# Patient Record
Sex: FEMALE | Race: WHITE | NOT HISPANIC OR LATINO | Employment: OTHER | URBAN - METROPOLITAN AREA
[De-identification: names, ages, dates, MRNs, and addresses within clinical notes are randomized per-mention and may not be internally consistent; named-entity substitution may affect disease eponyms.]

---

## 2017-03-20 ENCOUNTER — HOSPITAL ENCOUNTER (OUTPATIENT)
Dept: MAMMOGRAPHY | Facility: CLINIC | Age: 82
Discharge: HOME | End: 2017-03-20
Attending: INTERNAL MEDICINE | Admitting: INTERNAL MEDICINE
Payer: MEDICARE

## 2017-03-20 DIAGNOSIS — Z12.31 VISIT FOR SCREENING MAMMOGRAM: ICD-10-CM

## 2017-03-20 PROCEDURE — 77063 BREAST TOMOSYNTHESIS BI: CPT

## 2017-03-20 PROCEDURE — G0202 SCR MAMMO BI INCL CAD: HCPCS

## 2017-05-04 DIAGNOSIS — E78.00 HYPERCHOLESTEROLEMIA: ICD-10-CM

## 2017-05-04 DIAGNOSIS — I10 ESSENTIAL HYPERTENSION, BENIGN: ICD-10-CM

## 2017-05-04 RX ORDER — METOPROLOL SUCCINATE 25 MG/1
12.5 TABLET, EXTENDED RELEASE ORAL DAILY
Qty: 45 TABLET | Refills: 0 | Status: SHIPPED | OUTPATIENT
Start: 2017-05-04 | End: 2017-05-31

## 2017-05-04 RX ORDER — SIMVASTATIN 10 MG
10 TABLET ORAL AT BEDTIME
Qty: 90 TABLET | Refills: 0 | Status: SHIPPED | OUTPATIENT
Start: 2017-05-04 | End: 2017-05-31

## 2017-05-04 NOTE — TELEPHONE ENCOUNTER
Metoprolol      Last Written Prescription Date: 11/08/16  Last Fill Quantity: 45, # refills: 1    Last Office Visit with [facility] or  Health prescribing provider:  12/13/16  OB/GYN   Future Office Visit:    Next 5 appointments (look out 90 days)     May 31, 2017  8:00 AM CDT   PHYSICAL with Magalis Woodward MD   [facility]    [address]  [facility] [address]   [phone]                    BP Readings from Last 3 Encounters:   12/13/16 136/70   11/21/16 130/68   07/05/16 120/75     Simvastatin     Last Written Prescription Date: 12/31/16  Last Fill Quantity: 90, # refills: 0  Last Office Visit with [facility] or  Health prescribing provider: 12/13/16 OB/GYN  Next 5 appointments (look out 90 days)     May 31, 2017  8:00 AM CDT   PHYSICAL with Magalis Woodward MD   [facility]    [address]  [facility] [address]   [phone]                   Lab Results   Component Value Date    CHOL 188 05/27/2016     Lab Results   Component Value Date    HDL 79 05/27/2016     Lab Results   Component Value Date    LDL 87 05/27/2016     Lab Results   Component Value Date    TRIG 110 05/27/2016     Lab Results   Component Value Date    CHOLHDLRATIO 2.2 05/26/2015       Labs showing if normal/abnormal  Lab Results   Component Value Date    CHOL 188 05/27/2016    TRIG 110 05/27/2016    HDL 79 05/27/2016    LDL 87 05/27/2016    VLDL 15 05/26/2015    CHOLHDLRATIO 2.2 05/26/2015

## 2017-05-31 ENCOUNTER — OFFICE VISIT (OUTPATIENT)
Dept: INTERNAL MEDICINE | Facility: CLINIC | Age: 82
End: 2017-05-31
Payer: MEDICARE

## 2017-05-31 VITALS
DIASTOLIC BLOOD PRESSURE: 84 MMHG | OXYGEN SATURATION: 94 % | HEART RATE: 94 BPM | SYSTOLIC BLOOD PRESSURE: 138 MMHG | TEMPERATURE: 98.3 F | WEIGHT: 167.6 LBS | BODY MASS INDEX: 27.92 KG/M2 | HEIGHT: 65 IN

## 2017-05-31 DIAGNOSIS — N64.4 NIPPLE TENDERNESS: ICD-10-CM

## 2017-05-31 DIAGNOSIS — I10 BENIGN HYPERTENSION: ICD-10-CM

## 2017-05-31 DIAGNOSIS — N18.30 CKD (CHRONIC KIDNEY DISEASE) STAGE 3, GFR 30-59 ML/MIN (H): ICD-10-CM

## 2017-05-31 DIAGNOSIS — E78.00 HYPERCHOLESTEROLEMIA: ICD-10-CM

## 2017-05-31 DIAGNOSIS — Z00.00 ENCOUNTER FOR ROUTINE ADULT HEALTH EXAMINATION WITHOUT ABNORMAL FINDINGS: Primary | ICD-10-CM

## 2017-05-31 DIAGNOSIS — E03.9 HYPOTHYROIDISM, UNSPECIFIED TYPE: ICD-10-CM

## 2017-05-31 DIAGNOSIS — E03.8 OTHER SPECIFIED HYPOTHYROIDISM: ICD-10-CM

## 2017-05-31 DIAGNOSIS — I10 ESSENTIAL HYPERTENSION, BENIGN: ICD-10-CM

## 2017-05-31 DIAGNOSIS — Z78.0 MENOPAUSE: ICD-10-CM

## 2017-05-31 LAB
ALBUMIN SERPL-MCNC: 3.7 G/DL (ref 3.4–5)
ALP SERPL-CCNC: 63 U/L (ref 40–150)
ALT SERPL W P-5'-P-CCNC: 23 U/L (ref 0–50)
ANION GAP SERPL CALCULATED.3IONS-SCNC: 10 MMOL/L (ref 3–14)
AST SERPL W P-5'-P-CCNC: 21 U/L (ref 0–45)
BASOPHILS # BLD AUTO: 0 10E9/L (ref 0–0.2)
BASOPHILS NFR BLD AUTO: 0.4 %
BILIRUB SERPL-MCNC: 0.8 MG/DL (ref 0.2–1.3)
BUN SERPL-MCNC: 23 MG/DL (ref 7–30)
CALCIUM SERPL-MCNC: 9 MG/DL (ref 8.5–10.1)
CHLORIDE SERPL-SCNC: 102 MMOL/L (ref 94–109)
CHOLEST SERPL-MCNC: 201 MG/DL
CO2 SERPL-SCNC: 28 MMOL/L (ref 20–32)
CREAT SERPL-MCNC: 0.87 MG/DL (ref 0.52–1.04)
DIFFERENTIAL METHOD BLD: NORMAL
EOSINOPHIL # BLD AUTO: 0.3 10E9/L (ref 0–0.7)
EOSINOPHIL NFR BLD AUTO: 3.9 %
ERYTHROCYTE [DISTWIDTH] IN BLOOD BY AUTOMATED COUNT: 13.9 % (ref 10–15)
GFR SERPL CREATININE-BSD FRML MDRD: 63 ML/MIN/1.7M2
GLUCOSE SERPL-MCNC: 90 MG/DL (ref 70–99)
HCT VFR BLD AUTO: 43.8 % (ref 35–47)
HDLC SERPL-MCNC: 84 MG/DL
HGB BLD-MCNC: 14.5 G/DL (ref 11.7–15.7)
LDLC SERPL CALC-MCNC: 101 MG/DL
LYMPHOCYTES # BLD AUTO: 1.7 10E9/L (ref 0.8–5.3)
LYMPHOCYTES NFR BLD AUTO: 23.8 %
MCH RBC QN AUTO: 30.5 PG (ref 26.5–33)
MCHC RBC AUTO-ENTMCNC: 33.1 G/DL (ref 31.5–36.5)
MCV RBC AUTO: 92 FL (ref 78–100)
MONOCYTES # BLD AUTO: 0.8 10E9/L (ref 0–1.3)
MONOCYTES NFR BLD AUTO: 10.6 %
NEUTROPHILS # BLD AUTO: 4.4 10E9/L (ref 1.6–8.3)
NEUTROPHILS NFR BLD AUTO: 61.3 %
NONHDLC SERPL-MCNC: 117 MG/DL
PLATELET # BLD AUTO: 277 10E9/L (ref 150–450)
POTASSIUM SERPL-SCNC: 4 MMOL/L (ref 3.4–5.3)
PROT SERPL-MCNC: 7.9 G/DL (ref 6.8–8.8)
RBC # BLD AUTO: 4.75 10E12/L (ref 3.8–5.2)
SODIUM SERPL-SCNC: 140 MMOL/L (ref 133–144)
T4 FREE SERPL-MCNC: 1.46 NG/DL (ref 0.76–1.46)
TRIGL SERPL-MCNC: 81 MG/DL
TSH SERPL DL<=0.005 MIU/L-ACNC: 4.16 MU/L (ref 0.4–4)
WBC # BLD AUTO: 7.2 10E9/L (ref 4–11)

## 2017-05-31 PROCEDURE — 82043 UR ALBUMIN QUANTITATIVE: CPT | Performed by: INTERNAL MEDICINE

## 2017-05-31 PROCEDURE — G0439 PPPS, SUBSEQ VISIT: HCPCS | Performed by: INTERNAL MEDICINE

## 2017-05-31 PROCEDURE — 80061 LIPID PANEL: CPT | Performed by: INTERNAL MEDICINE

## 2017-05-31 PROCEDURE — 85025 COMPLETE CBC W/AUTO DIFF WBC: CPT | Performed by: INTERNAL MEDICINE

## 2017-05-31 PROCEDURE — 80053 COMPREHEN METABOLIC PANEL: CPT | Performed by: INTERNAL MEDICINE

## 2017-05-31 PROCEDURE — 84443 ASSAY THYROID STIM HORMONE: CPT | Performed by: INTERNAL MEDICINE

## 2017-05-31 PROCEDURE — 84439 ASSAY OF FREE THYROXINE: CPT | Performed by: INTERNAL MEDICINE

## 2017-05-31 PROCEDURE — 36415 COLL VENOUS BLD VENIPUNCTURE: CPT | Performed by: INTERNAL MEDICINE

## 2017-05-31 RX ORDER — SIMVASTATIN 10 MG
10 TABLET ORAL AT BEDTIME
Qty: 90 TABLET | Refills: 3 | Status: SHIPPED | OUTPATIENT
Start: 2017-05-31 | End: 2018-06-04

## 2017-05-31 RX ORDER — METOPROLOL SUCCINATE 25 MG/1
12.5 TABLET, EXTENDED RELEASE ORAL DAILY
Qty: 45 TABLET | Refills: 3 | Status: SHIPPED | OUTPATIENT
Start: 2017-05-31 | End: 2018-06-04

## 2017-05-31 RX ORDER — LISINOPRIL 10 MG/1
10 TABLET ORAL DAILY
Qty: 90 TABLET | Refills: 1 | Status: SHIPPED | OUTPATIENT
Start: 2017-05-31 | End: 2017-12-20

## 2017-05-31 RX ORDER — LEVOTHYROXINE SODIUM 88 UG/1
88 TABLET ORAL DAILY
Qty: 90 TABLET | Refills: 1 | Status: SHIPPED | OUTPATIENT
Start: 2017-05-31 | End: 2017-12-20

## 2017-05-31 NOTE — MR AVS SNAPSHOT
After Visit Summary   5/31/2017    Bushra Harmon    MRN: [id number]           Patient Information     Date Of Birth          1935        Visit Information        Provider Department      5/31/2017 8:00 AM Magalis Woodward MD [facility]        Today's Diagnoses     Encounter for routine adult health examination without abnormal findings    -  1    Benign hypertension        Other specified hypothyroidism        Menopause        CKD (chronic kidney disease) stage 3, GFR 30-59 ml/min        Nipple tenderness           Follow-ups after your visit        Additional Services     GENERAL SURG ADULT REFERRAL       Your provider has referred you to: [facility]: [facility] [place] [phone]   [email or web address]    Please be aware that coverage of these services is subject to the terms and limitations of your health insurance plan.  Call member services at your health plan with any benefit or coverage questions.      Please bring the following with you to your appointment:    (1) Any X-Rays, CTs or MRIs which have been performed.  Contact the facility where they were done to arrange for  prior to your scheduled appointment.   (2) List of current medications   (3) This referral request   (4) Any documents/labs given to you for this referral                  Future tests that were ordered for you today     Open Future Orders        Priority Expected Expires Ordered    Albumin Random Urine Quantitative Routine 5/31/2017 3/2/2018 5/31/2017    DX Hip/Pelvis/Spine Routine  5/31/2018 5/31/2017            Who to contact     If you have questions or need follow up information about today's clinic visit or your schedule please contact [facility] directly at [phone].  Normal or non-critical lab and imaging results will be communicated to you by MyChart, letter or phone within 4 business days after the clinic has  "received the results. If you do not hear from us within 7 days, please contact the clinic through Latio or phone. If you have a critical or abnormal lab result, we will notify you by phone as soon as possible.  Submit refill requests through Latio or call your pharmacy and they will forward the refill request to us. Please allow 3 business days for your refill to be completed.          Additional Information About Your Visit        [employer] Information     Latio gives you secure access to your electronic health record. If you see a primary care provider, you can also send messages to your care team and make appointments. If you have questions, please call your primary care clinic.  If you do not have a primary care provider, please call [phone] and they will assist you.        Care EveryWhere ID     This is your Care EveryWhere ID. This could be used by other organizations to access your [place] medical records  [id number]        Your Vitals Were     Pulse Temperature Height Last Period Pulse Oximetry BMI (Body Mass Index)    94 98.3  F (36.8  C) (Oral) 5' 4.5\" (1.638 m) 02/26/2003 94% 28.32 kg/m2       Blood Pressure from Last 3 Encounters:   05/31/17 138/84   12/13/16 136/70   11/21/16 130/68    Weight from Last 3 Encounters:   05/31/17 167 lb 9.6 oz (76 kg)   12/13/16 169 lb (76.7 kg)   11/21/16 169 lb (76.7 kg)              We Performed the Following     CBC with platelets differential     Comprehensive metabolic panel     GENERAL SURG ADULT REFERRAL     Lipid panel reflex to direct LDL     TSH with free T4 reflex          Today's Medication Changes          These changes are accurate as of: 5/31/17  8:54 AM.  If you have any questions, ask your nurse or doctor.               These medicines have changed or have updated prescriptions.        Dose/Directions    Acidophilus Tabs   This may have changed:  See the new instructions.   Used for:  Routine general medical examination at a [facility] " facility        120mg TAB EVERY OTHER DAY   Quantity:  90 Tab   Refills:  3       lisinopril 10 MG tablet   Commonly known as:  PRINIVIL/ZESTRIL   This may have changed:  Another medication with the same name was removed. Continue taking this medication, and follow the directions you see here.   Used for:  Benign hypertension   Changed by:  Magalis Woodward MD        Dose:  10 mg   Take 1 tablet (10 mg) by mouth daily   Quantity:  90 tablet   Refills:  0                Primary Care Provider Office Phone # Fax #    Magalis Woodward [phone]       [facility] [address] [facility] [address]        Thank you!     Thank you for choosing [facility]  for your care. Our goal is always to provide you with excellent care. Hearing back from our patients is one way we can continue to improve our services. Please take a few minutes to complete the written survey that you may receive in the mail after your visit with us. Thank you!             Your Updated Medication List - Protect others around you: Learn how to safely use, store and throw away your medicines at www.disposemymeds.org.          This list is accurate as of: 5/31/17  8:54 AM.  Always use your most recent med list.                   Brand Name Dispense Instructions for use    Acidophilus Tabs     90 Tab    120mg TAB EVERY OTHER DAY       acyclovir 200 MG capsule    ZOVIRAX    25 capsule    Take 1 capsule (200 mg) by mouth 5 times daily       aspirin 81 MG tablet     100    ONE DAILY       biotin 2.5 mg/mL Susp      Take 5,000 mg by mouth daily       calcium citrate-vitamin D 315-200 MG-UNIT Tabs per tablet    CITRACAL    90 Tab    2 TABLETS DAILY       Glucosamine HCl 1000 MG Tabs     90 Tab    2 TABLETS DAILY       levothyroxine 88 MCG tablet    SYNTHROID/LEVOTHROID    90 tablet    Take 1 tablet (88 mcg) by mouth daily       lisinopril 10 MG tablet    PRINIVIL/ZESTRIL    90 tablet    Take 1 tablet (10  mg) by mouth daily       Lutein 6 MG Tabs      Every other day       metoprolol 25 MG 24 hr tablet    TOPROL-XL    45 tablet    Take 0.5 tablets (12.5 mg) by mouth daily       MULTIVITAMIN TABS   OR      1 daily       * simvastatin 10 MG tablet    ZOCOR    90 tablet    Take 1 tablet (10 mg) by mouth At Bedtime       * simvastatin 10 MG tablet    ZOCOR    90 tablet    Take 1 tablet (10 mg) by mouth At Bedtime       SOY CARE MENOPAUSE PO      Every other day       * Notice:  This list has 2 medication(s) that are the same as other medications prescribed for you. Read the directions carefully, and ask your doctor or other care provider to review them with you.

## 2017-05-31 NOTE — PROGRESS NOTES
SUBJECTIVE:                                                            Bushra Harmon is a 81 year old female who presents for Preventive Visit.      Are you in the first 12 months of your Medicare coverage?  No    Physical   Annual:     Getting at least 3 servings of Calcium per day::  Yes    Bi-annual eye exam::  Yes    Dental care twice a year::  Yes    Sleep apnea or symptoms of sleep apnea::  None    Diet::  Regular (no restrictions)    Frequency of exercise::  2-3 days/week    Duration of exercise::  15-30 minutes    Medication side effects::  None    Additional concerns today::  YES      COGNITIVE SCREEN  1) Repeat 3 items (Banana, Sunrise, Chair)    2) Clock draw: NORMAL  3) 3 item recall: Recalls 3 objects  Results: 3 items recalled: COGNITIVE IMPAIRMENT LESS LIKELY    Mini-CogTM Copyright S Roya. Licensed by the author for use in [employer]; reprinted with permission (roberto@[facility]). All rights reserved.      May 21st right nipple tenderness.    Reviewed and updated as needed this visit by clinical staff  Tobacco  Allergies  Meds  Med Hx  Surg Hx  Fam Hx  Soc Hx        Reviewed and updated as needed this visit by Provider        Social History   Substance Use Topics     Smoking status: Former Smoker     Packs/day: 0.50     Years: 45.00     Quit date: 5/1/2001     Smokeless tobacco: Never Used     Alcohol use Yes      Comment: 1 drink per day       The patient does not drink >3 drinks per day nor >7 drinks per week.        PROBLEMS TO ADD ON...    Today's PHQ-2 Score:   PHQ-2 ( 1999 [employer]) 5/28/2017   Q1: Little interest or pleasure in doing things Not at all   Q2: Feeling down, depressed or hopeless Not at all   PHQ-2 Score 0       Do you feel safe in your environment - Yes    Do you have a Health Care Directive?: Yes: Advance Directive has been received and scanned.    Current providers sharing in care for this patient include:   Patient Care Team:  Magalis Woodward MD as PCP -  "General (Internal Medicine)      Hearing impairment: No    Ability to successfully perform activities of daily living: Yes, no assistance needed     Fall risk:  Fallen 2 or more times in the past year?: No  Any fall with injury in the past year?: No    Home safety:  none identified      The following health maintenance items are reviewed in Epic and correct as of today:  Health Maintenance   Topic Date Due     ADVANCE DIRECTIVE PLANNING Q5 YRS  05/18/2016     TSH Q1 YEAR  05/27/2017     MEDICARE ANNUAL WELLNESS VISIT  05/27/2017     FALL RISK ASSESSMENT  05/27/2017     INFLUENZA VACCINE (SYSTEM ASSIGNED)  09/01/2017     TETANUS IMMUNIZATION (SYSTEM ASSIGNED)  05/10/2020     LIPID SCREEN Q5 YR FEMALE (SYSTEM ASSIGNED)  05/27/2021     COLONOSCOPY Q10 YR  11/03/2025     DEXA SCAN SCREENING (SYSTEM ASSIGNED)  Completed     PNEUMOCOCCAL  Completed              ROS:  Constitutional, HEENT, cardiovascular, pulmonary, GI, , musculoskeletal, neuro, skin, endocrine and psych systems are negative, except as otherwise noted.  Right nipple tenderness since mid May    Problem list, Medication list, Allergies, and Medical/Social/Surgical histories reviewed in [facility] and updated as appropriate.  OBJECTIVE:                                                            /84 (BP Location: Left arm, Patient Position: Chair, Cuff Size: Adult Regular)  Pulse 94  Temp 98.3  F (36.8  C) (Oral)  Ht 5' 4.5\" (1.638 m)  Wt 167 lb 9.6 oz (76 kg)  LMP 02/26/2003  SpO2 94%  BMI 28.32 kg/m2 Estimated body mass index is 28.32 kg/(m^2) as calculated from the following:    Height as of this encounter: 5' 4.5\" (1.638 m).    Weight as of this encounter: 167 lb 9.6 oz (76 kg).  EXAM:   GENERAL APPEARANCE: healthy, alert and no distress  EYES: Eyes grossly normal to inspection, PERRL and conjunctivae and sclerae normal  HENT: ear canals and TM's normal, nose and mouth without ulcers or lesions, oropharynx clear and oral mucous membranes " moist  NECK: no adenopathy, no asymmetry, masses, or scars and thyroid normal to palpation  RESP: lungs clear to auscultation - no rales, rhonchi or wheezes  BREAST: normal without masses, tenderness or nipple discharge and no palpable axillary masses or adenopathy; right nipple with mild tenderness  CV: regular rate and rhythm, normal S1 S2, no S3 or S4, no murmur, click or rub, no peripheral edema and peripheral pulses strong  ABDOMEN: soft, nontender, no hepatosplenomegaly, no masses and bowel sounds normal  MS: no musculoskeletal defects are noted and gait is age appropriate without ataxia  SKIN: no suspicious lesions or rashes  NEURO: Normal strength and tone, sensory exam grossly normal, mentation intact and speech normal  PSYCH: mentation appears normal and affect normal/bright    ASSESSMENT / PLAN:                                                                ICD-10-CM    1. Encounter for routine adult health examination without abnormal findings Z00.00 Lipid panel reflex to direct LDL     Comprehensive metabolic panel     TSH with free T4 reflex     CBC with platelets differential     Albumin Random Urine Quantitative     Albumin Random Urine Quantitative   2. Benign hypertension I10 Albumin Random Urine Quantitative     lisinopril (PRINIVIL/ZESTRIL) 10 MG tablet     Albumin Random Urine Quantitative   3. Other specified hypothyroidism E03.8    4. Menopause Z78.0 DX Hip/Pelvis/Spine   5. CKD (chronic kidney disease) stage 3, GFR 30-59 ml/min N18.3 Albumin Random Urine Quantitative     Albumin Random Urine Quantitative   6. Nipple tenderness N64.4 GENERAL SURG ADULT REFERRAL   7. BENIGN HYPERTENSION I10 metoprolol (TOPROL-XL) 25 MG 24 hr tablet   8. Hypercholesterolemia E78.00 simvastatin (ZOCOR) 10 MG tablet   9. Hypothyroidism, unspecified type E03.9 levothyroxine (SYNTHROID/LEVOTHROID) 88 MCG tablet       End of Life Planning:  Patient currently has an advanced directive: Yes.  Practitioner is supportive  "of decision.    COUNSELING:  Reviewed preventive health counseling, as reflected in patient instructions        Estimated body mass index is 28.32 kg/(m^2) as calculated from the following:    Height as of this encounter: 5' 4.5\" (1.638 m).    Weight as of this encounter: 167 lb 9.6 oz (76 kg).     reports that she quit smoking about 16 years ago. She has a 22.50 pack-year smoking history. She has never used smokeless tobacco.      Appropriate preventive services were discussed with this patient, including applicable screening as appropriate for cardiovascular disease, diabetes, osteopenia/osteoporosis, and glaucoma.  As appropriate for age/gender, discussed screening for colorectal cancer, prostate cancer, breast cancer, and cervical cancer. Checklist reviewing preventive services available has been given to the patient.    Reviewed patients plan of care and provided an AVS. The Basic Care Plan (routine screening as documented in Health Maintenance) for Bushra meets the Care Plan requirement. This Care Plan has been established and reviewed with the Patient.    Counseling Resources:  ATP IV Guidelines  Pooled Cohorts Equation Calculator  Breast Cancer Risk Calculator  FRAX Risk Assessment  ICSI Preventive Guidelines  Dietary Guidelines for Americans, 2010  [place]'s MyPlate  ASA Prophylaxis  Lung CA Screening    Magalis Woodward MD  [facility]  Answers for HPI/ROS submitted by the patient on 5/28/2017   PHQ-2 Score: 0    "

## 2017-05-31 NOTE — NURSING NOTE
"Chief Complaint   Patient presents with     Medicare Visit     Fasting       Initial /84 (BP Location: Left arm, Patient Position: Chair, Cuff Size: Adult Regular)  Pulse 94  Temp 98.3  F (36.8  C) (Oral)  Ht 5' 4.5\" (1.638 m)  Wt 167 lb 9.6 oz (76 kg)  LMP 02/26/2003  SpO2 94%  BMI 28.32 kg/m2 Estimated body mass index is 28.32 kg/(m^2) as calculated from the following:    Height as of this encounter: 5' 4.5\" (1.638 m).    Weight as of this encounter: 167 lb 9.6 oz (76 kg).  Medication Reconciliation: complete     Maria Teresa Casey CMA      "

## 2017-06-01 LAB
CREAT UR-MCNC: 73 MG/DL
MICROALBUMIN UR-MCNC: 94 MG/L
MICROALBUMIN/CREAT UR: 128.28 MG/G CR (ref 0–25)

## 2017-06-01 ASSESSMENT — PATIENT HEALTH QUESTIONNAIRE - PHQ9: SUM OF ALL RESPONSES TO PHQ QUESTIONS 1-9: 0

## 2017-06-06 ENCOUNTER — TELEPHONE (OUTPATIENT)
Dept: INTERNAL MEDICINE | Facility: CLINIC | Age: 82
End: 2017-06-06

## 2017-06-06 NOTE — TELEPHONE ENCOUNTER
Pt calls with question regarding Protein in urine. Pt states the lab note from Dr. Woodward said it was stable, but concerned about how high the level is. Reviewed result note from Dr. Woodward and previous results. Informed pt the result is consistent with results from 2015. Pt thinks level was around 80 about 6 months ago when she saw her Nephrologist. Recommended pt contact Nephrologist with results to see if they have any concerns about this result. Pt agrees with plan.

## 2017-06-08 ENCOUNTER — OFFICE VISIT (OUTPATIENT)
Dept: SURGERY | Facility: CLINIC | Age: 82
End: 2017-06-08
Payer: MEDICARE

## 2017-06-08 ENCOUNTER — TELEPHONE (OUTPATIENT)
Dept: SURGERY | Facility: CLINIC | Age: 82
End: 2017-06-08

## 2017-06-08 VITALS
SYSTOLIC BLOOD PRESSURE: 132 MMHG | HEART RATE: 84 BPM | WEIGHT: 165 LBS | HEIGHT: 65 IN | OXYGEN SATURATION: 94 % | BODY MASS INDEX: 27.49 KG/M2 | DIASTOLIC BLOOD PRESSURE: 68 MMHG

## 2017-06-08 DIAGNOSIS — N64.4 NIPPLE PAIN: Primary | ICD-10-CM

## 2017-06-08 DIAGNOSIS — N64.4 MASTODYNIA: Primary | ICD-10-CM

## 2017-06-08 PROCEDURE — 99204 OFFICE O/P NEW MOD 45 MIN: CPT | Performed by: SURGERY

## 2017-06-08 NOTE — PATIENT INSTRUCTIONS
ULTRASOUND right breast    Date: 6/12/2017  Time: 2:00 pm   Location: [facility]  Breast [address] [facility]   [place] [address]  [phone]    * No lotion, perfume, or deodorants please.     Please check in at 1:45 pm

## 2017-06-08 NOTE — LETTER
" 2017    RE:  Bushra Harmon-: 39    Assessment:    Bushra Harmon is seen in consultation for right nipple pain, at the request of Magalis Woodward MD.     Bushra is a 81 year old female with a right nipple pain of unclear etiology.      PLAN:  We discussed the nature of mastodynia and recommendations for symptomatic improvement.  We discussed options for close observation with self breast exams and yearly mammography/US vs breast MRI. She would like to proceed with right diagnostic mammography and retroareolar US.  If these are reassuring she will continue her yearly mammography with tomosynthesis and self breast exams.  She will return to see me if she notes any changes in her self breast exams.     HPI:  Bushra Harmon is a 81 year old female who noted a right nipple pain 1 month ago.  The right nipple is particularly sensitive to rubbing of overlying clothing.  Wearing a bra minimizes her symptoms.  She describes the pain as aching. She denies trauma, or new physical activity/exercise routine.  Denies increased pain with activity.  She has consulted with a dermatologist and her PCP regarding this.         Skin rashes, dimpling or nipple changes:  none  Nipple discharge:  none  Performs self breast exams:  Yes  Previous breast biopsies:  Yes - L core bx ~10ya, B9  Previous cyst aspiration:  No  Previous other breast surgery:  No     Hormonal history:  postmenopausal, >10yrs HRT in the past.      Family history of breast cancer: Yes - mother 84yo  Family history of ovarian cancer: No       Has a past medical history of CKD (chronic kidney disease); Essential hypertension, benign; and unspecified hypothyroidism.     ROS:  The 10 point review of systems is negative other than noted in the HPI and above.     PE:  Vitals: /68 (BP Location: Right arm, Cuff Size: Adult Regular)  Pulse 84  Ht 5' 4.5\" (1.638 m)  Wt 165 lb (74.8 kg)  LMP 2003  SpO2 94%  Breastfeeding? No  BMI 27.88 " kg/m2  General appearance: well-nourished, sitting comfortably, no apparent distress  Neck: Supple, without masses or lymphadenopathy   Respirations:  Unlabored  Extremities: warm, without edema  Neurologic: alert, speech is clear, moves all extremities with good strength  Psychiatric: Mood and affect are appropriate  Skin: Without lesions, rashes, or jaundice  Breast:                         Symmetrical with no skin or nipple changes.                         Contour is normal .                        Tenderness right nipple areolar complex.                        Parenchyma is heterogeneously dense.                        Masses- none.                        Incisional scar- none     Lymph:                           No supraclavicular/infraclavicular adenopathy.                         Axillary adenopathy: none        This note was created using voice recognition software. Undetected word substitutions or other errors may have occurred.        Idania Sanchez MD

## 2017-06-08 NOTE — PROGRESS NOTES
Breast Patients    BREAST PATIENTS (ALL)    1-Do you have any of the following symptoms? Breast Pain  2-In which breast are you having the symptoms? right  3-Do you use hormones?  No  4-Have you had a Mammogram? Yes  Where: [facility]  Date: 3/20/2017  5-Have you ever had a breast cyst drained? No  6-Have you ever had a breast biopsy? Yes  Side: LEFT  Date: 10 YRS AGO  7-Have you ever had a Breast Cancer? No   8-Is there a history of Breast Cancer in your family? Yes   Relationship to you:    Mother  9-Have you ever had Ovarian Cancer? No  10-Is there a history of Ovarian Cancer in your family? No  11-Summarize your caffeine intake (i.e. coffee, tea, chocolate, soda etc.): 2 CUPS DAILY    BREAST PATIENTS (FEMALE)    12-What age did your periods begin? 13-14  13-Date your last menstrual period began? ?  14-Number of full-term pregnancies: 3  15-Your age when your first child was born? 26  16-Did you nurse your children? Yes  17-Are you pregnant now? No  18-Have you begun menopause? Yes  Age Menopause began:  HORMONE THERAPY  19-Have you had either ovary removed?No  20-Do you have breast implants? No       HPI      ROS (Review of Systems):      Positive for thyroid problem.   Cardiovascular: Positive for hypertension.          Physical Exam      Assessment:    Bushra Harmon is seen in consultation for right nipple pain, at the request of Magalis Woodward MD.    Bushra is a 81 year old female with a right nipple pain of unclear etiology.     PLAN:  We discussed the nature of mastodynia and recommendations for symptomatic improvement.  We discussed options for close observation with self breast exams and yearly mammography/US vs breast MRI. She would like to proceed with right diagnostic mammography and retroareolar US.  If these are reassuring she will continue her yearly mammography with tomosynthesis and self breast exams.  She will return to see me if she notes  any changes in her self breast exams.    HPI:  Bushra Harmon is a 81 year old female who noted a right nipple pain 1 month ago.  The right nipple is particularly sensitive to rubbing of overlying clothing.  Wearing a bra minimizes her symptoms.  She describes the pain as aching. She denies trauma, or new physical activity/exercise routine.  Denies increased pain with activity.  She has consulted with a dermatologist and her PCP regarding this.        Skin rashes, dimpling or nipple changes:  none  Nipple discharge:  none  Performs self breast exams:  Yes  Previous breast biopsies:  Yes - L core bx ~10ya, B9  Previous cyst aspiration:  No  Previous other breast surgery:  No    Hormonal history:  postmenopausal, >10yrs HRT in the past.     Family history of breast cancer: Yes - mother 84yo  Family history of ovarian cancer: No    Imaging:  Recent Results (from the past 744 hour(s))   MA Diagnostic Right w/Rafael    Narrative    Examination: Right breast digital diagnostic mammography and digital  breast tomosynthesis with computer aided detection, and focussed  ultrasound of the right breast, 6/14/2017.    Comparison: 3/20/2017, 3/17/2016, 3/16/2015, 3/13/2014 and 3/8/2012.    History: Intermittent hypersensitivity and tenderness in the right  nipple.    BREAST DENSITY: Scattered fibroglandular densities    Findings: No significant mammographic change.    Focused ultrasound of the retroareolar right breast was performed by  radiologist and technologist. No concerning findings identified.      Impression    IMPRESSION: BI-RADS CATEGORY: 1 -  NEGATIVE.    RECOMMENDED FOLLOW-UP: Annual Mammography.    Clinical follow-up. Given the lack of imaging findings, further  management of nipple pain should be based on clinical grounds.    The patient was given the results of the examination.    MOSES GAMBINO   US Breast Right Limited 1-3 Quadrants    Narrative    Examination: Right breast digital diagnostic mammography and  digital  breast tomosynthesis with computer aided detection, and focussed  ultrasound of the right breast, 2017.    Comparison: 3/20/2017, 3/17/2016, 3/16/2015, 3/13/2014 and 3/8/2012.    History: Intermittent hypersensitivity and tenderness in the right  nipple.    BREAST DENSITY: Scattered fibroglandular densities    Findings: No significant mammographic change.    Focused ultrasound of the retroareolar right breast was performed by  radiologist and technologist. No concerning findings identified.      Impression    IMPRESSION: BI-RADS CATEGORY: 1 -  NEGATIVE.    RECOMMENDED FOLLOW-UP: Annual Mammography.    Clinical follow-up. Given the lack of imaging findings, further  management of nipple pain should be based on clinical grounds.    The patient was given the results of the examination.    MOSES GAMBINO            Past Medical History:   has a past medical history of CKD (chronic kidney disease); Essential hypertension, benign; and Unspecified hypothyroidism.    Past Surgical History:  Past Surgical History:   Procedure Laterality Date     ABDOMEN SURGERY       C DEXA, BONE DENSITY, AXIAL SKEL  2003    Normal BMD     C NONSPECIFIC PROCEDURE      Exploratory Laparotomy constricting band     C NONSPECIFIC PROCEDURE       x3     C REPLANTATION THUMB DISTAL,COMPLETE  2009    left thumb trauma; Dr. Jose surgery     COLONOSCOPY N/A 11/3/2015    Procedure: COLONOSCOPY;  Surgeon: Joanna Acevedo MD;  Location:  GI     HC COLONOSCOPY THRU STOMA, DIAGNOSTIC  10/22/2005    Diverticulosis- Dr. Shea     HC FLEX SIGMOIDOSCOPY W/WO SHERIF SPEC BY BRUSH/WASH  1998    normal     HC REMOVE TONSILS/ADENOIDS,<11 Y/O       HC REMV CATARACT EXTRACAP,INSERT LENS,COMP  2005    Left eye     SURGICAL HISTORY OF -   2004    macular hole repair- Left eye        Social History:  Social History     Social History     Marital status:      Spouse name: N/A     Number of children: 3     Years of  "education: N/A     Occupational History     office       Retired     Social History Main Topics     Smoking status: Former Smoker     Packs/day: 0.50     Years: 45.00     Quit date: 5/1/2001     Smokeless tobacco: Never Used     Alcohol use Yes      Comment: 1 drink per day     Drug use: No     Sexual activity: No     Other Topics Concern     Caffeine Concern Yes     1 cup per day     Exercise Yes     Active; exercise 4 times per week     Seat Belt Yes     Self-Exams No     Social History Narrative        ROS:  The 10 point review of systems is negative other than noted in the HPI and above.    PE:  Vitals: /68 (BP Location: Right arm, Cuff Size: Adult Regular)  Pulse 84  Ht 5' 4.5\" (1.638 m)  Wt 165 lb (74.8 kg)  LMP 02/26/2003  SpO2 94%  Breastfeeding? No  BMI 27.88 kg/m2  General appearance: well-nourished, sitting comfortably, no apparent distress  Neck: Supple, without masses or lymphadenopathy   Respirations:  Unlabored  Extremities: warm, without edema  Neurologic: alert, speech is clear, moves all extremities with good strength  Psychiatric: Mood and affect are appropriate  Skin: Without lesions, rashes, or jaundice  Breast:    Symmetrical with no skin or nipple changes.    Contour is normal .   Tenderness right nipple areolar complex.   Parenchyma is heterogeneously dense.   Masses- none.   Incisional scar- none    Lymph:      No supraclavicular/infraclavicular adenopathy.    Axillary adenopathy: none      This note was created using voice recognition software. Undetected word substitutions or other errors may have occurred.     Time spent with the patient with greater that 50% of the time in discussion was 15 minutes.     Idania Sanchez MD    Please route or send letter to:  Primary Care Provider (PCP) and Referring Provider    "

## 2017-06-08 NOTE — MR AVS SNAPSHOT
After Visit Summary   6/8/2017    Bushra Harmon    MRN: [id number]           Patient Information     Date Of Birth          1935        Visit Information        Provider Department      6/8/2017 9:45 AM Idania Sanchez MD [facility] General [facility]      Care Instructions    ULTRASOUND right breast    Date: 6/12/2017  Time: 2:00 pm   Location: [facility]  Breast Center  [address]   [place] [address]  [phone]    * No lotion, perfume, or deodorants please.     Please check in at 1:45 pm             Follow-ups after your visit        Your next 10 appointments already scheduled     Jun 12, 2017  2:00 PM CDT   MA DIAGNOSTIC DIGITAL RIGHT with [id number]   [facility] ([facility])    [address], [address]  [facility] [id number] [phone]           Do not use any powder, lotion or deodorant under your arms or on your breast. If you do, we will ask you to remove it before your exam.  Wear comfortable, two-piece clothing.  If you have any allergies, tell your care team.  Bring any previous mammograms from other facilities or have them mailed to the breast center.            Jun 12, 2017  2:30 PM CDT   US BREAST RIGHT LIMITED 1-3 QUAD with [email or web address]   [facility] ([facility])    [address]  [facility] [id number] [phone]           Please bring a list of your medicines (including vitamins, minerals and over-the-counter drugs). Also, tell your doctor about any allergies you may have. Wear comfortable clothes and leave your valuables at home.  You do not need to do anything special to prepare for your exam.  Please call the Imaging Department at your exam site with any questions.            Jun 29, 2017 11:00 AM CDT   DX HIP/PELVIS/SPINE with RIDX1   [facility] ([facility] [place]  "[place])    [address]  [address]  [facility] [place] [address]              Please do not take any of the following 24 hours prior to the day of your exam: vitamins, calcium tablets, antacids.              Future tests that were ordered for you today     Open Future Orders        Priority Expected Expires Ordered    MA Diagnostic Digital Right Routine 6/8/2017 6/8/2018 6/8/2017    US Breast Right Limited 1-3 Quadrants Routine 6/8/2017 6/8/2018 6/8/2017            Who to contact     If you have questions or need follow up information about today's clinic visit or your schedule please contact [facility] directly at [phone].  Normal or non-critical lab and imaging results will be communicated to you by [employer], letter or phone within 4 business days after the clinic has received the results. If you do not hear from us within 7 days, please contact the clinic through [employer] or phone. If you have a critical or abnormal lab result, we will notify you by phone as soon as possible.  Submit refill requests through [employer] or call your pharmacy and they will forward the refill request to us. Please allow 3 business days for your refill to be completed.          Additional Information About Your Visit        [employer] Information     [employer] gives you secure access to your electronic health record. If you see a primary care provider, you can also send messages to your care team and make appointments. If you have questions, please call your primary care clinic.  If you do not have a primary care provider, please call [phone] and they will assist you.        Care EveryWhere ID     This is your Care EveryWhere ID. This could be used by other organizations to access your [facility] medical records  [id number]        Your Vitals Were     Pulse Height Last Period Pulse Oximetry Breastfeeding? BMI (Body Mass Index)    84 5' 4.5\" (1.638 m) 02/26/2003 94% No 27.88 kg/m2       Blood " Pressure from Last 3 Encounters:   06/08/17 132/68   05/31/17 138/84   12/13/16 136/70    Weight from Last 3 Encounters:   06/08/17 165 lb (74.8 kg)   05/31/17 167 lb 9.6 oz (76 kg)   12/13/16 169 lb (76.7 kg)              Today, you had the following     No orders found for display         Today's Medication Changes          These changes are accurate as of: 6/8/17 10:27 AM.  If you have any questions, ask your nurse or doctor.               These medicines have changed or have updated prescriptions.        Dose/Directions    Acidophilus Tabs   This may have changed:  See the new instructions.   Used for:  Routine general medical examination at a health care facility        120mg TAB EVERY OTHER DAY   Quantity:  90 Tab   Refills:  3                Primary Care Provider Office Phone # Fax #    Magalis Woodward [phone]       [facility] [address] [place] [address]        Thank you!     Thank you for choosing [facility]  for your care. Our goal is always to provide you with excellent care. Hearing back from our patients is one way we can continue to improve our services. Please take a few minutes to complete the written survey that you may receive in the mail after your visit with us. Thank you!             Your Updated Medication List - Protect others around you: Learn how to safely use, store and throw away your medicines at www.disposemymeds.org.          This list is accurate as of: 6/8/17 10:27 AM.  Always use your most recent med list.                   Brand Name Dispense Instructions for use    Acidophilus Tabs     90 Tab    120mg TAB EVERY OTHER DAY       acyclovir 200 MG capsule    ZOVIRAX    25 capsule    Take 1 capsule (200 mg) by mouth 5 times daily       aspirin 81 MG tablet     100    ONE DAILY       biotin 2.5 mg/mL Susp      Take 5,000 mg by mouth daily       calcium citrate-vitamin D 315-200 MG-UNIT Tabs per tablet    CITRACAL    90  Tab    2 TABLETS DAILY       Glucosamine HCl 1000 MG Tabs     90 Tab    2 TABLETS DAILY       levothyroxine 88 MCG tablet    SYNTHROID/LEVOTHROID    90 tablet    Take 1 tablet (88 mcg) by mouth daily       lisinopril 10 MG tablet    PRINIVIL/ZESTRIL    90 tablet    Take 1 tablet (10 mg) by mouth daily       Lutein 6 MG Tabs      Every other day       metoprolol 25 MG 24 hr tablet    TOPROL-XL    45 tablet    Take 0.5 tablets (12.5 mg) by mouth daily       MULTIVITAMIN TABS   OR      1 daily       * simvastatin 10 MG tablet    ZOCOR    90 tablet    Take 1 tablet (10 mg) by mouth At Bedtime       * simvastatin 10 MG tablet    ZOCOR    90 tablet    Take 1 tablet (10 mg) by mouth At Bedtime       SOY CARE MENOPAUSE PO      Every other day       * Notice:  This list has 2 medication(s) that are the same as other medications prescribed for you. Read the directions carefully, and ask your doctor or other care provider to review them with you.

## 2017-06-14 ENCOUNTER — HOSPITAL ENCOUNTER (OUTPATIENT)
Dept: ULTRASOUND IMAGING | Facility: CLINIC | Age: 82
End: 2017-06-14
Attending: SURGERY
Payer: MEDICARE

## 2017-06-14 ENCOUNTER — HOSPITAL ENCOUNTER (OUTPATIENT)
Dept: MAMMOGRAPHY | Facility: CLINIC | Age: 82
Discharge: HOME | End: 2017-06-14
Attending: SURGERY | Admitting: SURGERY
Payer: MEDICARE

## 2017-06-14 DIAGNOSIS — N64.4 NIPPLE PAIN: ICD-10-CM

## 2017-06-14 PROCEDURE — 76642 ULTRASOUND BREAST LIMITED: CPT | Mod: RT

## 2017-06-14 PROCEDURE — G0279 TOMOSYNTHESIS, MAMMO: HCPCS

## 2017-06-29 ENCOUNTER — RADIANT APPOINTMENT (OUTPATIENT)
Dept: BONE DENSITY | Facility: CLINIC | Age: 82
End: 2017-06-29
Attending: INTERNAL MEDICINE
Payer: MEDICARE

## 2017-06-29 ENCOUNTER — RADIANT APPOINTMENT (OUTPATIENT)
Dept: BONE DENSITY | Facility: CLINIC | Age: 82
End: 2017-06-29
Payer: MEDICARE

## 2017-06-29 DIAGNOSIS — Z78.0 MENOPAUSE: ICD-10-CM

## 2017-06-29 PROCEDURE — 77081 DXA BONE DENSITY APPENDICULR: CPT | Performed by: INTERNAL MEDICINE

## 2017-06-29 PROCEDURE — 77080 DXA BONE DENSITY AXIAL: CPT | Mod: XU | Performed by: INTERNAL MEDICINE

## 2017-12-18 ENCOUNTER — TRANSFERRED RECORDS (OUTPATIENT)
Dept: HEALTH INFORMATION MANAGEMENT | Facility: CLINIC | Age: 82
End: 2017-12-18

## 2017-12-20 DIAGNOSIS — I10 BENIGN HYPERTENSION: ICD-10-CM

## 2017-12-20 DIAGNOSIS — E03.9 HYPOTHYROIDISM, UNSPECIFIED TYPE: ICD-10-CM

## 2017-12-21 RX ORDER — LEVOTHYROXINE SODIUM 88 UG/1
TABLET ORAL
Qty: 90 TABLET | Refills: 1 | Status: SHIPPED | OUTPATIENT
Start: 2017-12-21 | End: 2018-06-04

## 2017-12-21 RX ORDER — LISINOPRIL 10 MG/1
TABLET ORAL
Qty: 90 TABLET | Refills: 1 | Status: SHIPPED | OUTPATIENT
Start: 2017-12-21 | End: 2018-06-04

## 2017-12-21 NOTE — TELEPHONE ENCOUNTER
TSH   Date Value Ref Range Status   05/31/2017 4.16 (H) 0.40 - 4.00 mU/L Final     Creatinine   Date Value Ref Range Status   05/31/2017 0.87 0.52 - 1.04 mg/dL Final     Potassium   Date Value Ref Range Status   05/31/2017 4.0 3.4 - 5.3 mmol/L Final       Prescriptions approved per [facility] Refill Protocol.

## 2018-01-12 DIAGNOSIS — N18.30 CHRONIC KIDNEY DISEASE, STAGE III (MODERATE) (H): Primary | ICD-10-CM

## 2018-02-22 ENCOUNTER — TELEPHONE (OUTPATIENT)
Dept: INTERNAL MEDICINE | Facility: CLINIC | Age: 83
End: 2018-02-22

## 2018-02-22 DIAGNOSIS — R19.7 DIARRHEA, UNSPECIFIED TYPE: Primary | ICD-10-CM

## 2018-02-22 NOTE — TELEPHONE ENCOUNTER
Pt states she ate at a [employer] party 8 days ago in [place], and started getting diarrhea shortly after this. There was shrimp cocktail. Ate Liver holliday there.   Now has a lot of abdomen gurgling, and has intermittent, very loose stools. Not watery. Not every day. But can be several times a day, when she does get it.     No appetite at times.     Trying to drink plenty of fluids.     She is asking if can have stool sample.     She had Cdiff before and has sample cup from years ago. Not sure if they  but she is asking if can use it to have a stool sample.  Advised that may need different testing.     Please advise.     Tried Imodium. She has appt on Monday when she hurt her shoulder, but she states this is starting to feel better, so may cancel it.

## 2018-02-22 NOTE — TELEPHONE ENCOUNTER
Please advise not likely food poisoning 8 days after eating seafood.  Without recent antibiotic use, C diff would be from contamination from another person with C diff.  Will order stool cultures and C diff,  new collection kit from lab.  Ivet Witt CNP

## 2018-02-23 PROCEDURE — 87177 OVA AND PARASITES SMEARS: CPT | Performed by: NURSE PRACTITIONER

## 2018-02-23 PROCEDURE — 87209 SMEAR COMPLEX STAIN: CPT | Performed by: NURSE PRACTITIONER

## 2018-02-23 PROCEDURE — 87329 GIARDIA AG IA: CPT | Performed by: NURSE PRACTITIONER

## 2018-02-23 PROCEDURE — 87338 HPYLORI STOOL AG IA: CPT | Performed by: NURSE PRACTITIONER

## 2018-02-24 DIAGNOSIS — R19.7 DIARRHEA, UNSPECIFIED TYPE: ICD-10-CM

## 2018-02-24 LAB
C DIFF TOX B STL QL: ABNORMAL
SPECIMEN SOURCE: ABNORMAL

## 2018-02-26 LAB
G LAMBLIA AG STL QL IA: NORMAL
H PYLORI AG STL QL IA: NORMAL
O+P STL MICRO: NORMAL
O+P STL MICRO: NORMAL
SPECIMEN SOURCE: NORMAL

## 2018-02-27 ENCOUNTER — OFFICE VISIT (OUTPATIENT)
Dept: INTERNAL MEDICINE | Facility: CLINIC | Age: 83
End: 2018-02-27
Payer: MEDICARE

## 2018-02-27 VITALS
TEMPERATURE: 97.6 F | DIASTOLIC BLOOD PRESSURE: 70 MMHG | OXYGEN SATURATION: 96 % | HEIGHT: 65 IN | WEIGHT: 168 LBS | SYSTOLIC BLOOD PRESSURE: 110 MMHG | HEART RATE: 98 BPM | BODY MASS INDEX: 27.99 KG/M2

## 2018-02-27 DIAGNOSIS — E03.8 OTHER SPECIFIED HYPOTHYROIDISM: ICD-10-CM

## 2018-02-27 DIAGNOSIS — R19.7 DIARRHEA, UNSPECIFIED TYPE: Primary | ICD-10-CM

## 2018-02-27 DIAGNOSIS — I10 HYPERTENSION GOAL BP (BLOOD PRESSURE) < 140/90: ICD-10-CM

## 2018-02-27 LAB
BASOPHILS # BLD AUTO: 0 10E9/L (ref 0–0.2)
BASOPHILS NFR BLD AUTO: 0.2 %
DIFFERENTIAL METHOD BLD: NORMAL
EOSINOPHIL # BLD AUTO: 0.2 10E9/L (ref 0–0.7)
EOSINOPHIL NFR BLD AUTO: 2.2 %
ERYTHROCYTE [DISTWIDTH] IN BLOOD BY AUTOMATED COUNT: 14 % (ref 10–15)
HCT VFR BLD AUTO: 41.5 % (ref 35–47)
HGB BLD-MCNC: 13.6 G/DL (ref 11.7–15.7)
LYMPHOCYTES # BLD AUTO: 1.8 10E9/L (ref 0.8–5.3)
LYMPHOCYTES NFR BLD AUTO: 22.6 %
MCH RBC QN AUTO: 30 PG (ref 26.5–33)
MCHC RBC AUTO-ENTMCNC: 32.8 G/DL (ref 31.5–36.5)
MCV RBC AUTO: 91 FL (ref 78–100)
MONOCYTES # BLD AUTO: 0.7 10E9/L (ref 0–1.3)
MONOCYTES NFR BLD AUTO: 9 %
NEUTROPHILS # BLD AUTO: 5.4 10E9/L (ref 1.6–8.3)
NEUTROPHILS NFR BLD AUTO: 66 %
PLATELET # BLD AUTO: 296 10E9/L (ref 150–450)
RBC # BLD AUTO: 4.54 10E12/L (ref 3.8–5.2)
WBC # BLD AUTO: 8.1 10E9/L (ref 4–11)

## 2018-02-27 PROCEDURE — 36415 COLL VENOUS BLD VENIPUNCTURE: CPT | Performed by: INTERNAL MEDICINE

## 2018-02-27 PROCEDURE — 99214 OFFICE O/P EST MOD 30 MIN: CPT | Performed by: INTERNAL MEDICINE

## 2018-02-27 PROCEDURE — 80048 BASIC METABOLIC PNL TOTAL CA: CPT | Performed by: INTERNAL MEDICINE

## 2018-02-27 PROCEDURE — 85025 COMPLETE CBC W/AUTO DIFF WBC: CPT | Performed by: INTERNAL MEDICINE

## 2018-02-27 NOTE — NURSING NOTE
"Chief Complaint   Patient presents with     Diarrhea      started 2/15/18 was tested last week for stool tests, cdiff was inconclusive as stool was formed. Last 3 days having water stools       Initial /70 (BP Location: Left arm, Cuff Size: Adult Large)  Pulse 98  Temp 97.6  F (36.4  C) (Oral)  Ht 5' 4.5\" (1.638 m)  Wt 168 lb (76.2 kg)  LMP 02/26/2003  SpO2 96%  Breastfeeding? No  BMI 28.39 kg/m2 Estimated body mass index is 28.39 kg/(m^2) as calculated from the following:    Height as of this encounter: 5' 4.5\" (1.638 m).    Weight as of this encounter: 168 lb (76.2 kg).  Medication Reconciliation: complete   Corin Ulloa CMA      "

## 2018-02-27 NOTE — MR AVS SNAPSHOT
"              After Visit Summary   2/27/2018    Bushra Harmon    MRN: [id number]           Patient Information     Date Of Birth          1935        Visit Information        Provider Department      2/27/2018 2:20 PM Magalis Woodward MD [facility]        Today's Diagnoses     Diarrhea, unspecified type    -  1    Other specified hypothyroidism        Hypertension goal BP (blood pressure) < 140/90           Follow-ups after your visit        Additional Services     GASTROENTEROLOGY ADULT REF CONSULT ONLY       Preferred Location: [facility] [phone]      Please be aware that coverage of these services is subject to the terms and limitations of your health insurance plan.  Call member services at your health plan with any benefit or coverage questions.  Any procedures must be performed at a [facility] facility OR coordinated by your clinic's referral office.    Please bring the following with you to your appointment:    (1) Any X-Rays, CTs or MRIs which have been performed.  Contact the facility where they were done to arrange for  prior to your scheduled appointment.    (2) List of current medications   (3) This referral request   (4) Any documents/labs given to you for this referral                  Your next 10 appointments already scheduled     Mar 21, 2018 11:00 AM CDT   (Arrive by 10:45 AM)   MA SCREENING BILATERAL W/ JOSE with [id number]   [facility] ([facility])    [address], [address]  [facility] [address] [phone]           Three-dimensional (3D) mammograms are available at [facility] locations in [place], [place], [place], [place], [facility], [place], [place], and [place]. -Health locations include [place] [facility] in [place]. Benefits of 3D mammograms include: - Improved rate of cancer detection - Decreases your chance of having to go back for more tests, which means fewer: - \"False-positive\" " results (This means that there is an abnormal area but it isn't cancer.) - Invasive testing procedures, such as a biopsy or surgery - Can provide clearer images of the breast if you have dense breast tissue. 3D mammography is an optional exam that anyone can have with a 2D mammogram. It doesn't replace or take the place of a 2D mammogram. 2D mammograms remain an effective screening test for all women.  Not all insurance companies cover the cost of a 3D mammogram. Check with your insurance.            Jun 04, 2018  8:00 AM CDT   PHYSICAL with Magalis Woodward MD   [facility]    [address]  [facility] [id number] [phone]              Who to contact     If you have questions or need follow up information about today's clinic visit or your schedule please contact [facility] directly at [phone].  Normal or non-critical lab and imaging results will be communicated to you by Contourhart, letter or phone within 4 business days after the clinic has received the results. If you do not hear from us within 7 days, please contact the clinic through Contourhart or phone. If you have a critical or abnormal lab result, we will notify you by phone as soon as possible.  Submit refill requests through PLDT or call your pharmacy and they will forward the refill request to us. Please allow 3 business days for your refill to be completed.          Additional Information About Your Visit        PLDT Information     PLDT gives you secure access to your electronic health record. If you see a primary care provider, you can also send messages to your care team and make appointments. If you have questions, please call your primary care clinic.  If you do not have a primary care provider, please call [phone] and they will assist you.        Care EveryWhere ID     This is your Care EveryWhere ID. This could be used by other organizations to access  "your [place] medical records  [id number]        Your Vitals Were     Pulse Temperature Height Last Period Pulse Oximetry Breastfeeding?    98 97.6  F (36.4  C) (Oral) 5' 4.5\" (1.638 m) 02/26/2003 96% No    BMI (Body Mass Index)                   28.39 kg/m2            Blood Pressure from Last 3 Encounters:   02/27/18 110/70   06/08/17 132/68   05/31/17 138/84    Weight from Last 3 Encounters:   02/27/18 168 lb (76.2 kg)   06/08/17 165 lb (74.8 kg)   05/31/17 167 lb 9.6 oz (76 kg)              We Performed the Following     Basic metabolic panel     CBC with platelets differential     GASTROENTEROLOGY ADULT REF CONSULT ONLY          Today's Medication Changes          These changes are accurate as of 2/27/18  3:53 PM.  If you have any questions, ask your nurse or doctor.               These medicines have changed or have updated prescriptions.        Dose/Directions    Acidophilus Tabs   This may have changed:  See the new instructions.   Used for:  Routine general medical examination at a health care facility        120mg TAB EVERY OTHER DAY   Quantity:  90 Tab   Refills:  3       acyclovir 200 MG capsule   Commonly known as:  ZOVIRAX   This may have changed:    - when to take this  - reasons to take this   Used for:  Herpes simplex virus (HSV) infection        Dose:  200 mg   Take 1 capsule (200 mg) by mouth 5 times daily   Quantity:  25 capsule   Refills:  3       simvastatin 10 MG tablet   Commonly known as:  ZOCOR   This may have changed:  Another medication with the same name was removed. Continue taking this medication, and follow the directions you see here.   Used for:  Hypercholesterolemia   Changed by:  Magalis Woodward MD        Dose:  10 mg   Take 1 tablet (10 mg) by mouth At Bedtime   Quantity:  90 tablet   Refills:  3                Primary Care Provider Office Phone # Fax #    Magalis Woodward [phone]       [facility] [address] [facility] [address]        Equal Access to " Services     [facility]: Hadii aad ku hadgilseandres Sanaalison, waaxda luqadaha, qaybta kaalmada juancarlos, shaun prather . So [facility] [phone].    ATENCIÓN: Si habla bridger, tiene a conrad disposición servicios gratuitos de asistencia lingüística. Llame al [phone].    We comply with applicable federal civil rights laws and [place] laws. We do not discriminate on the basis of race, color, national origin, age, disability, sex, sexual orientation, or gender identity.            Thank you!     Thank you for choosing [facility]  for your care. Our goal is always to provide you with excellent care. Hearing back from our patients is one way we can continue to improve our services. Please take a few minutes to complete the written survey that you may receive in the mail after your visit with us. Thank you!             Your Updated Medication List - Protect others around you: Learn how to safely use, store and throw away your medicines at www.disposemymeds.org.          This list is accurate as of 2/27/18  3:53 PM.  Always use your most recent med list.                   Brand Name Dispense Instructions for use Diagnosis    Acidophilus Tabs     90 Tab    120mg TAB EVERY OTHER DAY    Routine general medical examination at a health care facility       acyclovir 200 MG capsule    ZOVIRAX    25 capsule    Take 1 capsule (200 mg) by mouth 5 times daily    Herpes simplex virus (HSV) infection       aspirin 81 MG tablet     100    ONE DAILY    Essential hypertension, benign, Routine general medical examination at a health care facility       biotin 2.5 mg/mL Susp      Take 5,000 mg by mouth daily        calcium citrate-vitamin D 315-200 MG-UNIT Tabs per tablet    CITRACAL    90 Tab    2 TABLETS DAILY    Routine general medical examination at a health care facility       Glucosamine HCl 1000 MG Tabs     90 Tab    2 TABLETS DAILY    Routine general medical examination at a health care  facility       levothyroxine 88 MCG tablet    SYNTHROID/LEVOTHROID    90 tablet    TAKE 1 TABLET BY MOUTH EVERY DAY    Hypothyroidism, unspecified type       lisinopril 10 MG tablet    PRINIVIL/ZESTRIL    90 tablet    TAKE 1 TABLET BY MOUTH EVERY DAY    Benign hypertension       Lutein 6 MG Tabs           metoprolol succinate 25 MG 24 hr tablet    TOPROL-XL    45 tablet    Take 0.5 tablets (12.5 mg) by mouth daily    Essential hypertension, benign       MULTIVITAMIN TABS   OR      1 daily        simvastatin 10 MG tablet    ZOCOR    90 tablet    Take 1 tablet (10 mg) by mouth At Bedtime    Hypercholesterolemia       SOY CARE MENOPAUSE PO      Every other day

## 2018-02-27 NOTE — PROGRESS NOTES
"  SUBJECTIVE:   Bushra Harmon is a 82 year old female who presents to clinic today for the following health issues:    Diarrhea.  The patient had been in [facility] since Feb 6. She reports that she had a party Feb 14th. She then had diarrhea.  She tried the BRAT diet.  She returned from [place] on Feb 21st.  She took imodium. On the 23rd she had a formed stool, so C diff was inconclusive.   The patient reports that she has diarrhea in the morning now, and then the symptoms resolve.  The bowel movements are okay in the afternoon and evening.   She has not had a fever or chills.  Denies any abdominal pain.   Her diet and medications have not changed recently.   She has not been in the hospital or with antibiotics.   Denies any stress.     She had colon cancer screening in 2015. She had diverticulosis.     The Ova and Parasites were negative.  The C diff was inconclusive secondary to formed stool.     Of note, she had changed stool for the past few months prior to this.        Hypertension Follow-up      Outpatient blood pressures are being checked at home.  Results are normal.    Low Salt Diet: low salt    Hypothyroidism Follow-up      Since last visit, patient describes the following symptoms: weight gain of 3 lbs since 1 1/2 years ago      Amount of exercise or physical activity: some activity    Problems taking medications regularly: No    Medication side effects: none    Diet: regular (no restrictions)              Reviewed and updated as needed this visit by clinical staff  Allergies  Meds       Reviewed and updated as needed this visit by Provider         ROS:  CONSTITUTIONAL: NEGATIVE for fever, chills, change in weight  RESP: NEGATIVE for significant cough or SOB  CV: NEGATIVE for chest pain, palpitations or peripheral edema  POS diarrhea    OBJECTIVE:     /70 (BP Location: Left arm, Cuff Size: Adult Large)  Pulse 98  Temp 97.6  F (36.4  C) (Oral)  Ht 5' 4.5\" (1.638 m)  Wt 168 lb (76.2 kg)  LMP " 02/26/2003  SpO2 96%  Breastfeeding? No  BMI 28.39 kg/m2  Body mass index is 28.39 kg/(m^2).  GENERAL: healthy, alert and no distress  RESP: lungs clear to auscultation - no rales, rhonchi or wheezes  CV: regular rate and rhythm, normal S1 S2, no S3 or S4, no murmur, click or rub,  GI: no abdominal pain upon palpation; positive overactive bowel sounds appreciated; no masses or hepatosplenomegaly appreciated    ASSESSMENT/PLAN:       (R19.7) Diarrhea, unspecified type  (primary encounter diagnosis)  Comment: uncertain etiology- ova and parasites negative, unlikely c diff given formed stool  Plan: GASTROENTEROLOGY ADULT REF CONSULT ONLY, Basic         metabolic panel, CBC with platelets         differential          (E03.8) Other specified hypothyroidism  Comment: borderline in April  Plan: assess for thyroid when due in April    (I10) Hypertension goal BP (blood pressure) < 140/90  Comment: at goal  Plan: continue on current regimen        Magalis Woodward MD  [facility]

## 2018-02-28 LAB
ANION GAP SERPL CALCULATED.3IONS-SCNC: 7 MMOL/L (ref 3–14)
BUN SERPL-MCNC: 24 MG/DL (ref 7–30)
CALCIUM SERPL-MCNC: 9.4 MG/DL (ref 8.5–10.1)
CHLORIDE SERPL-SCNC: 101 MMOL/L (ref 94–109)
CO2 SERPL-SCNC: 27 MMOL/L (ref 20–32)
CREAT SERPL-MCNC: 0.95 MG/DL (ref 0.52–1.04)
GFR SERPL CREATININE-BSD FRML MDRD: 57 ML/MIN/1.7M2
GLUCOSE SERPL-MCNC: 108 MG/DL (ref 70–99)
POTASSIUM SERPL-SCNC: 3.8 MMOL/L (ref 3.4–5.3)
SODIUM SERPL-SCNC: 135 MMOL/L (ref 133–144)

## 2018-03-15 DIAGNOSIS — B00.9 HERPES SIMPLEX VIRUS (HSV) INFECTION: ICD-10-CM

## 2018-03-19 RX ORDER — ACYCLOVIR 200 MG/1
CAPSULE ORAL
Qty: 25 CAPSULE | Refills: 0 | Status: SHIPPED | OUTPATIENT
Start: 2018-03-19 | End: 2019-11-07

## 2018-03-19 NOTE — TELEPHONE ENCOUNTER
"Requested Prescriptions   Pending Prescriptions Disp Refills     acyclovir (ZOVIRAX) 200 MG capsule [Pharmacy Med Name: ACYCLOVIR 200MG CAPSULES] 25 capsule 0     Sig: TAKE ONE CAPSULE BY MOUTH FIVE TIMES DAILY    Antivirals for Herpes Protocol Passed    3/15/2018 10:22 AM       Passed - Patient is age 12 or older       Passed - Recent (12 mo) or future (30 days) visit within the authorizing provider's specialty    Patient had office visit in the last 12 months or has a visit in the next 30 days with authorizing provider or within the authorizing provider's specialty.  See \"Patient Info\" tab in inbasket, or \"Choose Columns\" in Meds & Orders section of the refill encounter.           Passed - Normal serum creatinine on file in past 12 months    Recent Labs   Lab Test  02/27/18   1452   CR  0.95           Prescription approved per [facility] Protocol.      "

## 2018-03-21 ENCOUNTER — HOSPITAL ENCOUNTER (OUTPATIENT)
Dept: MAMMOGRAPHY | Facility: CLINIC | Age: 83
Discharge: HOME | End: 2018-03-21
Attending: INTERNAL MEDICINE | Admitting: INTERNAL MEDICINE
Payer: MEDICARE

## 2018-03-21 DIAGNOSIS — Z12.31 VISIT FOR SCREENING MAMMOGRAM: ICD-10-CM

## 2018-03-21 PROCEDURE — 77063 BREAST TOMOSYNTHESIS BI: CPT

## 2018-06-04 ENCOUNTER — OFFICE VISIT (OUTPATIENT)
Dept: INTERNAL MEDICINE | Facility: CLINIC | Age: 83
End: 2018-06-04
Payer: MEDICARE

## 2018-06-04 VITALS
SYSTOLIC BLOOD PRESSURE: 120 MMHG | TEMPERATURE: 98.4 F | WEIGHT: 163 LBS | OXYGEN SATURATION: 93 % | DIASTOLIC BLOOD PRESSURE: 70 MMHG | HEART RATE: 84 BPM | BODY MASS INDEX: 27.16 KG/M2 | HEIGHT: 65 IN

## 2018-06-04 DIAGNOSIS — I10 HYPERTENSION GOAL BP (BLOOD PRESSURE) < 140/90: ICD-10-CM

## 2018-06-04 DIAGNOSIS — E78.00 HYPERCHOLESTEROLEMIA: ICD-10-CM

## 2018-06-04 DIAGNOSIS — E03.9 HYPOTHYROIDISM, UNSPECIFIED TYPE: ICD-10-CM

## 2018-06-04 DIAGNOSIS — E78.5 HYPERLIPIDEMIA LDL GOAL <100: ICD-10-CM

## 2018-06-04 DIAGNOSIS — E03.8 OTHER SPECIFIED HYPOTHYROIDISM: ICD-10-CM

## 2018-06-04 DIAGNOSIS — R79.89 LOW VITAMIN D LEVEL: ICD-10-CM

## 2018-06-04 DIAGNOSIS — I10 BENIGN HYPERTENSION: ICD-10-CM

## 2018-06-04 DIAGNOSIS — I10 ESSENTIAL HYPERTENSION, BENIGN: ICD-10-CM

## 2018-06-04 DIAGNOSIS — Z00.00 ENCOUNTER FOR ROUTINE ADULT HEALTH EXAMINATION WITHOUT ABNORMAL FINDINGS: Primary | ICD-10-CM

## 2018-06-04 LAB
ALBUMIN SERPL-MCNC: 3.6 G/DL (ref 3.4–5)
ALP SERPL-CCNC: 62 U/L (ref 40–150)
ALT SERPL W P-5'-P-CCNC: 27 U/L (ref 0–50)
ANION GAP SERPL CALCULATED.3IONS-SCNC: 10 MMOL/L (ref 3–14)
AST SERPL W P-5'-P-CCNC: 21 U/L (ref 0–45)
BASOPHILS # BLD AUTO: 0 10E9/L (ref 0–0.2)
BASOPHILS NFR BLD AUTO: 0.3 %
BILIRUB SERPL-MCNC: 0.9 MG/DL (ref 0.2–1.3)
BUN SERPL-MCNC: 24 MG/DL (ref 7–30)
CALCIUM SERPL-MCNC: 9.3 MG/DL (ref 8.5–10.1)
CHLORIDE SERPL-SCNC: 102 MMOL/L (ref 94–109)
CHOLEST SERPL-MCNC: 185 MG/DL
CO2 SERPL-SCNC: 27 MMOL/L (ref 20–32)
CREAT SERPL-MCNC: 0.86 MG/DL (ref 0.52–1.04)
CREAT UR-MCNC: 69 MG/DL
DIFFERENTIAL METHOD BLD: NORMAL
EOSINOPHIL # BLD AUTO: 0.2 10E9/L (ref 0–0.7)
EOSINOPHIL NFR BLD AUTO: 2.3 %
ERYTHROCYTE [DISTWIDTH] IN BLOOD BY AUTOMATED COUNT: 14.1 % (ref 10–15)
GFR SERPL CREATININE-BSD FRML MDRD: 63 ML/MIN/1.7M2
GLUCOSE SERPL-MCNC: 92 MG/DL (ref 70–99)
HCT VFR BLD AUTO: 42.2 % (ref 35–47)
HDLC SERPL-MCNC: 76 MG/DL
HGB BLD-MCNC: 13.9 G/DL (ref 11.7–15.7)
LDLC SERPL CALC-MCNC: 90 MG/DL
LYMPHOCYTES # BLD AUTO: 1.8 10E9/L (ref 0.8–5.3)
LYMPHOCYTES NFR BLD AUTO: 28.1 %
MCH RBC QN AUTO: 30 PG (ref 26.5–33)
MCHC RBC AUTO-ENTMCNC: 32.9 G/DL (ref 31.5–36.5)
MCV RBC AUTO: 91 FL (ref 78–100)
MICROALBUMIN UR-MCNC: 53 MG/L
MICROALBUMIN/CREAT UR: 76.3 MG/G CR (ref 0–25)
MONOCYTES # BLD AUTO: 0.7 10E9/L (ref 0–1.3)
MONOCYTES NFR BLD AUTO: 10.7 %
NEUTROPHILS # BLD AUTO: 3.8 10E9/L (ref 1.6–8.3)
NEUTROPHILS NFR BLD AUTO: 58.6 %
NONHDLC SERPL-MCNC: 109 MG/DL
PLATELET # BLD AUTO: 272 10E9/L (ref 150–450)
POTASSIUM SERPL-SCNC: 4 MMOL/L (ref 3.4–5.3)
PROT SERPL-MCNC: 7.5 G/DL (ref 6.8–8.8)
RBC # BLD AUTO: 4.64 10E12/L (ref 3.8–5.2)
SODIUM SERPL-SCNC: 139 MMOL/L (ref 133–144)
T4 FREE SERPL-MCNC: 1.47 NG/DL (ref 0.76–1.46)
TRIGL SERPL-MCNC: 93 MG/DL
TSH SERPL DL<=0.005 MIU/L-ACNC: 4.23 MU/L (ref 0.4–4)
WBC # BLD AUTO: 6.5 10E9/L (ref 4–11)

## 2018-06-04 PROCEDURE — G0439 PPPS, SUBSEQ VISIT: HCPCS | Performed by: INTERNAL MEDICINE

## 2018-06-04 PROCEDURE — 82043 UR ALBUMIN QUANTITATIVE: CPT | Performed by: INTERNAL MEDICINE

## 2018-06-04 PROCEDURE — 80053 COMPREHEN METABOLIC PANEL: CPT | Performed by: INTERNAL MEDICINE

## 2018-06-04 PROCEDURE — 84443 ASSAY THYROID STIM HORMONE: CPT | Performed by: INTERNAL MEDICINE

## 2018-06-04 PROCEDURE — 82306 VITAMIN D 25 HYDROXY: CPT | Performed by: INTERNAL MEDICINE

## 2018-06-04 PROCEDURE — 36415 COLL VENOUS BLD VENIPUNCTURE: CPT | Performed by: INTERNAL MEDICINE

## 2018-06-04 PROCEDURE — 80061 LIPID PANEL: CPT | Performed by: INTERNAL MEDICINE

## 2018-06-04 PROCEDURE — 85025 COMPLETE CBC W/AUTO DIFF WBC: CPT | Performed by: INTERNAL MEDICINE

## 2018-06-04 PROCEDURE — 84439 ASSAY OF FREE THYROXINE: CPT | Performed by: INTERNAL MEDICINE

## 2018-06-04 RX ORDER — LEVOTHYROXINE SODIUM 88 UG/1
88 TABLET ORAL DAILY
Qty: 90 TABLET | Refills: 4 | Status: SHIPPED | OUTPATIENT
Start: 2018-06-04 | End: 2019-06-05

## 2018-06-04 RX ORDER — LISINOPRIL 10 MG/1
10 TABLET ORAL DAILY
Qty: 90 TABLET | Refills: 4 | Status: SHIPPED | OUTPATIENT
Start: 2018-06-04 | End: 2019-06-05

## 2018-06-04 RX ORDER — SIMVASTATIN 10 MG
10 TABLET ORAL AT BEDTIME
Qty: 90 TABLET | Refills: 4 | Status: SHIPPED | OUTPATIENT
Start: 2018-06-04 | End: 2019-06-05

## 2018-06-04 RX ORDER — METOPROLOL SUCCINATE 25 MG/1
12.5 TABLET, EXTENDED RELEASE ORAL DAILY
Qty: 45 TABLET | Refills: 4 | Status: SHIPPED | OUTPATIENT
Start: 2018-06-04 | End: 2019-06-05

## 2018-06-04 ASSESSMENT — ACTIVITIES OF DAILY LIVING (ADL)
CURRENT_FUNCTION: NO ASSISTANCE NEEDED
I_NEED_ASSISTANCE_FOR_THE_FOLLOWING_DAILY_ACTIVITIES:: NO ASSISTANCE IS NEEDED

## 2018-06-04 NOTE — MR AVS SNAPSHOT
After Visit Summary   6/4/2018    Bushra Harmon    MRN: [id number]           Patient Information     Date Of Birth          1935        Visit Information        Provider Department      6/4/2018 8:00 AM Magalis Woodward MD [facility]        Today's Diagnoses     Encounter for routine adult health examination without abnormal findings    -  1    Hypertension goal BP (blood pressure) < 140/90        Hyperlipidemia LDL goal <100        Low vitamin D level        Other specified hypothyroidism           Follow-ups after your visit        Who to contact     If you have questions or need follow up information about today's clinic visit or your schedule please contact [facility] directly at [phone].  Normal or non-critical lab and imaging results will be communicated to you by MyChart, letter or phone within 4 business days after the clinic has received the results. If you do not hear from us within 7 days, please contact the clinic through MyChart or phone. If you have a critical or abnormal lab result, we will notify you by phone as soon as possible.  Submit refill requests through Glam .fr [place] or call your pharmacy and they will forward the refill request to us. Please allow 3 business days for your refill to be completed.          Additional Information About Your Visit        MyChart Information     Glam [employer] [place] gives you secure access to your electronic health record. If you see a primary care provider, you can also send messages to your care team and make appointments. If you have questions, please call your primary care clinic.  If you do not have a primary care provider, please call [phone] and they will assist you.        Care EveryWhere ID     This is your Care EveryWhere ID. This could be used by other organizations to access your [place] medical records  [id number]        Your Vitals Were     Pulse Temperature Height Last Period Pulse Oximetry  "Breastfeeding?    84 98.4  F (36.9  C) (Oral) 5' 4.5\" (1.638 m) 02/26/2003 93% No    BMI (Body Mass Index)                   27.55 kg/m2            Blood Pressure from Last 3 Encounters:   06/04/18 120/70   02/27/18 110/70   06/08/17 132/68    Weight from Last 3 Encounters:   06/04/18 163 lb (73.9 kg)   02/27/18 168 lb (76.2 kg)   06/08/17 165 lb (74.8 kg)              We Performed the Following     CBC with platelets differential     Comprehensive metabolic panel     Lipid panel reflex to direct LDL Fasting     TSH with free T4 reflex     Vitamin D Deficiency          Today's Medication Changes          These changes are accurate as of 6/4/18  8:41 AM.  If you have any questions, ask your nurse or doctor.               These medicines have changed or have updated prescriptions.        Dose/Directions    LACTOBACILLUS PO   This may have changed:  Another medication with the same name was removed. Continue taking this medication, and follow the directions you see here.   Changed by:  Magalis Woodward MD        Dose:  120 mg   Take 120 mg by mouth daily   Refills:  0                Primary Care Provider Office Phone # Fax #    Magalis Woodward [phone]       [address] [place] [address]        Equal Access to Services     [facility]: Hadarturo juárez hadasho Soomaali, waaxda luqadaha, qaybta kaalmada adeegyada, waxay yoko sheikh. So [facility] [phone].    ATENCIÓN: Si habla español, tiene a conrad disposición servicios gratuitos de asistencia lingüística. Llame al [phone].    We comply with applicable federal civil rights laws and [place] laws. We do not discriminate on the basis of race, color, national origin, age, disability, sex, sexual orientation, or gender identity.            Thank you!     Thank you for choosing [facility]  for your care. Our goal is always to provide you with excellent care. Hearing back from our patients is one " way we can continue to improve our services. Please take a few minutes to complete the written survey that you may receive in the mail after your visit with us. Thank you!             Your Updated Medication List - Protect others around you: Learn how to safely use, store and throw away your medicines at www.disposemymeds.org.          This list is accurate as of 6/4/18  8:41 AM.  Always use your most recent med list.                   Brand Name Dispense Instructions for use Diagnosis    acyclovir 200 MG capsule    ZOVIRAX    25 capsule    TAKE ONE CAPSULE BY MOUTH FIVE TIMES DAILY    Herpes simplex virus (HSV) infection       aspirin 81 MG tablet     100    ONE DAILY    Essential hypertension, benign, Routine general medical examination at a health care facility       biotin 2.5 mg/mL Susp      Take 5,000 mg by mouth daily        calcium citrate-vitamin D 315-200 MG-UNIT Tabs per tablet    CITRACAL    90 Tab    2 TABLETS DAILY    Routine general medical examination at a health care facility       Glucosamine HCl 1000 MG Tabs     90 Tab    2 TABLETS DAILY    Routine general medical examination at a health care facility       LACTOBACILLUS PO      Take 120 mg by mouth daily        levothyroxine 88 MCG tablet    SYNTHROID/LEVOTHROID    90 tablet    TAKE 1 TABLET BY MOUTH EVERY DAY    Hypothyroidism, unspecified type       lisinopril 10 MG tablet    PRINIVIL/ZESTRIL    90 tablet    TAKE 1 TABLET BY MOUTH EVERY DAY    Benign hypertension       Lutein 6 MG Tabs      Take 20 mg by mouth daily        metoprolol succinate 25 MG 24 hr tablet    TOPROL-XL    45 tablet    Take 0.5 tablets (12.5 mg) by mouth daily    Essential hypertension, benign       MULTIVITAMIN TABS   OR      1 daily        simvastatin 10 MG tablet    ZOCOR    90 tablet    Take 1 tablet (10 mg) by mouth At Bedtime    Hypercholesterolemia

## 2018-06-04 NOTE — PROGRESS NOTES
SUBJECTIVE:   Bushra Harmon is a 82 year old female who presents for Preventive Visit.    Are you in the first 12 months of your Medicare coverage?  No    Physical   Annual:     Getting at least 3 servings of Calcium per day::  Yes    Bi-annual eye exam::  Yes    Dental care twice a year::  Yes    Sleep apnea or symptoms of sleep apnea::  None    Taking medications regularly::  Yes    Medication side effects::  None    Additional concerns today::  No    Ability to successfully perform activities of daily living: no assistance needed  Home Safety:  No safety concerns identified  Hearing Impairment: no hearing concerns      COGNITIVE SCREEN  1) Repeat 3 items (Banana, Sunrise, Chair)    2) Clock draw: NORMAL  3) 3 item recall: Recalls 3 objects  Results: 3 items recalled: COGNITIVE IMPAIRMENT LESS LIKELY    Mini-CogTM Copyright AKI Pryor. Licensed by the author for use in [facility] [employer]; reprinted with permission (roberto@[email or web address]). All rights reserved.        Reviewed and updated as needed this visit by clinical staff  Tobacco  Allergies  Meds  Med Hx  Surg Hx  Fam Hx  Soc Hx        Reviewed and updated as needed this visit by Provider  Allergies  Meds        Social History   Substance Use Topics     Smoking status: Former Smoker     Packs/day: 0.50     Years: 45.00     Quit date: 5/1/2001     Smokeless tobacco: Never Used     Alcohol use Yes      Comment: 1 drink per day       Alcohol Use 6/4/2018   If you drink alcohol do you typically have greater than 3 drinks per day OR greater than 7 drinks per week? No       Today's PHQ-2 Score:   PHQ-2 ( 1999 [employer]) 6/4/2018   Q1: Little interest or pleasure in doing things 0   Q2: Feeling down, depressed or hopeless 0   PHQ-2 Score 0   Q1: Little interest or pleasure in doing things Not at all   Q2: Feeling down, depressed or hopeless Not at all   PHQ-2 Score 0       Do you feel safe in your environment - Yes    Do you have a Health Care Directive?: Yes:  "Advance Directive has been received and scanned.    Current providers sharing in care for this patient include:   Patient Care Team:  Magalis Woodward MD as PCP - General (Internal Medicine)    The following health maintenance items are reviewed in Epic and correct as of today:  Health Maintenance   Topic Date Due     ADVANCE DIRECTIVE PLANNING Q5 YRS  05/18/2016     TSH Q1 YEAR  05/31/2018     MEDICARE ANNUAL WELLNESS VISIT  05/31/2018     FALL RISK ASSESSMENT  05/31/2018     INFLUENZA VACCINE (Season Ended) 09/01/2018     TETANUS IMMUNIZATION (SYSTEM ASSIGNED)  05/10/2020     COLONOSCOPY Q10 YR  11/03/2025     DEXA SCAN SCREENING (SYSTEM ASSIGNED)  Completed     PNEUMOCOCCAL  Completed       Review of Systems  Constitutional, HEENT, cardiovascular, pulmonary, GI, , musculoskeletal, neuro, skin, endocrine and psych systems are negative, except as otherwise noted.    OBJECTIVE:   /70 (BP Location: Left arm, Cuff Size: Adult Regular)  Pulse 84  Temp 98.4  F (36.9  C) (Oral)  Ht 5' 4.5\" (1.638 m)  Wt 163 lb (73.9 kg)  LMP 02/26/2003  SpO2 93%  Breastfeeding? No  BMI 27.55 kg/m2 Estimated body mass index is 27.55 kg/(m^2) as calculated from the following:    Height as of this encounter: 5' 4.5\" (1.638 m).    Weight as of this encounter: 163 lb (73.9 kg).  Physical Exam  GENERAL APPEARANCE: healthy, alert and no distress  EYES: Eyes grossly normal to inspection, PERRL and conjunctivae and sclerae normal  HENT: ear canals and TM's normal, nose and mouth without ulcers or lesions, oropharynx clear and oral mucous membranes moist  NECK: no adenopathy, no asymmetry, masses, or scars and thyroid normal to palpation  RESP: lungs clear to auscultation - no rales, rhonchi or wheezes  BREAST: normal without masses, tenderness or nipple discharge and no palpable axillary masses or adenopathy  CV: regular rate and rhythm, normal S1 S2, no S3 or S4, no murmur, click or rub, no peripheral edema and peripheral " pulses strong  ABDOMEN: soft, nontender, no hepatosplenomegaly, no masses and bowel sounds normal  MS: no musculoskeletal defects are noted and gait is age appropriate without ataxia  SKIN: no suspicious lesions or rashes  NEURO: Normal strength and tone, sensory exam grossly normal, mentation intact and speech normal  PSYCH: mentation appears normal and affect normal/bright    ASSESSMENT / PLAN:       (Z00.00) Encounter for routine adult health examination without abnormal findings  (primary encounter diagnosis)  Comment: fasting  Plan: Comprehensive metabolic panel, CBC with         platelets differential, TSH with free T4         reflex, Lipid panel reflex to direct LDL         Fasting, Vitamin D Deficiency, Albumin Random         Urine Quantitative with Creat Ratio            (I10) Hypertension goal BP (blood pressure) < 140/90  Comment: at goal  Plan: CBC with platelets differential, Albumin Random        Urine Quantitative with Creat Ratio            (E78.5) Hyperlipidemia LDL goal <100  Comment: assess  Plan: Comprehensive metabolic panel, Lipid panel         reflex to direct LDL Fasting            (E55.9) Low vitamin D level  Comment: assess  Plan: Vitamin D Deficiency            (E03.8) Other specified hypothyroidism  Comment:   Plan: TSH with free T4 reflex            (E03.9) Hypothyroidism, unspecified type  Comment:   Plan: levothyroxine (SYNTHROID/LEVOTHROID) 88 MCG         tablet           (I10) Benign hypertension  Comment: at goal  Plan: lisinopril (PRINIVIL/ZESTRIL) 10 MG tablet            (I10) BENIGN HYPERTENSION  Comment: at goal  Plan: metoprolol succinate (TOPROL-XL) 25 MG 24 hr         tablet           (E78.00) Hypercholesterolemia  Comment:   Plan: simvastatin (ZOCOR) 10 MG tablet              End of Life Planning:  Patient currently has an advanced directive: Yes.  Practitioner is supportive of decision.    COUNSELING:  Reviewed preventive health counseling, as reflected in patient  "instructions        Estimated body mass index is 27.55 kg/(m^2) as calculated from the following:    Height as of this encounter: 5' 4.5\" (1.638 m).    Weight as of this encounter: 163 lb (73.9 kg).     reports that she quit smoking about 17 years ago. She has a 22.50 pack-year smoking history. She has never used smokeless tobacco.      Appropriate preventive services were discussed with this patient, including applicable screening as appropriate for cardiovascular disease, diabetes, osteopenia/osteoporosis, and glaucoma.  As appropriate for age/gender, discussed screening for colorectal cancer, prostate cancer, breast cancer, and cervical cancer. Checklist reviewing preventive services available has been given to the patient.    Reviewed patients plan of care and provided an AVS. The Basic Care Plan (routine screening as documented in Health Maintenance) for Bushra meets the Care Plan requirement. This Care Plan has been established and reviewed with the Patient.    Counseling Resources:  ATP IV Guidelines  Pooled Cohorts Equation Calculator  Breast Cancer Risk Calculator  FRAX Risk Assessment  ICSI Preventive Guidelines  Dietary Guidelines for Americans, 2010  USDA's MyPlate  ASA Prophylaxis  Lung CA Screening    Magalis Woodward MD  [facility]  Answers for HPI/ROS submitted by the patient on 6/4/2018   PHQ-2 Score: 0    "

## 2018-06-05 LAB — DEPRECATED CALCIDIOL+CALCIFEROL SERPL-MC: 53 UG/L (ref 20–75)

## 2018-12-19 ENCOUNTER — TRANSFERRED RECORDS (OUTPATIENT)
Dept: HEALTH INFORMATION MANAGEMENT | Facility: CLINIC | Age: 83
End: 2018-12-19

## 2019-03-22 ENCOUNTER — HOSPITAL ENCOUNTER (OUTPATIENT)
Dept: MAMMOGRAPHY | Facility: CLINIC | Age: 84
Discharge: HOME | End: 2019-03-22
Attending: INTERNAL MEDICINE | Admitting: INTERNAL MEDICINE
Payer: MEDICARE

## 2019-03-22 DIAGNOSIS — Z12.31 VISIT FOR SCREENING MAMMOGRAM: ICD-10-CM

## 2019-03-22 PROCEDURE — 77063 BREAST TOMOSYNTHESIS BI: CPT

## 2019-06-05 ENCOUNTER — OFFICE VISIT (OUTPATIENT)
Dept: INTERNAL MEDICINE | Facility: CLINIC | Age: 84
End: 2019-06-05
Payer: MEDICARE

## 2019-06-05 VITALS
TEMPERATURE: 97.9 F | HEART RATE: 86 BPM | DIASTOLIC BLOOD PRESSURE: 79 MMHG | WEIGHT: 165.4 LBS | SYSTOLIC BLOOD PRESSURE: 160 MMHG | BODY MASS INDEX: 26.58 KG/M2 | HEIGHT: 66 IN | RESPIRATION RATE: 18 BRPM | OXYGEN SATURATION: 94 %

## 2019-06-05 DIAGNOSIS — E78.00 HYPERCHOLESTEROLEMIA: ICD-10-CM

## 2019-06-05 DIAGNOSIS — I10 ESSENTIAL HYPERTENSION, BENIGN: ICD-10-CM

## 2019-06-05 DIAGNOSIS — Z00.00 ROUTINE GENERAL MEDICAL EXAMINATION AT A HEALTH CARE FACILITY: Primary | ICD-10-CM

## 2019-06-05 DIAGNOSIS — E03.9 HYPOTHYROIDISM, UNSPECIFIED TYPE: ICD-10-CM

## 2019-06-05 DIAGNOSIS — Z78.0 MENOPAUSE: ICD-10-CM

## 2019-06-05 LAB
BASOPHILS # BLD AUTO: 0 10E9/L (ref 0–0.2)
BASOPHILS NFR BLD AUTO: 0.1 %
CREAT UR-MCNC: 80 MG/DL
DIFFERENTIAL METHOD BLD: NORMAL
EOSINOPHIL # BLD AUTO: 0.2 10E9/L (ref 0–0.7)
EOSINOPHIL NFR BLD AUTO: 2.3 %
ERYTHROCYTE [DISTWIDTH] IN BLOOD BY AUTOMATED COUNT: 14 % (ref 10–15)
HCT VFR BLD AUTO: 46.5 % (ref 35–47)
HGB BLD-MCNC: 15.2 G/DL (ref 11.7–15.7)
LYMPHOCYTES # BLD AUTO: 1.8 10E9/L (ref 0.8–5.3)
LYMPHOCYTES NFR BLD AUTO: 24.6 %
MCH RBC QN AUTO: 29.9 PG (ref 26.5–33)
MCHC RBC AUTO-ENTMCNC: 32.7 G/DL (ref 31.5–36.5)
MCV RBC AUTO: 92 FL (ref 78–100)
MICROALBUMIN UR-MCNC: 108 MG/L
MICROALBUMIN/CREAT UR: 134.33 MG/G CR (ref 0–25)
MONOCYTES # BLD AUTO: 0.9 10E9/L (ref 0–1.3)
MONOCYTES NFR BLD AUTO: 11.7 %
NEUTROPHILS # BLD AUTO: 4.5 10E9/L (ref 1.6–8.3)
NEUTROPHILS NFR BLD AUTO: 61.3 %
PLATELET # BLD AUTO: 296 10E9/L (ref 150–450)
RBC # BLD AUTO: 5.08 10E12/L (ref 3.8–5.2)
WBC # BLD AUTO: 7.4 10E9/L (ref 4–11)

## 2019-06-05 PROCEDURE — G0439 PPPS, SUBSEQ VISIT: HCPCS | Performed by: INTERNAL MEDICINE

## 2019-06-05 PROCEDURE — 82043 UR ALBUMIN QUANTITATIVE: CPT | Performed by: INTERNAL MEDICINE

## 2019-06-05 PROCEDURE — 85025 COMPLETE CBC W/AUTO DIFF WBC: CPT | Performed by: INTERNAL MEDICINE

## 2019-06-05 PROCEDURE — 36415 COLL VENOUS BLD VENIPUNCTURE: CPT | Performed by: INTERNAL MEDICINE

## 2019-06-05 PROCEDURE — 80053 COMPREHEN METABOLIC PANEL: CPT | Performed by: INTERNAL MEDICINE

## 2019-06-05 PROCEDURE — 84443 ASSAY THYROID STIM HORMONE: CPT | Performed by: INTERNAL MEDICINE

## 2019-06-05 PROCEDURE — 80061 LIPID PANEL: CPT | Performed by: INTERNAL MEDICINE

## 2019-06-05 PROCEDURE — 84439 ASSAY OF FREE THYROXINE: CPT | Performed by: INTERNAL MEDICINE

## 2019-06-05 RX ORDER — LEVOTHYROXINE SODIUM 88 UG/1
88 TABLET ORAL DAILY
Qty: 90 TABLET | Refills: 4 | Status: SHIPPED | OUTPATIENT
Start: 2019-06-05 | End: 2019-09-25

## 2019-06-05 RX ORDER — SIMVASTATIN 10 MG
10 TABLET ORAL AT BEDTIME
Qty: 90 TABLET | Refills: 4 | Status: SHIPPED | OUTPATIENT
Start: 2019-06-05 | End: 2020-07-24

## 2019-06-05 RX ORDER — LISINOPRIL 20 MG/1
20 TABLET ORAL DAILY
Qty: 90 TABLET | Refills: 4 | Status: SHIPPED | OUTPATIENT
Start: 2019-06-05 | End: 2020-09-07

## 2019-06-05 RX ORDER — METOPROLOL SUCCINATE 25 MG/1
12.5 TABLET, EXTENDED RELEASE ORAL DAILY
Qty: 45 TABLET | Refills: 4 | Status: SHIPPED | OUTPATIENT
Start: 2019-06-05 | End: 2019-12-12

## 2019-06-05 ASSESSMENT — ENCOUNTER SYMPTOMS
DYSURIA: 0
DIARRHEA: 0
ABDOMINAL PAIN: 0
NAUSEA: 0
BREAST MASS: 0
PALPITATIONS: 0
FEVER: 0
JOINT SWELLING: 0
EYE PAIN: 0
WEAKNESS: 0
ARTHRALGIAS: 0
DIZZINESS: 0
CHILLS: 0
SORE THROAT: 0
MYALGIAS: 0
HEMATURIA: 0
HEARTBURN: 0
HEADACHES: 0
SHORTNESS OF BREATH: 0
CONSTIPATION: 0
COUGH: 0
NERVOUS/ANXIOUS: 0
FREQUENCY: 0
PARESTHESIAS: 0
HEMATOCHEZIA: 0

## 2019-06-05 ASSESSMENT — ACTIVITIES OF DAILY LIVING (ADL): CURRENT_FUNCTION: NO ASSISTANCE NEEDED

## 2019-06-05 ASSESSMENT — MIFFLIN-ST. JEOR: SCORE: 1224.06

## 2019-06-05 NOTE — PROGRESS NOTES
"SUBJECTIVE:   Bushra Harmon is a 83 year old female who presents for Preventive Visit.      Are you in the first 12 months of your Medicare coverage?  No    Healthy Habits:     In general, how would you rate your overall health?  Good    Frequency of exercise:  2-3 days/week    Duration of exercise:  Less than 15 minutes    Do you usually eat at least 4 servings of fruit and vegetables a day, include whole grains    & fiber and avoid regularly eating high fat or \"junk\" foods?  Yes    Taking medications regularly:  Yes    Medication side effects:  None    Ability to successfully perform activities of daily living:  No assistance needed    Home Safety:  No safety concerns identified    Hearing Impairment:  No hearing concerns    In the past 6 months, have you been bothered by leaking of urine?  No    In general, how would you rate your overall mental or emotional health?  Good      PHQ-2 Total Score: 0    Additional concerns today:  No    Do you feel safe in your environment? Yes    Do you have a Health Care Directive? Yes: Patient states has Advance Directive and will bring in a copy to clinic.      Fall risk  Fall Risk Assessment not completed.    Cognitive Screening   1) Repeat 3 items (Leader, Season, Table)    2) Clock draw: NORMAL  3) 3 item recall: Recalls 3 objects  Results: 3 items recalled: COGNITIVE IMPAIRMENT LESS LIKELY    Mini-CogTM Copyright S Roya. Licensed by the author for use in [facility]; reprinted with permission ([email or web address]@[email or web address]). All rights reserved.      Do you have sleep apnea, excessive snoring or daytime drowsiness?: no    Reviewed and updated as needed this visit by clinical staff  Tobacco  Allergies  Meds  Med Hx  Surg Hx  Fam Hx  Soc Hx        Reviewed and updated as needed this visit by Provider  Meds        Social History     Tobacco Use     Smoking status: Former Smoker     Packs/day: 0.50     Years: 45.00     Pack years: 22.50     Last attempt to quit: 5/1/2001    "  Years since quittin.1     Smokeless tobacco: Never Used   Substance Use Topics     Alcohol use: Yes     Comment: 1 drink per day     If you drink alcohol do you typically have >3 drinks per day or >7 drinks per week? No    Alcohol Use 2019   Prescreen: >3 drinks/day or >7 drinks/week? No   Prescreen: >3 drinks/day or >7 drinks/week? -       Current providers sharing in care for this patient include:   Patient Care Team:  Magalis Woodward MD as PCP - General (Internal Medicine)  Magalis Woodward MD as Assigned PCP    The following health maintenance items are reviewed in Epic and correct as of today:  Health Maintenance   Topic Date Due     MEDICARE ANNUAL WELLNESS VISIT  2019     DEXA  2019     ADVANCED DIRECTIVE PLANNING  2019 (Originally 2016)     DTAP/TDAP/TD IMMUNIZATION (3 - Td) 05/10/2020     PHQ-2  Completed     INFLUENZA VACCINE  Completed     PNEUMOCOCCAL IMMUNIZATION 65+ LOW/MEDIUM RISK  Completed     ZOSTER IMMUNIZATION  Completed     IPV IMMUNIZATION  Aged Out     MENINGITIS IMMUNIZATION  Aged Out       Review of Systems   Constitutional: Negative for chills and fever.   HENT: Negative for congestion, ear pain, hearing loss and sore throat.    Eyes: Negative for pain and visual disturbance.   Respiratory: Negative for cough and shortness of breath.    Cardiovascular: Negative for chest pain, palpitations and peripheral edema.   Gastrointestinal: Negative for abdominal pain, constipation, diarrhea, heartburn, hematochezia and nausea.   Breasts:  Negative for tenderness, breast mass and discharge.   Genitourinary: Negative for dysuria, frequency, genital sores, hematuria, pelvic pain, urgency, vaginal bleeding and vaginal discharge.   Musculoskeletal: Negative for arthralgias, joint swelling and myalgias.   Skin: Negative for rash.   Neurological: Negative for dizziness, weakness, headaches and paresthesias.   Psychiatric/Behavioral: Negative for mood changes. The  "patient is not nervous/anxious.          OBJECTIVE:   /79 (BP Location: Left arm, Patient Position: Sitting, Cuff Size: Adult Regular)   Pulse 86   Temp 97.9  F (36.6  C) (Oral)   Resp 18   Ht 1.664 m (5' 5.5\")   Wt 75 kg (165 lb 6.4 oz)   LMP 02/26/2003   SpO2 94%   BMI 27.11 kg/m   Estimated body mass index is 27.11 kg/m  as calculated from the following:    Height as of this encounter: 1.664 m (5' 5.5\").    Weight as of this encounter: 75 kg (165 lb 6.4 oz).  Physical Exam  GENERAL APPEARANCE: healthy, alert and no distress  EYES: Eyes grossly normal to inspection, PERRL and conjunctivae and sclerae normal  HENT: ear canals and TM's normal, nose and mouth without ulcers or lesions, oropharynx clear and oral mucous membranes moist  NECK: no adenopathy, no asymmetry, masses, or scars and thyroid normal to palpation  RESP: lungs clear to auscultation - no rales, rhonchi or wheezes  BREAST: normal without masses, tenderness or nipple discharge and no palpable axillary masses or adenopathy  CV: regular rate and rhythm, normal S1 S2, no S3 or S4, no murmur, click or rub, no peripheral edema and peripheral pulses strong  ABDOMEN: soft, nontender, no hepatosplenomegaly, no masses and bowel sounds normal  MS: no musculoskeletal defects are noted and gait is age appropriate without ataxia  SKIN: no suspicious lesions or rashes  NEURO: Normal strength and tone, sensory exam grossly normal, mentation intact and speech normal  PSYCH: mentation appears normal and affect normal/bright    Diagnostic Test Results:  Labs reviewed in Epic    ASSESSMENT / PLAN:       ICD-10-CM    1. Routine general medical examination at a health care facility Z00.00    2. Hypothyroidism, unspecified type E03.9 levothyroxine (SYNTHROID/LEVOTHROID) 88 MCG tablet     TSH with free T4 reflex   3. BENIGN HYPERTENSION I10 lisinopril (PRINIVIL/ZESTRIL) 20 MG tablet     metoprolol succinate ER (TOPROL-XL) 25 MG 24 hr tablet     Comprehensive " "metabolic panel     CBC with platelets differential     Albumin Random Urine Quantitative with Creat Ratio   4. Hypercholesterolemia E78.00 simvastatin (ZOCOR) 10 MG tablet     Comprehensive metabolic panel     Lipid panel reflex to direct LDL Fasting   5. Menopause Z78.0 DX Hip/Pelvis/Spine       End of Life Planning:  Patient currently has an advanced directive: Yes.  Practitioner is supportive of decision.    COUNSELING:  Reviewed preventive health counseling, as reflected in patient instructions       Regular exercise       Healthy diet/nutrition    Estimated body mass index is 27.11 kg/m  as calculated from the following:    Height as of this encounter: 1.664 m (5' 5.5\").    Weight as of this encounter: 75 kg (165 lb 6.4 oz).         reports that she quit smoking about 18 years ago. She has a 22.50 pack-year smoking history. She has never used smokeless tobacco.      Appropriate preventive services were discussed with this patient, including applicable screening as appropriate for cardiovascular disease, diabetes, osteopenia/osteoporosis, and glaucoma.  As appropriate for age/gender, discussed screening for colorectal cancer, prostate cancer, breast cancer, and cervical cancer. Checklist reviewing preventive services available has been given to the patient.    Reviewed patients plan of care and provided an AVS. The Basic Care Plan (routine screening as documented in Health Maintenance) for Bushra meets the Care Plan requirement. This Care Plan has been established and reviewed with the Patient.    Counseling Resources:  ATP IV Guidelines  Pooled Cohorts Equation Calculator  Breast Cancer Risk Calculator  FRAX Risk Assessment  ICSI Preventive Guidelines  Dietary Guidelines for Americans, 2010  USDA's MyPlate  ASA Prophylaxis  Lung CA Screening    Magalis Woodward MD  [facility]    Identified Health Risks:  "

## 2019-06-05 NOTE — NURSING NOTE
"BP (!) 0/0 (BP Location: Left arm, Patient Position: Sitting, Cuff Size: Adult Regular)   Pulse 86   Temp 97.9  F (36.6  C) (Oral)   Resp 18   Ht 1.664 m (5' 5.5\")   Wt 75 kg (165 lb 6.4 oz)   LMP 02/26/2003   SpO2 94%   BMI 27.11 kg/m    Patient here for a physical and is fasting.  "

## 2019-06-05 NOTE — PATIENT INSTRUCTIONS
Nurse only  Want bp <130/80    Patient Education   Personalized Prevention Plan  You are due for the preventive services outlined below.  Your care team is available to assist you in scheduling these services.  If you have already completed any of these items, please share that information with your care team to update in your medical record.  Health Maintenance Due   Topic Date Due     Annual Wellness Visit  06/04/2019     Osteoporosis Screening  06/29/2019

## 2019-06-06 LAB
ALBUMIN SERPL-MCNC: 3.7 G/DL (ref 3.4–5)
ALP SERPL-CCNC: 68 U/L (ref 40–150)
ALT SERPL W P-5'-P-CCNC: 24 U/L (ref 0–50)
ANION GAP SERPL CALCULATED.3IONS-SCNC: 8 MMOL/L (ref 3–14)
AST SERPL W P-5'-P-CCNC: 22 U/L (ref 0–45)
BILIRUB SERPL-MCNC: 0.8 MG/DL (ref 0.2–1.3)
BUN SERPL-MCNC: 24 MG/DL (ref 7–30)
CALCIUM SERPL-MCNC: 9.1 MG/DL (ref 8.5–10.1)
CHLORIDE SERPL-SCNC: 103 MMOL/L (ref 94–109)
CHOLEST SERPL-MCNC: 206 MG/DL
CO2 SERPL-SCNC: 26 MMOL/L (ref 20–32)
CREAT SERPL-MCNC: 0.99 MG/DL (ref 0.52–1.04)
GFR SERPL CREATININE-BSD FRML MDRD: 53 ML/MIN/{1.73_M2}
GLUCOSE SERPL-MCNC: 83 MG/DL (ref 70–99)
HDLC SERPL-MCNC: 82 MG/DL
LDLC SERPL CALC-MCNC: 103 MG/DL
NONHDLC SERPL-MCNC: 124 MG/DL
POTASSIUM SERPL-SCNC: 4.2 MMOL/L (ref 3.4–5.3)
PROT SERPL-MCNC: 7.7 G/DL (ref 6.8–8.8)
SODIUM SERPL-SCNC: 137 MMOL/L (ref 133–144)
T4 FREE SERPL-MCNC: 1.68 NG/DL (ref 0.76–1.46)
TRIGL SERPL-MCNC: 105 MG/DL
TSH SERPL DL<=0.005 MIU/L-ACNC: 4.1 MU/L (ref 0.4–4)

## 2019-06-09 DIAGNOSIS — R79.89 ABNORMAL TSH: Primary | ICD-10-CM

## 2019-06-18 ENCOUNTER — DOCUMENTATION ONLY (OUTPATIENT)
Dept: OTHER | Facility: CLINIC | Age: 84
End: 2019-06-18

## 2019-07-08 ENCOUNTER — OFFICE VISIT (OUTPATIENT)
Dept: NURSING | Facility: CLINIC | Age: 84
End: 2019-07-08
Payer: MEDICARE

## 2019-07-08 VITALS — SYSTOLIC BLOOD PRESSURE: 128 MMHG | DIASTOLIC BLOOD PRESSURE: 70 MMHG

## 2019-07-08 DIAGNOSIS — H61.21 IMPACTED CERUMEN OF RIGHT EAR: Primary | ICD-10-CM

## 2019-07-08 NOTE — PROGRESS NOTES
Patient identified using two patient identifiers.  Ear exam showing wax occlusion completed by RN.  H202/H20 was placed in the right ear(s) via irrigation tool: elephant ear.

## 2019-07-25 ENCOUNTER — ANCILLARY PROCEDURE (OUTPATIENT)
Dept: BONE DENSITY | Facility: CLINIC | Age: 84
End: 2019-07-25
Payer: MEDICARE

## 2019-07-25 ENCOUNTER — ANCILLARY PROCEDURE (OUTPATIENT)
Dept: BONE DENSITY | Facility: CLINIC | Age: 84
End: 2019-07-25
Attending: INTERNAL MEDICINE
Payer: MEDICARE

## 2019-07-25 DIAGNOSIS — Z78.0 MENOPAUSE: ICD-10-CM

## 2019-07-25 DIAGNOSIS — Z78.0 ASYMPTOMATIC MENOPAUSAL STATE: ICD-10-CM

## 2019-07-25 PROCEDURE — 77081 DXA BONE DENSITY APPENDICULR: CPT | Mod: 59 | Performed by: INTERNAL MEDICINE

## 2019-07-25 PROCEDURE — 77085 DXA BONE DENSITY AXL VRT FX: CPT | Performed by: INTERNAL MEDICINE

## 2019-08-08 ENCOUNTER — OFFICE VISIT (OUTPATIENT)
Dept: ENDOCRINOLOGY | Facility: CLINIC | Age: 84
End: 2019-08-08
Payer: MEDICARE

## 2019-08-08 VITALS
HEIGHT: 66 IN | DIASTOLIC BLOOD PRESSURE: 72 MMHG | WEIGHT: 167.8 LBS | BODY MASS INDEX: 26.97 KG/M2 | HEART RATE: 80 BPM | TEMPERATURE: 97.7 F | OXYGEN SATURATION: 95 % | RESPIRATION RATE: 18 BRPM | SYSTOLIC BLOOD PRESSURE: 147 MMHG

## 2019-08-08 DIAGNOSIS — E03.8 OTHER SPECIFIED HYPOTHYROIDISM: Primary | ICD-10-CM

## 2019-08-08 PROCEDURE — 99204 OFFICE O/P NEW MOD 45 MIN: CPT | Performed by: INTERNAL MEDICINE

## 2019-08-08 ASSESSMENT — MIFFLIN-ST. JEOR: SCORE: 1234.95

## 2019-08-08 NOTE — LETTER
2019         RE: Bushra Harmon  [address]  [place] [address]        Dear Colleague,    Thank you for referring your patient, Bushra Harmon, to the [facility]. Please see a copy of my visit note below.    ENDOCRINOLOGY CLINIC NOTE:    Name: Bushra Harmon  Seen in consultation with Magalis Woodward for Hypothyroidism.  HPI:  Bushra Harmon is a 83 year old female who presents for the evaluation of :hypothyroidism.   has a past medical history of CKD (chronic kidney disease), Essential hypertension, benign, and Unspecified hypothyroidism.    #1 Hypothyroidism.  Initially diagnosed at age 42.and since then she is on thyroid hormone replacement.  Currently taking levothyroxine 88 mcg/day. On this dose for many years.   Taking generic.  Reports compliance.    Feeling OK. No major s/s.  Taking biotin. X on it 10 years.    + frontal hair loss.    Palpitations:  No  Changes to hair or skin: + frontal hair loss X 2 yearss  Diarrhea/Constipation:No  Changes in menses: s/p menopause. Had been on HRT for a while. Stopped at age 67.  Dysphagia or Shortness of breath:No  Muscle aches or pain:Yes: on and off leg pain.  Tremors:No  Changes in weight: mostly stable  Heat or cold intolerance: no  History of Lithium or Amiodarone use:No  Head or neck surgery/radiation:No  Family History of Thyroid Problems: + hypothyroidism in mother and sister  PMH/PSH:  Past Medical History:   Diagnosis Date     CKD (chronic kidney disease)      Essential hypertension, benign      Unspecified hypothyroidism      Past Surgical History:   Procedure Laterality Date     ABDOMEN SURGERY       C DEXA, BONE DENSITY, AXIAL SKEL  2003    Normal BMD     C NONSPECIFIC PROCEDURE      Exploratory Laparotomy constricting band     C NONSPECIFIC PROCEDURE       x3     C REPLANTATION THUMB DISTAL,COMPLETE  2009    left thumb trauma; Dr. Jose surgery     COLONOSCOPY N/A 11/3/2015    Procedure: COLONOSCOPY;   Surgeon: Joanna Acevedo MD;  Location: [facility]     HC COLONOSCOPY THRU STOMA, DIAGNOSTIC  10/22/2005    Diverticulosis- Dr. Shea     HC FLEX SIGMOIDOSCOPY W/WO SHERIF SPEC BY BRUSH/WASH  1998    normal     HC REMOVE TONSILS/ADENOIDS,<13 Y/O       HC REMV CATARACT EXTRACAP,INSERT LENS,COMP  2005    Left eye     SURGICAL HISTORY OF -   2004    macular hole repair- Left eye     Family Hx:  Family History   Problem Relation Age of Onset     Hypertension Mother           at 100.     Breast Cancer Mother      Thyroid Disease Mother      Diabetes Sister      Hypertension Sister      Neurologic Disorder Daughter         MS     Hypertension Son        Social Hx:  Social History     Socioeconomic History     Marital status:      Spouse name: Not on file     Number of children: 3     Years of education: Not on file     Highest education level: Not on file   Occupational History     Occupation: office     Employer: RETIRED   Social Needs     Financial resource strain: Not on file     Food insecurity:     Worry: Not on file     Inability: Not on file     Transportation needs:     Medical: Not on file     Non-medical: Not on file   Tobacco Use     Smoking status: Former Smoker     Packs/day: 0.50     Years: 45.00     Pack years: 22.50     Last attempt to quit: 2001     Years since quittin.2     Smokeless tobacco: Never Used   Substance and Sexual Activity     Alcohol use: Yes     Comment: 1 drink per day     Drug use: No     Sexual activity: Never   Lifestyle     Physical activity:     Days per week: Not on file     Minutes per session: Not on file     Stress: Not on file   Relationships     Social connections:     Talks on phone: Not on file     Gets together: Not on file     Attends Ep[employer]op[employer] service: Not on file     Active member of club or organization: Not on file     Attends meetings of clubs or organizations: Not on file     Relationship status: Not on file     Intimate partner  "violence:     Fear of current or ex partner: Not on file     Emotionally abused: Not on file     Physically abused: Not on file     Forced sexual activity: Not on file   Other Topics Concern      Service Not Asked     Blood Transfusions Not Asked     Caffeine Concern Yes     Comment: 1 cup per day     Occupational Exposure Not Asked     Hobby Hazards Not Asked     Sleep Concern Not Asked     Stress Concern Not Asked     Weight Concern Not Asked     Special Diet Not Asked     Back Care Not Asked     Exercise Yes     Comment: Active; exercise 4 times per week     Bike Helmet Not Asked     Seat Belt Yes     Self-Exams No     Parent/sibling w/ CABG, MI or angioplasty before 65F 55M? Not Asked   Social History Narrative     Not on file          MEDICATIONS:  has a current medication list which includes the following prescription(s): acyclovir, aspirin, biotin, calcium citrate-vitamin d, glucosamine hcl, lactobacillus, levothyroxine, lisinopril, lutein, metoprolol succinate er, multiple vitamin, and simvastatin.    ROS   ROS: 10 point ROS neg other than the symptoms noted above in the HPI.    Physical Exam   VS: BP (!) 147/72 (BP Location: Left arm, Patient Position: Chair, Cuff Size: Adult Regular)   Pulse 80   Temp 97.7  F (36.5  C) (Oral)   Resp 18   Ht 1.664 m (5' 5.5\")   Wt 76.1 kg (167 lb 12.8 oz)   LMP 02/26/2003   SpO2 95%   BMI 27.50 kg/m     GENERAL: AXOX3, NAD, well dressed, answering questions appropriately, appears stated age.  HEENT: No exopthalmous, no proptosis, EOMI, no lig lag, no retraction  NECK: Thyroid normal in size, non tender, no nodules were palpated.  CV: RRR, no rubs, gallops, no murmurs  LUNGS: CTAB, no wheezes, rales, or ronchi  ABDOMEN:+BS  EXTREMITIES: no edema, +pulses, no rashes, no lesions  NEUROLOGY: CN grossly intact,  + DTR upper and lower extremity, no tremors  MSK: grossly intact  SKIN: no rashes, no lesions    LABS:  TFTs:  ENDO THYROID LABS-[facility] Latest Ref Rng & " Units 6/5/2019 6/4/2018   TSH 0.40 - 4.00 mU/L 4.10 (H) 4.23 (H)   T4 FREE 0.76 - 1.46 ng/dL 1.68 (H) 1.47 (H)     ENDO THYROID LABS-[facility] Latest Ref Rng & Units 5/31/2017 5/27/2016   TSH 0.40 - 4.00 mU/L 4.16 (H) 2.87   T4 FREE 0.76 - 1.46 ng/dL 1.46        All pertinent notes, labs, and images personally reviewed by me.     A/P  Ms.Nancy YUNG Harmon is a 83 year old here for the evaluation of hypothyroidism:    #1 Hypothyroidism. Differential includes: autoimmune disease (Hashimoto's thyroiditis), treatment for hyperthyroidism, radiation therapy, thyroid surgery, medications, congenital disease, pituitary disorder, pregnancy, and iodine deficiency.  Persons with Hashimoto's thyroiditis have serum antibodies reacting with TG, TPO, and against an unidentified protein present in colloid.   Currently taking generic levothyroxine 88 mcg/day and reports compliance  Clinically looks euthyroid  She is on biotin for last 10 years  Labs done in 2018 in 2019 showing high TSH and high free T4 as noted above.  Plan:  Discussed diagnosis, pathophysiology, management and treatment options of condition with patient.  Recommend to stop biotin for 4 weeks and labs after that.   Will also screen for Hashimoto at that time.  Follow-up after that  Continue levothyroxine at current dose at this time      Discussed s/s of hypothyroidism and hyperthyroidism to watch for.  The patient indicates understanding of these issues and agrees with the plan.    Plan: T4 free, Thyroid peroxidase antibody, TSH,         Thyroxine Free by [facility] Dialysis        Standard treatment for hypothyroidism involves daily use of the synthetic thyroid hormone levothyroxine (Levothroid, Synthroid, others).  The dosage of thyroxine should normally be that required to bring the serum TSH level to the low normal range, such as 0.3 - 1 uU/ml. This is typically achieved with 1 ug L-T4/lb body weight/day, ranges from 75 - 125 ug/day in women, and 125 - 200 ug/day in  men. Once thyroxine treatment is initiated, it is required indefinitely in most patients.     Symptoms should improve one to two weeks after starting treatment. Treatment with levothyroxine is usually lifelong.  Doseage may need to be adjusted based on body weight, medications, or pregnancy.  To determine the right dosage of levothyroxine initially we will repeat TSH and free T4 after two months.     Excessive amounts of the hormone can cause side effects, such as: Increased appetite, insomnia, heart palpitations, and shakiness.  Patients with CAD will be started on a lower dose.  Levothyroxine causes virtually no side effects when used in the appropriate dose.    In patients with childbearing age precaution should be taking regarding hypothyroidism. Hypothyroid woman are more likely to experience infertility, and they have an increase. Balance and portion, anemia, and gestational hypertension, placental abruption and postpartum hemorrhage.    Certain medications, supplements and foods can affect the body s ability to absorb levothyroxine. Medications include: Iron supplements, Cholestyramine and Calcium supplements.       Follow-up:  4-6 weeks.    Belinda Marcos MD  Endocrinology  [facility]/[place]  CC: Magalis Woodward    More than 50% of face to face time spent with Ms. Harmon on counseling / coordinating her care.        All questions were answered.  The patient indicates understanding of the above issues and agrees with the plan set forth.     Addendum to above note and clinic visit:    Labs reviewed.    See result note/telephone encounter.            Again, thank you for allowing me to participate in the care of your patient.        Sincerely,        Belinda Marcos MD

## 2019-08-08 NOTE — NURSING NOTE
ENDOCRINOLOGY INTAKE FORM    Patient Name:  Bushra Harmon  :  1935    Is patient Diabetic?   No  Does patient have non-diabetic or other endocrine issues?  Yes: hypothyroidism, hyperlipidemia    Vitals: LMP 2003   BMI= There is no height or weight on file to calculate BMI.    Smoking and Alcohol use:  Social History     Tobacco Use     Smoking status: Former Smoker     Packs/day: 0.50     Years: 45.00     Pack years: 22.50     Last attempt to quit: 2001     Years since quittin.2     Smokeless tobacco: Never Used   Substance Use Topics     Alcohol use: Yes     Comment: 1 drink per day     Drug use: No       Irasema Henry CMA  [facility]  [place]

## 2019-08-08 NOTE — PATIENT INSTRUCTIONS
[facility]   Dr Marcos, Endocrinology Department      [facility]   [address]  [place] [address]  Appointment [phone]  Fax: [phone]  [place]: Monday and Tuesday         [facility]   [address]  [place] [address]  Appointment [phone]  Fax: [phone]  [place]: Wednesday and Thursday            Stop biotin X 4 weeks  Labs after that.  Please make a lab appointment for blood work and follow up clinic appointment in 1 week after that to discuss results.  Continue levothyroxine at current dose.      Take Levothyroxine on an empty stomach. Take it with a full glass of water at least 30 minutes to 1 hour before eating breakfast.   This medicine should be taken at least 4 hours before or 4 hours after these medicines: antacids (Maalox , Mylanta , Tums ), calcium supplements, cholestyramine (Prevalite , Questran ), colestipol (Colestid ), iron supplements, orlistat (Heraclio , Xenical ), simethicone (Gas-X , Mylicon ), and sucralfate (Carafate ).   Swallow the capsule whole. Do not cut or crush it.

## 2019-08-08 NOTE — PROGRESS NOTES
ENDOCRINOLOGY CLINIC NOTE:    Name: Bushra Harmon  Seen in consultation with Magalis Woodward for Hypothyroidism.  HPI:  Bushra Harmon is a 83 year old female who presents for the evaluation of :hypothyroidism.   has a past medical history of CKD (chronic kidney disease), Essential hypertension, benign, and Unspecified hypothyroidism.    #1 Hypothyroidism.  Initially diagnosed at age 42.and since then she is on thyroid hormone replacement.  Currently taking levothyroxine 88 mcg/day. On this dose for many years.   Taking generic.  Reports compliance.    Feeling OK. No major s/s.  Taking biotin. X on it 10 years.    + frontal hair loss.    Palpitations:  No  Changes to hair or skin: + frontal hair loss X 2 yearss  Diarrhea/Constipation:No  Changes in menses: s/p menopause. Had been on HRT for a while. Stopped at age 67.  Dysphagia or Shortness of breath:No  Muscle aches or pain:Yes: on and off leg pain.  Tremors:No  Changes in weight: mostly stable  Heat or cold intolerance: no  History of Lithium or Amiodarone use:No  Head or neck surgery/radiation:No  Family History of Thyroid Problems: + hypothyroidism in mother and sister  PMH/PSH:  Past Medical History:   Diagnosis Date     CKD (chronic kidney disease)      Essential hypertension, benign      Unspecified hypothyroidism      Past Surgical History:   Procedure Laterality Date     ABDOMEN SURGERY       C DEXA, BONE DENSITY, AXIAL SKEL  2003    Normal BMD     C NONSPECIFIC PROCEDURE      Exploratory Laparotomy constricting band     C NONSPECIFIC PROCEDURE       x3     C REPLANTATION THUMB DISTAL,COMPLETE  2009    left thumb trauma; Dr. Jose surgery     COLONOSCOPY N/A 11/3/2015    Procedure: COLONOSCOPY;  Surgeon: Joanna Acevedo MD;  Location:  GI     HC COLONOSCOPY THRU STOMA, DIAGNOSTIC  10/22/2005    Diverticulosis- Dr. Shea     HC FLEX SIGMOIDOSCOPY W/WO SHERIF SPEC BY BRUSH/WASH  1998    normal     HC REMOVE  TONSILS/ADENOIDS,<11 Y/O       HC REMV CATARACT EXTRACAP,INSERT LENS,COMP  2005    Left eye     SURGICAL HISTORY OF -   2004    macular hole repair- Left eye     Family Hx:  Family History   Problem Relation Age of Onset     Hypertension Mother           at 100.     Breast Cancer Mother      Thyroid Disease Mother      Diabetes Sister      Hypertension Sister      Neurologic Disorder Daughter         MS     Hypertension Son        Social Hx:  Social History     Socioeconomic History     Marital status:      Spouse name: Not on file     Number of children: 3     Years of education: Not on file     Highest education level: Not on file   Occupational History     Occupation: office     Employer: RETIRED   Social Needs     Financial resource strain: Not on file     Food insecurity:     Worry: Not on file     Inability: Not on file     Transportation needs:     Medical: Not on file     Non-medical: Not on file   Tobacco Use     Smoking status: Former Smoker     Packs/day: 0.50     Years: 45.00     Pack years: 22.50     Last attempt to quit: 2001     Years since quittin.2     Smokeless tobacco: Never Used   Substance and Sexual Activity     Alcohol use: Yes     Comment: 1 drink per day     Drug use: No     Sexual activity: Never   Lifestyle     Physical activity:     Days per week: Not on file     Minutes per session: Not on file     Stress: Not on file   Relationships     Social connections:     Talks on phone: Not on file     Gets together: Not on file     Attends Yarsanism service: Not on file     Active member of club or organization: Not on file     Attends meetings of clubs or organizations: Not on file     Relationship status: Not on file     Intimate partner violence:     Fear of current or ex partner: Not on file     Emotionally abused: Not on file     Physically abused: Not on file     Forced sexual activity: Not on file   Other Topics Concern      Service Not Asked      "Blood Transfusions Not Asked     Caffeine Concern Yes     Comment: 1 cup per day     Occupational Exposure Not Asked     Hobby Hazards Not Asked     Sleep Concern Not Asked     Stress Concern Not Asked     Weight Concern Not Asked     Special Diet Not Asked     Back Care Not Asked     Exercise Yes     Comment: Active; exercise 4 times per week     Bike Helmet Not Asked     Seat Belt Yes     Self-Exams No     Parent/sibling w/ CABG, MI or angioplasty before 65F 55M? Not Asked   Social History Narrative     Not on file          MEDICATIONS:  has a current medication list which includes the following prescription(s): acyclovir, aspirin, biotin, calcium citrate-vitamin d, glucosamine hcl, lactobacillus, levothyroxine, lisinopril, lutein, metoprolol succinate er, multiple vitamin, and simvastatin.    ROS   ROS: 10 point ROS neg other than the symptoms noted above in the HPI.    Physical Exam   VS: BP (!) 147/72 (BP Location: Left arm, Patient Position: Chair, Cuff Size: Adult Regular)   Pulse 80   Temp 97.7  F (36.5  C) (Oral)   Resp 18   Ht 1.664 m (5' 5.5\")   Wt 76.1 kg (167 lb 12.8 oz)   LMP 02/26/2003   SpO2 95%   BMI 27.50 kg/m    GENERAL: AXOX3, NAD, well dressed, answering questions appropriately, appears stated age.  HEENT: No exopthalmous, no proptosis, EOMI, no lig lag, no retraction  NECK: Thyroid normal in size, non tender, no nodules were palpated.  CV: RRR, no rubs, gallops, no murmurs  LUNGS: CTAB, no wheezes, rales, or ronchi  ABDOMEN:+BS  EXTREMITIES: no edema, +pulses, no rashes, no lesions  NEUROLOGY: CN grossly intact,  + DTR upper and lower extremity, no tremors  MSK: grossly intact  SKIN: no rashes, no lesions    LABS:  TFTs:  ENDO THYROID LABS-[facility] Latest Ref Rng & Units 6/5/2019 6/4/2018   TSH 0.40 - 4.00 mU/L 4.10 (H) 4.23 (H)   T4 FREE 0.76 - 1.46 ng/dL 1.68 (H) 1.47 (H)     ENDO THYROID LABS-[facility] Latest Ref Rng & Units 5/31/2017 5/27/2016   TSH 0.40 - 4.00 mU/L 4.16 (H) 2.87   T4 FREE " 0.76 - 1.46 ng/dL 1.46        All pertinent notes, labs, and images personally reviewed by me.     A/P  Ms.Nancy YUNG Harmon is a 83 year old here for the evaluation of hypothyroidism:    #1 Hypothyroidism. Differential includes: autoimmune disease (Hashimoto's thyroiditis), treatment for hyperthyroidism, radiation therapy, thyroid surgery, medications, congenital disease, pituitary disorder, pregnancy, and iodine deficiency.  Persons with Hashimoto's thyroiditis have serum antibodies reacting with TG, TPO, and against an unidentified protein present in colloid.   Currently taking generic levothyroxine 88 mcg/day and reports compliance  Clinically looks euthyroid  She is on biotin for last 10 years  Labs done in 2018 in 2019 showing high TSH and high free T4 as noted above.  Plan:  Discussed diagnosis, pathophysiology, management and treatment options of condition with patient.  Recommend to stop biotin for 4 weeks and labs after that.   Will also screen for Hashimoto at that time.  Follow-up after that  Continue levothyroxine at current dose at this time      Discussed s/s of hypothyroidism and hyperthyroidism to watch for.  The patient indicates understanding of these issues and agrees with the plan.    Plan: T4 free, Thyroid peroxidase antibody, TSH,         Thyroxine Free by Equilibrium Dialysis        Standard treatment for hypothyroidism involves daily use of the synthetic thyroid hormone levothyroxine (Levothroid, Synthroid, others).  The dosage of thyroxine should normally be that required to bring the serum TSH level to the low normal range, such as 0.3 - 1 uU/ml. This is typically achieved with 1 ug L-T4/lb body weight/day, ranges from 75 - 125 ug/day in women, and 125 - 200 ug/day in men. Once thyroxine treatment is initiated, it is required indefinitely in most patients.     Symptoms should improve one to two weeks after starting treatment. Treatment with levothyroxine is usually lifelong.  Doseage may need  to be adjusted based on body weight, medications, or pregnancy.  To determine the right dosage of levothyroxine initially we will repeat TSH and free T4 after two months.     Excessive amounts of the hormone can cause side effects, such as: Increased appetite, insomnia, heart palpitations, and shakiness.  Patients with CAD will be started on a lower dose.  Levothyroxine causes virtually no side effects when used in the appropriate dose.    In patients with childbearing age precaution should be taking regarding hypothyroidism. Hypothyroid woman are more likely to experience infertility, and they have an increase. Balance and portion, anemia, and gestational hypertension, placental abruption and postpartum hemorrhage.    Certain medications, supplements and foods can affect the body s ability to absorb levothyroxine. Medications include: Iron supplements, Cholestyramine and Calcium supplements.       Follow-up:  4-6 weeks.    Belinda Marcos MD  Endocrinology  [facility]/[place]  CC: Magalis Woodward    More than 50% of face to face time spent with Ms. Harmon on counseling / coordinating her care.        All questions were answered.  The patient indicates understanding of the above issues and agrees with the plan set forth.     Addendum to above note and clinic visit:    Labs reviewed.    See result note/telephone encounter.

## 2019-09-23 DIAGNOSIS — E03.8 OTHER SPECIFIED HYPOTHYROIDISM: ICD-10-CM

## 2019-09-23 PROCEDURE — 84439 ASSAY OF FREE THYROXINE: CPT | Performed by: INTERNAL MEDICINE

## 2019-09-23 PROCEDURE — 84443 ASSAY THYROID STIM HORMONE: CPT | Performed by: INTERNAL MEDICINE

## 2019-09-23 PROCEDURE — 84439 ASSAY OF FREE THYROXINE: CPT | Mod: 90 | Performed by: INTERNAL MEDICINE

## 2019-09-23 PROCEDURE — 99000 SPECIMEN HANDLING OFFICE-LAB: CPT | Performed by: INTERNAL MEDICINE

## 2019-09-23 PROCEDURE — 36415 COLL VENOUS BLD VENIPUNCTURE: CPT | Performed by: INTERNAL MEDICINE

## 2019-09-23 PROCEDURE — 86376 MICROSOMAL ANTIBODY EACH: CPT | Performed by: INTERNAL MEDICINE

## 2019-09-24 LAB
T4 FREE SERPL-MCNC: 1.27 NG/DL (ref 0.76–1.46)
THYROPEROXIDASE AB SERPL-ACNC: 63 IU/ML
TSH SERPL DL<=0.005 MIU/L-ACNC: 7.36 MU/L (ref 0.4–4)

## 2019-09-25 ENCOUNTER — TELEPHONE (OUTPATIENT)
Dept: ENDOCRINOLOGY | Facility: CLINIC | Age: 84
End: 2019-09-25

## 2019-09-25 DIAGNOSIS — E03.8 OTHER SPECIFIED HYPOTHYROIDISM: Primary | ICD-10-CM

## 2019-09-25 RX ORDER — LEVOTHYROXINE SODIUM 100 UG/1
100 TABLET ORAL DAILY
Qty: 90 TABLET | Refills: 0 | Status: SHIPPED | OUTPATIENT
Start: 2019-09-25 | End: 2019-11-12

## 2019-09-25 NOTE — TELEPHONE ENCOUNTER
ENDO THYROID LABS-[facility] Latest Ref Rng & Units 9/23/2019 6/5/2019   TSH 0.40 - 4.00 mU/L 7.36 (H) 4.10 (H)   T4 FREE 0.76 - 1.46 ng/dL 1.27 1.68 (H)   THYR PEROXIDASE RENETTA <35 IU/mL 63 (H)      TPO antibodies are positive. This means that you you have Hashimots thyroiditis which is associated with underactive thyroid.      Based on labs recommend to increase levothyroxine to 100 mcg/day ( from 88)  F/u as scheduled on Nov. Get labs few days prior to that.  Rx sent.    Please inform patient.

## 2019-09-26 LAB — T4 FREE SERPL DIALY-MCNC: 2.8 NG/DL (ref 1.1–2.4)

## 2019-09-27 NOTE — TELEPHONE ENCOUNTER
Patient called back and said she is kind of scared because a few of her labs were so far out of range and she is wanting to discuss this further. She didn't recognize the phone number earlier but will  the call if she can get another call back.

## 2019-09-30 NOTE — TELEPHONE ENCOUNTER
Component      Latest Ref Rng & Units 9/23/2019   Free T4 Eq Dialysis      1.1 - 2.4 ng/dL 2.8 (H)   TSH      0.40 - 4.00 mU/L 7.36 (H)   Thyroid Peroxidase Antibody      <35 IU/mL 63 (H)   T4 Free      0.76 - 1.46 ng/dL 1.27     See previoous encounter about dose change 9/25/19.  Called Bushra again.  No response.  Left VM.  F/u as scheduled.

## 2019-10-01 ENCOUNTER — HEALTH MAINTENANCE LETTER (OUTPATIENT)
Age: 84
End: 2019-10-01

## 2019-10-31 DIAGNOSIS — E03.8 OTHER SPECIFIED HYPOTHYROIDISM: ICD-10-CM

## 2019-10-31 PROCEDURE — 84439 ASSAY OF FREE THYROXINE: CPT | Performed by: INTERNAL MEDICINE

## 2019-10-31 PROCEDURE — 36415 COLL VENOUS BLD VENIPUNCTURE: CPT | Performed by: INTERNAL MEDICINE

## 2019-10-31 PROCEDURE — 84443 ASSAY THYROID STIM HORMONE: CPT | Performed by: INTERNAL MEDICINE

## 2019-11-01 LAB
T4 FREE SERPL-MCNC: 1.57 NG/DL (ref 0.76–1.46)
TSH SERPL DL<=0.005 MIU/L-ACNC: 1.56 MU/L (ref 0.4–4)

## 2019-11-07 ENCOUNTER — OFFICE VISIT (OUTPATIENT)
Dept: ENDOCRINOLOGY | Facility: CLINIC | Age: 84
End: 2019-11-07
Payer: MEDICARE

## 2019-11-07 VITALS
RESPIRATION RATE: 16 BRPM | SYSTOLIC BLOOD PRESSURE: 138 MMHG | TEMPERATURE: 98.2 F | WEIGHT: 168.1 LBS | DIASTOLIC BLOOD PRESSURE: 76 MMHG | HEART RATE: 80 BPM | HEIGHT: 66 IN | OXYGEN SATURATION: 94 % | BODY MASS INDEX: 27.02 KG/M2

## 2019-11-07 DIAGNOSIS — E03.8 OTHER SPECIFIED HYPOTHYROIDISM: Primary | ICD-10-CM

## 2019-11-07 PROCEDURE — 36415 COLL VENOUS BLD VENIPUNCTURE: CPT | Performed by: INTERNAL MEDICINE

## 2019-11-07 PROCEDURE — 84439 ASSAY OF FREE THYROXINE: CPT | Mod: 90 | Performed by: INTERNAL MEDICINE

## 2019-11-07 PROCEDURE — 99214 OFFICE O/P EST MOD 30 MIN: CPT | Performed by: INTERNAL MEDICINE

## 2019-11-07 PROCEDURE — 99000 SPECIMEN HANDLING OFFICE-LAB: CPT | Performed by: INTERNAL MEDICINE

## 2019-11-07 ASSESSMENT — MIFFLIN-ST. JEOR: SCORE: 1236.31

## 2019-11-07 NOTE — PROGRESS NOTES
ENDOCRINOLOGY CLINIC NOTE:    Name: Bushra Harmon  Seen in consultation with Magalis Woodward for Hypothyroidism.  HPI:  Bushra Harmon is a 83 year old female who presents for the evaluation of :hypothyroidism.   has a past medical history of CKD (chronic kidney disease), Essential hypertension, benign, and Unspecified hypothyroidism.    #1. Hypothyroidism.  Initially diagnosed at age 42.and since then she is on thyroid hormone replacement.  Currently taking levothyroxine 100 mcg/day. On this dose X 2019. Prior to that was taking 88 mcg/day. Dose increased based on labs at that time.  Taking generic.  Reports compliance.    Feeling OK. No major s/s.  No longer taking biotin X Aug 2019.  Labs 10/2019 showing high FT4. FT4DL not done.    + frontal hair loss.    Palpitations:  No  Changes to hair or skin: + frontal hair loss X 2 years  Diarrhea/Constipation:No  Changes in menses: s/p menopause. Had been on HRT for a while. Stopped at age 67.  Dysphagia or Shortness of breath:No  Tremors:No  Changes in weight: mostly stable  Wt Readings from Last 2 Encounters:   19 76.2 kg (168 lb 1.6 oz)   19 76.1 kg (167 lb 12.8 oz)     Heat or cold intolerance: no  History of Lithium or Amiodarone use:No  Head or neck surgery/radiation:No  Family History of Thyroid Problems: + hypothyroidism in mother and sister  PMH/PSH:  Past Medical History:   Diagnosis Date     CKD (chronic kidney disease)      Essential hypertension, benign      Unspecified hypothyroidism      Past Surgical History:   Procedure Laterality Date     ABDOMEN SURGERY       C DEXA, BONE DENSITY, AXIAL SKEL  2003    Normal BMD     C NONSPECIFIC PROCEDURE      Exploratory Laparotomy constricting band     C NONSPECIFIC PROCEDURE       x3     C REPLANTATION THUMB DISTAL,COMPLETE  2009    left thumb trauma; Dr. Jose surgery     COLONOSCOPY N/A 11/3/2015    Procedure: COLONOSCOPY;  Surgeon: Joanna Acevedo MD;  Location: [facility]      HC COLONOSCOPY THRU STOMA, DIAGNOSTIC  10/22/2005    Diverticulosis- Dr. Shea     HC FLEX SIGMOIDOSCOPY W/WO SHERIF SPEC BY BRUSH/WASH  1998    normal     HC REMOVE TONSILS/ADENOIDS,<11 Y/O       HC REMV CATARACT EXTRACAP,INSERT LENS,COMP  2005    Left eye     SURGICAL HISTORY OF -   2004    macular hole repair- Left eye     Family Hx:  Family History   Problem Relation Age of Onset     Hypertension Mother           at 100.     Breast Cancer Mother      Thyroid Disease Mother      Diabetes Sister      Hypertension Sister      Neurologic Disorder Daughter         MS     Hypertension Son        Social Hx:  Social History     Socioeconomic History     Marital status:      Spouse name: Not on file     Number of children: 3     Years of education: Not on file     Highest education level: Not on file   Occupational History     Occupation: office     Employer: RETIRED   Social Needs     Financial resource strain: Not on file     Food insecurity:     Worry: Not on file     Inability: Not on file     Transportation needs:     Medical: Not on file     Non-medical: Not on file   Tobacco Use     Smoking status: Former Smoker     Packs/day: 0.50     Years: 45.00     Pack years: 22.50     Last attempt to quit: 2001     Years since quittin.5     Smokeless tobacco: Never Used   Substance and Sexual Activity     Alcohol use: Yes     Comment: 1 drink per day     Drug use: No     Sexual activity: Never   Lifestyle     Physical activity:     Days per week: Not on file     Minutes per session: Not on file     Stress: Not on file   Relationships     Social connections:     Talks on phone: Not on file     Gets together: Not on file     Attends Protestant service: Not on file     Active member of club or organization: Not on file     Attends meetings of clubs or organizations: Not on file     Relationship status: Not on file     Intimate partner violence:     Fear of current or ex partner: Not on  "file     Emotionally abused: Not on file     Physically abused: Not on file     Forced sexual activity: Not on file   Other Topics Concern      Service Not Asked     Blood Transfusions Not Asked     Caffeine Concern Yes     Comment: 1 cup per day     Occupational Exposure Not Asked     Hobby Hazards Not Asked     Sleep Concern Not Asked     Stress Concern Not Asked     Weight Concern Not Asked     Special Diet Not Asked     Back Care Not Asked     Exercise Yes     Comment: Active; exercise 4 times per week     Bike Helmet Not Asked     Seat Belt Yes     Self-Exams No     Parent/sibling w/ CABG, MI or angioplasty before 65F 55M? Not Asked   Social History Narrative     Not on file          MEDICATIONS:  has a current medication list which includes the following prescription(s): aspirin, biotin, calcium citrate-vitamin d, glucosamine hcl, lactobacillus, levothyroxine, lisinopril, lutein, metoprolol succinate er, multiple vitamin, and simvastatin.    ROS   ROS: 10 point ROS neg other than the symptoms noted above in the HPI.    Physical Exam   VS: /76   Pulse 80   Temp 98.2  F (36.8  C) (Oral)   Resp 16   Ht 1.664 m (5' 5.5\")   Wt 76.2 kg (168 lb 1.6 oz)   LMP 02/26/2003   SpO2 94%   Breastfeeding? No   BMI 27.55 kg/m    GENERAL: AXOX3, NAD, well dressed, answering questions appropriately, appears stated age.  HEENT: No exopthalmous, no proptosis, EOMI, no lig lag, no retraction  NECK: Thyroid normal in size, non tender, no nodules were palpated.  CV: RRR, no rubs, gallops, no murmurs  LUNGS: CTAB, no wheezes, rales, or ronchi  ABDOMEN:+BS  EXTREMITIES: no edema, +pulses, no rashes, no lesions  NEUROLOGY: CN grossly intact,  + DTR upper and lower extremity, no tremors  MSK: grossly intact  SKIN: no rashes, no lesions    LABS:  TFTs:  ENDO THYROID LABS-[facility] Latest Ref Rng & Units 10/31/2019   TSH 0.40 - 4.00 mU/L 1.56   T4 FREE 0.76 - 1.46 ng/dL 1.57 (H)     Component      Latest Ref Rng & Units " 9/23/2019 10/31/2019   Free T4 Eq Dialysis      1.1 - 2.4 ng/dL 2.8 (H)    TSH      0.40 - 4.00 mU/L  1.56   T4 Free      0.76 - 1.46 ng/dL  1.57 (H)     ENDO THYROID LABS-[facility] Latest Ref Rng & Units 9/23/2019   TSH 0.40 - 4.00 mU/L 7.36 (H)   T4 FREE 0.76 - 1.46 ng/dL 1.27   THYR PEROXIDASE RENETTA <35 IU/mL 63 (H)     ENDO THYROID LABS-[facility] Latest Ref Rng & Units 6/5/2019 6/4/2018   TSH 0.40 - 4.00 mU/L 4.10 (H) 4.23 (H)   T4 FREE 0.76 - 1.46 ng/dL 1.68 (H) 1.47 (H)     ENDO THYROID LABS-[facility] Latest Ref Rng & Units 5/31/2017 5/27/2016   TSH 0.40 - 4.00 mU/L 4.16 (H) 2.87   T4 FREE 0.76 - 1.46 ng/dL 1.46        All pertinent notes, labs, and images personally reviewed by me.     A/P  Ms.Nancy YUNG Harmon is a 83 year old here for the evaluation of hypothyroidism:    #1 Hypothyroidism(+TPO):   Currently taking generic levothyroxine 100 mcg/day and reports compliance. On this dose X 9/2019  Clinically looks euthyroid  Plan:  Discussed diagnosis, pathophysiology, management and treatment options of condition with patient.  Labs today (FT4DL)  Dose change based on that.  (May need to consider dose close to 650 mcg/week based on labs)  Continue to hold off biotin ( she did not notice difference since stopping it)    Discussed s/s of hypothyroidism and hyperthyroidism to watch for.  The patient indicates understanding of these issues and agrees with the plan.    Bushra to follow up with Primary Care provider regarding elevated blood pressure.    Standard treatment for hypothyroidism involves daily use of the synthetic thyroid hormone levothyroxine (Levothroid, Synthroid, others).  The dosage of thyroxine should normally be that required to bring the serum TSH level to the low normal range, such as 0.3 - 1 uU/ml. This is typically achieved with 1 ug L-T4/lb body weight/day, ranges from 75 - 125 ug/day in women, and 125 - 200 ug/day in men. Once thyroxine treatment is initiated, it is required indefinitely in most patients.      Symptoms should improve one to two weeks after starting treatment. Treatment with levothyroxine is usually lifelong.  Doseage may need to be adjusted based on body weight, medications, or pregnancy.  To determine the right dosage of levothyroxine initially we will repeat TSH and free T4 after two months.     Excessive amounts of the hormone can cause side effects, such as: Increased appetite, insomnia, heart palpitations, and shakiness.  Patients with CAD will be started on a lower dose.  Levothyroxine causes virtually no side effects when used in the appropriate dose.    In patients with childbearing age precaution should be taking regarding hypothyroidism. Hypothyroid woman are more likely to experience infertility, and they have an increase. Balance and portion, anemia, and gestational hypertension, placental abruption and postpartum hemorrhage.    Certain medications, supplements and foods can affect the body s ability to absorb levothyroxine. Medications include: Iron supplements, Cholestyramine and Calcium supplements.       Follow-up:  Based on labs.    Belinda Marcos MD  Endocrinology  [facility]  CC: Magalis Woodward    More than 50% of face to face time spent with Ms. Harmon on counseling / coordinating her care.        All questions were answered.  The patient indicates understanding of the above issues and agrees with the plan set forth.     Addendum to above note and clinic visit:    Labs reviewed.    See result note/telephone encounter.

## 2019-11-07 NOTE — NURSING NOTE
ENDOCRINOLOGY INTAKE FORM    Patient Name:  Bushra Harmon  :  1935    Is patient Diabetic?   No  Does patient have non-diabetic or other endocrine issues?  Yes: hypothyroidism, hyperlipidemia    Vitals: LMP 2003   BMI= There is no height or weight on file to calculate BMI.    Smoking and Alcohol use:  Social History     Tobacco Use     Smoking status: Former Smoker     Packs/day: 0.50     Years: 45.00     Pack years: 22.50     Last attempt to quit: 2001     Years since quittin.5     Smokeless tobacco: Never Used   Substance Use Topics     Alcohol use: Yes     Comment: 1 drink per day     Drug use: No     Irasema Henry CMA  [facility]  [facility]

## 2019-11-07 NOTE — LETTER
11/7/2019         RE: Bushra Harmon  [address]  [place] [address]        Dear Colleague,    Thank you for referring your patient, Bushra Harmon, to the [facility]. Please see a copy of my visit note below.    ENDOCRINOLOGY CLINIC NOTE:    Name: Bushra Harmon  Seen in consultation with Magalis Woodward for Hypothyroidism.  HPI:  Bushra Harmon is a 83 year old female who presents for the evaluation of :hypothyroidism.   has a past medical history of CKD (chronic kidney disease), Essential hypertension, benign, and Unspecified hypothyroidism.    #1. Hypothyroidism.  Initially diagnosed at age 42.and since then she is on thyroid hormone replacement.  Currently taking levothyroxine 100 mcg/day. On this dose X 9/2019. Prior to that was taking 88 mcg/day. Dose increased based on labs at that time.  Taking generic.  Reports compliance.    Feeling OK. No major s/s.  No longer taking biotin X Aug 2019.  Labs 10/2019 showing high FT4. FT4DL not done.    + frontal hair loss.    Palpitations:  No  Changes to hair or skin: + frontal hair loss X 2 years  Diarrhea/Constipation:No  Changes in menses: s/p menopause. Had been on HRT for a while. Stopped at age 67.  Dysphagia or Shortness of breath:No  Tremors:No  Changes in weight: mostly stable  Wt Readings from Last 2 Encounters:   11/07/19 76.2 kg (168 lb 1.6 oz)   08/08/19 76.1 kg (167 lb 12.8 oz)     Heat or cold intolerance: no  History of Lithium or Amiodarone use:No  Head or neck surgery/radiation:No  Family History of Thyroid Problems: + hypothyroidism in mother and sister  PMH/PSH:  Past Medical History:   Diagnosis Date     CKD (chronic kidney disease)      Essential hypertension, benign      Unspecified hypothyroidism      Past Surgical History:   Procedure Laterality Date     ABDOMEN SURGERY       C DEXA, BONE DENSITY, AXIAL SKEL  6/16/2003    Normal BMD     C NONSPECIFIC PROCEDURE  1980's    Exploratory Laparotomy constricting band      C NONSPECIFIC PROCEDURE       x3     C REPLANTATION THUMB DISTAL,COMPLETE  2009    left thumb trauma; Dr. Jose surgery     COLONOSCOPY N/A 11/3/2015    Procedure: COLONOSCOPY;  Surgeon: Joanna Acevedo MD;  Location:  GI     HC COLONOSCOPY THRU STOMA, DIAGNOSTIC  10/22/2005    Diverticulosis- Dr. Shea     HC FLEX SIGMOIDOSCOPY W/WO SHERIF SPEC BY BRUSH/WASH  1998    normal     HC REMOVE TONSILS/ADENOIDS,<11 Y/O       HC REMV CATARACT EXTRACAP,INSERT LENS,COMP  2005    Left eye     SURGICAL HISTORY OF -   2004    macular hole repair- Left eye     Family Hx:  Family History   Problem Relation Age of Onset     Hypertension Mother           at 100.     Breast Cancer Mother      Thyroid Disease Mother      Diabetes Sister      Hypertension Sister      Neurologic Disorder Daughter         MS     Hypertension Son        Social Hx:  Social History     Socioeconomic History     Marital status:      Spouse name: Not on file     Number of children: 3     Years of education: Not on file     Highest education level: Not on file   Occupational History     Occupation: office     Employer: RETIRED   Social Needs     Financial resource strain: Not on file     Food insecurity:     Worry: Not on file     Inability: Not on file     Transportation needs:     Medical: Not on file     Non-medical: Not on file   Tobacco Use     Smoking status: Former Smoker     Packs/day: 0.50     Years: 45.00     Pack years: 22.50     Last attempt to quit: 2001     Years since quittin.5     Smokeless tobacco: Never Used   Substance and Sexual Activity     Alcohol use: Yes     Comment: 1 drink per day     Drug use: No     Sexual activity: Never   Lifestyle     Physical activity:     Days per week: Not on file     Minutes per session: Not on file     Stress: Not on file   Relationships     Social connections:     Talks on phone: Not on file     Gets together: Not on file     Attends Jain service:  "Not on file     Active member of club or organization: Not on file     Attends meetings of clubs or organizations: Not on file     Relationship status: Not on file     Intimate partner violence:     Fear of current or ex partner: Not on file     Emotionally abused: Not on file     Physically abused: Not on file     Forced sexual activity: Not on file   Other Topics Concern      Service Not Asked     Blood Transfusions Not Asked     Caffeine Concern Yes     Comment: 1 cup per day     Occupational Exposure Not Asked     Hobby Hazards Not Asked     Sleep Concern Not Asked     Stress Concern Not Asked     Weight Concern Not Asked     Special Diet Not Asked     Back Care Not Asked     Exercise Yes     Comment: Active; exercise 4 times per week     Bike Helmet Not Asked     Seat Belt Yes     Self-Exams No     Parent/sibling w/ CABG, MI or angioplasty before 65F 55M? Not Asked   Social History Narrative     Not on file          MEDICATIONS:  has a current medication list which includes the following prescription(s): aspirin, biotin, calcium citrate-vitamin d, glucosamine hcl, lactobacillus, levothyroxine, lisinopril, lutein, metoprolol succinate er, multiple vitamin, and simvastatin.    ROS   ROS: 10 point ROS neg other than the symptoms noted above in the HPI.    Physical Exam   VS: /76   Pulse 80   Temp 98.2  F (36.8  C) (Oral)   Resp 16   Ht 1.664 m (5' 5.5\")   Wt 76.2 kg (168 lb 1.6 oz)   LMP 02/26/2003   SpO2 94%   Breastfeeding? No   BMI 27.55 kg/m     GENERAL: AXOX3, NAD, well dressed, answering questions appropriately, appears stated age.  HEENT: No exopthalmous, no proptosis, EOMI, no lig lag, no retraction  NECK: Thyroid normal in size, non tender, no nodules were palpated.  CV: RRR, no rubs, gallops, no murmurs  LUNGS: CTAB, no wheezes, rales, or ronchi  ABDOMEN:+BS  EXTREMITIES: no edema, +pulses, no rashes, no lesions  NEUROLOGY: CN grossly intact,  + DTR upper and lower extremity, no " tremors  MSK: grossly intact  SKIN: no rashes, no lesions    LABS:  TFTs:  ENDO THYROID LABS-[facility] Latest Ref Rng & Units 10/31/2019   TSH 0.40 - 4.00 mU/L 1.56   T4 FREE 0.76 - 1.46 ng/dL 1.57 (H)     Component      Latest Ref Rng & Units 9/23/2019 10/31/2019   Free T4 Eq Dialysis      1.1 - 2.4 ng/dL 2.8 (H)    TSH      0.40 - 4.00 mU/L  1.56   T4 Free      0.76 - 1.46 ng/dL  1.57 (H)     ENDO THYROID LABS-[facility] Latest Ref Rng & Units 9/23/2019   TSH 0.40 - 4.00 mU/L 7.36 (H)   T4 FREE 0.76 - 1.46 ng/dL 1.27   THYR PEROXIDASE RENETTA <35 IU/mL 63 (H)     ENDO THYROID LABS-[facility] Latest Ref Rng & Units 6/5/2019 6/4/2018   TSH 0.40 - 4.00 mU/L 4.10 (H) 4.23 (H)   T4 FREE 0.76 - 1.46 ng/dL 1.68 (H) 1.47 (H)     ENDO THYROID LABS-[facility] Latest Ref Rng & Units 5/31/2017 5/27/2016   TSH 0.40 - 4.00 mU/L 4.16 (H) 2.87   T4 FREE 0.76 - 1.46 ng/dL 1.46        All pertinent notes, labs, and images personally reviewed by me.     A/P  Ms.Nancy YUNG Harmon is a 83 year old here for the evaluation of hypothyroidism:    #1 Hypothyroidism(+TPO):   Currently taking generic levothyroxine 100 mcg/day and reports compliance. On this dose X 9/2019  Clinically looks euthyroid  Plan:  Discussed diagnosis, pathophysiology, management and treatment options of condition with patient.  Labs today (FT4DL)  Dose change based on that.  (May need to consider dose close to 650 mcg/week based on labs)  Continue to hold off biotin ( she did not notice difference since stopping it)    Discussed s/s of hypothyroidism and hyperthyroidism to watch for.  The patient indicates understanding of these issues and agrees with the plan.    Bushra to follow up with Primary Care provider regarding elevated blood pressure.    Standard treatment for hypothyroidism involves daily use of the synthetic thyroid hormone levothyroxine (Levothroid, Synthroid, others).  The dosage of thyroxine should normally be that required to bring the serum TSH level to the low normal range, such as  0.3 - 1 uU/ml. This is typically achieved with 1 ug L-T4/lb body weight/day, ranges from 75 - 125 ug/day in women, and 125 - 200 ug/day in men. Once thyroxine treatment is initiated, it is required indefinitely in most patients.     Symptoms should improve one to two weeks after starting treatment. Treatment with levothyroxine is usually lifelong.  Doseage may need to be adjusted based on body weight, medications, or pregnancy.  To determine the right dosage of levothyroxine initially we will repeat TSH and free T4 after two months.     Excessive amounts of the hormone can cause side effects, such as: Increased appetite, insomnia, heart palpitations, and shakiness.  Patients with CAD will be started on a lower dose.  Levothyroxine causes virtually no side effects when used in the appropriate dose.    In patients with childbearing age precaution should be taking regarding hypothyroidism. Hypothyroid woman are more likely to experience infertility, and they have an increase. Balance and portion, anemia, and gestational hypertension, placental abruption and postpartum hemorrhage.    Certain medications, supplements and foods can affect the body s ability to absorb levothyroxine. Medications include: Iron supplements, Cholestyramine and Calcium supplements.       Follow-up:  Based on labs.    Belinda Marcos MD  Endocrinology  [facility]  CC: Magalis Woodward    More than 50% of face to face time spent with Ms. Harmon on counseling / coordinating her care.        All questions were answered.  The patient indicates understanding of the above issues and agrees with the plan set forth.     Addendum to above note and clinic visit:    Labs reviewed.    See result note/telephone encounter.            Again, thank you for allowing me to participate in the care of your patient.        Sincerely,        Belinda Marcos MD

## 2019-11-07 NOTE — PATIENT INSTRUCTIONS
[facility]   Dr Marcos, Endocrinology Department      [facility]   [address]  [place] [address]  Appointment [phone]  Fax: [phone]  [place]: Monday and Tuesday         [facility]   [address]  [place] [address]  Appointment [phone]  Fax: [phone]  [place]: Wednesday and Thursday            Please check the cost coverage and copay with insurance before recommended tests, services and medications (especially if new medications are prescribed).     If ordered, please get blood work done 1 week prior to your next appointment so they will be available to Dr. Marcos at your visit.    Labs today.  Dose change based on that.  (May need to consider dose close to 650 mcg/week based on labs)      Take Levothyroxine on an empty stomach. Take it with a full glass of water at least 30 minutes to 1 hour before eating breakfast.   This medicine should be taken at least 4 hours before or 4 hours after these medicines: antacids (Maalox , Mylanta , Tums ), calcium supplements, cholestyramine (Prevalite , Questran ), colestipol (Colestid ), iron supplements, orlistat (Heraclio , Xenical ), simethicone (Gas-X , Mylicon ), and sucralfate (Carafate ).   Swallow the capsule whole. Do not cut or crush it.

## 2019-11-11 LAB — T4 FREE SERPL DIALY-MCNC: 3 NG/DL (ref 1.1–2.4)

## 2019-11-12 ENCOUNTER — TELEPHONE (OUTPATIENT)
Dept: ENDOCRINOLOGY | Facility: CLINIC | Age: 84
End: 2019-11-12

## 2019-11-12 DIAGNOSIS — E03.8 OTHER SPECIFIED HYPOTHYROIDISM: ICD-10-CM

## 2019-11-12 RX ORDER — LEVOTHYROXINE SODIUM 100 UG/1
100 TABLET ORAL DAILY
Qty: 90 TABLET | Refills: 0 | Status: SHIPPED | OUTPATIENT
Start: 2019-11-12 | End: 2020-02-05

## 2019-11-12 NOTE — TELEPHONE ENCOUNTER
ENDO THYROID LABS-[facility] Latest Ref Rng & Units 10/31/2019   TSH 0.40 - 4.00 mU/L 1.56   T4 FREE 0.76 - 1.46 ng/dL 1.57 (H)     Component      Latest Ref Rng & Units 11/7/2019   Free T4 Eq Dialysis      1.1 - 2.4 ng/dL 3.0 (H)     Currently taking levothyroxine 100 mcg/day. On this dose X 9/2019. Prior to that was taking 88 mcg/day.  Based on labs recommend to decrease dose of levothyroxine slightly.  Take 100 mcg X 6 days a week and 50 mcg X 1 day a week (total dose 650 mcg/week)  Labs in 2 months.  Please make a lab appointment for blood work and follow up clinic appointment in 1 week after that to discuss results.    Rx sent.    Please inform patient.

## 2019-12-12 ENCOUNTER — TELEPHONE (OUTPATIENT)
Dept: INTERNAL MEDICINE | Facility: CLINIC | Age: 84
End: 2019-12-12

## 2019-12-12 DIAGNOSIS — I10 ESSENTIAL HYPERTENSION, BENIGN: ICD-10-CM

## 2019-12-12 RX ORDER — METOPROLOL SUCCINATE 25 MG/1
25 TABLET, EXTENDED RELEASE ORAL DAILY
Qty: 90 TABLET | Refills: 4 | Status: SHIPPED | OUTPATIENT
Start: 2019-12-12 | End: 2020-12-31

## 2019-12-12 NOTE — TELEPHONE ENCOUNTER
Patient was seen by her nephrologist Dr Blair on 12/10/19 and he would like her to take a full pill of the metoprolol. The patient was unclear if we need to send a new rx for this or if the kidney doctor should do it.

## 2020-01-24 DIAGNOSIS — E03.8 OTHER SPECIFIED HYPOTHYROIDISM: ICD-10-CM

## 2020-01-24 LAB
T4 FREE SERPL-MCNC: 1.56 NG/DL (ref 0.76–1.46)
TSH SERPL DL<=0.005 MIU/L-ACNC: 2.04 MU/L (ref 0.4–4)

## 2020-01-24 PROCEDURE — 36415 COLL VENOUS BLD VENIPUNCTURE: CPT | Performed by: INTERNAL MEDICINE

## 2020-01-24 PROCEDURE — 84439 ASSAY OF FREE THYROXINE: CPT | Performed by: INTERNAL MEDICINE

## 2020-01-24 PROCEDURE — 84443 ASSAY THYROID STIM HORMONE: CPT | Performed by: INTERNAL MEDICINE

## 2020-01-24 PROCEDURE — 99000 SPECIMEN HANDLING OFFICE-LAB: CPT | Performed by: INTERNAL MEDICINE

## 2020-01-27 LAB — T4 FREE SERPL DIALY-MCNC: 3.3 NG/DL (ref 1.1–2.4)

## 2020-02-05 ENCOUNTER — OFFICE VISIT (OUTPATIENT)
Dept: ENDOCRINOLOGY | Facility: CLINIC | Age: 85
End: 2020-02-05
Payer: MEDICARE

## 2020-02-05 VITALS
HEART RATE: 110 BPM | HEIGHT: 66 IN | DIASTOLIC BLOOD PRESSURE: 54 MMHG | OXYGEN SATURATION: 94 % | BODY MASS INDEX: 26.52 KG/M2 | WEIGHT: 165 LBS | SYSTOLIC BLOOD PRESSURE: 118 MMHG

## 2020-02-05 DIAGNOSIS — E03.8 OTHER SPECIFIED HYPOTHYROIDISM: ICD-10-CM

## 2020-02-05 PROCEDURE — 99214 OFFICE O/P EST MOD 30 MIN: CPT | Performed by: INTERNAL MEDICINE

## 2020-02-05 RX ORDER — LEVOTHYROXINE SODIUM 100 UG/1
TABLET ORAL
Qty: 90 TABLET | Refills: 0 | OUTPATIENT
Start: 2020-02-05

## 2020-02-05 RX ORDER — LEVOTHYROXINE SODIUM 88 UG/1
88 TABLET ORAL DAILY
Qty: 90 TABLET | Refills: 0 | Status: SHIPPED | OUTPATIENT
Start: 2020-02-05 | End: 2020-04-27

## 2020-02-05 ASSESSMENT — MIFFLIN-ST. JEOR: SCORE: 1217.25

## 2020-02-05 NOTE — PROGRESS NOTES
ENDOCRINOLOGY CLINIC NOTE:    Name: Bushra Harmon  Seen in consultation with Magalis Woodward for Hypothyroidism.  HPI:  Bushra Harmon is a 83 year old female who presents for the evaluation of :hypothyroidism.   has a past medical history of CKD (chronic kidney disease), Essential hypertension, benign, and Unspecified hypothyroidism.    #1. Hypothyroidism.  Initially diagnosed at age 42.and since then she is on thyroid hormone replacement.  Currently taking 100 mcg X 6 days a week and 50 mcg X 1 day a week (total dose 650 mcg/week). On this dose since 2019. Dose was decreased at that time.  Taking generic.  Reports compliance.    Feeling OK. No major s/s.  No longer taking biotin X Aug 2019.    + frontal hair loss.    Palpitations:  No  Changes to hair or skin: + frontal hair loss X 2 years  Diarrhea/Constipation:No  Changes in menses: s/p menopause. Had been on HRT for a while. Stopped at age 67.  Dysphagia or Shortness of breath:No  Tremors:No  Changes in weight: mostly stable  Wt Readings from Last 2 Encounters:   20 74.8 kg (165 lb)   19 76.2 kg (168 lb 1.6 oz)     Heat or cold intolerance: no  History of Lithium or Amiodarone use:No  Head or neck surgery/radiation:No  Family History of Thyroid Problems: + hypothyroidism in mother and sister  PMH/PSH:  Past Medical History:   Diagnosis Date     CKD (chronic kidney disease)      Essential hypertension, benign      Unspecified hypothyroidism      Past Surgical History:   Procedure Laterality Date     ABDOMEN SURGERY       C DEXA, BONE DENSITY, AXIAL SKEL  2003    Normal BMD     C NONSPECIFIC PROCEDURE      Exploratory Laparotomy constricting band     C NONSPECIFIC PROCEDURE       x3     C REPLANTATION THUMB DISTAL,COMPLETE  2009    left thumb trauma; Dr. Jose surgery     COLONOSCOPY N/A 11/3/2015    Procedure: COLONOSCOPY;  Surgeon: Joanna Acevedo MD;  Location:  GI     HC COLONOSCOPY THRU STOMA, DIAGNOSTIC   10/22/2005    Diverticulosis- Dr. Shea     HC FLEX SIGMOIDOSCOPY W/WO SHERIF SPEC BY BRUSH/WASH  1998    normal     HC REMOVE TONSILS/ADENOIDS,<13 Y/O       HC REMV CATARACT EXTRACAP,INSERT LENS, COMPLEX, W/O ECP  2005    Left eye     SURGICAL HISTORY OF -   2004    macular hole repair- Left eye     Family Hx:  Family History   Problem Relation Age of Onset     Hypertension Mother           at 100.     Breast Cancer Mother      Thyroid Disease Mother      Diabetes Sister      Hypertension Sister      Neurologic Disorder Daughter         MS     Hypertension Son        Social Hx:  Social History     Socioeconomic History     Marital status:      Spouse name: Not on file     Number of children: 3     Years of education: Not on file     Highest education level: Not on file   Occupational History     Occupation: office     Employer: RETIRED   Social Needs     Financial resource strain: Not on file     Food insecurity:     Worry: Not on file     Inability: Not on file     Transportation needs:     Medical: Not on file     Non-medical: Not on file   Tobacco Use     Smoking status: Former Smoker     Packs/day: 0.50     Years: 45.00     Pack years: 22.50     Last attempt to quit: 2001     Years since quittin.7     Smokeless tobacco: Never Used   Substance and Sexual Activity     Alcohol use: Yes     Comment: 1 drink per day     Drug use: No     Sexual activity: Never   Lifestyle     Physical activity:     Days per week: Not on file     Minutes per session: Not on file     Stress: Not on file   Relationships     Social connections:     Talks on phone: Not on file     Gets together: Not on file     Attends Sabianist service: Not on file     Active member of club or organization: Not on file     Attends meetings of clubs or organizations: Not on file     Relationship status: Not on file     Intimate partner violence:     Fear of current or ex partner: Not on file     Emotionally abused: Not  "on file     Physically abused: Not on file     Forced sexual activity: Not on file   Other Topics Concern      Service Not Asked     Blood Transfusions Not Asked     Caffeine Concern Yes     Comment: 1 cup per day     Occupational Exposure Not Asked     Hobby Hazards Not Asked     Sleep Concern Not Asked     Stress Concern Not Asked     Weight Concern Not Asked     Special Diet Not Asked     Back Care Not Asked     Exercise Yes     Comment: Active; exercise 4 times per week     Bike Helmet Not Asked     Seat Belt Yes     Self-Exams No     Parent/sibling w/ CABG, MI or angioplasty before 65F 55M? Not Asked   Social History Narrative     Not on file          MEDICATIONS:  has a current medication list which includes the following prescription(s): calcium citrate-vitamin d, glucosamine hcl, lactobacillus, levothyroxine, lisinopril, lutein, metoprolol succinate er, multiple vitamin, simvastatin, aspirin, and biotin.    ROS   ROS: 10 point ROS neg other than the symptoms noted above in the HPI.    Physical Exam   VS: Pulse 110   Ht 1.664 m (5' 5.5\")   Wt 74.8 kg (165 lb)   LMP 02/26/2003   SpO2 94%   BMI 27.04 kg/m    GENERAL: AXOX3, NAD, well dressed, answering questions appropriately, appears stated age.  HEENT: No exopthalmous, no proptosis, EOMI, no lig lag, no retraction  NECK: Thyroid normal in size, non tender, no nodules were palpated.  NEUROLOGY: CN grossly intact, no tremors  PSYCH: normal affect and mood      LABS:  TFTs:  Component      Latest Ref Rng & Units 1/24/2020   T4 Free      0.76 - 1.46 ng/dL 1.56 (H)   TSH      0.40 - 4.00 mU/L 2.04   Free T4 Eq Dialysis      1.1 - 2.4 ng/dL 3.3 (H)     ENDO THYROID LABS-[facility] Latest Ref Rng & Units 10/31/2019   TSH 0.40 - 4.00 mU/L 1.56   T4 FREE 0.76 - 1.46 ng/dL 1.57 (H)     Component      Latest Ref Rng & Units 9/23/2019 10/31/2019   Free T4 Eq Dialysis      1.1 - 2.4 ng/dL 2.8 (H)    TSH      0.40 - 4.00 mU/L  1.56   T4 Free      0.76 - 1.46 ng/dL  " 1.57 (H)     ENDO THYROID LABS-[facility] Latest Ref Rng & Units 9/23/2019   TSH 0.40 - 4.00 mU/L 7.36 (H)   T4 FREE 0.76 - 1.46 ng/dL 1.27   THYR PEROXIDASE RENETTA <35 IU/mL 63 (H)     ENDO THYROID LABS-[facility] Latest Ref Rng & Units 6/5/2019 6/4/2018   TSH 0.40 - 4.00 mU/L 4.10 (H) 4.23 (H)   T4 FREE 0.76 - 1.46 ng/dL 1.68 (H) 1.47 (H)     ENDO THYROID LABS-[facility] Latest Ref Rng & Units 5/31/2017 5/27/2016   TSH 0.40 - 4.00 mU/L 4.16 (H) 2.87   T4 FREE 0.76 - 1.46 ng/dL 1.46        All pertinent notes, labs, and images personally reviewed by me.     A/P  Ms.Nancy YUNG Harmon is a 83 year old here for the evaluation of hypothyroidism:    #1 Hypothyroidism(+TPO):   Currently taking 100 mcg X 6 days a week and 50 mcg X 1 day a week (total dose 650 mcg/week). On this dose since 11/2019. Dose was decreased at that time.  Taking generic.  Clinically looks euthyorid.  Plan:  Discussed diagnosis, pathophysiology, management and treatment options of condition with patient.  Decrease dose of levothyroxine.  Take 88 mcg/day (2/5/2020).  Labs in 2 months.  Please make a lab appointment for blood work and follow up clinic appointment in 1 week after that to discuss results.  Continue to hold off biotin.    Discussed s/s of hypothyroidism and hyperthyroidism to watch for.  The patient indicates understanding of these issues and agrees with the plan.    Bushra to follow up with Primary Care provider regarding elevated blood pressure.    Standard treatment for hypothyroidism involves daily use of the synthetic thyroid hormone levothyroxine (Levothroid, Synthroid, others).  The dosage of thyroxine should normally be that required to bring the serum TSH level to the low normal range, such as 0.3 - 1 uU/ml. This is typically achieved with 1 ug L-T4/lb body weight/day, ranges from 75 - 125 ug/day in women, and 125 - 200 ug/day in men. Once thyroxine treatment is initiated, it is required indefinitely in most patients.     Symptoms should improve one to  two weeks after starting treatment. Treatment with levothyroxine is usually lifelong.  Doseage may need to be adjusted based on body weight, medications, or pregnancy.  To determine the right dosage of levothyroxine initially we will repeat TSH and free T4 after two months.     Excessive amounts of the hormone can cause side effects, such as: Increased appetite, insomnia, heart palpitations, and shakiness.  Patients with CAD will be started on a lower dose.  Levothyroxine causes virtually no side effects when used in the appropriate dose.    In patients with childbearing age precaution should be taking regarding hypothyroidism. Hypothyroid woman are more likely to experience infertility, and they have an increase. Balance and portion, anemia, and gestational hypertension, placental abruption and postpartum hemorrhage.    Certain medications, supplements and foods can affect the body s ability to absorb levothyroxine. Medications include: Iron supplements, Cholestyramine and Calcium supplements.       Follow-up:  2 months    Belinda Marcos MD  Endocrinology  [facility]  CC: Magalis Woodward    All questions were answered.  The patient indicates understanding of the above issues and agrees with the plan set forth.     Addendum to above note and clinic visit:    Labs reviewed.    See result note/telephone encounter.

## 2020-02-05 NOTE — LETTER
2020         RE: Bushra Harmon  [address]  [place] [address]        Dear Colleague,    Thank you for referring your patient, Bushra Harmon, to the [facility]. Please see a copy of my visit note below.    ENDOCRINOLOGY CLINIC NOTE:    Name: Bushra Harmon  Seen in consultation with Magalis Woodward for Hypothyroidism.  HPI:  Bushra Harmon is a 83 year old female who presents for the evaluation of :hypothyroidism.   has a past medical history of CKD (chronic kidney disease), Essential hypertension, benign, and Unspecified hypothyroidism.    #1. Hypothyroidism.  Initially diagnosed at age 42.and since then she is on thyroid hormone replacement.  Currently taking 100 mcg X 6 days a week and 50 mcg X 1 day a week (total dose 650 mcg/week). On this dose since 2019. Dose was decreased at that time.  Taking generic.  Reports compliance.    Feeling OK. No major s/s.  No longer taking biotin X Aug 2019.    + frontal hair loss.    Palpitations:  No  Changes to hair or skin: + frontal hair loss X 2 years  Diarrhea/Constipation:No  Changes in menses: s/p menopause. Had been on HRT for a while. Stopped at age 67.  Dysphagia or Shortness of breath:No  Tremors:No  Changes in weight: mostly stable  Wt Readings from Last 2 Encounters:   20 74.8 kg (165 lb)   19 76.2 kg (168 lb 1.6 oz)     Heat or cold intolerance: no  History of Lithium or Amiodarone use:No  Head or neck surgery/radiation:No  Family History of Thyroid Problems: + hypothyroidism in mother and sister  PMH/PSH:  Past Medical History:   Diagnosis Date     CKD (chronic kidney disease)      Essential hypertension, benign      Unspecified hypothyroidism      Past Surgical History:   Procedure Laterality Date     ABDOMEN SURGERY       C DEXA, BONE DENSITY, AXIAL SKEL  2003    Normal BMD     C NONSPECIFIC PROCEDURE      Exploratory Laparotomy constricting band     C NONSPECIFIC PROCEDURE       x3     C  REPLANTATION THUMB DISTAL,COMPLETE  2009    left thumb trauma; Dr. Jose surgery     COLONOSCOPY N/A 11/3/2015    Procedure: COLONOSCOPY;  Surgeon: Joanna Acevedo MD;  Location:  GI     HC COLONOSCOPY THRU STOMA, DIAGNOSTIC  10/22/2005    Diverticulosis- Dr. Shea     HC FLEX SIGMOIDOSCOPY W/WO SHERIF SPEC BY BRUSH/WASH  1998    normal     HC REMOVE TONSILS/ADENOIDS,<13 Y/O       HC REMV CATARACT EXTRACAP,INSERT LENS, COMPLEX, W/O ECP  2005    Left eye     SURGICAL HISTORY OF -   2004    macular hole repair- Left eye     Family Hx:  Family History   Problem Relation Age of Onset     Hypertension Mother           at 100.     Breast Cancer Mother      Thyroid Disease Mother      Diabetes Sister      Hypertension Sister      Neurologic Disorder Daughter         MS     Hypertension Son        Social Hx:  Social History     Socioeconomic History     Marital status:      Spouse name: Not on file     Number of children: 3     Years of education: Not on file     Highest education level: Not on file   Occupational History     Occupation: office     Employer: RETIRED   Social Needs     Financial resource strain: Not on file     Food insecurity:     Worry: Not on file     Inability: Not on file     Transportation needs:     Medical: Not on file     Non-medical: Not on file   Tobacco Use     Smoking status: Former Smoker     Packs/day: 0.50     Years: 45.00     Pack years: 22.50     Last attempt to quit: 2001     Years since quittin.7     Smokeless tobacco: Never Used   Substance and Sexual Activity     Alcohol use: Yes     Comment: 1 drink per day     Drug use: No     Sexual activity: Never   Lifestyle     Physical activity:     Days per week: Not on file     Minutes per session: Not on file     Stress: Not on file   Relationships     Social connections:     Talks on phone: Not on file     Gets together: Not on file     Attends Buddhism service: Not on file     Active member  "of club or organization: Not on file     Attends meetings of clubs or organizations: Not on file     Relationship status: Not on file     Intimate partner violence:     Fear of current or ex partner: Not on file     Emotionally abused: Not on file     Physically abused: Not on file     Forced sexual activity: Not on file   Other Topics Concern      Service Not Asked     Blood Transfusions Not Asked     Caffeine Concern Yes     Comment: 1 cup per day     Occupational Exposure Not Asked     Hobby Hazards Not Asked     Sleep Concern Not Asked     Stress Concern Not Asked     Weight Concern Not Asked     Special Diet Not Asked     Back Care Not Asked     Exercise Yes     Comment: Active; exercise 4 times per week     Bike Helmet Not Asked     Seat Belt Yes     Self-Exams No     Parent/sibling w/ CABG, MI or angioplasty before 65F 55M? Not Asked   Social History Narrative     Not on file          MEDICATIONS:  has a current medication list which includes the following prescription(s): calcium citrate-vitamin d, glucosamine hcl, lactobacillus, levothyroxine, lisinopril, lutein, metoprolol succinate er, multiple vitamin, simvastatin, aspirin, and biotin.    ROS   ROS: 10 point ROS neg other than the symptoms noted above in the HPI.    Physical Exam   VS: Pulse 110   Ht 1.664 m (5' 5.5\")   Wt 74.8 kg (165 lb)   LMP 02/26/2003   SpO2 94%   BMI 27.04 kg/m     GENERAL: AXOX3, NAD, well dressed, answering questions appropriately, appears stated age.  HEENT: No exopthalmous, no proptosis, EOMI, no lig lag, no retraction  NECK: Thyroid normal in size, non tender, no nodules were palpated.  NEUROLOGY: CN grossly intact, no tremors  PSYCH: normal affect and mood      LABS:  TFTs:  Component      Latest Ref Rng & Units 1/24/2020   T4 Free      0.76 - 1.46 ng/dL 1.56 (H)   TSH      0.40 - 4.00 mU/L 2.04   Free T4 Eq Dialysis      1.1 - 2.4 ng/dL 3.3 (H)     ENDO THYROID LABS[facility] Latest Ref Rng & Units 10/31/2019   TSH " 0.40 - 4.00 mU/L 1.56   T4 FREE 0.76 - 1.46 ng/dL 1.57 (H)     Component      Latest Ref Rng & Units 9/23/2019 10/31/2019   Free T4 Eq Dialysis      1.1 - 2.4 ng/dL 2.8 (H)    TSH      0.40 - 4.00 mU/L  1.56   T4 Free      0.76 - 1.46 ng/dL  1.57 (H)     ENDO THYROID LABS-[facility] Latest Ref Rng & Units 9/23/2019   TSH 0.40 - 4.00 mU/L 7.36 (H)   T4 FREE 0.76 - 1.46 ng/dL 1.27   THYR PEROXIDASE RENETTA <35 IU/mL 63 (H)     ENDO THYROID LABS-[facility] Latest Ref Rng & Units 6/5/2019 6/4/2018   TSH 0.40 - 4.00 mU/L 4.10 (H) 4.23 (H)   T4 FREE 0.76 - 1.46 ng/dL 1.68 (H) 1.47 (H)     ENDO THYROID LABS-[facility] Latest Ref Rng & Units 5/31/2017 5/27/2016   TSH 0.40 - 4.00 mU/L 4.16 (H) 2.87   T4 FREE 0.76 - 1.46 ng/dL 1.46        All pertinent notes, labs, and images personally reviewed by me.     A/P  Ms.Nancy YUNG Harmon is a 83 year old here for the evaluation of hypothyroidism:    #1 Hypothyroidism(+TPO):   Currently taking 100 mcg X 6 days a week and 50 mcg X 1 day a week (total dose 650 mcg/week). On this dose since 11/2019. Dose was decreased at that time.  Taking generic.  Clinically looks euthyorid.  Plan:  Discussed diagnosis, pathophysiology, management and treatment options of condition with patient.  Decrease dose of levothyroxine.  Take 88 mcg/day (2/5/2020).  Labs in 2 months.  Please make a lab appointment for blood work and follow up clinic appointment in 1 week after that to discuss results.  Continue to hold off biotin.    Discussed s/s of hypothyroidism and hyperthyroidism to watch for.  The patient indicates understanding of these issues and agrees with the plan.    Bushra to follow up with Primary Care provider regarding elevated blood pressure.    Standard treatment for hypothyroidism involves daily use of the synthetic thyroid hormone levothyroxine (Levothroid, Synthroid, others).  The dosage of thyroxine should normally be that required to bring the serum TSH level to the low normal range, such as 0.3 - 1 uU/ml. This is  typically achieved with 1 ug L-T4/lb body weight/day, ranges from 75 - 125 ug/day in women, and 125 - 200 ug/day in men. Once thyroxine treatment is initiated, it is required indefinitely in most patients.     Symptoms should improve one to two weeks after starting treatment. Treatment with levothyroxine is usually lifelong.  Doseage may need to be adjusted based on body weight, medications, or pregnancy.  To determine the right dosage of levothyroxine initially we will repeat TSH and free T4 after two months.     Excessive amounts of the hormone can cause side effects, such as: Increased appetite, insomnia, heart palpitations, and shakiness.  Patients with CAD will be started on a lower dose.  Levothyroxine causes virtually no side effects when used in the appropriate dose.    In patients with childbearing age precaution should be taking regarding hypothyroidism. Hypothyroid woman are more likely to experience infertility, and they have an increase. Balance and portion, anemia, and gestational hypertension, placental abruption and postpartum hemorrhage.    Certain medications, supplements and foods can affect the body s ability to absorb levothyroxine. Medications include: Iron supplements, Cholestyramine and Calcium supplements.       Follow-up:  2 months    Belinda Marcos MD  Endocrinology  [facility]  CC: Magalis Woodward    All questions were answered.  The patient indicates understanding of the above issues and agrees with the plan set forth.     Addendum to above note and clinic visit:    Labs reviewed.    See result note/telephone encounter.            Again, thank you for allowing me to participate in the care of your patient.        Sincerely,        Belinda Marcos MD

## 2020-02-05 NOTE — NURSING NOTE
"ENDOCRINOLOGY INTAKE FORM    Patient Name:  Bushra Harmon  :  1935    Is patient Diabetic?   No  Does patient have non-diabetic or other endocrine issues?  Yes: hypothyroid    Vitals: Pulse 110   Ht 1.664 m (5' 5.5\")   Wt 74.8 kg (165 lb)   LMP 2003   SpO2 94%   BMI 27.04 kg/m    BMI= Body mass index is 27.04 kg/m .    Flu vaccine:  Yes: 10/2019  Pneumonia vaccine:  Yes: 2015 pneumo 13 2011 (Pneumococcal 23 valent)    Smoking and Alcohol use:  Social History     Tobacco Use     Smoking status: Former Smoker     Packs/day: 0.50     Years: 45.00     Pack years: 22.50     Last attempt to quit: 2001     Years since quittin.7     Smokeless tobacco: Never Used   Substance Use Topics     Alcohol use: Yes     Comment: 1 drink per day     Drug use: No       Maryellen MCLAUGHLIN CMA"

## 2020-02-05 NOTE — PATIENT INSTRUCTIONS
[facility]   Dr Marcos, Endocrinology Department      [facility]   [address]  [place] [address]  Appointment [phone]  Fax: [phone]  [place]: Monday and Tuesday         [facility]   [address]  [place] [address]  Appointment [phone]  Fax: [phone]  [place]: Wednesday and Thursday          Decrease dose of levothyroxine.  Take 88 mcg/day.  Labs in 2 months.  Please make a lab appointment for blood work and follow up clinic appointment in 1 week after that to discuss results.    Take Levothyroxine on an empty stomach. Take it with a full glass of water at least 30 minutes to 1 hour before eating breakfast.   This medicine should be taken at least 4 hours before or 4 hours after these medicines: antacids (Maalox , Mylanta , Tums ), calcium supplements, cholestyramine (Prevalite , Questran ), colestipol (Colestid ), iron supplements, orlistat (Heraclio , Xenical ), simethicone (Gas-X , Mylicon ), and sucralfate (Carafate ).   Swallow the capsule whole. Do not cut or crush it.

## 2020-02-14 ENCOUNTER — OFFICE VISIT (OUTPATIENT)
Dept: PODIATRY | Facility: CLINIC | Age: 85
End: 2020-02-14
Payer: MEDICARE

## 2020-02-14 VITALS
DIASTOLIC BLOOD PRESSURE: 70 MMHG | WEIGHT: 165 LBS | BODY MASS INDEX: 26.52 KG/M2 | HEIGHT: 66 IN | SYSTOLIC BLOOD PRESSURE: 124 MMHG

## 2020-02-14 DIAGNOSIS — M79.671 FOOT PAIN, BILATERAL: Primary | ICD-10-CM

## 2020-02-14 DIAGNOSIS — M77.41 METATARSALGIA, RIGHT FOOT: ICD-10-CM

## 2020-02-14 DIAGNOSIS — L84 CALLUS: ICD-10-CM

## 2020-02-14 DIAGNOSIS — M79.672 FOOT PAIN, BILATERAL: Primary | ICD-10-CM

## 2020-02-14 DIAGNOSIS — M21.6X1 PLANTAR FAT PAD ATROPHY OF RIGHT FOOT: ICD-10-CM

## 2020-02-14 PROCEDURE — 99203 OFFICE O/P NEW LOW 30 MIN: CPT | Performed by: PODIATRIST

## 2020-02-14 ASSESSMENT — MIFFLIN-ST. JEOR: SCORE: 1217.25

## 2020-02-14 NOTE — LETTER
2020         RE: Bushra Harmon  [address] [address]        Dear Colleague,    Thank you for referring your patient, Bushra Harmon, to the [facility]. Please see a copy of my visit note below.    PATIENT HISTORY:   Bushra Harmon is a 84 year old female who presents to clinic for painful calluses to both feet and pain under the right second toe on the ball of the foot.  Calluses she has had for years the pain on the bottom of the right toe has been a few months.  Notes that it is a deep ache.  Pain is 6 out of 10.  Worse with activity.  Denies specific injury.  Has been using callus patches on the area but has not really helped.  Is wondering what is causing the pain and what can be done to get rid of the calluses.    Review of Systems:  Patient denies fever, chills, rash, wound, stiffness, numbness, weakness, heart burn, blood in stool, chest pain with activity, calf pain when walking, shortness of breath with activity, chronic cough, easy bleeding/bruising, swelling of ankles, excessive thirst, fatigue, depression, anxiety.  Patient admits to limping at times.     PAST MEDICAL HISTORY:   Past Medical History:   Diagnosis Date     CKD (chronic kidney disease)      Essential hypertension, benign      Unspecified hypothyroidism         PAST SURGICAL HISTORY:   Past Surgical History:   Procedure Laterality Date     ABDOMEN SURGERY       C DEXA, BONE DENSITY, AXIAL SKEL  2003    Normal BMD     C NONSPECIFIC PROCEDURE      Exploratory Laparotomy constricting band     C NONSPECIFIC PROCEDURE       x3     C REPLANTATION THUMB DISTAL,COMPLETE  2009    left thumb trauma; Dr. Jose surgery     COLONOSCOPY N/A 11/3/2015    Procedure: COLONOSCOPY;  Surgeon: Joanna Acevedo MD;  Location:  GI     HC COLONOSCOPY THRU STOMA, DIAGNOSTIC  10/22/2005    Diverticulosis- Dr. Shea     HC FLEX SIGMOIDOSCOPY W/WO SHERIF SPEC BY BRUSH/WASH  1998    normal     HC  REMOVE TONSILS/ADENOIDS,<11 Y/O       HC REMV CATARACT EXTRACAP,INSERT LENS, COMPLEX, W/O ECP  2005    Left eye     SURGICAL HISTORY OF -   2004    macular hole repair- Left eye        MEDICATIONS:   Current Outpatient Medications:      aspirin (ASA) 81 MG tablet, 2 times weekly, Disp: , Rfl:      biotin 2.5 mg/mL, Take 5,000 mg by mouth daily, Disp: , Rfl:      CALCIUM CITRATE-VITAMIN D 315-200 MG-UNIT PO TABS, 2 TABLETS DAILY, Disp: 90 Tab, Rfl: 3     GLUCOSAMINE HCL 1000 MG PO TABS, 2 TABLETS DAILY, Disp: 90 Tab, Rfl: 3     LACTOBACILLUS PO, Take 120 mg by mouth daily, Disp: , Rfl:      levothyroxine (SYNTHROID/LEVOTHROID) 88 MCG tablet, Take 1 tablet (88 mcg) by mouth daily, Disp: 90 tablet, Rfl: 0     lisinopril (PRINIVIL/ZESTRIL) 20 MG tablet, Take 1 tablet (20 mg) by mouth daily, Disp: 90 tablet, Rfl: 4     Lutein 6 MG TABS, Take 20 mg by mouth daily , Disp: , Rfl:      metoprolol succinate ER (TOPROL-XL) 25 MG 24 hr tablet, Take 1 tablet (25 mg) by mouth daily, Disp: 90 tablet, Rfl: 4     MULTIVITAMIN TABS   OR, 1 daily, Disp: , Rfl:      simvastatin (ZOCOR) 10 MG tablet, Take 1 tablet (10 mg) by mouth At Bedtime, Disp: 90 tablet, Rfl: 4     ALLERGIES:    Allergies   Allergen Reactions     No Known Drug Allergies         SOCIAL HISTORY:   Social History     Socioeconomic History     Marital status:      Spouse name: Not on file     Number of children: 3     Years of education: Not on file     Highest education level: Not on file   Occupational History     Occupation: office     Employer: RETIRED   Social Needs     Financial resource strain: Not on file     Food insecurity:     Worry: Not on file     Inability: Not on file     Transportation needs:     Medical: Not on file     Non-medical: Not on file   Tobacco Use     Smoking status: Former Smoker     Packs/day: 0.50     Years: 45.00     Pack years: 22.50     Last attempt to quit: 2001     Years since quittin.8     Smokeless tobacco:  "Never Used   Substance and Sexual Activity     Alcohol use: Yes     Comment: 1 drink per day     Drug use: No     Sexual activity: Never   Lifestyle     Physical activity:     Days per week: Not on file     Minutes per session: Not on file     Stress: Not on file   Relationships     Social connections:     Talks on phone: Not on file     Gets together: Not on file     Attends Nondenominational service: Not on file     Active member of club or organization: Not on file     Attends meetings of clubs or organizations: Not on file     Relationship status: Not on file     Intimate partner violence:     Fear of current or ex partner: Not on file     Emotionally abused: Not on file     Physically abused: Not on file     Forced sexual activity: Not on file   Other Topics Concern      Service Not Asked     Blood Transfusions Not Asked     Caffeine Concern Yes     Comment: 1 cup per day     Occupational Exposure Not Asked     Hobby Hazards Not Asked     Sleep Concern Not Asked     Stress Concern Not Asked     Weight Concern Not Asked     Special Diet Not Asked     Back Care Not Asked     Exercise Yes     Comment: Active; exercise 4 times per week     Bike Helmet Not Asked     Seat Belt Yes     Self-Exams No     Parent/sibling w/ CABG, MI or angioplasty before 65F 55M? Not Asked   Social History Narrative     Not on file        FAMILY HISTORY:   Family History   Problem Relation Age of Onset     Hypertension Mother           at 100.     Breast Cancer Mother      Thyroid Disease Mother      Diabetes Sister      Hypertension Sister      Neurologic Disorder Daughter         MS     Hypertension Son         EXAM:Vitals: /70   Ht 1.664 m (5' 5.5\")   Wt 74.8 kg (165 lb)   LMP 2003   BMI 27.04 kg/m     BMI= Body mass index is 27.04 kg/m .    General appearance: Patient is alert and fully cooperative with history & exam.  No sign of distress is noted during the visit.     Psychiatric: Affect is pleasant & " appropriate.  Patient appears motivated to improve health.     Respiratory: Breathing is regular & unlabored while sitting.     HEENT: Hearing is intact to spoken word.  Speech is clear.  No gross evidence of visual impairment that would impact ambulation.     Dermatologic: Localized hyperkeratotic lesions to the plantar fifth metatarsal heads bilaterally and left third metatarsal head.  No open lesions or signs of acute infection noted.     Vascular: DP & PT pulses are intact & regular bilaterally.  No significant edema but varicosities noted.  CFT and skin temperature is normal to both lower extremities.     Neurologic: Lower extremity sensation is intact to light touch.  No evidence of weakness or contracture in the lower extremities.  No evidence of neuropathy.     Musculoskeletal: Patient is ambulatory without assistive device or brace.  Semi-flexible contractures of toes 2 through 5 bilateral.  Pain on palpation of the right plantar second metatarsal head distally.  Pain with plantarflexion of the right second toe.       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 Discussed metatarsalgia.  Discussed treatments such as orthotics, padding, physical therapy, injections, immobilization and further imaging such as MRI.    Recommend inserts and not going barefoot along with topical pain cream.    Discussed causes of keratomas.  They are due to areas of increase friction.  Hammertoes can create these as they put more pressure to the metatarsal head.  Discussed treatments such as using foot file, pumice stone, metatarsal pads, orthotics, and not walking barefoot.     We discussed the cost structure of callus care if they were to come back and have it treated in the clinic if insurance does not cover it and explained that they would be billed. They were also provided information on places to get the callus  treatment.    Recommend a pumice stone and also inserts.  Patient will call with further questions or concerns.       Neva Cabello DPM, Podiatry/Foot and Ankle Surgery        Recommended to Bushra Harmon to follow up with Primary Care provider regarding elevated blood pressure.        Again, thank you for allowing me to participate in the care of your patient.        Sincerely,        Neva Cabello DPM, Podiatry/Foot and Ankle Surgery

## 2020-02-14 NOTE — PATIENT INSTRUCTIONS
Thank you for choosing [facility] Podiatry / Foot & Ankle Surgery!    DR. ABURTO'S CLINIC SCHEDULE  MONDAY AM - [place] TUESDAY - [place]   [address] [address] [place] [address] [place] [address]   [phone] / [phone] / [phone]       WEDNESDAY - [place] FRIDAY AM - [facility]   [address]   [place] [address] [place] [address]   [phone] / [phone]       FRIDAY PM - [place] SCHEDULE SURGERY: [phone]   [address] BILLING QUESTIONS: [phone]   [place] [address] AFTER HOURS: [phone]   [phone] / [phone] APPOINTMENTS: [phone]     Consumer Price Line (CPL) [phone]     FOLLOW UP:  As needed      CAPSULITIS / METATARSALGIA  All joints in the body are surrounded by a capsule, or a covering of soft tissue and ligaments. The capsule holds bones together and secretes joint fluid to help lubricate the joint. If a joint capsule is exposed to excessive force, it can develop microscopic tears and become inflamed. This commonly occurs in the foot due to mild variation in anatomy. Hammertoes, bunions, irregular bone length, joint immobility, etc. can all lead to excessive force on the joint. Capsule injury can also occur due to repetitive stress from exercise, insufficient support from shoes, excessive bare foot walking and excessive weight.      Conservative treatments include ice, rest from the aggravating activity, weight loss, orthotic inserts, improving shoes and shoe modifications. You can purchase an over the counter felt metatarsal pad to place in your shoes at [facility] or on [employer]. Appropriate shoes will protect the inflamed tissue improving the chances of healing. Avoidance of standing or walking barefoot, including around the house, is necessary to allow healing. Casts are sometimes used for more aggressive protection.  NSAIDs such as Advil are  also used to help with pain and decreasing inflammation. If pain continues over a period of weeks with continuous rest and icing, Corticosteroid injections can be a treatment option to try and help decrease inflammation.    Surgery is often necessary to correct the underlying structural problem. Surgery might include shortening an excessively long bone, repairing bunion or hammertoe, lengthening a tight Achilles  tendon, etc. These are same day surgeries that might be pursued if more conservative measures fail to provide relief.      The inflamed joint capsule has the potential to completely tear. This will allow the toe to drift off the ground, curving toward the other toes. The involved toe may under or overlap the adjacent toes as drift continues. The pain may improve after the joint tears or this new position will be permanent. Surgery can address the toe alignment. Your goal of treating capsulitis is to avoid this scenario.        ** You can find felt metatarsal pads to place in your shoes at our [facility], [place], or on [employer] **        CALLUS / CORNS / IPKs  When there is excessive friction or pressure on the skin, the body responds by making the skin thicker to protect the deeper structures from becoming exposed. While this works well to protect the deeper structures, the thickened skin can increase pressure and pain.    CALLUS: Flat, diffuse thickening are simple calluses and they are usually caused by friction. Often these are the result of rubbing on a shoe or going barefoot.    CORNS: Calluses with a central core between the toes are called corns. These result from prominent joints on adjacent toes rubbing together. Theses are a symptom of bone malalignment and will always recur unless the underlying bones are addressed surgically.    IPKs: Calluses with a central core on the ball of the foot are usually IPKs (intractable plantar keratosis). These are caused by excessive  pressure from the metatarsals, the bones that make up the ball of the foot. Often one of these bones is too long or too prominent.  Again, these will always recur unless the underlying bone issue is addressed. There is no cure for these. They will either go away by themselves, recur, or more could develop.    ROUTINE MAINTENANCE  1. File them down with a pumice stone or callus file a couple times a week.   2. An electric callus removing device. Amope Pedi Perfect Electronic Pedicure Foot File and Callus Remover can be a good option.   3. Lotion can be applied to soften the callus. A urea based cream such as Kersal or Vanicream or thicker cream with shea butter are good options.  4. Toe spacers or toe covers can be used for corns, gel pads can be used for other lesions on the bottom of the foot.   If there is a surgical pathology noted, such as a prominent bone, often this needs to be addressed surgically to minimize recurrence. However, sometimes the lesion simply migrates to another spot after surgery, so it is not a guaranteed cure.     There was also discussion of the cost structure of callus care if they were to come back and have it treated in the clinic. Explained that if insurance does not cover it, they would be billed. This charge could range from $100 - $160.  They were also provided information on places to get the callus treatment.                TOE SPACERS              [email or web address]   1-800-PEDIFIX        BODY WEIGHT AND YOUR FEET  The following information is included in the after visit summary for all patients. Body weight can be a sensitive issue to discuss in clinic, but we think the following information is very important. Although we focus on the feet and ankles, we do support the overall health of our patients.     Many things can cause foot and ankle problems. Foot structure, activity level, foot mechanics and injuries are common causes of pain. One very important issue that often goes  unmentioned, is body weight. Extra weight can cause increased stress on muscles, ligaments, bones and tendons. Sometimes just a few extra pounds is all it takes to put one over her/his threshold. Without reducing that stress, it can be difficult to alleviate pain. As Foot & Ankle specialists, our job is addressing the lower extremity problem and possible causes. Regarding extra body weight, we encourage patients to discuss diet and weight management plans with their primary care doctors. It is this team approach that gives you the best opportunity for pain relief and getting you back on your feet.      [facility] has a Comprehensive Weight Management Program. This program includes counseling, education, non-surgical and surgical approaches to weight loss. If you are interested in learning more either talk to you primary care provider or call [phone].

## 2020-02-14 NOTE — PROGRESS NOTES
PATIENT HISTORY:   Bushra Harmon is a 84 year old female who presents to clinic for painful calluses to both feet and pain under the right second toe on the ball of the foot.  Calluses she has had for years the pain on the bottom of the right toe has been a few months.  Notes that it is a deep ache.  Pain is 6 out of 10.  Worse with activity.  Denies specific injury.  Has been using callus patches on the area but has not really helped.  Is wondering what is causing the pain and what can be done to get rid of the calluses.    Review of Systems:  Patient denies fever, chills, rash, wound, stiffness, numbness, weakness, heart burn, blood in stool, chest pain with activity, calf pain when walking, shortness of breath with activity, chronic cough, easy bleeding/bruising, swelling of ankles, excessive thirst, fatigue, depression, anxiety.  Patient admits to limping at times.     PAST MEDICAL HISTORY:   Past Medical History:   Diagnosis Date     CKD (chronic kidney disease)      Essential hypertension, benign      Unspecified hypothyroidism         PAST SURGICAL HISTORY:   Past Surgical History:   Procedure Laterality Date     ABDOMEN SURGERY       C DEXA, BONE DENSITY, AXIAL SKEL  2003    Normal BMD     C NONSPECIFIC PROCEDURE      Exploratory Laparotomy constricting band     C NONSPECIFIC PROCEDURE       x3     C REPLANTATION THUMB DISTAL,COMPLETE  2009    left thumb trauma; Dr. Jose surgery     COLONOSCOPY N/A 11/3/2015    Procedure: COLONOSCOPY;  Surgeon: Joanna Acevedo MD;  Location:  GI     HC COLONOSCOPY THRU STOMA, DIAGNOSTIC  10/22/2005    Diverticulosis- Dr. Shea     HC FLEX SIGMOIDOSCOPY W/WO SHERIF SPEC BY BRUSH/WASH  1998    normal     HC REMOVE TONSILS/ADENOIDS,<13 Y/O       HC REMV CATARACT EXTRACAP,INSERT LENS, COMPLEX, W/O ECP  2005    Left eye     SURGICAL HISTORY OF -   2004    macular hole repair- Left eye        MEDICATIONS:   Current Outpatient Medications:       aspirin (ASA) 81 MG tablet, 2 times weekly, Disp: , Rfl:      biotin 2.5 mg/mL, Take 5,000 mg by mouth daily, Disp: , Rfl:      CALCIUM CITRATE-VITAMIN D 315-200 MG-UNIT PO TABS, 2 TABLETS DAILY, Disp: 90 Tab, Rfl: 3     GLUCOSAMINE HCL 1000 MG PO TABS, 2 TABLETS DAILY, Disp: 90 Tab, Rfl: 3     LACTOBACILLUS PO, Take 120 mg by mouth daily, Disp: , Rfl:      levothyroxine (SYNTHROID/LEVOTHROID) 88 MCG tablet, Take 1 tablet (88 mcg) by mouth daily, Disp: 90 tablet, Rfl: 0     lisinopril (PRINIVIL/ZESTRIL) 20 MG tablet, Take 1 tablet (20 mg) by mouth daily, Disp: 90 tablet, Rfl: 4     Lutein 6 MG TABS, Take 20 mg by mouth daily , Disp: , Rfl:      metoprolol succinate ER (TOPROL-XL) 25 MG 24 hr tablet, Take 1 tablet (25 mg) by mouth daily, Disp: 90 tablet, Rfl: 4     MULTIVITAMIN TABS   OR, 1 daily, Disp: , Rfl:      simvastatin (ZOCOR) 10 MG tablet, Take 1 tablet (10 mg) by mouth At Bedtime, Disp: 90 tablet, Rfl: 4     ALLERGIES:    Allergies   Allergen Reactions     No Known Drug Allergies         SOCIAL HISTORY:   Social History     Socioeconomic History     Marital status:      Spouse name: Not on file     Number of children: 3     Years of education: Not on file     Highest education level: Not on file   Occupational History     Occupation: office     Employer: RETIRED   Social Needs     Financial resource strain: Not on file     Food insecurity:     Worry: Not on file     Inability: Not on file     Transportation needs:     Medical: Not on file     Non-medical: Not on file   Tobacco Use     Smoking status: Former Smoker     Packs/day: 0.50     Years: 45.00     Pack years: 22.50     Last attempt to quit: 2001     Years since quittin.8     Smokeless tobacco: Never Used   Substance and Sexual Activity     Alcohol use: Yes     Comment: 1 drink per day     Drug use: No     Sexual activity: Never   Lifestyle     Physical activity:     Days per week: Not on file     Minutes per session: Not on file      "Stress: Not on file   Relationships     Social connections:     Talks on phone: Not on file     Gets together: Not on file     Attends Congregational service: Not on file     Active member of club or organization: Not on file     Attends meetings of clubs or organizations: Not on file     Relationship status: Not on file     Intimate partner violence:     Fear of current or ex partner: Not on file     Emotionally abused: Not on file     Physically abused: Not on file     Forced sexual activity: Not on file   Other Topics Concern      Service Not Asked     Blood Transfusions Not Asked     Caffeine Concern Yes     Comment: 1 cup per day     Occupational Exposure Not Asked     Hobby Hazards Not Asked     Sleep Concern Not Asked     Stress Concern Not Asked     Weight Concern Not Asked     Special Diet Not Asked     Back Care Not Asked     Exercise Yes     Comment: Active; exercise 4 times per week     Bike Helmet Not Asked     Seat Belt Yes     Self-Exams No     Parent/sibling w/ CABG, MI or angioplasty before 65F 55M? Not Asked   Social History Narrative     Not on file        FAMILY HISTORY:   Family History   Problem Relation Age of Onset     Hypertension Mother           at 100.     Breast Cancer Mother      Thyroid Disease Mother      Diabetes Sister      Hypertension Sister      Neurologic Disorder Daughter         MS     Hypertension Son         EXAM:Vitals: /70   Ht 1.664 m (5' 5.5\")   Wt 74.8 kg (165 lb)   LMP 2003   BMI 27.04 kg/m    BMI= Body mass index is 27.04 kg/m .    General appearance: Patient is alert and fully cooperative with history & exam.  No sign of distress is noted during the visit.     Psychiatric: Affect is pleasant & appropriate.  Patient appears motivated to improve health.     Respiratory: Breathing is regular & unlabored while sitting.     HEENT: Hearing is intact to spoken word.  Speech is clear.  No gross evidence of visual impairment that would impact " ambulation.     Dermatologic: Localized hyperkeratotic lesions to the plantar fifth metatarsal heads bilaterally and left third metatarsal head.  No open lesions or signs of acute infection noted.     Vascular: DP & PT pulses are intact & regular bilaterally.  No significant edema but varicosities noted.  CFT and skin temperature is normal to both lower extremities.     Neurologic: Lower extremity sensation is intact to light touch.  No evidence of weakness or contracture in the lower extremities.  No evidence of neuropathy.     Musculoskeletal: Patient is ambulatory without assistive device or brace.  Semi-flexible contractures of toes 2 through 5 bilateral.  Pain on palpation of the right plantar second metatarsal head distally.  Pain with plantarflexion of the right second toe.       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 Discussed metatarsalgia.  Discussed treatments such as orthotics, padding, physical therapy, injections, immobilization and further imaging such as MRI.    Recommend inserts and not going barefoot along with topical pain cream.    Discussed causes of keratomas.  They are due to areas of increase friction.  Hammertoes can create these as they put more pressure to the metatarsal head.  Discussed treatments such as using foot file, pumice stone, metatarsal pads, orthotics, and not walking barefoot.     We discussed the cost structure of callus care if they were to come back and have it treated in the clinic if insurance does not cover it and explained that they would be billed. They were also provided information on places to get the callus treatment.    Recommend a pumice stone and also inserts.  Patient will call with further questions or concerns.       Neva Cabello DPM, Podiatry/Foot and Ankle Surgery        Recommended to Bushra Harmon to follow up with Primary Care provider  regarding elevated blood pressure.

## 2020-04-16 ENCOUNTER — VIRTUAL VISIT (OUTPATIENT)
Dept: INTERNAL MEDICINE | Facility: CLINIC | Age: 85
End: 2020-04-16
Payer: MEDICARE

## 2020-04-16 DIAGNOSIS — L90.0 LICHEN SCLEROSUS: ICD-10-CM

## 2020-04-16 DIAGNOSIS — E78.5 HYPERLIPIDEMIA LDL GOAL <100: ICD-10-CM

## 2020-04-16 DIAGNOSIS — E03.8 OTHER SPECIFIED HYPOTHYROIDISM: ICD-10-CM

## 2020-04-16 DIAGNOSIS — N18.30 CKD (CHRONIC KIDNEY DISEASE) STAGE 3, GFR 30-59 ML/MIN (H): ICD-10-CM

## 2020-04-16 DIAGNOSIS — I10 HYPERTENSION GOAL BP (BLOOD PRESSURE) < 140/90: Primary | ICD-10-CM

## 2020-04-16 DIAGNOSIS — R29.898 ARM WEAKNESS: ICD-10-CM

## 2020-04-16 PROCEDURE — 99442 ZZC PHYSICIAN TELEPHONE EVALUATION 11-20 MIN: CPT | Performed by: INTERNAL MEDICINE

## 2020-04-16 NOTE — PROGRESS NOTES
"Bushra Harmon is a 84 year old female who is being evaluated via a billable telephone visit.      The patient has been notified of following:     \"This telephone visit will be conducted via a call between you and your physician/provider. We have found that certain health care needs can be provided without the need for a physical exam.  This service lets us provide the care you need with a short phone conversation.  If a prescription is necessary we can send it directly to your pharmacy.  If lab work is needed we can place an order for that and you can then stop by our lab to have the test done at a later time.    Telephone visits are billed at different rates depending on your insurance coverage. During this emergency period, for some insurers they may be billed the same as an in-person visit.  Please reach out to your insurance provider with any questions.    If during the course of the call the physician/provider feels a telephone visit is not appropriate, you will not be charged for this service.\"    Patient has given verbal consent for Telephone visit?  Yes    How would you like to obtain your AVS? Pritesh Gibbons CMA.    Subjective   Phone # [phone] (pt. Declined video)    Bushra Harmon is a 84 year old female who presents to clinic today for the following health issues:    HPI   Hyperlipidemia Follow-Up      Are you regularly taking any medication or supplement to lower your cholesterol?   Yes- zocor    Are you having muscle aches or other side effects that you think could be caused by your cholesterol lowering medication?  No    Hypertension Follow-up      Do you check your blood pressure regularly outside of the clinic? Yes     Are you following a low salt diet? Yes    Are your blood pressures ever more than 140 on the top number (systolic) OR more   than 90 on the bottom number (diastolic), for example 140/90? No    Hypothyroidism.  The patient has been taking levothyroxine.  She has seen " endocrinology.      How many servings of fruits and vegetables do you eat daily?  4 or more    On average, how many sweetened beverages do you drink each day (Examples: soda, juice, sweet tea, etc.  Do NOT count diet or artificially sweetened beverages)?   1    How many days per week do you exercise enough to make your heart beat faster? 4    How many minutes a day do you exercise enough to make your heart beat faster? 20 - 29    How many days per week do you miss taking your medication? 0    Upper arm pain/weakness.  She has had 2 weeks of symptoms. She has had weakness and pain in her arms from elbow to her shoulder bilaterally when elevating her arms over her head.  The patient reports that she has some weakness.  At night it seems to be worse. Denies any neck pain.  She is able to still lift her 2 lb weights.   No hip pain.      Patient Active Problem List   Diagnosis     Herpes simplex virus (HSV) infection     CKD (chronic kidney disease) stage 3, GFR 30-59 ml/min (H)     Hypertension goal BP (blood pressure) < 140/90     Hyperlipidemia LDL goal <100     Onychomycosis     Benign hypertension     Other specified hypothyroidism     Past Surgical History:   Procedure Laterality Date     ABDOMEN SURGERY       C DEXA, BONE DENSITY, AXIAL SKEL  2003    Normal BMD     C NONSPECIFIC PROCEDURE      Exploratory Laparotomy constricting band     C NONSPECIFIC PROCEDURE       x3     C REPLANTATION THUMB DISTAL,COMPLETE  2009    left thumb trauma; Dr. Jose surgery     COLONOSCOPY N/A 11/3/2015    Procedure: COLONOSCOPY;  Surgeon: Joanna Acevedo MD;  Location:  GI     HC COLONOSCOPY THRU STOMA, DIAGNOSTIC  10/22/2005    Diverticulosis- Dr. Shea     HC FLEX SIGMOIDOSCOPY W/WO SHERIF SPEC BY BRUSH/WASH  1998    normal     HC REMOVE TONSILS/ADENOIDS,<11 Y/O       HC REMV CATARACT EXTRACAP,INSERT LENS, COMPLEX, W/O ECP  2005    Left eye     SURGICAL HISTORY OF -   2004    macular hole  repair- Left eye       Social History     Tobacco Use     Smoking status: Former Smoker     Packs/day: 0.50     Years: 45.00     Pack years: 22.50     Last attempt to quit: 2001     Years since quittin.9     Smokeless tobacco: Never Used   Substance Use Topics     Alcohol use: Yes     Comment: 1 drink per day     Family History   Problem Relation Age of Onset     Hypertension Mother           at 100.     Breast Cancer Mother      Thyroid Disease Mother      Diabetes Sister      Hypertension Sister      Neurologic Disorder Daughter         MS     Hypertension Son            PROBLEMS TO ADD ON...  Reviewed and updated as needed this visit by Provider         Review of Systems   ROS COMP: CONSTITUTIONAL: NEGATIVE for fever, chills, change in weight  RESP: NEGATIVE for significant cough or SOB  CV: NEGATIVE for chest pain, palpitations or peripheral edema  Skin: h/o lichen sclerosis- the patient has seen dermatology  Neuro: no headaches      Objective    LMP 2003   There is no height or weight on file to calculate BMI.  Physical Exam   Blood pressure 129/72  GENERAL: healthy, alert and no distress  Psych: normal affect    Diagnostic Test Results:  Labs reviewed in Epic        Assessment & Plan     (I10) Hypertension goal BP (blood pressure) < 140/90  (primary encounter diagnosis)  Comment: at goal  Plan: continue on meds    (N18.3) CKD (chronic kidney disease) stage 3, GFR 30-59 ml/min (H)  Comment:   Plan: NSAIDs    (E03.8) Other specified hypothyroidism  Comment:   Plan: levothyroxine    (E78.5) Hyperlipidemia LDL goal <100  Comment: assess  Plan: Comprehensive metabolic panel (BMP + Alb, Alk         Phos, ALT, AST, Total. Bili, TP), Lipid panel         reflex to direct LDL Fasting            (L90.0) Lichen sclerosus  Comment:   Plan: followed by dermatology    (R29.898) Arm weakness  Comment: consider PMR; no headache or vision changes  Plan: ESR: Erythrocyte sedimentation rate, CRP,        "  inflammation                   BMI:   Estimated body mass index is 27.04 kg/m  as calculated from the following:    Height as of 2/14/20: 1.664 m (5' 5.5\").    Weight as of 2/14/20: 74.8 kg (165 lb).           See Patient Instructions    Follow up in 6 months      Magalis Woodward MD  [facility]        Phone call duration: 15 minutes      Magalis Woodward MD        "

## 2020-04-17 ENCOUNTER — NURSE TRIAGE (OUTPATIENT)
Dept: NURSING | Facility: CLINIC | Age: 85
End: 2020-04-17

## 2020-04-17 DIAGNOSIS — I10 HYPERTENSION GOAL BP (BLOOD PRESSURE) < 140/90: ICD-10-CM

## 2020-04-17 DIAGNOSIS — E78.5 HYPERLIPIDEMIA LDL GOAL <100: Primary | ICD-10-CM

## 2020-04-17 NOTE — TELEPHONE ENCOUNTER
Please see message below, writer unable to identify where UA/UC orders were mentioned during virtual visit yesterday. Please advise,     Thank you

## 2020-04-17 NOTE — TELEPHONE ENCOUNTER
Call to patient, patient states that she usually gets this done yearly and sometimes the kidney doctor will order it as well. Patient states it is due to her kidney function. Upon review of patient's chart patient has had Albumin Random Urine Quantitative with Creat Ratio yearly. Please advise if patient should have this order placed or not. Patient states that she was just reminding provider that she gets this done yearly if provider wanted to order it. Please advise,     Thank you

## 2020-04-17 NOTE — TELEPHONE ENCOUNTER
RN Triage -> ACTION NEEDED.    Pt calls re virtual visit yesterday (April 16, 2020).    Pt asks if orders for UA-UC were entered (along with other lab orders).  Answer -> No, not at this time.    Pt would therefore like UA-UC orders added please (due to chronic kidney disease).    Has upcoming lab appt Monday, April 20 for blood draw.  Could she do urine specimen at this time?    Pt will await a callback -> [phone].  Thank you-    G[facility] Nurse Advisor     Reason for Disposition    [1] Follow-up call from patient regarding patient's clinical status AND [2] information NON-URGENT    Protocols used: PCP CALL - NO TRIAGE-A-AH

## 2020-04-20 DIAGNOSIS — I10 HYPERTENSION GOAL BP (BLOOD PRESSURE) < 140/90: ICD-10-CM

## 2020-04-20 DIAGNOSIS — E03.8 OTHER SPECIFIED HYPOTHYROIDISM: ICD-10-CM

## 2020-04-20 DIAGNOSIS — E78.5 HYPERLIPIDEMIA LDL GOAL <100: ICD-10-CM

## 2020-04-20 DIAGNOSIS — R29.898 ARM WEAKNESS: ICD-10-CM

## 2020-04-20 LAB
ALBUMIN SERPL-MCNC: 3.3 G/DL (ref 3.4–5)
ALP SERPL-CCNC: 70 U/L (ref 40–150)
ALT SERPL W P-5'-P-CCNC: 29 U/L (ref 0–50)
ANION GAP SERPL CALCULATED.3IONS-SCNC: 7 MMOL/L (ref 3–14)
AST SERPL W P-5'-P-CCNC: 23 U/L (ref 0–45)
BILIRUB SERPL-MCNC: 0.7 MG/DL (ref 0.2–1.3)
BUN SERPL-MCNC: 25 MG/DL (ref 7–30)
CALCIUM SERPL-MCNC: 9.4 MG/DL (ref 8.5–10.1)
CHLORIDE SERPL-SCNC: 100 MMOL/L (ref 94–109)
CHOLEST SERPL-MCNC: 196 MG/DL
CO2 SERPL-SCNC: 28 MMOL/L (ref 20–32)
CREAT SERPL-MCNC: 0.89 MG/DL (ref 0.52–1.04)
CREAT UR-MCNC: 46 MG/DL
CRP SERPL-MCNC: 11 MG/L (ref 0–8)
ERYTHROCYTE [SEDIMENTATION RATE] IN BLOOD BY WESTERGREN METHOD: 21 MM/H (ref 0–30)
GFR SERPL CREATININE-BSD FRML MDRD: 60 ML/MIN/{1.73_M2}
GLUCOSE SERPL-MCNC: 95 MG/DL (ref 70–99)
HDLC SERPL-MCNC: 82 MG/DL
LDLC SERPL CALC-MCNC: 92 MG/DL
MICROALBUMIN UR-MCNC: 75 MG/L
MICROALBUMIN/CREAT UR: 162.96 MG/G CR (ref 0–25)
NONHDLC SERPL-MCNC: 114 MG/DL
POTASSIUM SERPL-SCNC: 4.1 MMOL/L (ref 3.4–5.3)
PROT SERPL-MCNC: 8.1 G/DL (ref 6.8–8.8)
SODIUM SERPL-SCNC: 135 MMOL/L (ref 133–144)
T4 FREE SERPL-MCNC: 1.4 NG/DL (ref 0.76–1.46)
TRIGL SERPL-MCNC: 112 MG/DL
TSH SERPL DL<=0.005 MIU/L-ACNC: 8.54 MU/L (ref 0.4–4)

## 2020-04-20 PROCEDURE — 36415 COLL VENOUS BLD VENIPUNCTURE: CPT | Performed by: INTERNAL MEDICINE

## 2020-04-20 PROCEDURE — 80053 COMPREHEN METABOLIC PANEL: CPT | Performed by: INTERNAL MEDICINE

## 2020-04-20 PROCEDURE — 82043 UR ALBUMIN QUANTITATIVE: CPT | Performed by: INTERNAL MEDICINE

## 2020-04-20 PROCEDURE — 84443 ASSAY THYROID STIM HORMONE: CPT | Performed by: INTERNAL MEDICINE

## 2020-04-20 PROCEDURE — 84439 ASSAY OF FREE THYROXINE: CPT | Mod: 90 | Performed by: INTERNAL MEDICINE

## 2020-04-20 PROCEDURE — 80061 LIPID PANEL: CPT | Performed by: INTERNAL MEDICINE

## 2020-04-20 PROCEDURE — 99000 SPECIMEN HANDLING OFFICE-LAB: CPT | Performed by: INTERNAL MEDICINE

## 2020-04-20 PROCEDURE — 86140 C-REACTIVE PROTEIN: CPT | Performed by: INTERNAL MEDICINE

## 2020-04-20 PROCEDURE — 85652 RBC SED RATE AUTOMATED: CPT | Performed by: INTERNAL MEDICINE

## 2020-04-23 ENCOUNTER — TELEPHONE (OUTPATIENT)
Dept: ENDOCRINOLOGY | Facility: CLINIC | Age: 85
End: 2020-04-23

## 2020-04-23 DIAGNOSIS — E03.8 OTHER SPECIFIED HYPOTHYROIDISM: Primary | ICD-10-CM

## 2020-04-23 NOTE — TELEPHONE ENCOUNTER
ENDO THYROID LABS-[facility] Latest Ref Rng & Units 4/20/2020 1/24/2020   TSH 0.40 - 4.00 mU/L 8.54 (H) 2.04   T4 FREE 0.76 - 1.46 ng/dL 1.40 1.56 (H)     Earlier dose was decreased in the setting of high FT4 and high Ft4DL.  Now after dose change TSH is high. With normal FT4, FT4Dl is pending.    Please help schedule virtual visit in next few weeks to discuss results and next steps.

## 2020-04-24 ENCOUNTER — NURSE TRIAGE (OUTPATIENT)
Dept: NURSING | Facility: CLINIC | Age: 85
End: 2020-04-24

## 2020-04-24 NOTE — TELEPHONE ENCOUNTER
Triage call:   Phone conversation with her PCP - ran some labs and they came back     Arms and shoulder are very stiff and sore in the morning- states that she also has stiffness in her hips when getting up from a sitting position in the AM as well. States that the stiffness gets better during the day.      States that she is wondering why PCP has not called about her lab results- patient declines additional triage- would like to follow up with her PCP only     Patient feels that she has Polymyalgia rheumatica- wondering If she needs to be put on steroids- follow up visit? PCP please advise.    Sherice Abraham RN BSN 4/24/2020 7:15 AM    Reason for Disposition    [1] Follow-up call from patient regarding patient's clinical status AND [2] information NON-URGENT    Additional Information    Negative: Lab calling with strep throat test results and triager can call in prescription    Negative: Lab calling with urinalysis test results and triager can call in prescription    Negative: Medication questions    Negative: ED call to PCP    Negative: Physician call to PCP    Negative: Call about patient who is currently hospitalized    Negative: Lab or radiology calling with CRITICAL test results    Negative: [1] Prescription not at pharmacy AND [2] was prescribed today by PCP    Negative: [1] Follow-up call from patient regarding patient's clinical status AND [2] information urgent    Negative: [1] Caller requests to speak ONLY to PCP AND [2] URGENT question    Negative: [1] Caller requests to speak to PCP now AND [2] won't tell us reason for call  (Exception: if 10 pm to 6 am, caller must first discuss reason for the call)    Negative: Notification of hospital admission    Negative: Notification of death    Negative: Caller requesting lab results    Negative: Lab or radiology calling with test results    Protocols used: PCP CALL - NO TRIAGE-A-

## 2020-04-24 NOTE — TELEPHONE ENCOUNTER
Patient wondering if she can increase her levothyroxine dosage to 100 mcg prior to her appointment that is scheduled on 5/5/20? Patient currently taking 88 mcg.  Please advise, thanks.    Patient has concerns regarding pain below and is having a lot of difficulty moving, especially in the am.    See below note regarding polymyalgia rheumatica.  Patient wondering if she needs steroids.  Please advise, thanks.

## 2020-04-25 LAB — T4 FREE SERPL DIALY-MCNC: 2.6 NG/DL (ref 1.1–2.4)

## 2020-04-27 RX ORDER — LEVOTHYROXINE SODIUM 100 UG/1
100 TABLET ORAL DAILY
Qty: 90 TABLET | Refills: 0 | Status: SHIPPED | OUTPATIENT
Start: 2020-04-27 | End: 2020-05-19

## 2020-04-27 NOTE — TELEPHONE ENCOUNTER
ENDO THYROID LABS-[facility] Latest Ref Rng & Units 4/20/2020 1/24/2020   TSH 0.40 - 4.00 mU/L 8.54 (H) 2.04   T4 FREE 0.76 - 1.46 ng/dL 1.40 1.56 (H)     Based on labs and her s/s recommend to increase levothyroxine to 100 mcg/day ( from 88 mcg).  Rx sent.    Labs in 2 months  Please make a lab appointment for blood work and follow up clinic appointment in 1 week after that to discuss results.    Please inform patient.

## 2020-04-27 NOTE — TELEPHONE ENCOUNTER
Stiffness in hips, bilateral upper arm pain (muscles) to the point of difficulty completing tasks that need strength.  Also low grade fevers, neck stiffness, fatigue, nosebleeds about every other day.  Patient has been doing a lot of research on PMR and feels that she may still have PMR despite normal ESR.  Taking Tylenol twice daily which helps the pain. States she is miserable, pain with any movement and she has difficulty pulling covers over herself in bed, pulling up pants, wiping herself.  Feels she either must have PMR or something is being missed.  She wants to speak with a provider further about her symptoms and requests appt this week with Dr. Dill.  MANNY Bradshaw R.N.

## 2020-04-27 NOTE — TELEPHONE ENCOUNTER
If she is OK with plan then cancel.  If she has questions and would like to keep visit then lets keep it.

## 2020-04-27 NOTE — TELEPHONE ENCOUNTER
See other telephone message. OK to increase dose to 100 mcg/day.  Please route steroid related question to PCP .

## 2020-04-27 NOTE — TELEPHONE ENCOUNTER
Called patient and she will increase the levothyroxine to 100 mg daily per Dr. Marcos.      Routing steroid question to primary care provider.  Patient really believes she has polymyalgia rheumatica and wonders how this is diagnosed and if she needs steroids.  Please advise, thanks.

## 2020-04-27 NOTE — TELEPHONE ENCOUNTER
Covering for PCP, please notify the patient about lab results which was addressed by PCP on 4/22/2020.    Usually sed rate will be high in PMR. Her sed rate was in normal range. So PMR diagnosis is unlikely.  Sometimes thyroid abnormality also can cause muscle aches. Since her dose was adjusted she could wait to see if her symptoms are getting better.    If she still has concerns she can do VV with one of us or can wait for PCP to address about her symptoms.    Georgia Dill MD

## 2020-04-28 ENCOUNTER — VIRTUAL VISIT (OUTPATIENT)
Dept: INTERNAL MEDICINE | Facility: CLINIC | Age: 85
End: 2020-04-28
Payer: MEDICARE

## 2020-04-28 DIAGNOSIS — E55.9 VITAMIN D DEFICIENCY: ICD-10-CM

## 2020-04-28 DIAGNOSIS — E78.5 HYPERLIPIDEMIA LDL GOAL <100: ICD-10-CM

## 2020-04-28 DIAGNOSIS — M25.50 ARTHRALGIA, UNSPECIFIED JOINT: ICD-10-CM

## 2020-04-28 DIAGNOSIS — M79.10 MYALGIA: ICD-10-CM

## 2020-04-28 DIAGNOSIS — E03.8 OTHER SPECIFIED HYPOTHYROIDISM: ICD-10-CM

## 2020-04-28 DIAGNOSIS — M79.10 MYALGIA: Primary | ICD-10-CM

## 2020-04-28 LAB
ALBUMIN UR-MCNC: NEGATIVE MG/DL
APPEARANCE UR: CLEAR
BILIRUB UR QL STRIP: NEGATIVE
COLOR UR AUTO: YELLOW
CRP SERPL-MCNC: 15 MG/L (ref 0–8)
ERYTHROCYTE [SEDIMENTATION RATE] IN BLOOD BY WESTERGREN METHOD: 37 MM/H (ref 0–30)
GLUCOSE UR STRIP-MCNC: NEGATIVE MG/DL
HGB UR QL STRIP: ABNORMAL
KETONES UR STRIP-MCNC: NEGATIVE MG/DL
LEUKOCYTE ESTERASE UR QL STRIP: NEGATIVE
NITRATE UR QL: NEGATIVE
PH UR STRIP: 6 PH (ref 5–7)
RBC #/AREA URNS AUTO: ABNORMAL /HPF
SOURCE: ABNORMAL
SP GR UR STRIP: 1.02 (ref 1–1.03)
UROBILINOGEN UR STRIP-ACNC: 0.2 EU/DL (ref 0.2–1)
WBC #/AREA URNS AUTO: ABNORMAL /HPF

## 2020-04-28 PROCEDURE — 86431 RHEUMATOID FACTOR QUANT: CPT | Performed by: INTERNAL MEDICINE

## 2020-04-28 PROCEDURE — 85652 RBC SED RATE AUTOMATED: CPT | Performed by: INTERNAL MEDICINE

## 2020-04-28 PROCEDURE — 36415 COLL VENOUS BLD VENIPUNCTURE: CPT | Performed by: INTERNAL MEDICINE

## 2020-04-28 PROCEDURE — 99443 ZZC PHYSICIAN TELEPHONE EVALUATION 21-30 MIN: CPT | Performed by: INTERNAL MEDICINE

## 2020-04-28 PROCEDURE — 86039 ANTINUCLEAR ANTIBODIES (ANA): CPT | Performed by: INTERNAL MEDICINE

## 2020-04-28 PROCEDURE — 86038 ANTINUCLEAR ANTIBODIES: CPT | Performed by: INTERNAL MEDICINE

## 2020-04-28 PROCEDURE — 81001 URINALYSIS AUTO W/SCOPE: CPT | Performed by: INTERNAL MEDICINE

## 2020-04-28 PROCEDURE — 86140 C-REACTIVE PROTEIN: CPT | Performed by: INTERNAL MEDICINE

## 2020-04-28 PROCEDURE — 86200 CCP ANTIBODY: CPT | Performed by: INTERNAL MEDICINE

## 2020-04-28 PROCEDURE — 82550 ASSAY OF CK (CPK): CPT | Performed by: INTERNAL MEDICINE

## 2020-04-28 PROCEDURE — 82306 VITAMIN D 25 HYDROXY: CPT | Performed by: INTERNAL MEDICINE

## 2020-04-28 NOTE — PROGRESS NOTES
"Bushra Harmon is a 84 year old female who is being evaluated via a billable telephone visit.      The patient has been notified of following:     \"This telephone visit will be conducted via a call between you and your physician/provider. We have found that certain health care needs can be provided without the need for a physical exam.  This service lets us provide the care you need with a short phone conversation.  If a prescription is necessary we can send it directly to your pharmacy.  If lab work is needed we can place an order for that and you can then stop by our lab to have the test done at a later time.    Telephone visits are billed at different rates depending on your insurance coverage. During this emergency period, for some insurers they may be billed the same as an in-person visit.  Please reach out to your insurance provider with any questions.    If during the course of the call the physician/provider feels a telephone visit is not appropriate, you will not be charged for this service.\"    Patient has given verbal consent for Telephone visit?  Yes    How would you like to obtain your AVS? MyChart    Subjective     Bushra Harmon is a 84 year old female who presents to clinic today for the following health issues:    HPI  84Yr/F with h/o hypothyroidism, hyperlipidemia, HTN c/o generalized muscle aches and joint pain.   Predominantly muscles, moves from one joint to another in last 3 weeks. Denies similar complaints in the past.  Taking tylenol for pain, Can't take NSAIDs.  Has difficulty performing daily activities, dressing up, holding coffee mug.  Recent labs shows TSH around 8 and thyroid dose was adjusted.  Takes simvastatin for few years and denies muscle aches, joint pain with medicine in the past.  She had h/o of vit d def in the past and takes 1000 units Vit D daily.  PCP suspected PMR last week but ESR was normal and CRP was mildly elevated. Patient is wondering if she needs " steroids.    Patient Active Problem List   Diagnosis     Herpes simplex virus (HSV) infection     CKD (chronic kidney disease) stage 3, GFR 30-59 ml/min (H)     Hypertension goal BP (blood pressure) < 140/90     Hyperlipidemia LDL goal <100     Onychomycosis     Benign hypertension     Other specified hypothyroidism     Lichen sclerosus     Past Surgical History:   Procedure Laterality Date     ABDOMEN SURGERY       C DEXA, BONE DENSITY, AXIAL SKEL  2003    Normal BMD     C NONSPECIFIC PROCEDURE      Exploratory Laparotomy constricting band     C NONSPECIFIC PROCEDURE       x3     C REPLANTATION THUMB DISTAL,COMPLETE  2009    left thumb trauma; Dr. Jose surgery     COLONOSCOPY N/A 11/3/2015    Procedure: COLONOSCOPY;  Surgeon: Joanna Acevedo MD;  Location:  GI     HC COLONOSCOPY THRU STOMA, DIAGNOSTIC  10/22/2005    Diverticulosis- Dr. Shea     HC FLEX SIGMOIDOSCOPY W/WO SHERIF SPEC BY BRUSH/WASH  1998    normal     HC REMOVE TONSILS/ADENOIDS,<11 Y/O       HC REMV CATARACT EXTRACAP,INSERT LENS, COMPLEX, W/O ECP  2005    Left eye     SURGICAL HISTORY OF -   2004    macular hole repair- Left eye       Social History     Tobacco Use     Smoking status: Former Smoker     Packs/day: 0.50     Years: 45.00     Pack years: 22.50     Last attempt to quit: 2001     Years since quittin.0     Smokeless tobacco: Never Used   Substance Use Topics     Alcohol use: Yes     Comment: 1 drink per day     Family History   Problem Relation Age of Onset     Hypertension Mother           at 100.     Breast Cancer Mother      Thyroid Disease Mother      Diabetes Sister      Hypertension Sister      Neurologic Disorder Daughter         MS     Hypertension Son          Current Outpatient Medications   Medication Sig Dispense Refill     CALCIUM CITRATE-VITAMIN D 315-200 MG-UNIT PO TABS 2 TABLETS DAILY 90 Tab 3     GLUCOSAMINE HCL 1000 MG PO TABS 2 TABLETS DAILY 90 Tab 3      LACTOBACILLUS PO Take 120 mg by mouth daily       levothyroxine (SYNTHROID/LEVOTHROID) 100 MCG tablet Take 1 tablet (100 mcg) by mouth daily 90 tablet 0     lisinopril (PRINIVIL/ZESTRIL) 20 MG tablet Take 1 tablet (20 mg) by mouth daily 90 tablet 4     Lutein 6 MG TABS Take 20 mg by mouth daily        metoprolol succinate ER (TOPROL-XL) 25 MG 24 hr tablet Take 1 tablet (25 mg) by mouth daily 90 tablet 4     MULTIVITAMIN TABS   OR 1 daily       simvastatin (ZOCOR) 10 MG tablet Take 1 tablet (10 mg) by mouth At Bedtime 90 tablet 4     Allergies   Allergen Reactions     No Known Drug Allergies        Reviewed and updated as needed this visit by Provider         Review of Systems        Objective   Reported vitals:  [facility] 02/26/2003    PSYCH: Alert and oriented times 3; coherent speech, normal   rate and volume, able to articulate logical thoughts, able   to abstract reason, no tangential thoughts, no hallucinations   or delusions  Her affect is normal  RESP: No cough, no audible wheezing, able to talk in full sentences  Remainder of exam unable to be completed due to telephone visits      Assessment/Plan:  1. Myalgia  - CK total; Future  - **ESR FUTURE anytime; Future  - CRP, inflammation; Future  - UA with Microscopic; Future  - Vitamin D Deficiency  - Rheumatoid factor; Future  - Anti Nuclear Ciara IgG by IFA with Reflex; Future  - Cyclic Citrullinated Peptide Antibody IgG; Future    2. Arthralgia, unspecified joint  - CK total; Future  - **ESR FUTURE anytime; Future  - CRP, inflammation; Future  - UA with Microscopic; Future  - Vitamin D Deficiency  - Rheumatoid factor; Future  - Anti Nuclear Ciara IgG by IFA with Reflex; Future  - Cyclic Citrullinated Peptide Antibody IgG; Future    3. Vitamin D deficiency  - Vitamin D Deficiency    4. Hyperlipidemia LDL goal <100    5. Other specified hypothyroidism     Will add additional labs and recheck CRP and Esr per patient request.  Also informed statin can cause muscle pain,  under treated thyroid can also cause muscle aches. Thyroid dose is already adjusted by endocrinologist.    Patient will discuss with PCP next week if she needs steroids for her symptoms.    Phone call duration: 22  minutes    Georgia Dill MD

## 2020-04-28 NOTE — PATIENT INSTRUCTIONS
Patient Education     Myalgias  Myalgias are another word for muscle aches and soreness. This is a symptom, not a disease. Myalgias can have many causes. A cold, the flu, or an acute infection can cause them. So can any illness with a high fever. They may happen after exertion (such as heavy exercise) or injury (such as an accident or fall). Some medicines (such as statins and certain antidepressants) can cause myalgias. They can also be a symptom of chronic or ongoing medical problems (such as lupus, chronic fatigue, or hypothyroidism). With these illnesses, other serious symptoms often occur in addition to muscle pain and soreness.    Myalgias most often go away on their own. If they don't go away, come back, or are severe, testing may be needed to help find the cause.  Home care    Rest until you feel better.    Follow instructions that you were given for how to care for yourself. This may depend on the cause of your myalgias.     If myalgia is thought to be due to a medicine, be sure to talk to the doctor that prescribed the medicine about the best course of action.    To control pain, take prescription or over-the-counter medicines as directed. Unless told not to, you can try acetaminophen or ibuprofen.  Follow-up care  Follow up with your healthcare provider or as advised. If your symptoms do not go away in a few days or if they come back, follow up with your healthcare provider for an exam and testing.  When to see medical advice  Call your healthcare provider for any of the following:    Fever of 100.4 F (38 C) or higher, or as directed by your healthcare provider    Pain that gets worse and not better, or that goes away and comes back    New joint pains    New rash    Severe headache, neck pain, drowsiness, or confusion  Date Last Reviewed: 3/1/2017    5609-9536 [employer]. [address] [place] [address], [place] [address]. All rights reserved. This information is not intended as a substitute for  professional medical care. Always follow your healthcare professional's instructions.

## 2020-04-28 NOTE — NURSING NOTE
patient states that she is having generalized body aches especially in the rt elbow to shoulder and now going into her neck. She also states that she is having low grade temps in the evening only.

## 2020-04-29 LAB
CK SERPL-CCNC: 82 U/L (ref 30–225)
DEPRECATED CALCIDIOL+CALCIFEROL SERPL-MC: 58 UG/L (ref 20–75)
RHEUMATOID FACT SER NEPH-ACNC: <7 IU/ML (ref 0–20)

## 2020-04-30 LAB
ANA PAT SER IF-IMP: ABNORMAL
ANA SER QL IF: ABNORMAL
ANA TITR SER IF: ABNORMAL {TITER}

## 2020-05-01 ENCOUNTER — NURSE TRIAGE (OUTPATIENT)
Dept: NURSING | Facility: CLINIC | Age: 85
End: 2020-05-01

## 2020-05-01 DIAGNOSIS — M25.50 ARTHRALGIA, UNSPECIFIED JOINT: Primary | ICD-10-CM

## 2020-05-01 LAB — CCP AB SER IA-ACNC: 7 U/ML

## 2020-05-01 RX ORDER — PREDNISONE 5 MG/1
15 TABLET ORAL DAILY
Qty: 15 TABLET | Refills: 0 | Status: SHIPPED | OUTPATIENT
Start: 2020-05-01 | End: 2020-05-06

## 2020-05-01 NOTE — TELEPHONE ENCOUNTER
"Bea Garcia is a 21 y.o.  female who presents for her Annual Gynecologic Exam      HPI Comments: Pt presents for her annual well woman exam.   Pt has no complaints.  Patient's last menstrual period was 2018.   Student at [employer] ([place]), wants to be an     Review of Systems:   Pertinent positives documented in HPI and all other systems reviewed & are negative    PGYN Hx: no hx abnl pap, no hx STDs, OCPs for contraception    All PMH, PSH, allergies, social history and FH reviewed and updated today:  Past Medical History:   Diagnosis Date   • Healthy infant or child    • Pyelonephritis    • Scoliosis        History reviewed. No pertinent surgical history.    Medications:   Current Outpatient Prescriptions Ordered in [facility]   Medication Sig Dispense Refill   • acetaminophen (TYLENOL) 325 MG Tab Take 2 Tabs by mouth every 6 hours as needed (Mild Pain; (Pain scale 1-3); Temp greater than 100.5 F). 30 Tab 2   • NON SPECIFIED Take 1 Tab by mouth every day. Birth control pill. Unknown name       No current [facility]-ordered facility-administered medications on file.    Desogen OCPs    Patient has no known allergies.    Social History     Social History   • Marital status: Single     Spouse name: N/A   • Number of children: N/A   • Years of education: N/A     Social History Main Topics   • Smoking status: Never Smoker   • Smokeless tobacco: Never Used   • Alcohol use No      Comment: occasionally   • Drug use: No   • Sexual activity: Yes     Partners: Male     Birth control/ protection: Pill     Other Topics Concern   • Not on file     Social History Narrative   • No narrative on file       Family History   Problem Relation Age of Onset   • Hypertension Father           Objective:   Vital measurements:  Blood pressure 100/58, height 1.626 m (5' 4.02\"), weight 54.9 kg (121 lb), last menstrual period 2018, not currently breastfeeding.  Body mass index is 20.76 kg/m². (Goal BM I>18 <25)    Physical " Call to patient, notified of lab result notes. Patient states that she would like to know the name of the steroid that would be prescribed to her and dose so she can check the cost, Please advise, will route to covering providers    Thank you      Exam   Nursing note and vitals reviewed.  Constitutional: She is oriented to person, place, and time. She appears well-developed and well-nourished. No distress.     HEENT:   Head: Normocephalic and atraumatic.   Right Ear: External ear normal.   Left Ear: External ear normal.   Nose: Nose normal.   Eyes: Conjunctivae and EOM are normal. Pupils are equal, round, and reactive to light. No scleral icterus.     Neck: Normal range of motion. Neck supple. No tracheal deviation present. No thyromegaly present. No cervical or supraclavicular lymphadenopathy.    Pulmonary/Chest: Effort normal and breath sounds normal. No respiratory distress. She has no wheezes. She has no rales. She exhibits no tenderness.     Cardiovascular: Regular, rate and rhythm. No edema.    Breast: Symmetrical, normal consistency without masses., No dimpling or skin changes, Normal nipples without discharge, no axillary lymphadenopathy    Abdominal: Soft. Bowel sounds are normal. She exhibits no distension and no mass. No tenderness. She has no rebound and no guarding.     Genitourinary:  Pelvic exam was performed with patient supine.  External genitalia with no abnormal pigmentation, labial fusion, rashes, tenderness, lesions or injury to the labia bilaterally.  BUS normal  Vagina is pink and moist with no lesions, foul discharge, erythema, tenderness or bleeding. No foreign body around the vagina or signs of injury.   Cervix exhibits no motion tenderness, no discharge and no friability, no lesions.   Uterus is not deviated, not enlarged, not fixed and not tender.  Right adnexa displays no mass, no tenderness and no fullness.  Left adnexa displays no mass, no tenderness and no fullness.     Musculoskeletal: Normal range of motion. Non tender. She exhibits no edema and no tenderness.     Lymphadenopathy: She has no cervical or supraclavicular adenopathy.     Neurological: She is alert and oriented to person, place, and time. She exhibits normal  muscle tone.     Skin: Skin is warm and dry. No rash noted. She is not diaphoretic. No erythema. No pallor.     Psychiatric: She has a normal mood and affect. Her behavior is normal. Judgment and thought content normal.        Assessment:     1. Well woman exam     2. Screen for sexually transmitted diseases  Chlamydia/GC PCR Urine or Swab         Plan:   Physical exam performed  STD testing done  Pap smear age 23  Self breast awareness discussed.  Encouraged exercise and proper diet.  Mammograms starting @ age 40 annually.  See medications and orders placed in encounter report.  Follow up in 1 year for annual exam or sooner as needed  Refilled OCPs

## 2020-05-01 NOTE — TELEPHONE ENCOUNTER
Call to patient left detailed voicemail regarding medication and to call clinic if she would like this to be prescribed.

## 2020-05-01 NOTE — TELEPHONE ENCOUNTER
Patient called back, was given the name and dose of medication. Will check with her insurance about cost and call us back

## 2020-05-01 NOTE — TELEPHONE ENCOUNTER
"This is from the result notes   \" Urine show small amount of blood, muscle enzymes is in normal range, sed rate in higher than pervious lab draw, also CRP is little high. both are inflammatory markers. Rheumatoid factor is normal but one more additional testing for Rheumatoid arthritis is still pending. Felicity is borderline positive.   Please check with her if she wants to steroids or wait and discuss with PCP next week\"    Georgia Dill MD      "

## 2020-05-01 NOTE — TELEPHONE ENCOUNTER
Prednisone sent. Advised to Follow-up with PCP to decide the next step in management.    Georgia Dill MD

## 2020-05-01 NOTE — TELEPHONE ENCOUNTER
"Pt calls with status update while labs still in process ...    Pt states \"Every day I'm getting worse.\"  Shoulder pain and stiffness.  Arm pain and stiffness.  Neck pain with movement.    \"More and more stiff and painful.\"  \"Even hips are getting stiffer with any amount of sitting.\"    Today -> \"Can hardly even get my bra on any more.\"  \"Arms are not working.\"  \"Using a salad tongs to reach edge of my robe to pull on.\"  \"Need something ... steroids, etc ...\"    NO fevers.  No other symptoms whatsoever.    Has upcoming telephone provider visit next week to discuss lab results -> kandy regarding rheumatoid factors.  Will wait until then ...  In the interim, discussed pt may increase Tylenol to 3000 mg per 24 hours, with absolute maximum Tylenol of 4000 mg q 24 hrs -> taken with generous fluids.  Pt agrees to do so.    Gwen W[facility] Nurse Advisor     Additional Information    Negative: Shock suspected (e.g., cold/pale/clammy skin, too weak to stand, low BP, rapid pulse)    Negative: Similar pain previously and it was from 'heart attack'    Negative: Similar pain previously and it was from 'angina' and not relieved by nitroglycerin    Negative: Sounds like a life-threatening emergency to the triager    Negative: Chest pain    Negative: Followed an injury to shoulder    Negative: Difficulty breathing or unusual sweating (e.g., sweating without exertion)    Negative: Pain lasting > 5 minutes and pain also present in chest (Exception: pain is clearly made worse by movement)    Negative: Age > 40 and no obvious cause and pain even when not moving the arm (Exception: pain is clearly made worse by moving arm or bending neck)    Negative: Red area or streak and fever    Negative: Swollen joint and fever    Negative: Entire arm is swollen    Negative: Patient sounds very sick or weak to the triager    Negative: SEVERE pain (e.g., excruciating, unable to do any normal activities)    Negative: Shoulder pains with exertion " (e.g., walking) and pain goes away on resting and not present now    Negative: Painful rash with multiple small blisters grouped together (i.e., dermatomal distribution or 'band' or 'stripe')    Negative: Looks like a boil, infected sore, deep ulcer or other infected rash (spreading redness, pus)    Negative: Localized rash is very painful (no fever)    Negative: Weakness (i.e., loss of strength) in hand or fingers (Exception: not truly weak; hand feels weak because of pain)    Negative: Numbness (i.e., loss of sensation) in hand or fingers    Negative: Unable to use arm at all and because of shoulder pain or stiffness    MODERATE pain (e.g., interferes with normal activities) and present > 3 days    Shoulder pain is a chronic symptom (recurrent or ongoing AND present > 4 weeks)    Negative: Patient wants to be seen    Protocols used: SHOULDER PAIN-A-OH

## 2020-05-04 NOTE — TELEPHONE ENCOUNTER
Referral for rheumatology given.  Uncertain if PMR since only slightly elevation in inflammatory markers

## 2020-05-04 NOTE — TELEPHONE ENCOUNTER
Patient advised and will call rheumatology to set up appt.  States she still wants to keep virtual visit with primary care provider 5/6/20 as that will be her last day of Prednisone and she will want to know how to proceed until rheumatology appt.  MANNY Bradshaw R.N.

## 2020-05-06 ENCOUNTER — VIRTUAL VISIT (OUTPATIENT)
Dept: INTERNAL MEDICINE | Facility: CLINIC | Age: 85
End: 2020-05-06
Payer: MEDICARE

## 2020-05-06 DIAGNOSIS — M25.50 ARTHRALGIA, UNSPECIFIED JOINT: ICD-10-CM

## 2020-05-06 PROCEDURE — 99442 ZZC PHYSICIAN TELEPHONE EVALUATION 11-20 MIN: CPT | Performed by: INTERNAL MEDICINE

## 2020-05-06 RX ORDER — PREDNISONE 5 MG/1
15 TABLET ORAL DAILY
Qty: 90 TABLET | Refills: 0 | Status: SHIPPED | OUTPATIENT
Start: 2020-05-06 | End: 2020-08-03 | Stop reason: DRUGHIGH

## 2020-05-06 NOTE — PROGRESS NOTES
"Bushra Harmon is a 84 year old female who is being evaluated via a billable telephone visit.      The patient has been notified of following:     \"This telephone visit will be conducted via a call between you and your physician/provider. We have found that certain health care needs can be provided without the need for a physical exam.  This service lets us provide the care you need with a short phone conversation.  If a prescription is necessary we can send it directly to your pharmacy.  If lab work is needed we can place an order for that and you can then stop by our lab to have the test done at a later time.    Telephone visits are billed at different rates depending on your insurance coverage. During this emergency period, for some insurers they may be billed the same as an in-person visit.  Please reach out to your insurance provider with any questions.    If during the course of the call the physician/provider feels a telephone visit is not appropriate, you will not be charged for this service.\"    Patient has given verbal consent for Telephone visit?  Yes    What phone number would you like to be contacted at? [phone]    How would you like to obtain your AVS? Pritesh    Subjective     Bushra Harmon is a 84 year old female who presents to clinic today for the following health issues:    HPI     Stiffness.  The patient reports that her arms and neck are stiff in the morning.  She reports that it is hard to lift her arms.  She also feels like her hips are so stiff.  She reports that later in the day she is feeling stronger.  The patient reports that in the evening she does not feel well.   ESR and CRP were checked and elevated - ESR of 37 and CRP of 15.  Anti- CCP was borderline.  CRISTIANA was borderline.  The patient was started on prednisone for likely PMR.  She reports that the prednisone has significantly improved the symptoms.  She feels 80% improved after taking the prednisone.  She does not want to go to " up to 20mg.   Follow  Up possible PMR. Has rheumatology appointment 2020.    Still no headache or vision changes.    Patient Active Problem List   Diagnosis     Herpes simplex virus (HSV) infection     CKD (chronic kidney disease) stage 3, GFR 30-59 ml/min (H)     Hypertension goal BP (blood pressure) < 140/90     Hyperlipidemia LDL goal <100     Onychomycosis     Benign hypertension     Other specified hypothyroidism     Lichen sclerosus     Past Surgical History:   Procedure Laterality Date     ABDOMEN SURGERY       C DEXA, BONE DENSITY, AXIAL SKEL  2003    Normal BMD     C NONSPECIFIC PROCEDURE      Exploratory Laparotomy constricting band     C NONSPECIFIC PROCEDURE       x3     C REPLANTATION THUMB DISTAL,COMPLETE  2009    left thumb trauma; Dr. Jose surgery     COLONOSCOPY N/A 11/3/2015    Procedure: COLONOSCOPY;  Surgeon: Joanna Acevedo MD;  Location:  GI     [facility] COLONOSCOPY THRU STOMA, DIAGNOSTIC  10/22/2005    Diverticulosis- Dr. Shea     HC FLEX SIGMOIDOSCOPY W/WO SHERIF SPEC BY BRUSH/WASH  1998    normal     HC REMOVE TONSILS/ADENOIDS,<11 Y/O       HC REMV CATARACT EXTRACAP,INSERT LENS, COMPLEX, W/O ECP  2005    Left eye     SURGICAL HISTORY OF -   2004    macular hole repair- Left eye       Social History     Tobacco Use     Smoking status: Former Smoker     Packs/day: 0.50     Years: 45.00     Pack years: 22.50     Last attempt to quit: 2001     Years since quittin.0     Smokeless tobacco: Never Used   Substance Use Topics     Alcohol use: Yes     Comment: 1 drink per day     Family History   Problem Relation Age of Onset     Hypertension Mother           at 100.     Breast Cancer Mother      Thyroid Disease Mother      Diabetes Sister      Hypertension Sister      Neurologic Disorder Daughter         MS     Hypertension Son            Reviewed and updated as needed this visit by Provider         Review of Systems   ROS COMP:  CONSTITUTIONAL: NEGATIVE for fever, chills, change in weight  RESP: NEGATIVE for significant cough or SOB  CV: NEGATIVE for chest pain, palpitations or peripheral edema  MSK: muscle pain improved       Objective   Reported vitals:  LMP 02/26/2003      healthy, alert and no distress  PSYCH: Alert and oriented times 3; coherent speech, normal   rate and volume, able to articulate logical thoughts, able   to abstract reason, no tangential thoughts, no hallucinations   or delusions  Her affect is normal  RESP: No cough, no audible wheezing, able to talk in full sentences  Remainder of exam unable to be completed due to telephone visits    Diagnostic Test Results:  Labs reviewed in Epic        Assessment/Plan:    (M25.50) Arthralgia, unspecified joint  Comment: likely PMR with good response to prednisone  Plan: predniSONE (DELTASONE) 5 MG tablet        -Follow up with rheumatology    Follow up in 1 year    Phone call duration:  17 minutes      Magalis Woodward MD

## 2020-05-07 ENCOUNTER — TELEPHONE (OUTPATIENT)
Dept: INTERNAL MEDICINE | Facility: CLINIC | Age: 85
End: 2020-05-07

## 2020-05-07 NOTE — TELEPHONE ENCOUNTER
See message below.    Medication e-scribed 5-6-20 with directions:  Take 3 tablets (15 mg) by mouth daily Take 5 tab daily for 5 days.    Please clarify directions.    Please advise, thanks.

## 2020-05-07 NOTE — TELEPHONE ENCOUNTER
[facility] pharmacy called for clarification on the directions for prednisone. Call them back at [phone]

## 2020-05-11 ENCOUNTER — TRANSFERRED RECORDS (OUTPATIENT)
Dept: HEALTH INFORMATION MANAGEMENT | Facility: CLINIC | Age: 85
End: 2020-05-11

## 2020-05-11 LAB
ALT SERPL-CCNC: 35 IU/L (ref 5–35)
AST SERPL-CCNC: 29 U/L (ref 5–34)
CREAT SERPL-MCNC: 0.79 MG/DL (ref 0.5–1.3)
GFR SERPL CREATININE-BSD FRML MDRD: 74.1 ML/MIN/1.73M2

## 2020-05-13 ENCOUNTER — TELEPHONE (OUTPATIENT)
Dept: INTERNAL MEDICINE | Facility: CLINIC | Age: 85
End: 2020-05-13

## 2020-05-13 NOTE — TELEPHONE ENCOUNTER
ENDO THYROID LABS-[facility] Latest Ref Rng & Units 4/20/2020   TSH 0.40 - 4.00 mU/L 8.54 (H)   T4 FREE 0.76 - 1.46 ng/dL 1.40     Looks like she missed/cancelled 5/5/20 appointment.  Please help schedule virtual visit to discuss labs, new diagnosis and dose adjustment.

## 2020-05-13 NOTE — TELEPHONE ENCOUNTER
Patient is calling to ask if there needs to be a change made with her levothyroxine since she has been diagnosed with fibromyalgia by her rheumatologist..

## 2020-05-13 NOTE — TELEPHONE ENCOUNTER
Per chart, Dr. Marcos prescribed levothyroxine. Will route to provider for review.    TSH   Date Value Ref Range Status   04/20/2020 8.54 (H) 0.40 - 4.00 mU/L Final

## 2020-05-19 ENCOUNTER — VIRTUAL VISIT (OUTPATIENT)
Dept: ENDOCRINOLOGY | Facility: CLINIC | Age: 85
End: 2020-05-19
Payer: MEDICARE

## 2020-05-19 DIAGNOSIS — E03.8 OTHER SPECIFIED HYPOTHYROIDISM: ICD-10-CM

## 2020-05-19 PROCEDURE — 99442: CPT | Performed by: INTERNAL MEDICINE

## 2020-05-19 RX ORDER — LEVOTHYROXINE SODIUM 100 UG/1
100 TABLET ORAL DAILY
Qty: 90 TABLET | Refills: 0 | Status: SHIPPED | OUTPATIENT
Start: 2020-05-19 | End: 2020-07-09

## 2020-05-19 NOTE — PROGRESS NOTES
"This is a telephone encounter.  Pt declined video visit.    Start time: 10:06    End time: 10:23    More than 10 min spent reviewing chart, labs, results, imaging studies and in patient communication.    In the setting of COVID-19 outbreak patients visits are transitioned to phone visits/ virtual visits as per system and leadership guidelines.  I have personally reviewed data including notes, lab tests. I have reviewed and interpreted images personally.  This encounter is potentially equivalent to established 3 level (78933) clinic visit.    The patient has been notified of following:      \"This telephone visit will be conducted via a call between you and your physician/provider. We have found that certain health care needs can be provided without the need for a physical exam.  This service lets us provide the care you need with a short phone conversation.  If a prescription is necessary we can send it directly to your pharmacy.  If lab work is needed we can place an order for that and you can then stop by our lab to have the test done at a later time.     We will bill your insurance company for this service.  Please check with your medical insurance if this type of visit is covered. You may be responsible for the cost of this type of visit if insurance coverage is denied.  The typical cost is $30 (10min), $59 (11-20min) and $85 (21-30min).  Most often these visits are shorter than 10 minutes. With new updates with corona virus patient might be billed as clinic visit.     If during the course of the call the physician/provider feels a telephone visit is not appropriate, you will not be charged for this service.\"      Past medical history, social history, family history, allergy and medications were reviewed and updated as appropriate.  Reviewed all pertinent labs, notes and imaging studies as appropriate.    Patient verbally consented to phone visit today: yes.    Disclaimer: This note consists of symbols derived " from keyboarding, dictation and/or voice recognition software. As a result, there may be errors in the script that have gone undetected. Please consider this when interpreting information found in this chart.    ROS neg expect noted in notes.  Patient feels well at this time and denies any tachycardia, palpitations, heat intolerance, tremor, insomnia, diarrhea, or unexplained weight loss.  Patient also denies  cold intolerance, constipation, or unexplained weight gain.   ENDOCRINOLOGY CLINIC NOTE:    Name: Bushra Harmon  Seen in consultation with Magalis Woodward for Hypothyroidism.  HPI:  Bushra Harmon is a 83 year old female who presents for the evaluation of :hypothyroidism.   has a past medical history of CKD (chronic kidney disease), Essential hypertension, benign, and Unspecified hypothyroidism.     Was started on prednisone for RA by rheumatology.    #1. Hypothyroidism.  Initially diagnosed at age 42.and since then she is on thyroid hormone replacement.  Currently taking 100 mcg/day (on this dose 4/27/2020). Dose was increased at that time.  Taking generic.  Reports compliance.    Feeling OK. No major s/s.  No longer taking biotin X Aug 2019.    + frontal hair loss.    Palpitations:  No  Changes to hair or skin: + frontal hair loss X 2 years  Diarrhea/Constipation:No  Changes in menses: s/p menopause. Had been on HRT for a while. Stopped at age 67.  Dysphagia or Shortness of breath:No  Tremors:No  Changes in weight: mostly stable per her report.  Wt Readings from Last 2 Encounters:   02/14/20 74.8 kg (165 lb)   02/05/20 74.8 kg (165 lb)     Heat or cold intolerance: no  History of Lithium or Amiodarone use:No  Head or neck surgery/radiation:No  Family History of Thyroid Problems: + hypothyroidism in mother and sister  PMH/PSH:  Past Medical History:   Diagnosis Date     CKD (chronic kidney disease)      Essential hypertension, benign      Unspecified hypothyroidism      Past Surgical History:   Procedure  Laterality Date     ABDOMEN SURGERY       C DEXA, BONE DENSITY, AXIAL SKEL  2003    Normal BMD     C NONSPECIFIC PROCEDURE      Exploratory Laparotomy constricting band     C NONSPECIFIC PROCEDURE       x3     C REPLANTATION THUMB DISTAL,COMPLETE  2009    left thumb trauma; Dr. Jose surgery     COLONOSCOPY N/A 11/3/2015    Procedure: COLONOSCOPY;  Surgeon: Joanna Acevedo MD;  Location:  GI     HC COLONOSCOPY THRU STOMA, DIAGNOSTIC  10/22/2005    Diverticulosis- Dr. Shea     HC FLEX SIGMOIDOSCOPY W/WO SHERIF SPEC BY BRUSH/WASH  1998    normal     HC REMOVE TONSILS/ADENOIDS,<11 Y/O       HC REMV CATARACT EXTRACAP,INSERT LENS, COMPLEX, W/O ECP  2005    Left eye     SURGICAL HISTORY OF -   2004    macular hole repair- Left eye     Family Hx:  Family History   Problem Relation Age of Onset     Hypertension Mother           at 100.     Breast Cancer Mother      Thyroid Disease Mother      Diabetes Sister      Hypertension Sister      Neurologic Disorder Daughter         MS     Hypertension Son        Social Hx:  Social History     Socioeconomic History     Marital status:      Spouse name: Not on file     Number of children: 3     Years of education: Not on file     Highest education level: Not on file   Occupational History     Occupation: office     Employer: RETIRED   Social Needs     Financial resource strain: Not on file     Food insecurity     Worry: Not on file     Inability: Not on file     Transportation needs     Medical: Not on file     Non-medical: Not on file   Tobacco Use     Smoking status: Former Smoker     Packs/day: 0.50     Years: 45.00     Pack years: 22.50     Last attempt to quit: 2001     Years since quittin.0     Smokeless tobacco: Never Used   Substance and Sexual Activity     Alcohol use: Yes     Comment: 1 drink per day     Drug use: No     Sexual activity: Never   Lifestyle     Physical activity     Days per week: Not on file      Minutes per session: Not on file     Stress: Not on file   Relationships     Social connections     Talks on phone: Not on file     Gets together: Not on file     Attends Christianity service: Not on file     Active member of club or organization: Not on file     Attends meetings of clubs or organizations: Not on file     Relationship status: Not on file     Intimate partner violence     Fear of current or ex partner: Not on file     Emotionally abused: Not on file     Physically abused: Not on file     Forced sexual activity: Not on file   Other Topics Concern      Service Not Asked     Blood Transfusions Not Asked     Caffeine Concern Yes     Comment: 1 cup per day     Occupational Exposure Not Asked     Hobby Hazards Not Asked     Sleep Concern Not Asked     Stress Concern Not Asked     Weight Concern Not Asked     Special Diet Not Asked     Back Care Not Asked     Exercise Yes     Comment: Active; exercise 4 times per week     Bike Helmet Not Asked     Seat Belt Yes     Self-Exams No     Parent/sibling w/ CABG, MI or angioplasty before 65F 55M? Not Asked   Social History Narrative     Not on file          MEDICATIONS:  has a current medication list which includes the following prescription(s): calcium citrate-vitamin d, vitamin d3, glucosamine hcl, lactobacillus, levothyroxine, lisinopril, lutein, metoprolol succinate er, multivitamin, prednisone, and simvastatin.    ROS   ROS: 10 point ROS neg other than the symptoms noted above in the HPI.    Physical Exam   VS: LMP 02/26/2003   Breastfeeding No       LABS:  TFTs:  Component      Latest Ref Rng & Units 4/20/2020   Free T4 Eq Dialysis      1.1 - 2.4 ng/dL 2.6 (H)     ENDO THYROID [facility] Latest Ref Rng & Units 4/20/2020 1/24/2020   TSH 0.40 - 4.00 mU/L 8.54 (H) 2.04   T4 FREE 0.76 - 1.46 ng/dL 1.40 1.56 (H)     [facility]ID [facility] Latest Ref Rng & Units 10/31/2019   TSH 0.40 - 4.00 mU/L 1.56   T4 FREE 0.76 - 1.46 ng/dL 1.57 (H)     Component       Latest Ref Rng & Units 9/23/2019 10/31/2019   Free T4 Eq Dialysis      1.1 - 2.4 ng/dL 2.8 (H)    TSH      0.40 - 4.00 mU/L  1.56   T4 Free      0.76 - 1.46 ng/dL  1.57 (H)     ENDO THYROID LABS[facility] Latest Ref Rng & Units 9/23/2019   TSH 0.40 - 4.00 mU/L 7.36 (H)   T4 FREE 0.76 - 1.46 ng/dL 1.27   THYR PEROXIDASE RENETTA <35 IU/mL 63 (H)     ENDO THYROID LABS-[facility] Latest Ref Rng & Units 6/5/2019 6/4/2018   TSH 0.40 - 4.00 mU/L 4.10 (H) 4.23 (H)   T4 FREE 0.76 - 1.46 ng/dL 1.68 (H) 1.47 (H)     ENDO THYROID LABS-[facility] Latest Ref Rng & Units 5/31/2017 5/27/2016   TSH 0.40 - 4.00 mU/L 4.16 (H) 2.87   T4 FREE 0.76 - 1.46 ng/dL 1.46        All pertinent notes, labs, and images personally reviewed by me.     A/P  Ms.Nancy YUNG Harmon is a 83 year old here for the evaluation of hypothyroidism:    #1 Hypothyroidism(+TPO):   Currently taking 100 mcg/day (since 4/27/20).  Taking generic.  Clinically looks euthyorid.  Plan:  Discussed diagnosis, pathophysiology, management and treatment options of condition with patient.  Due for labs in few weeks ( has upcoming lab and OV).  Follow up as scheduled.  Continue to hold off biotin.    Discussed s/s of hypothyroidism and hyperthyroidism to watch for.  The patient indicates understanding of these issues and agrees with the plan.    Standard treatment for hypothyroidism involves daily use of the synthetic thyroid hormone levothyroxine (Levothroid, Synthroid, others).  The dosage of thyroxine should normally be that required to bring the serum TSH level to the low normal range, such as 0.3 - 1 uU/ml. This is typically achieved with 1 ug L-T4/lb body weight/day, ranges from 75 - 125 ug/day in women, and 125 - 200 ug/day in men. Once thyroxine treatment is initiated, it is required indefinitely in most patients.     Symptoms should improve one to two weeks after starting treatment. Treatment with levothyroxine is usually lifelong.  Doseage may need to be adjusted based on body weight,  medications, or pregnancy.  To determine the right dosage of levothyroxine initially we will repeat TSH and free T4 after two months.     Excessive amounts of the hormone can cause side effects, such as: Increased appetite, insomnia, heart palpitations, and shakiness.  Patients with CAD will be started on a lower dose.  Levothyroxine causes virtually no side effects when used in the appropriate dose.    In patients with childbearing age precaution should be taking regarding hypothyroidism. Hypothyroid woman are more likely to experience infertility, and they have an increase. Balance and portion, anemia, and gestational hypertension, placental abruption and postpartum hemorrhage.    Certain medications, supplements and foods can affect the body s ability to absorb levothyroxine. Medications include: Iron supplements, Cholestyramine and Calcium supplements.       Follow-up:  2 months    Belinda Marcos MD  Endocrinology  [facility]  CC: Magalis Woodward    All questions were answered.  The patient indicates understanding of the above issues and agrees with the plan set forth.     Addendum to above note and clinic visit:    Labs reviewed.    See result note/telephone encounter.

## 2020-06-01 ENCOUNTER — TRANSFERRED RECORDS (OUTPATIENT)
Dept: HEALTH INFORMATION MANAGEMENT | Facility: CLINIC | Age: 85
End: 2020-06-01

## 2020-06-24 ENCOUNTER — MYC MEDICAL ADVICE (OUTPATIENT)
Dept: ENDOCRINOLOGY | Facility: CLINIC | Age: 85
End: 2020-06-24

## 2020-06-25 ENCOUNTER — VIRTUAL VISIT (OUTPATIENT)
Dept: INTERNAL MEDICINE | Facility: CLINIC | Age: 85
End: 2020-06-25
Payer: MEDICARE

## 2020-06-25 DIAGNOSIS — K82.8 SLUDGE IN GALLBLADDER: ICD-10-CM

## 2020-06-25 DIAGNOSIS — R10.11 RUQ ABDOMINAL PAIN: Primary | ICD-10-CM

## 2020-06-25 PROCEDURE — 99442 ZZC PHYSICIAN TELEPHONE EVALUATION 11-20 MIN: CPT | Performed by: INTERNAL MEDICINE

## 2020-06-25 NOTE — TELEPHONE ENCOUNTER
appt changed.    Message sent via [employer] [place].      [facility]  [facility]

## 2020-06-25 NOTE — PROGRESS NOTES
"Bushra Harmon is a 84 year old female who is being evaluated via a billable telephone visit.      The patient has been notified of following:     \"This telephone visit will be conducted via a call between you and your physician/provider. We have found that certain health care needs can be provided without the need for a physical exam.  This service lets us provide the care you need with a short phone conversation.  If a prescription is necessary we can send it directly to your pharmacy.  If lab work is needed we can place an order for that and you can then stop by our lab to have the test done at a later time.    Telephone visits are billed at different rates depending on your insurance coverage. During this emergency period, for some insurers they may be billed the same as an in-person visit.  Please reach out to your insurance provider with any questions.    If during the course of the call the physician/provider feels a telephone visit is not appropriate, you will not be charged for this service.\"    Patient has given verbal consent for Telephone visit?  Yes Corin Ulloa CMA      What phone number would you like to be contacted at? [phone] (M)    How would you like to obtain your AVS? MyChart    2;10 pm - 2:22 pm   Subjective     Bushra Harmon is a 84 year old female who presents via phone visit today for the following health issues:    HPI     Pt is a 84 year old female for phone visit to day with the complaints of having right upper abdominal discomfort since 1-2 months.  increases after eating any kind of food , no nausea or vomiting, no diarrhea.  No fever no chills.  Has history of follow-up ultrasound of gallbladder in the past which showed sludge.  No diarrhea or constipation.        Patient Active Problem List   Diagnosis     Herpes simplex virus (HSV) infection     CKD (chronic kidney disease) stage 3, GFR 30-59 ml/min (H)     Hypertension goal BP (blood pressure) < 140/90     Hyperlipidemia " LDL goal <100     Onychomycosis     Benign hypertension     Other specified hypothyroidism     Lichen sclerosus     Past Surgical History:   Procedure Laterality Date     ABDOMEN SURGERY       C DEXA, BONE DENSITY, AXIAL SKEL  2003    Normal BMD     C NONSPECIFIC PROCEDURE      Exploratory Laparotomy constricting band     C NONSPECIFIC PROCEDURE       x3     C REPLANTATION THUMB DISTAL,COMPLETE  2009    left thumb trauma; Dr. Jose surgery     COLONOSCOPY N/A 11/3/2015    Procedure: COLONOSCOPY;  Surgeon: Joanna Acevedo MD;  Location:  GI     HC COLONOSCOPY THRU STOMA, DIAGNOSTIC  10/22/2005    Diverticulosis- Dr. Shea     HC FLEX SIGMOIDOSCOPY W/WO SHERIF SPEC BY BRUSH/WASH  1998    normal     HC REMOVE TONSILS/ADENOIDS,<13 Y/O       HC REMV CATARACT EXTRACAP,INSERT LENS, COMPLEX, W/O ECP  2005    Left eye     SURGICAL HISTORY OF -   2004    macular hole repair- Left eye       Social History     Tobacco Use     Smoking status: Former Smoker     Packs/day: 0.50     Years: 45.00     Pack years: 22.50     Last attempt to quit: 2001     Years since quittin.1     Smokeless tobacco: Never Used   Substance Use Topics     Alcohol use: Yes     Comment: 1 drink per day     Family History   Problem Relation Age of Onset     Hypertension Mother           at 100.     Breast Cancer Mother      Thyroid Disease Mother      Diabetes Sister      Hypertension Sister      Neurologic Disorder Daughter         MS     Hypertension Son          Current Outpatient Medications   Medication Sig Dispense Refill     CALCIUM CITRATE-VITAMIN D 315-200 MG-UNIT PO TABS 2 TABLETS DAILY 90 Tab 3     Cholecalciferol (VITAMIN D3) 25 MCG (1000 UT) CAPS Take 1 capsule by mouth daily       GLUCOSAMINE HCL 1000 MG PO TABS 2 TABLETS DAILY 90 Tab 3     LACTOBACILLUS PO Take 120 mg by mouth daily       levothyroxine (SYNTHROID/LEVOTHROID) 100 MCG tablet Take 1 tablet (100 mcg) by mouth daily 90  tablet 0     lisinopril (PRINIVIL/ZESTRIL) 20 MG tablet Take 1 tablet (20 mg) by mouth daily 90 tablet 4     Lutein 6 MG TABS Take 20 mg by mouth daily        metoprolol succinate ER (TOPROL-XL) 25 MG 24 hr tablet Take 1 tablet (25 mg) by mouth daily 90 tablet 4     MULTIVITAMIN TABS   OR 1 daily       simvastatin (ZOCOR) 10 MG tablet Take 1 tablet (10 mg) by mouth At Bedtime 90 tablet 4     predniSONE (DELTASONE) 5 MG tablet Take 3 tablets (15 mg) by mouth daily Take 5 tab daily for 5 days 90 tablet 0       Reviewed and updated as needed this visit by Provider         Review of Systems   CONSTITUTIONAL: NEGATIVE for fever, chills, change in weight  ENT/MOUTH: NEGATIVE for ear, mouth and throat problems  RESP: NEGATIVE for significant cough or SOB  CV: NEGATIVE for chest pain, palpitations or peripheral edema  GI: Rt upper abd pain   NEURO: NEGATIVE for weakness, dizziness or paresthesias  ENDOCRINE: NEGATIVE for temperature intolerance, skin/hair changes       Objective   Reported vitals:  LMP 02/26/2003    healthy, alert and no distress  PSYCH: Alert and oriented times 3; coherent speech, normal   rate and volume, able to articulate logical thoughts, able   to abstract reason, no tangential thoughts, no hallucinations   or delusions  Her affect is normal  RESP: No cough, no audible wheezing, able to talk in full sentences  Remainder of exam unable to be completed due to telephone visits           Assessment/Plan:    1. RUQ abdominal jeanette  -Possibly related to gallstones, will check US Abdomen Complete; Future.pt was told I will contact her after results and proceed accordingly.     Phone call duration:  12 minutes    Bakari Lockhart MD

## 2020-07-02 ENCOUNTER — HOSPITAL ENCOUNTER (OUTPATIENT)
Dept: ULTRASOUND IMAGING | Facility: CLINIC | Age: 85
Discharge: HOME | End: 2020-07-02
Attending: INTERNAL MEDICINE | Admitting: INTERNAL MEDICINE
Payer: MEDICARE

## 2020-07-02 DIAGNOSIS — E03.8 OTHER SPECIFIED HYPOTHYROIDISM: ICD-10-CM

## 2020-07-02 DIAGNOSIS — R10.11 RUQ ABDOMINAL PAIN: ICD-10-CM

## 2020-07-02 PROCEDURE — 84439 ASSAY OF FREE THYROXINE: CPT | Mod: 90 | Performed by: INTERNAL MEDICINE

## 2020-07-02 PROCEDURE — 99000 SPECIMEN HANDLING OFFICE-LAB: CPT | Performed by: INTERNAL MEDICINE

## 2020-07-02 PROCEDURE — 76700 US EXAM ABDOM COMPLETE: CPT

## 2020-07-02 PROCEDURE — 84439 ASSAY OF FREE THYROXINE: CPT | Performed by: INTERNAL MEDICINE

## 2020-07-02 PROCEDURE — 36415 COLL VENOUS BLD VENIPUNCTURE: CPT | Performed by: INTERNAL MEDICINE

## 2020-07-02 PROCEDURE — 84443 ASSAY THYROID STIM HORMONE: CPT | Performed by: INTERNAL MEDICINE

## 2020-07-03 LAB
T4 FREE SERPL-MCNC: 1.56 NG/DL (ref 0.76–1.46)
TSH SERPL DL<=0.005 MIU/L-ACNC: 0.65 MU/L (ref 0.4–4)

## 2020-07-07 LAB — T4 FREE SERPL DIALY-MCNC: 3.3 NG/DL (ref 1.1–2.4)

## 2020-07-09 ENCOUNTER — VIRTUAL VISIT (OUTPATIENT)
Dept: ENDOCRINOLOGY | Facility: CLINIC | Age: 85
End: 2020-07-09
Payer: MEDICARE

## 2020-07-09 DIAGNOSIS — E03.8 OTHER SPECIFIED HYPOTHYROIDISM: Primary | ICD-10-CM

## 2020-07-09 PROCEDURE — 99442 ZZC PHYSICIAN TELEPHONE EVALUATION 11-20 MIN: CPT | Performed by: INTERNAL MEDICINE

## 2020-07-09 RX ORDER — LEVOTHYROXINE SODIUM 88 UG/1
88 TABLET ORAL DAILY
Qty: 90 TABLET | Refills: 0 | Status: SHIPPED | OUTPATIENT
Start: 2020-07-09 | End: 2020-09-09

## 2020-07-09 NOTE — PATIENT INSTRUCTIONS
[facility]   Dr Marcos, Endocrinology Department      [facility]   [address] [address]  Appointment [phone]  Fax: [phone]   Monday and Tuesday         [facility]   [address] [place] [address]  Appointment [phone]  Fax: [phone]  Wednesday and Thursday           Decrease dose of levothyroxine to 88 mcg/day.  Labs in 2 months.  Please make a lab appointment for blood work and follow up clinic appointment in 1 week after that to discuss results.    Take Levothyroxine on an empty stomach. Take it with a full glass of water at least 30 minutes to 1 hour before eating breakfast.   This medicine should be taken at least 4 hours before or 4 hours after these medicines: antacids (Maalox , Mylanta , Tums ), calcium supplements, cholestyramine (Prevalite , Questran ), colestipol (Colestid ), iron supplements, orlistat (Heraclio , Xenical ), simethicone (Gas-X , Mylicon ), and sucralfate (Carafate ).   Swallow the capsule whole. Do not cut or crush it.

## 2020-07-09 NOTE — LETTER
"    7/9/2020         RE: Bushra Harmon  [address] [place] [address]        Dear Colleague,    Thank you for referring your patient, Bushra Harmon, to the [facility]. Please see a copy of my visit note below.    This is a telephone encounter.  Pt declined video visit..  7/9/2020    Start time: 11:14    End time: 11:28    More than 10 min spent reviewing chart, labs, results, imaging studies and in patient communication.    In the setting of COVID-19 outbreak patients visits are transitioned to phone visits/ virtual visits as per system and leadership guidelines.  I have personally reviewed data including notes, lab tests. I have reviewed and interpreted images personally.  This encounter is potentially equivalent to established 4 level (06871) clinic visit.    The patient has been notified of following:      \"This telephone visit will be conducted via a call between you and your physician/provider. We have found that certain health care needs can be provided without the need for a physical exam.  This service lets us provide the care you need with a short phone conversation.  If a prescription is necessary we can send it directly to your pharmacy.  If lab work is needed we can place an order for that and you can then stop by our lab to have the test done at a later time.     We will bill your insurance company for this service.  Please check with your medical insurance if this type of visit is covered. You may be responsible for the cost of this type of visit if insurance coverage is denied.  The typical cost is $30 (10min), $59 (11-20min) and $85 (21-30min).  Most often these visits are shorter than 10 minutes. With new updates with corona virus patient might be billed as clinic visit.     If during the course of the call the physician/provider feels a telephone visit is not appropriate, you will not be charged for this service.\"      Past medical history, social history, family history, " allergy and medications were reviewed and updated as appropriate.  Reviewed all pertinent labs, notes and imaging studies as appropriate.    Patient verbally consented to phone visit today: yes.    Disclaimer: This note consists of symbols derived from keyboarding, dictation and/or voice recognition software. As a result, there may be errors in the script that have gone undetected. Please consider this when interpreting information found in this chart.    ROS neg expect noted in notes.  Patient feels well at this time and denies any tachycardia, palpitations, heat intolerance, tremor, insomnia, diarrhea, or unexplained weight loss.  Patient also denies  cold intolerance, constipation, or unexplained weight gain.   ENDOCRINOLOGY CLINIC NOTE:    Name: Bushra Harmon  Seen in consultation with Magalis Woodward for Hypothyroidism.  HPI:  Bushra Harmon is a 84 year old female who presents for the evaluation of :hypothyroidism.   has a past medical history of CKD (chronic kidney disease), Essential hypertension, benign, and Unspecified hypothyroidism.     Was started on prednisone for RA by rheumatology.  Is having joint pains.  Gained some wt since starting prednisone.  Not sleeping well.    #1. Hypothyroidism.  Initially diagnosed at age 42.and since then she is on thyroid hormone replacement.  Currently taking 100 mcg/day (on this dose 4/27/2020). Dose was increased at that time.  Taking generic.  Reports compliance.    Feeling OK. No major s/s.  No longer taking biotin X Aug 2019.    + frontal hair loss.    Palpitations:  No  Changes to hair or skin: + frontal hair loss X 2 years  Diarrhea/Constipation:No  Changes in menses: s/p menopause. Had been on HRT for a while. Stopped at age 67.  Dysphagia or Shortness of breath:No  Tremors:No  Changes in weight: mostly stable per her report.  Wt Readings from Last 2 Encounters:   02/14/20 74.8 kg (165 lb)   02/05/20 74.8 kg (165 lb)     Heat or cold intolerance:  no  History of Lithium or Amiodarone use:No  Head or neck surgery/radiation:No  Family History of Thyroid Problems: + hypothyroidism in mother and sister  PMH/PSH:  Past Medical History:   Diagnosis Date     CKD (chronic kidney disease)      Essential hypertension, benign      Unspecified hypothyroidism      Past Surgical History:   Procedure Laterality Date     ABDOMEN SURGERY       C DEXA, BONE DENSITY, AXIAL SKEL  2003    Normal BMD     C NONSPECIFIC PROCEDURE      Exploratory Laparotomy constricting band     C NONSPECIFIC PROCEDURE       x3     C REPLANTATION THUMB DISTAL,COMPLETE  2009    left thumb trauma; Dr. Jose surgery     COLONOSCOPY N/A 11/3/2015    Procedure: COLONOSCOPY;  Surgeon: Joanna Acevedo MD;  Location:  GI     HC COLONOSCOPY THRU STOMA, DIAGNOSTIC  10/22/2005    Diverticulosis- Dr. Shea     HC FLEX SIGMOIDOSCOPY W/WO SHERIF SPEC BY BRUSH/WASH  1998    normal     HC REMOVE TONSILS/ADENOIDS,<13 Y/O       HC REMV CATARACT EXTRACAP,INSERT LENS, COMPLEX, W/O ECP  2005    Left eye     SURGICAL HISTORY OF -   2004    macular hole repair- Left eye     Family Hx:  Family History   Problem Relation Age of Onset     Hypertension Mother           at 100.     Breast Cancer Mother      Thyroid Disease Mother      Diabetes Sister      Hypertension Sister      Neurologic Disorder Daughter         MS     Hypertension Son        Social Hx:  Social History     Socioeconomic History     Marital status:      Spouse name: Not on file     Number of children: 3     Years of education: Not on file     Highest education level: Not on file   Occupational History     Occupation: office     Employer: RETIRED   Social Needs     Financial resource strain: Not on file     Food insecurity     Worry: Not on file     Inability: Not on file     Transportation needs     Medical: Not on file     Non-medical: Not on file   Tobacco Use     Smoking status: Former Smoker      Packs/day: 0.50     Years: 45.00     Pack years: 22.50     Last attempt to quit: 2001     Years since quittin.2     Smokeless tobacco: Never Used   Substance and Sexual Activity     Alcohol use: Yes     Comment: 1 drink per day     Drug use: No     Sexual activity: Never   Lifestyle     Physical activity     Days per week: Not on file     Minutes per session: Not on file     Stress: Not on file   Relationships     Social connections     Talks on phone: Not on file     Gets together: Not on file     Attends Church service: Not on file     Active member of club or organization: Not on file     Attends meetings of clubs or organizations: Not on file     Relationship status: Not on file     Intimate partner violence     Fear of current or ex partner: Not on file     Emotionally abused: Not on file     Physically abused: Not on file     Forced sexual activity: Not on file   Other Topics Concern      Service Not Asked     Blood Transfusions Not Asked     Caffeine Concern Yes     Comment: 1 cup per day     Occupational Exposure Not Asked     Hobby Hazards Not Asked     Sleep Concern Not Asked     Stress Concern Not Asked     Weight Concern Not Asked     Special Diet Not Asked     Back Care Not Asked     Exercise Yes     Comment: Active; exercise 4 times per week     Bike Helmet Not Asked     Seat Belt Yes     Self-Exams No     Parent/sibling w/ CABG, MI or angioplasty before 65F 55M? Not Asked   Social History Narrative     Not on file          MEDICATIONS:  has a current medication list which includes the following prescription(s): calcium citrate-vitamin d, vitamin d3, glucosamine hcl, lactobacillus, levothyroxine, lisinopril, lutein, metoprolol succinate er, multivitamin, prednisone, and simvastatin.    ROS   ROS: 10 point ROS neg other than the symptoms noted above in the HPI.    Physical Exam   VS: LMP 2003       LABS:  TFTs:  Component      Latest Ref Rng & Units 2020   Free T4 Eq  Dialysis      1.1 - 2.4 ng/dL 3.3 (H)   TSH      0.40 - 4.00 mU/L 0.65   T4 Free      0.76 - 1.46 ng/dL 1.56 (H)     Component      Latest Ref Rng & Units 4/20/2020   Free T4 Eq Dialysis      1.1 - 2.4 ng/dL 2.6 (H)     ENDO THYROID LABS-[facility] Latest Ref Rng & Units 4/20/2020 1/24/2020   TSH 0.40 - 4.00 mU/L 8.54 (H) 2.04   T4 FREE 0.76 - 1.46 ng/dL 1.40 1.56 (H)     ENDO THYROID LABS-[facility] Latest Ref Rng & Units 10/31/2019   TSH 0.40 - 4.00 mU/L 1.56   T4 FREE 0.76 - 1.46 ng/dL 1.57 (H)     Component      Latest Ref Rng & Units 9/23/2019 10/31/2019   Free T4 Eq Dialysis      1.1 - 2.4 ng/dL 2.8 (H)    TSH      0.40 - 4.00 mU/L  1.56   T4 Free      0.76 - 1.46 ng/dL  1.57 (H)     ENDO THYROID LABS-[facility] Latest Ref Rng & Units 9/23/2019   TSH 0.40 - 4.00 mU/L 7.36 (H)   T4 FREE 0.76 - 1.46 ng/dL 1.27   THYR PEROXIDASE RENETTA <35 IU/mL 63 (H)     ENDO THYROID LABS-[facility] Latest Ref Rng & Units 6/5/2019 6/4/2018   TSH 0.40 - 4.00 mU/L 4.10 (H) 4.23 (H)   T4 FREE 0.76 - 1.46 ng/dL 1.68 (H) 1.47 (H)     ENDO THYROID LABS-[facility] Latest Ref Rng & Units 5/31/2017 5/27/2016   TSH 0.40 - 4.00 mU/L 4.16 (H) 2.87   T4 FREE 0.76 - 1.46 ng/dL 1.46        All pertinent notes, labs, and images personally reviewed by me.     A/P  Ms.Nancy YUNG Harmon is a 83 year old here for the evaluation of hypothyroidism:    #1 Hypothyroidism(+TPO):   Currently taking 100 mcg/day (since 4/27/20).  Taking generic.  Clinically looks euthyorid.  Has gained some wt on prednisone which she takes for RA.  Plan:  Discussed diagnosis, pathophysiology, management and treatment options of condition with patient.  Based on 7/2020 labs recommend to decrease dose of levothyroxine to 88 mcg/day (7/9/2020)  Due for labs in 2 months.  Please make a lab appointment for blood work and follow up clinic appointment in 1 week after that to discuss results.  Continue to hold off biotin.    Discussed s/s of hypothyroidism and hyperthyroidism to watch for.  The patient indicates  understanding of these issues and agrees with the plan.    Standard treatment for hypothyroidism involves daily use of the synthetic thyroid hormone levothyroxine (Levothroid, Synthroid, others).  The dosage of thyroxine should normally be that required to bring the serum TSH level to the low normal range, such as 0.3 - 1 uU/ml. This is typically achieved with 1 ug L-T4/lb body weight/day, ranges from 75 - 125 ug/day in women, and 125 - 200 ug/day in men. Once thyroxine treatment is initiated, it is required indefinitely in most patients.     Symptoms should improve one to two weeks after starting treatment. Treatment with levothyroxine is usually lifelong.  Doseage may need to be adjusted based on body weight, medications, or pregnancy.  To determine the right dosage of levothyroxine initially we will repeat TSH and free T4 after two months.     Excessive amounts of the hormone can cause side effects, such as: Increased appetite, insomnia, heart palpitations, and shakiness.  Patients with CAD will be started on a lower dose.  Levothyroxine causes virtually no side effects when used in the appropriate dose.    In patients with childbearing age precaution should be taking regarding hypothyroidism. Hypothyroid woman are more likely to experience infertility, and they have an increase. Balance and portion, anemia, and gestational hypertension, placental abruption and postpartum hemorrhage.    Certain medications, supplements and foods can affect the body s ability to absorb levothyroxine. Medications include: Iron supplements, Cholestyramine and Calcium supplements.       Follow-up:  2 months    Belinda Marcos MD  Endocrinology  [facility]  CC: Magails Woodward    All questions were answered.  The patient indicates understanding of the above issues and agrees with the plan set forth.     Addendum to above note and clinic visit:    Labs reviewed.    See result note/telephone  encounter.            Again, thank you for allowing me to participate in the care of your patient.        Sincerely,        Belinda Marcos MD

## 2020-07-09 NOTE — PROGRESS NOTES
"This is a telephone encounter.  Pt declined video visit..  7/9/2020    Start time: 11:14    End time: 11:28    More than 10 min spent reviewing chart, labs, results, imaging studies and in patient communication.    In the setting of COVID-19 outbreak patients visits are transitioned to phone visits/ virtual visits as per system and leadership guidelines.  I have personally reviewed data including notes, lab tests. I have reviewed and interpreted images personally.  This encounter is potentially equivalent to established 4 level (39423) clinic visit.    The patient has been notified of following:      \"This telephone visit will be conducted via a call between you and your physician/provider. We have found that certain health care needs can be provided without the need for a physical exam.  This service lets us provide the care you need with a short phone conversation.  If a prescription is necessary we can send it directly to your pharmacy.  If lab work is needed we can place an order for that and you can then stop by our lab to have the test done at a later time.     We will bill your insurance company for this service.  Please check with your medical insurance if this type of visit is covered. You may be responsible for the cost of this type of visit if insurance coverage is denied.  The typical cost is $30 (10min), $59 (11-20min) and $85 (21-30min).  Most often these visits are shorter than 10 minutes. With new updates with corona virus patient might be billed as clinic visit.     If during the course of the call the physician/provider feels a telephone visit is not appropriate, you will not be charged for this service.\"      Past medical history, social history, family history, allergy and medications were reviewed and updated as appropriate.  Reviewed all pertinent labs, notes and imaging studies as appropriate.    Patient verbally consented to phone visit today: yes.    Disclaimer: This note consists of symbols " derived from keyboarding, dictation and/or voice recognition software. As a result, there may be errors in the script that have gone undetected. Please consider this when interpreting information found in this chart.    ROS neg expect noted in notes.  Patient feels well at this time and denies any tachycardia, palpitations, heat intolerance, tremor, insomnia, diarrhea, or unexplained weight loss.  Patient also denies  cold intolerance, constipation, or unexplained weight gain.   ENDOCRINOLOGY CLINIC NOTE:    Name: Bushra Harmon  Seen in consultation with Magalis Woodward for Hypothyroidism.  HPI:  Bushra Harmon is a 84 year old female who presents for the evaluation of :hypothyroidism.   has a past medical history of CKD (chronic kidney disease), Essential hypertension, benign, and Unspecified hypothyroidism.     Was started on prednisone for RA by rheumatology.  Is having joint pains.  Gained some wt since starting prednisone.  Not sleeping well.    #1. Hypothyroidism.  Initially diagnosed at age 42.and since then she is on thyroid hormone replacement.  Currently taking 100 mcg/day (on this dose 4/27/2020). Dose was increased at that time.  Taking generic.  Reports compliance.    Feeling OK. No major s/s.  No longer taking biotin X Aug 2019.    + frontal hair loss.    Palpitations:  No  Changes to hair or skin: + frontal hair loss X 2 years  Diarrhea/Constipation:No  Changes in menses: s/p menopause. Had been on HRT for a while. Stopped at age 67.  Dysphagia or Shortness of breath:No  Tremors:No  Changes in weight: mostly stable per her report.  Wt Readings from Last 2 Encounters:   02/14/20 74.8 kg (165 lb)   02/05/20 74.8 kg (165 lb)     Heat or cold intolerance: no  History of Lithium or Amiodarone use:No  Head or neck surgery/radiation:No  Family History of Thyroid Problems: + hypothyroidism in mother and sister  PMH/PSH:  Past Medical History:   Diagnosis Date     CKD (chronic kidney disease)       Essential hypertension, benign      Unspecified hypothyroidism      Past Surgical History:   Procedure Laterality Date     ABDOMEN SURGERY       C DEXA, BONE DENSITY, AXIAL SKEL  2003    Normal BMD     C NONSPECIFIC PROCEDURE      Exploratory Laparotomy constricting band     C NONSPECIFIC PROCEDURE       x3     C REPLANTATION THUMB DISTAL,COMPLETE  2009    left thumb trauma; Dr. Jose surgery     COLONOSCOPY N/A 11/3/2015    Procedure: COLONOSCOPY;  Surgeon: Joanna Acevedo MD;  Location:  GI     HC COLONOSCOPY THRU STOMA, DIAGNOSTIC  10/22/2005    Diverticulosis- Dr. Shea     HC FLEX SIGMOIDOSCOPY W/WO SHERIF SPEC BY BRUSH/WASH  1998    normal     HC REMOVE TONSILS/ADENOIDS,<11 Y/O       HC REMV CATARACT EXTRACAP,INSERT LENS, COMPLEX, W/O ECP  2005    Left eye     SURGICAL HISTORY OF -   2004    macular hole repair- Left eye     Family Hx:  Family History   Problem Relation Age of Onset     Hypertension Mother           at 100.     Breast Cancer Mother      Thyroid Disease Mother      Diabetes Sister      Hypertension Sister      Neurologic Disorder Daughter         MS     Hypertension Son        Social Hx:  Social History     Socioeconomic History     Marital status:      Spouse name: Not on file     Number of children: 3     Years of education: Not on file     Highest education level: Not on file   Occupational History     Occupation: office     Employer: RETIRED   Social Needs     Financial resource strain: Not on file     Food insecurity     Worry: Not on file     Inability: Not on file     Transportation needs     Medical: Not on file     Non-medical: Not on file   Tobacco Use     Smoking status: Former Smoker     Packs/day: 0.50     Years: 45.00     Pack years: 22.50     Last attempt to quit: 2001     Years since quittin.2     Smokeless tobacco: Never Used   Substance and Sexual Activity     Alcohol use: Yes     Comment: 1 drink per day      Drug use: No     Sexual activity: Never   Lifestyle     Physical activity     Days per week: Not on file     Minutes per session: Not on file     Stress: Not on file   Relationships     Social connections     Talks on phone: Not on file     Gets together: Not on file     Attends Zoroastrianism service: Not on file     Active member of club or organization: Not on file     Attends meetings of clubs or organizations: Not on file     Relationship status: Not on file     Intimate partner violence     Fear of current or ex partner: Not on file     Emotionally abused: Not on file     Physically abused: Not on file     Forced sexual activity: Not on file   Other Topics Concern      Service Not Asked     Blood Transfusions Not Asked     Caffeine Concern Yes     Comment: 1 cup per day     Occupational Exposure Not Asked     Hobby Hazards Not Asked     Sleep Concern Not Asked     Stress Concern Not Asked     Weight Concern Not Asked     Special Diet Not Asked     Back Care Not Asked     Exercise Yes     Comment: Active; exercise 4 times per week     Bike Helmet Not Asked     Seat Belt Yes     Self-Exams No     Parent/sibling w/ CABG, MI or angioplasty before 65F 55M? Not Asked   Social History Narrative     Not on file          MEDICATIONS:  has a current medication list which includes the following prescription(s): calcium citrate-vitamin d, vitamin d3, glucosamine hcl, lactobacillus, levothyroxine, lisinopril, lutein, metoprolol succinate er, multivitamin, prednisone, and simvastatin.    ROS   ROS: 10 point ROS neg other than the symptoms noted above in the HPI.    Physical Exam   VS: LMP 02/26/2003       LABS:  TFTs:  Component      Latest Ref Rng & Units 7/2/2020   Free T4 Eq Dialysis      1.1 - 2.4 ng/dL 3.3 (H)   TSH      0.40 - 4.00 mU/L 0.65   T4 Free      0.76 - 1.46 ng/dL 1.56 (H)     Component      Latest Ref Rng & Units 4/20/2020   Free T4 Eq Dialysis      1.1 - 2.4 ng/dL 2.6 (H)     ENDO THYROID LABS-[facility]  Latest Ref Rng & Units 4/20/2020 1/24/2020   TSH 0.40 - 4.00 mU/L 8.54 (H) 2.04   T4 FREE 0.76 - 1.46 ng/dL 1.40 1.56 (H)     ENDO THYROID LABS-[facility] Latest Ref Rng & Units 10/31/2019   TSH 0.40 - 4.00 mU/L 1.56   T4 FREE 0.76 - 1.46 ng/dL 1.57 (H)     Component      Latest Ref Rng & Units 9/23/2019 10/31/2019   Free T4 Eq Dialysis      1.1 - 2.4 ng/dL 2.8 (H)    TSH      0.40 - 4.00 mU/L  1.56   T4 Free      0.76 - 1.46 ng/dL  1.57 (H)     ENDO THYROID LABS-[facility] Latest Ref Rng & Units 9/23/2019   TSH 0.40 - 4.00 mU/L 7.36 (H)   T4 FREE 0.76 - 1.46 ng/dL 1.27   THYR PEROXIDASE RENETTA <35 IU/mL 63 (H)     ENDO THYROID LABS-[facility] Latest Ref Rng & Units 6/5/2019 6/4/2018   TSH 0.40 - 4.00 mU/L 4.10 (H) 4.23 (H)   T4 FREE 0.76 - 1.46 ng/dL 1.68 (H) 1.47 (H)     ENDO THYROID LABS-[facility] Latest Ref Rng & Units 5/31/2017 5/27/2016   TSH 0.40 - 4.00 mU/L 4.16 (H) 2.87   T4 FREE 0.76 - 1.46 ng/dL 1.46        All pertinent notes, labs, and images personally reviewed by me.     A/P  Ms.Nancy YUNG Harmon is a 83 year old here for the evaluation of hypothyroidism:    #1 Hypothyroidism(+TPO):   Currently taking 100 mcg/day (since 4/27/20).  Taking generic.  Clinically looks euthyorid.  Has gained some wt on prednisone which she takes for RA.  Plan:  Discussed diagnosis, pathophysiology, management and treatment options of condition with patient.  Based on 7/2020 labs recommend to decrease dose of levothyroxine to 88 mcg/day (7/9/2020)  Due for labs in 2 months.  Please make a lab appointment for blood work and follow up clinic appointment in 1 week after that to discuss results.  Continue to hold off biotin.    Discussed s/s of hypothyroidism and hyperthyroidism to watch for.  The patient indicates understanding of these issues and agrees with the plan.    Standard treatment for hypothyroidism involves daily use of the synthetic thyroid hormone levothyroxine (Levothroid, Synthroid, others).  The dosage of thyroxine should normally be that  required to bring the serum TSH level to the low normal range, such as 0.3 - 1 uU/ml. This is typically achieved with 1 ug L-T4/lb body weight/day, ranges from 75 - 125 ug/day in women, and 125 - 200 ug/day in men. Once thyroxine treatment is initiated, it is required indefinitely in most patients.     Symptoms should improve one to two weeks after starting treatment. Treatment with levothyroxine is usually lifelong.  Doseage may need to be adjusted based on body weight, medications, or pregnancy.  To determine the right dosage of levothyroxine initially we will repeat TSH and free T4 after two months.     Excessive amounts of the hormone can cause side effects, such as: Increased appetite, insomnia, heart palpitations, and shakiness.  Patients with CAD will be started on a lower dose.  Levothyroxine causes virtually no side effects when used in the appropriate dose.    In patients with childbearing age precaution should be taking regarding hypothyroidism. Hypothyroid woman are more likely to experience infertility, and they have an increase. Balance and portion, anemia, and gestational hypertension, placental abruption and postpartum hemorrhage.    Certain medications, supplements and foods can affect the body s ability to absorb levothyroxine. Medications include: Iron supplements, Cholestyramine and Calcium supplements.       Follow-up:  2 months    Belinda Marcos MD  Endocrinology  [facility]  CC: Magalis Woodward    All questions were answered.  The patient indicates understanding of the above issues and agrees with the plan set forth.     Addendum to above note and clinic visit:    Labs reviewed.    See result note/telephone encounter.

## 2020-07-10 ENCOUNTER — VIRTUAL VISIT (OUTPATIENT)
Dept: SURGERY | Facility: CLINIC | Age: 85
End: 2020-07-10
Payer: MEDICARE

## 2020-07-10 DIAGNOSIS — K82.8 GALLBLADDER SLUDGE: Primary | ICD-10-CM

## 2020-07-10 PROCEDURE — 99442 ZZC PHYSICIAN TELEPHONE EVALUATION 11-20 MIN: CPT | Performed by: SURGERY

## 2020-07-10 NOTE — PROGRESS NOTES
"Bushra Harmon is a 84 year old female who is being evaluated via a billable telephone visit.      The patient has been notified of following:     \"This telephone visit will be conducted via a call between you and your physician/provider. We have found that certain health care needs can be provided without the need for a physical exam.  This service lets us provide the care you need with a short phone conversation.  If a prescription is necessary we can send it directly to your pharmacy.  If lab work is needed we can place an order for that and you can then stop by our lab to have the test done at a later time.    Telephone visits are billed at different rates depending on your insurance coverage. During this emergency period, for some insurers they may be billed the same as an in-person visit.  Please reach out to your insurance provider with any questions.    If during the course of the call the physician/provider feels a telephone visit is not appropriate, you will not be charged for this service.\"    Patient has given verbal consent for Telephone visit?  Yes    What phone number would you like to be contacted at? [phone]      Phone call duration: 12 minutes    85 yo female with a history of RUQ pain when bending forward, known sludge from ultrasound in 2016; her symptoms had improve in the interm but now has pressure along the right rib line after eating on occasion, it can occur several times in a week, usually short lived and manageable. No nausea or jaundice, no loose stools. Repeat ultrasound done this month shows sludge again without wall thickening or duct dilation. She is on prednisone for polymyalgia rheumatica. She had a laparotomy for small bowel obstruction in the mid 1980's; states the scar is low transverse.  She would like to avoid surgery for now especially as her symptoms are minimal and she is on steroids. She will call us in the future if she desires lap coby. We discussed the surgery " itself and anticipated recovery.    Tang Smiley MD  Please route or send letter to:  Primary Care Provider (PCP)

## 2020-07-16 ENCOUNTER — TRANSFERRED RECORDS (OUTPATIENT)
Dept: HEALTH INFORMATION MANAGEMENT | Facility: CLINIC | Age: 85
End: 2020-07-16

## 2020-07-16 LAB
ALT SERPL-CCNC: 29 IU/L (ref 5–35)
AST SERPL-CCNC: 26 U/L (ref 5–34)
CREAT SERPL-MCNC: 0.86 MG/DL (ref 0.5–1.3)

## 2020-07-22 ENCOUNTER — HOSPITAL ENCOUNTER (OUTPATIENT)
Dept: MAMMOGRAPHY | Facility: CLINIC | Age: 85
Discharge: HOME | End: 2020-07-22
Attending: INTERNAL MEDICINE | Admitting: INTERNAL MEDICINE
Payer: MEDICARE

## 2020-07-22 DIAGNOSIS — Z12.31 VISIT FOR SCREENING MAMMOGRAM: ICD-10-CM

## 2020-07-22 PROCEDURE — 77067 SCR MAMMO BI INCL CAD: CPT

## 2020-07-23 DIAGNOSIS — E78.00 HYPERCHOLESTEROLEMIA: ICD-10-CM

## 2020-07-24 RX ORDER — SIMVASTATIN 10 MG
TABLET ORAL
Qty: 90 TABLET | Refills: 3 | Status: SHIPPED | OUTPATIENT
Start: 2020-07-24 | End: 2021-07-08

## 2020-08-03 ENCOUNTER — VIRTUAL VISIT (OUTPATIENT)
Dept: INTERNAL MEDICINE | Facility: CLINIC | Age: 85
End: 2020-08-03
Payer: MEDICARE

## 2020-08-03 DIAGNOSIS — R22.1 LOCALIZED SWELLING, MASS AND LUMP, NECK: Primary | ICD-10-CM

## 2020-08-03 DIAGNOSIS — M35.3 PMR (POLYMYALGIA RHEUMATICA) (H): ICD-10-CM

## 2020-08-03 PROCEDURE — 99214 OFFICE O/P EST MOD 30 MIN: CPT | Mod: 95 | Performed by: INTERNAL MEDICINE

## 2020-08-03 NOTE — PROGRESS NOTES
"Bushra Harmon is a 84 year old female who is being evaluated via a billable video visit.      The patient has been notified of following:     \"This video visit will be conducted via a call between you and your physician/provider. We have found that certain health care needs can be provided without the need for an in-person physical exam.  This service lets us provide the care you need with a video conversation.  If a prescription is necessary we can send it directly to your pharmacy.  If lab work is needed we can place an order for that and you can then stop by our lab to have the test done at a later time.    Video visits are billed at different rates depending on your insurance coverage.  Please reach out to your insurance provider with any questions.    If during the course of the call the physician/provider feels a video visit is not appropriate, you will not be charged for this service.\"    Patient has given verbal consent for Video visit? Yes Corin Ulloa CMA  How would you like to obtain your AVS? MyChart  If you are dropped from the video visit, the video invite should be resent to: Text to cell phone: [phone] (M)  Will anyone else be joining your video visit? No      Subjective     Bushra Harmon is a 84 year old female who presents today via video visit for the following health issues:    HPI     Neck lump.  She has noticed a lump at the base of her neck.  She has noticed that last night.  No pain.  Has not grown in size.  The patient reports that it is different from the other side.  Feels like a \"marshmallow\"bump.    No nasal congestion or ear pain or sore throat.     PMR. Following with rheumatology.  She is feeling better on the prednisone.  Labs recently performed by rheumatology in [facility]- slightly elevated WBC- 11.5.  Hemoglobin normal.     Video Start Time: 4:17pm        Patient Active Problem List   Diagnosis     Herpes simplex virus (HSV) infection     CKD (chronic kidney disease) stage " 3, GFR 30-59 ml/min (H)     Hypertension goal BP (blood pressure) < 140/90     Hyperlipidemia LDL goal <100     Onychomycosis     Benign hypertension     Other specified hypothyroidism     Lichen sclerosus     PMR (polymyalgia rheumatica) (H)     Past Surgical History:   Procedure Laterality Date     ABDOMEN SURGERY       C DEXA, BONE DENSITY, AXIAL SKEL  2003    Normal BMD     C NONSPECIFIC PROCEDURE      Exploratory Laparotomy constricting band     C NONSPECIFIC PROCEDURE       x3     C REPLANTATION THUMB DISTAL,COMPLETE  2009    left thumb trauma; Dr. Jose surgery     COLONOSCOPY N/A 11/3/2015    Procedure: COLONOSCOPY;  Surgeon: Joanna Acevedo MD;  Location:  GI     HC COLONOSCOPY THRU STOMA, DIAGNOSTIC  10/22/2005    Diverticulosis- Dr. Shea     HC FLEX SIGMOIDOSCOPY W/WO SHERIF SPEC BY BRUSH/WASH  1998    normal     HC REMOVE TONSILS/ADENOIDS,<13 Y/O       HC REMV CATARACT EXTRACAP,INSERT LENS, COMPLEX, W/O ECP  2005    Left eye     SURGICAL HISTORY OF -   2004    macular hole repair- Left eye       Social History     Tobacco Use     Smoking status: Former Smoker     Packs/day: 0.50     Years: 45.00     Pack years: 22.50     Last attempt to quit: 2001     Years since quittin.2     Smokeless tobacco: Never Used   Substance Use Topics     Alcohol use: Yes     Comment: 1 drink per day     Family History   Problem Relation Age of Onset     Hypertension Mother           at 100.     Breast Cancer Mother      Thyroid Disease Mother      Diabetes Sister      Hypertension Sister      Neurologic Disorder Daughter         MS     Hypertension Son            Reviewed and updated as needed this visit by Provider  Problems         Review of Systems   CONSTITUTIONAL: NEGATIVE for fever, chills, change in weight  RESP: NEGATIVE for significant cough or SOB  CV: NEGATIVE for chest pain, palpitations or peripheral edema      Objective             Physical Exam      GENERAL: Healthy, alert and no distress  EYES: Eyes grossly normal to inspection.  No discharge or erythema, or obvious scleral/conjunctival abnormalities.  RESP: No audible wheeze, cough, or visible cyanosis.  No visible retractions or increased work of breathing.    SKIN: Visible skin clear. No significant rash, abnormal pigmentation or lesions.  NEURO: Cranial nerves grossly intact.  Mentation and speech appropriate for age.  PSYCH: Mentation appears normal, affect normal/bright, judgement and insight intact, normal speech and appearance well-groomed.      Diagnostic Test Results:  Labs reviewed in Epic        Assessment & Plan     (R22.1) Localized swelling, mass and lump, neck  (primary encounter diagnosis)  Comment: question lipoma versus enlarged lymph node versus side effects from prednisone?  Plan:  Head Neck Soft Tissue, OTOLARYNGOLOGY         REFERRAL          (M35.3) PMR (polymyalgia rheumatica) (H)  Comment:  Plan: prednisone   rheumatology        See Patient Instructions    No follow-ups on file.    Magalis Woodward MD  [facility]      Video-Visit Details    Type of service:  Video Visit    Video End Time:4:22pm    Originating Location (pt. Location): Home    Distant Location (provider location):  home    Platform used for Video Visit: Doximity    No follow-ups on file.       Magalis Woodward MD

## 2020-08-06 ENCOUNTER — TELEPHONE (OUTPATIENT)
Dept: INTERNAL MEDICINE | Facility: CLINIC | Age: 85
End: 2020-08-06

## 2020-08-06 NOTE — TELEPHONE ENCOUNTER
Patient is going to be having ultrasound of her head and neck on 8/10/20 and she is wondering as long as they are doing that if she should also get the carotid artery looked.

## 2020-08-06 NOTE — TELEPHONE ENCOUNTER
Patient advised of MD message.  She is not experiencing any symptoms, just thought it would be easy to add on the carotid study.  She is fine without having test ordered. MANNY Bradshaw R.N.

## 2020-08-10 ENCOUNTER — HOSPITAL ENCOUNTER (OUTPATIENT)
Dept: ULTRASOUND IMAGING | Facility: CLINIC | Age: 85
Discharge: HOME | End: 2020-08-10
Attending: INTERNAL MEDICINE | Admitting: INTERNAL MEDICINE
Payer: MEDICARE

## 2020-08-10 DIAGNOSIS — R22.1 LOCALIZED SWELLING, MASS AND LUMP, NECK: ICD-10-CM

## 2020-08-10 PROCEDURE — 76536 US EXAM OF HEAD AND NECK: CPT

## 2020-08-13 ENCOUNTER — TELEPHONE (OUTPATIENT)
Dept: INTERNAL MEDICINE | Facility: CLINIC | Age: 85
End: 2020-08-13

## 2020-08-13 NOTE — TELEPHONE ENCOUNTER
Patient calling and would like results and advise on her ultrasound. Ok to call and lm [phone]

## 2020-08-14 NOTE — TELEPHONE ENCOUNTER
"Per result note message from primary care provider regarding US results:  \"Ultrasound reveals a likely lipoma (fatty benign lesion).   Recommend monitoring for pain or enlargement.\"     Spoke with patient and advised of message above.  No further questions.  Will monitor lump and call back as needed.    "

## 2020-08-27 ENCOUNTER — TRANSFERRED RECORDS (OUTPATIENT)
Dept: HEALTH INFORMATION MANAGEMENT | Facility: CLINIC | Age: 85
End: 2020-08-27

## 2020-08-27 LAB
ALT SERPL-CCNC: 25 IU/L (ref 5–35)
AST SERPL-CCNC: 23 U/L (ref 5–34)
CREAT SERPL-MCNC: 0.89 MG/DL (ref 0.5–1.3)

## 2020-09-02 DIAGNOSIS — E03.8 OTHER SPECIFIED HYPOTHYROIDISM: ICD-10-CM

## 2020-09-02 PROCEDURE — 84443 ASSAY THYROID STIM HORMONE: CPT | Performed by: INTERNAL MEDICINE

## 2020-09-02 PROCEDURE — 99000 SPECIMEN HANDLING OFFICE-LAB: CPT | Performed by: INTERNAL MEDICINE

## 2020-09-02 PROCEDURE — 84439 ASSAY OF FREE THYROXINE: CPT | Mod: 90 | Performed by: INTERNAL MEDICINE

## 2020-09-02 PROCEDURE — 36415 COLL VENOUS BLD VENIPUNCTURE: CPT | Performed by: INTERNAL MEDICINE

## 2020-09-02 PROCEDURE — 84439 ASSAY OF FREE THYROXINE: CPT | Performed by: INTERNAL MEDICINE

## 2020-09-03 DIAGNOSIS — I10 ESSENTIAL HYPERTENSION, BENIGN: ICD-10-CM

## 2020-09-03 LAB
T4 FREE SERPL-MCNC: 1.43 NG/DL (ref 0.76–1.46)
TSH SERPL DL<=0.005 MIU/L-ACNC: 1.83 MU/L (ref 0.4–4)

## 2020-09-06 LAB — T4 FREE SERPL DIALY-MCNC: 2.7 NG/DL (ref 1.1–2.4)

## 2020-09-07 RX ORDER — LISINOPRIL 20 MG/1
TABLET ORAL
Qty: 90 TABLET | Refills: 0 | Status: SHIPPED | OUTPATIENT
Start: 2020-09-07 | End: 2020-12-04

## 2020-09-09 ENCOUNTER — VIRTUAL VISIT (OUTPATIENT)
Dept: ENDOCRINOLOGY | Facility: CLINIC | Age: 85
End: 2020-09-09
Payer: MEDICARE

## 2020-09-09 DIAGNOSIS — E03.8 OTHER SPECIFIED HYPOTHYROIDISM: Primary | ICD-10-CM

## 2020-09-09 PROCEDURE — 99442 ZZC PHYSICIAN TELEPHONE EVALUATION 11-20 MIN: CPT | Performed by: INTERNAL MEDICINE

## 2020-09-09 RX ORDER — LEVOTHYROXINE SODIUM 88 UG/1
88 TABLET ORAL DAILY
Qty: 90 TABLET | Refills: 1 | Status: SHIPPED | OUTPATIENT
Start: 2020-09-09 | End: 2021-03-10

## 2020-09-09 NOTE — PATIENT INSTRUCTIONS
[facility] & [place] locations   Dr Marcos, Endocrinology Department      [facility]   [address]  [place] [address]  Appointment [phone]  Fax: [phone]  [place]: Monday and Tuesday         [facility]   [address]  [place] [address]  Appointment [phone]  Fax: [phone]  [place]: Wednesday and Thursday            Please check the cost coverage and copay with insurance before recommended tests, services and medications (especially if new medications are prescribed).     If ordered, please get blood work done 1 week prior to your next appointment so they will be available to Dr. Marcos at your visit.    Continue levothyroxine 88 mcg/day.  Labs in 6 months/sooner if concerns or major weight changes.  Please make a lab appointment for blood work and follow up clinic appointment in 1 week after that to discuss results.    Take Levothyroxine on an empty stomach. Take it with a full glass of water at least 30 minutes to 1 hour before eating breakfast.   This medicine should be taken at least 4 hours before or 4 hours after these medicines: antacids (Maalox , Mylanta , Tums ), calcium supplements, cholestyramine (Prevalite , Questran ), colestipol (Colestid ), iron supplements, orlistat (Heraclio , Xenical ), simethicone (Gas-X , Mylicon ), and sucralfate (Carafate ).   Swallow the capsule whole. Do not cut or crush it.

## 2020-09-09 NOTE — PROGRESS NOTES
"This is a telephone encounter.  Pt declined video visit..  9/9/2020    Start time: 10:36    End time: 10:52    More than 10 min spent reviewing chart, labs, results, imaging studies and in patient communication.    In the setting of COVID-19 outbreak patients visits are transitioned to phone visits/ virtual visits as per system and leadership guidelines.  I have personally reviewed data including notes, lab tests. I have reviewed and interpreted images personally.  This encounter is potentially equivalent to established 3 level (78880) clinic visit.    The patient has been notified of following:      \"This telephone visit will be conducted via a call between you and your physician/provider. We have found that certain health care needs can be provided without the need for a physical exam.  This service lets us provide the care you need with a short phone conversation.  If a prescription is necessary we can send it directly to your pharmacy.  If lab work is needed we can place an order for that and you can then stop by our lab to have the test done at a later time.     We will bill your insurance company for this service.  Please check with your medical insurance if this type of visit is covered. You may be responsible for the cost of this type of visit if insurance coverage is denied.  The typical cost is $30 (10min), $59 (11-20min) and $85 (21-30min).  Most often these visits are shorter than 10 minutes. With new updates with corona virus patient might be billed as clinic visit.     If during the course of the call the physician/provider feels a telephone visit is not appropriate, you will not be charged for this service.\"      Past medical history, social history, family history, allergy and medications were reviewed and updated as appropriate.  Reviewed all pertinent labs, notes and imaging studies as appropriate.    Patient verbally consented to phone visit today: yes.    Disclaimer: This note consists of symbols " derived from keyboarding, dictation and/or voice recognition software. As a result, there may be errors in the script that have gone undetected. Please consider this when interpreting information found in this chart.    ROS neg expect noted in notes.  Patient feels well at this time and denies any tachycardia, palpitations, heat intolerance, tremor, insomnia, diarrhea, or unexplained weight loss.  Patient also denies  cold intolerance, constipation, or unexplained weight gain.   ENDOCRINOLOGY CLINIC NOTE:    Name: Bushra Harmon  Seen for f/u of Hypothyroidism.  HPI:  Bushra Harmon is a 84 year old female who presents for the evaluation of :hypothyroidism.   has a past medical history of CKD (chronic kidney disease), Essential hypertension, benign, and Unspecified hypothyroidism.     Was started on prednisone for RA by Rheumatology.  Is having joint pains.  Gained some wt since starting Prednisone.  Not sleeping well.    #1. Hypothyroidism.  Initially diagnosed at age 42.and since then she is on thyroid hormone replacement.  çurrently taking 88 mcg/day (On this dose since 7/9/2020). Dose was decreased at that time based on that.  F/u labs improving. FT4DL is slightly high.    Taking generic.  Reports compliance.    Feeling OK. No major s/s.  No longer taking biotin X Aug 2019.    + frontal hair loss.    Palpitations:  No  Changes to hair or skin: + frontal hair loss X 2 years  Diarrhea/Constipation:No  Changes in menses: s/p menopause. Had been on HRT for a while. Stopped at age 67.  Dysphagia or Shortness of breath:No  Tremors:No  Changes in weight: gained about 5-6 lbs.  Wt Readings from Last 2 Encounters:   02/14/20 74.8 kg (165 lb)   02/05/20 74.8 kg (165 lb)     Heat or cold intolerance: no  History of Lithium or Amiodarone use:No  Head or neck surgery/radiation:No  Family History of Thyroid Problems: + hypothyroidism in mother and sister  PMH/PSH:  Past Medical History:   Diagnosis Date     CKD (chronic  kidney disease)      Essential hypertension, benign      Unspecified hypothyroidism      Past Surgical History:   Procedure Laterality Date     ABDOMEN SURGERY       C DEXA, BONE DENSITY, AXIAL SKEL  2003    Normal BMD     C NONSPECIFIC PROCEDURE      Exploratory Laparotomy constricting band     C NONSPECIFIC PROCEDURE       x3     C REPLANTATION THUMB DISTAL,COMPLETE  2009    left thumb trauma; Dr. Jose surgery     COLONOSCOPY N/A 11/3/2015    Procedure: COLONOSCOPY;  Surgeon: Joanna Acevedo MD;  Location:  GI     HC COLONOSCOPY THRU STOMA, DIAGNOSTIC  10/22/2005    Diverticulosis- Dr. Shea     HC FLEX SIGMOIDOSCOPY W/WO SHERIF SPEC BY BRUSH/WASH  1998    normal     HC REMOVE TONSILS/ADENOIDS,<13 Y/O       HC REMV CATARACT EXTRACAP,INSERT LENS, COMPLEX, W/O ECP  2005    Left eye     SURGICAL HISTORY OF -   2004    macular hole repair- Left eye     Family Hx:  Family History   Problem Relation Age of Onset     Hypertension Mother           at 100.     Breast Cancer Mother      Thyroid Disease Mother      Diabetes Sister      Hypertension Sister      Neurologic Disorder Daughter         MS     Hypertension Son        Social Hx:  Social History     Socioeconomic History     Marital status:      Spouse name: Not on file     Number of children: 3     Years of education: Not on file     Highest education level: Not on file   Occupational History     Occupation: office     Employer: RETIRED   Social Needs     Financial resource strain: Not on file     Food insecurity     Worry: Not on file     Inability: Not on file     Transportation needs     Medical: Not on file     Non-medical: Not on file   Tobacco Use     Smoking status: Former Smoker     Packs/day: 0.50     Years: 45.00     Pack years: 22.50     Last attempt to quit: 2001     Years since quittin.3     Smokeless tobacco: Never Used   Substance and Sexual Activity     Alcohol use: Yes     Comment: 1  drink per day     Drug use: No     Sexual activity: Never   Lifestyle     Physical activity     Days per week: Not on file     Minutes per session: Not on file     Stress: Not on file   Relationships     Social connections     Talks on phone: Not on file     Gets together: Not on file     Attends Orthodoxy service: Not on file     Active member of club or organization: Not on file     Attends meetings of clubs or organizations: Not on file     Relationship status: Not on file     Intimate partner violence     Fear of current or ex partner: Not on file     Emotionally abused: Not on file     Physically abused: Not on file     Forced sexual activity: Not on file   Other Topics Concern      Service Not Asked     Blood Transfusions Not Asked     Caffeine Concern Yes     Comment: 1 cup per day     Occupational Exposure Not Asked     Hobby Hazards Not Asked     Sleep Concern Not Asked     Stress Concern Not Asked     Weight Concern Not Asked     Special Diet Not Asked     Back Care Not Asked     Exercise Yes     Comment: Active; exercise 4 times per week     Bike Helmet Not Asked     Seat Belt Yes     Self-Exams No     Parent/sibling w/ CABG, MI or angioplasty before 65F 55M? Not Asked   Social History Narrative     Not on file          MEDICATIONS:  has a current medication list which includes the following prescription(s): calcium citrate-vitamin d, vitamin d3, folic acid, glucosamine hcl, lactobacillus, levothyroxine, lisinopril, lutein, methotrexate, metoprolol succinate er, multivitamin, prednisone, and simvastatin.    ROS   ROS: 10 point ROS neg other than the symptoms noted above in the HPI.    Physical Exam   VS: LMP 02/26/2003   Breastfeeding No       LABS:  TFTs:  ENDO THYROID LABS-[facility] Latest Ref Rng & Units 9/2/2020   TSH 0.40 - 4.00 mU/L 1.83   T4 FREE 0.76 - 1.46 ng/dL 1.43     Component      Latest Ref Rng & Units 9/2/2020   Free T4 Eq Dialysis      1.1 - 2.4 ng/dL 2.7 (H)     Component      Latest  Ref Rng & Units 7/2/2020   Free T4 Eq Dialysis      1.1 - 2.4 ng/dL 3.3 (H)   TSH      0.40 - 4.00 mU/L 0.65   T4 Free      0.76 - 1.46 ng/dL 1.56 (H)     Component      Latest Ref Rng & Units 4/20/2020   Free T4 Eq Dialysis      1.1 - 2.4 ng/dL 2.6 (H)     ENDO THYROID LABS-[facility] Latest Ref Rng & Units 4/20/2020 1/24/2020   TSH 0.40 - 4.00 mU/L 8.54 (H) 2.04   T4 FREE 0.76 - 1.46 ng/dL 1.40 1.56 (H)     ENDO THYROID LABS-[facility] Latest Ref Rng & Units 10/31/2019   TSH 0.40 - 4.00 mU/L 1.56   T4 FREE 0.76 - 1.46 ng/dL 1.57 (H)     Component      Latest Ref Rng & Units 9/23/2019 10/31/2019   Free T4 Eq Dialysis      1.1 - 2.4 ng/dL 2.8 (H)    TSH      0.40 - 4.00 mU/L  1.56   T4 Free      0.76 - 1.46 ng/dL  1.57 (H)     ENDO THYROID LABS-[facility] Latest Ref Rng & Units 9/23/2019   TSH 0.40 - 4.00 mU/L 7.36 (H)   T4 FREE 0.76 - 1.46 ng/dL 1.27   THYR PEROXIDASE RENETTA <35 IU/mL 63 (H)     ENDO THYROID LABS-[facility] Latest Ref Rng & Units 6/5/2019 6/4/2018   TSH 0.40 - 4.00 mU/L 4.10 (H) 4.23 (H)   T4 FREE 0.76 - 1.46 ng/dL 1.68 (H) 1.47 (H)     ENDO THYROID LABS-[facility] Latest Ref Rng & Units 5/31/2017 5/27/2016   TSH 0.40 - 4.00 mU/L 4.16 (H) 2.87   T4 FREE 0.76 - 1.46 ng/dL 1.46        All pertinent notes, labs, and images personally reviewed by me.     A/P  Ms.Nancy YUNG Harmon is a 84 year old here for the evaluation of hypothyroidism:    #1 Hypothyroidism(+TPO):   Currently taking 88 mcg/day (on this dose since 7/9/2020)  F/u labs are in range.  Taking generic.  Clinically looks euthyorid.  Has gained some wt on prednisone which she takes for RA.  Plan:  Discussed diagnosis, pathophysiology, management and treatment options of condition with patient.  Based on 9/2020 labs recommend to continue levothyroxine to 88 mcg/day (on this dose since 7/9/2020).  Continue levothyroxine 88 mcg/day.  Labs in 6 months/sooner if concerns or major weight changes.  Please make a lab appointment for blood work and follow up clinic appointment in 1 week  after that to discuss results.  Continue to hold off biotin.    Discussed s/s of hypothyroidism and hyperthyroidism to watch for.  The patient indicates understanding of these issues and agrees with the plan.    Standard treatment for hypothyroidism involves daily use of the synthetic thyroid hormone levothyroxine (Levothroid, Synthroid, others).  The dosage of thyroxine should normally be that required to bring the serum TSH level to the low normal range, such as 0.3 - 1 uU/ml. This is typically achieved with 1 ug L-T4/lb body weight/day, ranges from 75 - 125 ug/day in women, and 125 - 200 ug/day in men. Once thyroxine treatment is initiated, it is required indefinitely in most patients.     Symptoms should improve one to two weeks after starting treatment. Treatment with levothyroxine is usually lifelong.  Doseage may need to be adjusted based on body weight, medications, or pregnancy.  To determine the right dosage of levothyroxine initially we will repeat TSH and free T4 after two months.     Excessive amounts of the hormone can cause side effects, such as: Increased appetite, insomnia, heart palpitations, and shakiness.  Patients with CAD will be started on a lower dose.  Levothyroxine causes virtually no side effects when used in the appropriate dose.    In patients with childbearing age precaution should be taking regarding hypothyroidism. Hypothyroid woman are more likely to experience infertility, and they have an increase. Balance and portion, anemia, and gestational hypertension, placental abruption and postpartum hemorrhage.    Certain medications, supplements and foods can affect the body s ability to absorb levothyroxine. Medications include: Iron supplements, Cholestyramine and Calcium supplements.       Follow-up:  6 months    Belinda Marcos MD  Endocrinology  [facility]  CC: Magalis Woodward    All questions were answered.  The patient indicates understanding of the above  issues and agrees with the plan set forth.     Addendum to above note and clinic visit:    Labs reviewed.    See result note/telephone encounter.

## 2020-09-09 NOTE — LETTER
"    9/9/2020         RE: Bushra Harmon  [address] [place] [address]        Dear Colleague,    Thank you for referring your patient, Bushra Harmon, to the [facility]. Please see a copy of my visit note below.    This is a telephone encounter.  Pt declined video visit..  9/9/2020    Start time: 10:36    End time: 10:52    More than 10 min spent reviewing chart, labs, results, imaging studies and in patient communication.    In the setting of COVID-19 outbreak patients visits are transitioned to phone visits/ virtual visits as per system and leadership guidelines.  I have personally reviewed data including notes, lab tests. I have reviewed and interpreted images personally.  This encounter is potentially equivalent to established 3 level (65213) clinic visit.    The patient has been notified of following:      \"This telephone visit will be conducted via a call between you and your physician/provider. We have found that certain health care needs can be provided without the need for a physical exam.  This service lets us provide the care you need with a short phone conversation.  If a prescription is necessary we can send it directly to your pharmacy.  If lab work is needed we can place an order for that and you can then stop by our lab to have the test done at a later time.     We will bill your insurance company for this service.  Please check with your medical insurance if this type of visit is covered. You may be responsible for the cost of this type of visit if insurance coverage is denied.  The typical cost is $30 (10min), $59 (11-20min) and $85 (21-30min).  Most often these visits are shorter than 10 minutes. With new updates with corona virus patient might be billed as clinic visit.     If during the course of the call the physician/provider feels a telephone visit is not appropriate, you will not be charged for this service.\"      Past medical history, social history, family history, " allergy and medications were reviewed and updated as appropriate.  Reviewed all pertinent labs, notes and imaging studies as appropriate.    Patient verbally consented to phone visit today: yes.    Disclaimer: This note consists of symbols derived from keyboarding, dictation and/or voice recognition software. As a result, there may be errors in the script that have gone undetected. Please consider this when interpreting information found in this chart.    ROS neg expect noted in notes.  Patient feels well at this time and denies any tachycardia, palpitations, heat intolerance, tremor, insomnia, diarrhea, or unexplained weight loss.  Patient also denies  cold intolerance, constipation, or unexplained weight gain.   ENDOCRINOLOGY CLINIC NOTE:    Name: Bushra Harmon  Seen for f/u of Hypothyroidism.  HPI:  Bushra Harmon is a 84 year old female who presents for the evaluation of :hypothyroidism.   has a past medical history of CKD (chronic kidney disease), Essential hypertension, benign, and Unspecified hypothyroidism.     Was started on prednisone for RA by Rheumatology.  Is having joint pains.  Gained some wt since starting Prednisone.  Not sleeping well.    #1. Hypothyroidism.  Initially diagnosed at age 42.and since then she is on thyroid hormone replacement.  çurrently taking 88 mcg/day (On this dose since 7/9/2020). Dose was decreased at that time based on that.  F/u labs improving. FT4DL is slightly high.    Taking generic.  Reports compliance.    Feeling OK. No major s/s.  No longer taking biotin X Aug 2019.    + frontal hair loss.    Palpitations:  No  Changes to hair or skin: + frontal hair loss X 2 years  Diarrhea/Constipation:No  Changes in menses: s/p menopause. Had been on HRT for a while. Stopped at age 67.  Dysphagia or Shortness of breath:No  Tremors:No  Changes in weight: gained about 5-6 lbs.  Wt Readings from Last 2 Encounters:   02/14/20 74.8 kg (165 lb)   02/05/20 74.8 kg (165 lb)     Heat or cold  intolerance: no  History of Lithium or Amiodarone use:No  Head or neck surgery/radiation:No  Family History of Thyroid Problems: + hypothyroidism in mother and sister  PMH/PSH:  Past Medical History:   Diagnosis Date     CKD (chronic kidney disease)      Essential hypertension, benign      Unspecified hypothyroidism      Past Surgical History:   Procedure Laterality Date     ABDOMEN SURGERY       C DEXA, BONE DENSITY, AXIAL SKEL  2003    Normal BMD     C NONSPECIFIC PROCEDURE      Exploratory Laparotomy constricting band     C NONSPECIFIC PROCEDURE       x3     C REPLANTATION THUMB DISTAL,COMPLETE  2009    left thumb trauma; Dr. Jose surgery     COLONOSCOPY N/A 11/3/2015    Procedure: COLONOSCOPY;  Surgeon: Joanna Acevedo MD;  Location:  GI     HC COLONOSCOPY THRU STOMA, DIAGNOSTIC  10/22/2005    Diverticulosis- Dr. Shea     HC FLEX SIGMOIDOSCOPY W/WO SHERIF SPEC BY BRUSH/WASH  1998    normal     HC REMOVE TONSILS/ADENOIDS,<11 Y/O       HC REMV CATARACT EXTRACAP,INSERT LENS, COMPLEX, W/O ECP  2005    Left eye     SURGICAL HISTORY OF -   2004    macular hole repair- Left eye     Family Hx:  Family History   Problem Relation Age of Onset     Hypertension Mother           at 100.     Breast Cancer Mother      Thyroid Disease Mother      Diabetes Sister      Hypertension Sister      Neurologic Disorder Daughter         MS     Hypertension Son        Social Hx:  Social History     Socioeconomic History     Marital status:      Spouse name: Not on file     Number of children: 3     Years of education: Not on file     Highest education level: Not on file   Occupational History     Occupation: office     Employer: RETIRED   Social Needs     Financial resource strain: Not on file     Food insecurity     Worry: Not on file     Inability: Not on file     Transportation needs     Medical: Not on file     Non-medical: Not on file   Tobacco Use     Smoking status: Former  Smoker     Packs/day: 0.50     Years: 45.00     Pack years: 22.50     Last attempt to quit: 2001     Years since quittin.3     Smokeless tobacco: Never Used   Substance and Sexual Activity     Alcohol use: Yes     Comment: 1 drink per day     Drug use: No     Sexual activity: Never   Lifestyle     Physical activity     Days per week: Not on file     Minutes per session: Not on file     Stress: Not on file   Relationships     Social connections     Talks on phone: Not on file     Gets together: Not on file     Attends Voodoo service: Not on file     Active member of club or organization: Not on file     Attends meetings of clubs or organizations: Not on file     Relationship status: Not on file     Intimate partner violence     Fear of current or ex partner: Not on file     Emotionally abused: Not on file     Physically abused: Not on file     Forced sexual activity: Not on file   Other Topics Concern      Service Not Asked     Blood Transfusions Not Asked     Caffeine Concern Yes     Comment: 1 cup per day     Occupational Exposure Not Asked     Hobby Hazards Not Asked     Sleep Concern Not Asked     Stress Concern Not Asked     Weight Concern Not Asked     Special Diet Not Asked     Back Care Not Asked     Exercise Yes     Comment: Active; exercise 4 times per week     Bike Helmet Not Asked     Seat Belt Yes     Self-Exams No     Parent/sibling w/ CABG, MI or angioplasty before 65F 55M? Not Asked   Social History Narrative     Not on file          MEDICATIONS:  has a current medication list which includes the following prescription(s): calcium citrate-vitamin d, vitamin d3, folic acid, glucosamine hcl, lactobacillus, levothyroxine, lisinopril, lutein, methotrexate, metoprolol succinate er, multivitamin, prednisone, and simvastatin.    ROS   ROS: 10 point ROS neg other than the symptoms noted above in the HPI.    Physical Exam   VS: LMP 2003   Breastfeeding No       LABS:  TFTs:  ENDO  THYROID LABS-[facility] Latest Ref Rng & Units 9/2/2020   TSH 0.40 - 4.00 mU/L 1.83   T4 FREE 0.76 - 1.46 ng/dL 1.43     Component      Latest Ref Rng & Units 9/2/2020   Free T4 Eq Dialysis      1.1 - 2.4 ng/dL 2.7 (H)     Component      Latest Ref Rng & Units 7/2/2020   Free T4 Eq Dialysis      1.1 - 2.4 ng/dL 3.3 (H)   TSH      0.40 - 4.00 mU/L 0.65   T4 Free      0.76 - 1.46 ng/dL 1.56 (H)     Component      Latest Ref Rng & Units 4/20/2020   Free T4 Eq Dialysis      1.1 - 2.4 ng/dL 2.6 (H)     ENDO THYROID LABS-[facility] Latest Ref Rng & Units 4/20/2020 1/24/2020   TSH 0.40 - 4.00 mU/L 8.54 (H) 2.04   T4 FREE 0.76 - 1.46 ng/dL 1.40 1.56 (H)     [facility] 10/31/2019   TSH 0.40 - 4.00 mU/L 1.56   T4 FREE 0.76 - 1.46 ng/dL 1.57 (H)     Component      Latest Ref Rng & Units 9/23/2019 10/31/2019   Free T4 Eq Dialysis      1.1 - 2.4 ng/dL 2.8 (H)    TSH      0.40 - 4.00 mU/L  1.56   T4 Free      0.76 - 1.46 ng/dL  1.57 (H)     ENDO THYROID LABS-[facility] Latest Ref Rng & Units 9/23/2019   TSH 0.40 - 4.00 mU/L 7.36 (H)   T4 FREE 0.76 - 1.46 ng/dL 1.27   THYR PEROXIDASE RENETTA <35 IU/mL 63 (H)     ENDO THYROID LABS-[facility] Latest Ref Rng & Units 6/5/2019 6/4/2018   TSH 0.40 - 4.00 mU/L 4.10 (H) 4.23 (H)   T4 FREE 0.76 - 1.46 ng/dL 1.68 (H) 1.47 (H)     [facility] Lat[facility] Units 5/31/2017 5/27/2016   TSH 0.40 - 4.00 mU/L 4.16 (H) 2.87   T4 FREE 0.76 - 1.46 ng/dL 1.46        All pertinent notes, labs, and images personally reviewed by me.     A/P  Ms.Nancy YUNG Harmon is a 84 year old here for the evaluation of hypothyroidism:    #1 Hypothyroidism(+TPO):   Currently taking 88 mcg/day (on this dose since 7/9/2020)  F/u labs are in range.  Taking generic.  Clinically looks euthyorid.  Has gained some wt on prednisone which she takes for RA.  Plan:  Discussed diagnosis, pathophysiology, management and treatment options of condition with patient.  Based on 9/2020 labs recommend to continue  levothyroxine to 88 mcg/day (on this dose since 7/9/2020).  Continue levothyroxine 88 mcg/day.  Labs in 6 months/sooner if concerns or major weight changes.  Please make a lab appointment for blood work and follow up clinic appointment in 1 week after that to discuss results.  Continue to hold off biotin.    Discussed s/s of hypothyroidism and hyperthyroidism to watch for.  The patient indicates understanding of these issues and agrees with the plan.    Standard treatment for hypothyroidism involves daily use of the synthetic thyroid hormone levothyroxine (Levothroid, Synthroid, others).  The dosage of thyroxine should normally be that required to bring the serum TSH level to the low normal range, such as 0.3 - 1 uU/ml. This is typically achieved with 1 ug L-T4/lb body weight/day, ranges from 75 - 125 ug/day in women, and 125 - 200 ug/day in men. Once thyroxine treatment is initiated, it is required indefinitely in most patients.     Symptoms should improve one to two weeks after starting treatment. Treatment with levothyroxine is usually lifelong.  Doseage may need to be adjusted based on body weight, medications, or pregnancy.  To determine the right dosage of levothyroxine initially we will repeat TSH and free T4 after two months.     Excessive amounts of the hormone can cause side effects, such as: Increased appetite, insomnia, heart palpitations, and shakiness.  Patients with CAD will be started on a lower dose.  Levothyroxine causes virtually no side effects when used in the appropriate dose.    In patients with childbearing age precaution should be taking regarding hypothyroidism. Hypothyroid woman are more likely to experience infertility, and they have an increase. Balance and portion, anemia, and gestational hypertension, placental abruption and postpartum hemorrhage.    Certain medications, supplements and foods can affect the body s ability to absorb levothyroxine. Medications include: Iron supplements,  Cholestyramine and Calcium supplements.       Follow-up:  6 months    Belinda Marcos MD  Endocrinology  [facility]/[place]  CC: Magalis Woodward    All questions were answered.  The patient indicates understanding of the above issues and agrees with the plan set forth.     Addendum to above note and clinic visit:    Labs reviewed.    See result note/telephone encounter.            Again, thank you for allowing me to participate in the care of your patient.        Sincerely,        Belinda Marcos MD

## 2020-10-27 ENCOUNTER — TRANSFERRED RECORDS (OUTPATIENT)
Dept: HEALTH INFORMATION MANAGEMENT | Facility: CLINIC | Age: 85
End: 2020-10-27

## 2020-10-27 LAB
ALT SERPL-CCNC: 22 IU/L (ref 5–35)
AST SERPL-CCNC: 26 U/L (ref 5–34)
CREAT SERPL-MCNC: 0.88 MG/DL (ref 0.5–1.3)
GFR SERPL CREATININE-BSD FRML MDRD: 65.4 ML/MIN/1.73M2

## 2020-11-30 ENCOUNTER — TELEPHONE (OUTPATIENT)
Dept: INTERNAL MEDICINE | Facility: CLINIC | Age: 85
End: 2020-11-30

## 2020-11-30 NOTE — TELEPHONE ENCOUNTER
Patient specifically wanted an order for Covid testing sent to an urgent care near or attached to [facility] which is close to her home.  Advised that [facility] but that she could do a virtual visit via [email or web address] for sorethroat symptoms.  Denies fever or any other symptoms at this time.  MANNY Bradshaw R.N.

## 2020-11-30 NOTE — TELEPHONE ENCOUNTER
Patient is wanting to be tested for Covid due to a cough she has had for 4 days. She only has it at night and in the morning and has no other symptoms. Call her back at [phone]

## 2020-12-04 DIAGNOSIS — I10 ESSENTIAL HYPERTENSION, BENIGN: ICD-10-CM

## 2020-12-07 RX ORDER — LISINOPRIL 20 MG/1
20 TABLET ORAL DAILY
Qty: 90 TABLET | Refills: 2 | Status: SHIPPED | OUTPATIENT
Start: 2020-12-07 | End: 2021-07-08

## 2020-12-07 NOTE — TELEPHONE ENCOUNTER
Prescription approved per [facility], [facility] or [facility] refill protocol.  Jaqueline PIKE - Registered Nurse  [facility]  Acute and Diagnostic Services

## 2020-12-08 ENCOUNTER — TELEPHONE (OUTPATIENT)
Dept: INTERNAL MEDICINE | Facility: CLINIC | Age: 85
End: 2020-12-08

## 2020-12-08 NOTE — TELEPHONE ENCOUNTER
Patient cough for 12 days with no other symptoms.  Started when she started taking Methotrexate and Prednisone for PMR.  Patient was tested for COVID-19 a few days ago and is negative.  Dry cough worse at night ans is just horrible when it starts.  Please address and advise.

## 2020-12-08 NOTE — TELEPHONE ENCOUNTER
Denies fever, shortness of breath, chest pain, rash, loss of taste or smell.  She had a Covid test don on 12/4/20 which was negative.  Has been on steroid since May 2020 and Methotrexate since July 2020.  Cough is listed as a possible side effect of Methotrexate.  She called rheumatology office and they told her she could skip a weekly dose of Methotrexate to see if there was any change in cough.  She states cough seems worse first thing in the morning and when she is laying down to go to sleep at night.  She feels like she has some post nasal drip and is told by others that she clears her throat frequently.  She denies rhinorrhea, nasal congestion, headache, sinus or teeth pain or sneezing.  She will try taking an OTC antihistamine other than Benadryl daily and is scheduled to see primary care provider in 1 week.  Will cancel if improvement.  MANNY Bradshaw R.N.

## 2020-12-08 NOTE — TELEPHONE ENCOUNTER
Recommend an appt to discuss further.  Virtual okay to start with if no openings and provider can assess if she needs to be seen, as could be postnasal drip or heartburn

## 2020-12-28 DIAGNOSIS — I10 ESSENTIAL HYPERTENSION, BENIGN: ICD-10-CM

## 2020-12-31 RX ORDER — METOPROLOL SUCCINATE 25 MG/1
25 TABLET, EXTENDED RELEASE ORAL DAILY
Qty: 90 TABLET | Refills: 0 | Status: SHIPPED | OUTPATIENT
Start: 2020-12-31 | End: 2021-03-26

## 2020-12-31 NOTE — TELEPHONE ENCOUNTER
Medication is being filled for 1 time refill only due to:  Last recorded by 2/2020 will need to be seen for BP follow up.     Latasha Montano R.N.

## 2021-01-15 ENCOUNTER — HEALTH MAINTENANCE LETTER (OUTPATIENT)
Age: 86
End: 2021-01-15

## 2021-02-03 ENCOUNTER — TRANSFERRED RECORDS (OUTPATIENT)
Dept: HEALTH INFORMATION MANAGEMENT | Facility: CLINIC | Age: 86
End: 2021-02-03

## 2021-02-03 LAB
ALT SERPL-CCNC: 23 IU/L (ref 5–35)
AST SERPL-CCNC: 25 U/L (ref 5–34)
CREAT SERPL-MCNC: 0.9 MG/DL (ref 0.5–1.3)
GFR SERPL CREATININE-BSD FRML MDRD: 63.6 ML/MIN/1.73M2

## 2021-03-01 ENCOUNTER — IMMUNIZATION (OUTPATIENT)
Dept: NURSING | Facility: CLINIC | Age: 86
End: 2021-03-01
Payer: MEDICARE

## 2021-03-01 PROCEDURE — 91300 PR COVID VAC PFIZER DIL RECON 30 MCG/0.3 ML IM: CPT

## 2021-03-01 PROCEDURE — 0001A PR COVID VAC PFIZER DIL RECON 30 MCG/0.3 ML IM: CPT

## 2021-03-02 DIAGNOSIS — E03.8 OTHER SPECIFIED HYPOTHYROIDISM: ICD-10-CM

## 2021-03-02 PROCEDURE — 36415 COLL VENOUS BLD VENIPUNCTURE: CPT | Performed by: INTERNAL MEDICINE

## 2021-03-02 PROCEDURE — 84439 ASSAY OF FREE THYROXINE: CPT | Mod: 90 | Performed by: INTERNAL MEDICINE

## 2021-03-02 PROCEDURE — 99000 SPECIMEN HANDLING OFFICE-LAB: CPT | Performed by: INTERNAL MEDICINE

## 2021-03-02 PROCEDURE — 84443 ASSAY THYROID STIM HORMONE: CPT | Performed by: INTERNAL MEDICINE

## 2021-03-03 LAB
T4 FREE SERPL-MCNC: 1.43 NG/DL (ref 0.76–1.46)
TSH SERPL DL<=0.005 MIU/L-ACNC: 1.49 MU/L (ref 0.4–4)

## 2021-03-07 LAB — T4 FREE SERPL DIALY-MCNC: 2.4 NG/DL (ref 1.1–2.4)

## 2021-03-10 ENCOUNTER — VIRTUAL VISIT (OUTPATIENT)
Dept: ENDOCRINOLOGY | Facility: CLINIC | Age: 86
End: 2021-03-10
Payer: MEDICARE

## 2021-03-10 DIAGNOSIS — E03.8 OTHER SPECIFIED HYPOTHYROIDISM: Primary | ICD-10-CM

## 2021-03-10 PROCEDURE — 99442 PR PHYSICIAN TELEPHONE EVALUATION 11-20 MIN: CPT | Mod: 95 | Performed by: INTERNAL MEDICINE

## 2021-03-10 RX ORDER — PREDNISONE 5 MG/1
5 TABLET ORAL DAILY
COMMUNITY
End: 2021-12-15

## 2021-03-10 RX ORDER — LEVOTHYROXINE SODIUM 88 UG/1
88 TABLET ORAL DAILY
Qty: 90 TABLET | Refills: 3 | Status: SHIPPED | OUTPATIENT
Start: 2021-03-10 | End: 2021-03-10

## 2021-03-10 RX ORDER — LEVOTHYROXINE SODIUM 88 UG/1
88 TABLET ORAL DAILY
Qty: 90 TABLET | Refills: 3 | Status: SHIPPED | OUTPATIENT
Start: 2021-03-10 | End: 2022-03-17

## 2021-03-10 NOTE — LETTER
"    3/10/2021         RE: Bushra Harmon  [address] [place] [address]        Dear Colleague,    Thank you for referring your patient, Bushra Harmon, to the [facility]. Please see a copy of my visit note below.    This is a telephone encounter.  Pt declined video visit..  3/10/2021    Start time: 10:29    End time: 10:41      In the setting of COVID-19 outbreak patients visits are transitioned to phone visits/ virtual visits as per system and leadership guidelines.  I have personally reviewed data including notes, lab tests. I have reviewed and interpreted images personally.  This encounter is potentially equivalent to established 3 level (20421) clinic visit.    The patient has been notified of following:      \"This telephone visit will be conducted via a call between you and your physician/provider. We have found that certain health care needs can be provided without the need for a physical exam.  This service lets us provide the care you need with a short phone conversation.  If a prescription is necessary we can send it directly to your pharmacy.  If lab work is needed we can place an order for that and you can then stop by our lab to have the test done at a later time.     We will bill your insurance company for this service.  Please check with your medical insurance if this type of visit is covered. You may be responsible for the cost of this type of visit if insurance coverage is denied.  The typical cost is $30 (10min), $59 (11-20min) and $85 (21-30min).  Most often these visits are shorter than 10 minutes. With new updates with corona virus patient might be billed as clinic visit.     If during the course of the call the physician/provider feels a telephone visit is not appropriate, you will not be charged for this service.\"      Past medical history, social history, family history, allergy and medications were reviewed and updated as appropriate.  Reviewed all pertinent " labs, notes and imaging studies as appropriate.    Patient verbally consented to phone visit today: yes.    Disclaimer: This note consists of symbols derived from keyboarding, dictation and/or voice recognition software. As a result, there may be errors in the script that have gone undetected. Please consider this when interpreting information found in this chart.    ROS neg expect noted in notes.  Patient feels well at this time and denies any tachycardia, palpitations, heat intolerance, tremor, insomnia, diarrhea, or unexplained weight loss.  Patient also denies  cold intolerance, constipation, or unexplained weight gain.   ENDOCRINOLOGY CLINIC NOTE:    Name: Bushra Harmon  Seen for f/u of Hypothyroidism.  HPI:  Bushra Harmon is a 84 year old female who presents for the evaluation of :hypothyroidism.   has a past medical history of CKD (chronic kidney disease), Essential hypertension, benign, and Unspecified hypothyroidism.     Was started on prednisone for RA by Rheumatology.  Gained some wt since starting Prednisone.  She is also on methotrexate.    #1. Hypothyroidism.  Initially diagnosed at age 42.and since then she is on thyroid hormone replacement.  çurrently taking 88 mcg/day (On this dose since 7/9/2020). Dose was decreased at that time based on that.  Labs 3/2021 in range including TSH, FT4 and FT4DL.    Taking generic.  Reports compliance.    Feeling OK. No major s/s.  No longer taking biotin X Aug 2019.    + frontal hair loss.    Palpitations:  No  Changes to hair or skin: chronic problem. + frontal hair loss X 2 years  Diarrhea/Constipation:No  Changes in menses: s/p menopause. Had been on HRT for a while. Stopped at age 67.  Dysphagia or Shortness of breath:No  Tremors:No  Changes in weight: gained some weight.    Heat or cold intolerance: no  History of Lithium or Amiodarone use:No  Head or neck surgery/radiation:No  Family History of Thyroid Problems: + hypothyroidism in mother and  sister  PMH/PSH:  Past Medical History:   Diagnosis Date     CKD (chronic kidney disease)      Essential hypertension, benign      Unspecified hypothyroidism      Past Surgical History:   Procedure Laterality Date     ABDOMEN SURGERY       C DEXA, BONE DENSITY, AXIAL SKEL  2003    Normal BMD     C REPLANTATION THUMB DISTAL,COMPLETE  2009    left thumb trauma; Dr. Jose surgery     COLONOSCOPY N/A 11/3/2015    Procedure: COLONOSCOPY;  Surgeon: Joanna Acevedo MD;  Location:  GI     HC COLONOSCOPY THRU STOMA, DIAGNOSTIC  10/22/2005    Diverticulosis- Dr. Shea     HC FLEX SIGMOIDOSCOPY W/WO SHERIF SPEC BY BRUSH/WASH  1998    normal     HC REMOVE TONSILS/ADENOIDS,<13 Y/O       HC REMV CATARACT EXTRACAP,INSERT LENS, COMPLEX, W/O ECP  2005    Left eye     SURGICAL HISTORY OF -   2004    macular hole repair- Left eye     ZZC NONSPECIFIC PROCEDURE      Exploratory Laparotomy constricting band     ZZC NONSPECIFIC PROCEDURE       x3     Family Hx:  Family History   Problem Relation Age of Onset     Hypertension Mother           at 100.     Breast Cancer Mother      Thyroid Disease Mother      Diabetes Sister      Hypertension Sister      Neurologic Disorder Daughter         MS     Hypertension Son        Social Hx:  Social History     Socioeconomic History     Marital status:      Spouse name: Not on file     Number of children: 3     Years of education: Not on file     Highest education level: Not on file   Occupational History     Occupation: office     Employer: RETIRED   Social Needs     Financial resource strain: Not on file     Food insecurity     Worry: Not on file     Inability: Not on file     Transportation needs     Medical: Not on file     Non-medical: Not on file   Tobacco Use     Smoking status: Former Smoker     Packs/day: 0.50     Years: 45.00     Pack years: 22.50     Quit date: 2001     Years since quittin.8     Smokeless tobacco: Never Used    Substance and Sexual Activity     Alcohol use: Yes     Comment: 1 drink per day     Drug use: No     Sexual activity: Never   Lifestyle     Physical activity     Days per week: Not on file     Minutes per session: Not on file     Stress: Not on file   Relationships     Social connections     Talks on phone: Not on file     Gets together: Not on file     Attends Church service: Not on file     Active member of club or organization: Not on file     Attends meetings of clubs or organizations: Not on file     Relationship status: Not on file     Intimate partner violence     Fear of current or ex partner: Not on file     Emotionally abused: Not on file     Physically abused: Not on file     Forced sexual activity: Not on file   Other Topics Concern      Service Not Asked     Blood Transfusions Not Asked     Caffeine Concern Yes     Comment: 1 cup per day     Occupational Exposure Not Asked     Hobby Hazards Not Asked     Sleep Concern Not Asked     Stress Concern Not Asked     Weight Concern Not Asked     Special Diet Not Asked     Back Care Not Asked     Exercise Yes     Comment: Active; exercise 4 times per week     Bike Helmet Not Asked     Seat Belt Yes     Self-Exams No     Parent/sibling w/ CABG, MI or angioplasty before 65F 55M? Not Asked   Social History Narrative     Not on file          MEDICATIONS:  has a current medication list which includes the following prescription(s): calcium citrate-vitamin d, vitamin d3, folic acid, glucosamine hcl, lactobacillus, levothyroxine, lisinopril, lutein, methotrexate, metoprolol succinate er, multivitamin, prednisone, and simvastatin.    ROS   ROS: 10 point ROS neg other than the symptoms noted above in the HPI.    Physical Exam   VS: LMP 02/26/2003   Breastfeeding No       LABS:  TFTs:  ENDO THYROID LABS-P Latest Ref Rng & Units 3/2/2021   TSH 0.40 - 4.00 mU/L 1.49   T4 FREE 0.76 - 1.46 ng/dL 1.43     ENDO THYROID LABS-P Latest Ref Rng & Units 9/2/2020    TSH 0.40 - 4.00 mU/L 1.83   T4 FREE 0.76 - 1.46 ng/dL 1.43     Component      Latest Ref Rng & Units 9/2/2020   Free T4 Eq Dialysis      1.1 - 2.4 ng/dL 2.7 (H)     Component      Latest Ref Rng & Units 7/2/2020   Free T4 Eq Dialysis      1.1 - 2.4 ng/dL 3.3 (H)   TSH      0.40 - 4.00 mU/L 0.65   T4 Free      0.76 - 1.46 ng/dL 1.56 (H)     Component      Latest Ref Rng & Units 4/20/2020   Free T4 Eq Dialysis      1.1 - 2.4 ng/dL 2.6 (H)     ENDO THYROID LABS-[facility] Latest Ref Rng & Units 4/20/2020 1/24/2020   TSH 0.40 - 4.00 mU/L 8.54 (H) 2.04   T4 FREE 0.76 - 1.46 ng/dL 1.40 1.56 (H)     ENDO THYROID LABS-[facility] Latest Ref Rng & Units 10/31/2019   TSH 0.40 - 4.00 mU/L 1.56   T4 FREE 0.76 - 1.46 ng/dL 1.57 (H)     Component      Latest Ref Rng & Units 9/23/2019 10/31/2019   Free T4 Eq Dialysis      1.1 - 2.4 ng/dL 2.8 (H)    TSH      0.40 - 4.00 mU/L  1.56   T4 Free      0.76 - 1.46 ng/dL  1.57 (H)     ENDO THYROID LABS-[facility] Latest Ref Rng & Units 9/23/2019   TSH 0.40 - 4.00 mU/L 7.36 (H)   T4 FREE 0.76 - 1.46 ng/dL 1.27   THYR PEROXIDASE RENETTA <35 IU/mL 63 (H)     ENDO THYROID LABS-[facility] Latest Ref Rng & Units 6/5/2019 6/4/2018   TSH 0.40 - 4.00 mU/L 4.10 (H) 4.23 (H)   T4 FREE 0.76 - 1.46 ng/dL 1.68 (H) 1.47 (H)     ENDO THYROID LABS-[facility] Latest Ref Rng & Units 5/31/2017 5/27/2016   TSH 0.40 - 4.00 mU/L 4.16 (H) 2.87   T4 FREE 0.76 - 1.46 ng/dL 1.46        All pertinent notes, labs, and images personally reviewed by me.     A/P  Ms.Nancy YUNG Harmon is a 84 year old here for the evaluation of hypothyroidism:    #1 Hypothyroidism(+TPO):   Currently taking 88 mcg/day (on this dose since 7/9/2020)  F/u labs are in range.  Taking generic.  Clinically looks euthyorid.  Plan:  Discussed diagnosis, pathophysiology, management and treatment options of condition with patient.  Based on 3/2021 labs recommend to continue levothyroxine to 88 mcg/day (on this dose since 7/9/2020).  Continue levothyroxine 88 mcg/day.  Labs in 6-9  months/sooner if concerns or major weight changes.  Please make a lab appointment for blood work and follow up clinic appointment in 1 week after that to discuss results.  Continue to hold off biotin.    Discussed s/s of hypothyroidism and hyperthyroidism to watch for.  The patient indicates understanding of these issues and agrees with the plan.    Standard treatment for hypothyroidism involves daily use of the synthetic thyroid hormone levothyroxine (Levothroid, Synthroid, others).  The dosage of thyroxine should normally be that required to bring the serum TSH level to the low normal range, such as 0.3 - 1 uU/ml. This is typically achieved with 1 ug L-T4/lb body weight/day, ranges from 75 - 125 ug/day in women, and 125 - 200 ug/day in men. Once thyroxine treatment is initiated, it is required indefinitely in most patients.     Symptoms should improve one to two weeks after starting treatment. Treatment with levothyroxine is usually lifelong.  Doseage may need to be adjusted based on body weight, medications, or pregnancy.  To determine the right dosage of levothyroxine initially we will repeat TSH and free T4 after two months.     Excessive amounts of the hormone can cause side effects, such as: Increased appetite, insomnia, heart palpitations, and shakiness.  Patients with CAD will be started on a lower dose.  Levothyroxine causes virtually no side effects when used in the appropriate dose.    In patients with childbearing age precaution should be taking regarding hypothyroidism. Hypothyroid woman are more likely to experience infertility, and they have an increase. Balance and portion, anemia, and gestational hypertension, placental abruption and postpartum hemorrhage.    Certain medications, supplements and foods can affect the body s ability to absorb levothyroxine. Medications include: Iron supplements, Cholestyramine and Calcium supplements.       Follow-up:  6-9 months    Belinda Marcos  MD  Endocrinology  [place] [place] [facility]  CC: Magalis Woodward    All questions were answered.  The patient indicates understanding of the above issues and agrees with the plan set forth.     Addendum to above note and clinic visit:    Labs reviewed.    See result note/telephone encounter.              Again, thank you for allowing me to participate in the care of your patient.        Sincerely,        Belinda Marcos MD

## 2021-03-10 NOTE — PATIENT INSTRUCTIONS
[facility] & [place] locations   Dr Marcos, Endocrinology Department      [facility]   [address]  [place] [address]  Appointment [phone]  Fax: [phone]  [place]: Monday and Tuesday         [facility]   [address]  [place] [address]  Appointment [phone]  Fax: [phone]  [place]: Wednesday and Thursday            Please check the cost coverage and copay with insurance before recommended tests, services and medications (especially if new medications are prescribed).     If ordered, please get blood work done 1 week prior to your next appointment so they will be available to Dr. Marcos at your visit.      Continue levothyroxine 88 mcg/day.  Labs in 6-9 months/sooner if concerns or major weight changes.  Please make a lab appointment for blood work and follow up clinic appointment in 1 week after that to discuss results.    Take Levothyroxine on an empty stomach. Take it with a full glass of water at least 30 minutes to 1 hour before eating breakfast.   This medicine should be taken at least 4 hours before or 4 hours after these medicines: antacids (Maalox , Mylanta , Tums ), calcium supplements, cholestyramine (Prevalite , Questran ), colestipol (Colestid ), iron supplements, orlistat (Heraclio , Xenical ), simethicone (Gas-X , Mylicon ), and sucralfate (Carafate ).   Swallow the capsule whole. Do not cut or crush it.

## 2021-03-10 NOTE — PROGRESS NOTES
"This is a telephone encounter.  Pt declined video visit..  3/10/2021    Start time: 10:29    End time: 10:41      In the setting of COVID-19 outbreak patients visits are transitioned to phone visits/ virtual visits as per system and leadership guidelines.  I have personally reviewed data including notes, lab tests. I have reviewed and interpreted images personally.  This encounter is potentially equivalent to established 3 level (23118) clinic visit.    The patient has been notified of following:      \"This telephone visit will be conducted via a call between you and your physician/provider. We have found that certain health care needs can be provided without the need for a physical exam.  This service lets us provide the care you need with a short phone conversation.  If a prescription is necessary we can send it directly to your pharmacy.  If lab work is needed we can place an order for that and you can then stop by our lab to have the test done at a later time.     We will bill your insurance company for this service.  Please check with your medical insurance if this type of visit is covered. You may be responsible for the cost of this type of visit if insurance coverage is denied.  The typical cost is $30 (10min), $59 (11-20min) and $85 (21-30min).  Most often these visits are shorter than 10 minutes. With new updates with corona virus patient might be billed as clinic visit.     If during the course of the call the physician/provider feels a telephone visit is not appropriate, you will not be charged for this service.\"      Past medical history, social history, family history, allergy and medications were reviewed and updated as appropriate.  Reviewed all pertinent labs, notes and imaging studies as appropriate.    Patient verbally consented to phone visit today: yes.    Disclaimer: This note consists of symbols derived from keyboarding, dictation and/or voice recognition software. As a result, there may be " errors in the script that have gone undetected. Please consider this when interpreting information found in this chart.    ROS neg expect noted in notes.  Patient feels well at this time and denies any tachycardia, palpitations, heat intolerance, tremor, insomnia, diarrhea, or unexplained weight loss.  Patient also denies  cold intolerance, constipation, or unexplained weight gain.   ENDOCRINOLOGY CLINIC NOTE:    Name: Bushra Harmon  Seen for f/u of Hypothyroidism.  HPI:  Bushra Harmon is a 84 year old female who presents for the evaluation of :hypothyroidism.   has a past medical history of CKD (chronic kidney disease), Essential hypertension, benign, and Unspecified hypothyroidism.     Was started on prednisone for RA by Rheumatology.  Gained some wt since starting Prednisone.  She is also on methotrexate.    #1. Hypothyroidism.  Initially diagnosed at age 42.and since then she is on thyroid hormone replacement.  çurrently taking 88 mcg/day (On this dose since 7/9/2020). Dose was decreased at that time based on that.  Labs 3/2021 in range including TSH, FT4 and FT4DL.    Taking generic.  Reports compliance.    Feeling OK. No major s/s.  No longer taking biotin X Aug 2019.    + frontal hair loss.    Palpitations:  No  Changes to hair or skin: chronic problem. + frontal hair loss X 2 years  Diarrhea/Constipation:No  Changes in menses: s/p menopause. Had been on HRT for a while. Stopped at age 67.  Dysphagia or Shortness of breath:No  Tremors:No  Changes in weight: gained some weight.    Heat or cold intolerance: no  History of Lithium or Amiodarone use:No  Head or neck surgery/radiation:No  Family History of Thyroid Problems: + hypothyroidism in mother and sister  PMH/PSH:  Past Medical History:   Diagnosis Date     CKD (chronic kidney disease)      Essential hypertension, benign      Unspecified hypothyroidism      Past Surgical History:   Procedure Laterality Date     ABDOMEN SURGERY       C DEXA, BONE DENSITY,  AXIAL SKEL  2003    Normal BMD     C REPLANTATION THUMB DISTAL,COMPLETE  2009    left thumb trauma; Dr. Jose surgery     COLONOSCOPY N/A 11/3/2015    Procedure: COLONOSCOPY;  Surgeon: Joanna Acevedo MD;  Location:  GI     HC COLONOSCOPY THRU STOMA, DIAGNOSTIC  10/22/2005    Diverticulosis- Dr. Shea     HC FLEX SIGMOIDOSCOPY W/WO SHERIF SPEC BY BRUSH/WASH  1998    normal     HC REMOVE TONSILS/ADENOIDS,<13 Y/O       HC REMV CATARACT EXTRACAP,INSERT LENS, COMPLEX, W/O ECP  2005    Left eye     SURGICAL HISTORY OF -   2004    macular hole repair- Left eye     ZZC NONSPECIFIC PROCEDURE      Exploratory Laparotomy constricting band     ZZC NONSPECIFIC PROCEDURE       x3     Family Hx:  Family History   Problem Relation Age of Onset     Hypertension Mother           at 100.     Breast Cancer Mother      Thyroid Disease Mother      Diabetes Sister      Hypertension Sister      Neurologic Disorder Daughter         MS     Hypertension Son        Social Hx:  Social History     Socioeconomic History     Marital status:      Spouse name: Not on file     Number of children: 3     Years of education: Not on file     Highest education level: Not on file   Occupational History     Occupation: office     Employer: RETIRED   Social Needs     Financial resource strain: Not on file     Food insecurity     Worry: Not on file     Inability: Not on file     Transportation needs     Medical: Not on file     Non-medical: Not on file   Tobacco Use     Smoking status: Former Smoker     Packs/day: 0.50     Years: 45.00     Pack years: 22.50     Quit date: 2001     Years since quittin.8     Smokeless tobacco: Never Used   Substance and Sexual Activity     Alcohol use: Yes     Comment: 1 drink per day     Drug use: No     Sexual activity: Never   Lifestyle     Physical activity     Days per week: Not on file     Minutes per session: Not on file     Stress: Not on file    Relationships     Social connections     Talks on phone: Not on file     Gets together: Not on file     Attends Jewish service: Not on file     Active member of club or organization: Not on file     Attends meetings of clubs or organizations: Not on file     Relationship status: Not on file     Intimate partner violence     Fear of current or ex partner: Not on file     Emotionally abused: Not on file     Physically abused: Not on file     Forced sexual activity: Not on file   Other Topics Concern      Service Not Asked     Blood Transfusions Not Asked     Caffeine Concern Yes     Comment: 1 cup per day     Occupational Exposure Not Asked     Hobby Hazards Not Asked     Sleep Concern Not Asked     Stress Concern Not Asked     Weight Concern Not Asked     Special Diet Not Asked     Back Care Not Asked     Exercise Yes     Comment: Active; exercise 4 times per week     Bike Helmet Not Asked     Seat Belt Yes     Self-Exams No     Parent/sibling w/ CABG, MI or angioplasty before 65F 55M? Not Asked   Social History Narrative     Not on file          MEDICATIONS:  has a current medication list which includes the following prescription(s): calcium citrate-vitamin d, vitamin d3, folic acid, glucosamine hcl, lactobacillus, levothyroxine, lisinopril, lutein, methotrexate, metoprolol succinate er, multivitamin, prednisone, and simvastatin.    ROS   ROS: 10 point ROS neg other than the symptoms noted above in the HPI.    Physical Exam   VS: LMP 02/26/2003   Breastfeeding No       LABS:  TFTs:  ENDO THYROID LABS-[facility] Latest Ref Rng & Units 3/2/2021   TSH 0.40 - 4.00 mU/L 1.49   T4 FREE 0.76 - 1.46 ng/dL 1.43     ENDO THYROID LABS[facility] Latest Ref Rng & Units 9/2/2020   TSH 0.40 - 4.00 mU/L 1.83   T4 FREE 0.76 - 1.46 ng/dL 1.43     Component      Latest Ref Rng & Units 9/2/2020   Free T4 Eq Dialysis      1.1 - 2.4 ng/dL 2.7 (H)     Component      Latest Ref Rng & Units 7/2/2020   Free T4 Eq Dialysis      1.1 - 2.4  ng/dL 3.3 (H)   TSH      0.40 - 4.00 mU/L 0.65   T4 Free      0.76 - 1.46 ng/dL 1.56 (H)     Component      Latest Ref Rng & Units 4/20/2020   Free T4 Eq Dialysis      1.1 - 2.4 ng/dL 2.6 (H)     ENDO THYROID LABS-[facility] Latest Ref Rng & Units 4/20/2020 1/24/2020   TSH 0.40 - 4.00 mU/L 8.54 (H) 2.04   T4 FREE 0.76 - 1.46 ng/dL 1.40 1.56 (H)     ENDO THYROID LABS-[facility] Latest Ref Rng & Units 10/31/2019   TSH 0.40 - 4.00 mU/L 1.56   T4 FREE 0.76 - 1.46 ng/dL 1.57 (H)     Component      Latest Ref Rng & Units 9/23/2019 10/31/2019   Free T4 Eq Dialysis      1.1 - 2.4 ng/dL 2.8 (H)    TSH      0.40 - 4.00 mU/L  1.56   T4 Free      0.76 - 1.46 ng/dL  1.57 (H)     ENDO THYROID LABS-[facility] Latest Ref Rng & Units 9/23/2019   TSH 0.40 - 4.00 mU/L 7.36 (H)   T4 FREE 0.76 - 1.46 ng/dL 1.27   THYR PEROXIDASE RENETTA <35 IU/mL 63 (H)     ENDO THYROID LABS-[facility] Latest Ref Rng & Units 6/5/2019 6/4/2018   TSH 0.40 - 4.00 mU/L 4.10 (H) 4.23 (H)   T4 FREE 0.76 - 1.46 ng/dL 1.68 (H) 1.47 (H)     ENDO THYROID LABS-[facility] Latest Ref Rng & Units 5/31/2017 5/27/2016   TSH 0.40 - 4.00 mU/L 4.16 (H) 2.87   T4 FREE 0.76 - 1.46 ng/dL 1.46        All pertinent notes, labs, and images personally reviewed by me.     A/P  Ms.Nancy YUNG Harmon is a 84 year old here for the evaluation of hypothyroidism:    #1 Hypothyroidism(+TPO):   Currently taking 88 mcg/day (on this dose since 7/9/2020)  F/u labs are in range.  Taking generic.  Clinically looks euthyorid.  Plan:  Discussed diagnosis, pathophysiology, management and treatment options of condition with patient.  Based on 3/2021 labs recommend to continue levothyroxine to 88 mcg/day (on this dose since 7/9/2020).  Continue levothyroxine 88 mcg/day.  Labs in 6-9 months/sooner if concerns or major weight changes.  Please make a lab appointment for blood work and follow up clinic appointment in 1 week after that to discuss results.  Continue to hold off biotin.    Discussed s/s of hypothyroidism and hyperthyroidism to  watch for.  The patient indicates understanding of these issues and agrees with the plan.    Standard treatment for hypothyroidism involves daily use of the synthetic thyroid hormone levothyroxine (Levothroid, Synthroid, others).  The dosage of thyroxine should normally be that required to bring the serum TSH level to the low normal range, such as 0.3 - 1 uU/ml. This is typically achieved with 1 ug L-T4/lb body weight/day, ranges from 75 - 125 ug/day in women, and 125 - 200 ug/day in men. Once thyroxine treatment is initiated, it is required indefinitely in most patients.     Symptoms should improve one to two weeks after starting treatment. Treatment with levothyroxine is usually lifelong.  Doseage may need to be adjusted based on body weight, medications, or pregnancy.  To determine the right dosage of levothyroxine initially we will repeat TSH and free T4 after two months.     Excessive amounts of the hormone can cause side effects, such as: Increased appetite, insomnia, heart palpitations, and shakiness.  Patients with CAD will be started on a lower dose.  Levothyroxine causes virtually no side effects when used in the appropriate dose.    In patients with childbearing age precaution should be taking regarding hypothyroidism. Hypothyroid woman are more likely to experience infertility, and they have an increase. Balance and portion, anemia, and gestational hypertension, placental abruption and postpartum hemorrhage.    Certain medications, supplements and foods can affect the body s ability to absorb levothyroxine. Medications include: Iron supplements, Cholestyramine and Calcium supplements.       Follow-up:  6-9 months    Belinda Marcos MD  Endocrinology  [facility]  CC: Magalis Woodward    All questions were answered.  The patient indicates understanding of the above issues and agrees with the plan set forth.     Addendum to above note and clinic visit:    Labs reviewed.    See result  note/telephone encounter.

## 2021-03-22 ENCOUNTER — IMMUNIZATION (OUTPATIENT)
Dept: NURSING | Facility: CLINIC | Age: 86
End: 2021-03-22
Attending: INTERNAL MEDICINE
Payer: MEDICARE

## 2021-03-22 PROCEDURE — 0002A PR COVID VAC PFIZER DIL RECON 30 MCG/0.3 ML IM: CPT

## 2021-03-22 PROCEDURE — 91300 PR COVID VAC PFIZER DIL RECON 30 MCG/0.3 ML IM: CPT

## 2021-03-25 DIAGNOSIS — I10 ESSENTIAL HYPERTENSION, BENIGN: ICD-10-CM

## 2021-03-26 RX ORDER — METOPROLOL SUCCINATE 25 MG/1
TABLET, EXTENDED RELEASE ORAL
Qty: 90 TABLET | Refills: 0 | Status: SHIPPED | OUTPATIENT
Start: 2021-03-26 | End: 2021-06-07

## 2021-03-26 NOTE — TELEPHONE ENCOUNTER
Pending Prescriptions:                       Disp   Refills    metoprolol succinate ER (TOPROL-XL) 25 MG *90 tab*0        Sig: TAKE 1 TABLET(25 MG) BY MOUTH DAILY    Routing refill request to provider for review/approval because:  BP Readings from Last 3 Encounters:   02/14/20 124/70   02/05/20 118/54   11/07/19 138/76

## 2021-04-21 ENCOUNTER — TRANSFERRED RECORDS (OUTPATIENT)
Dept: HEALTH INFORMATION MANAGEMENT | Facility: CLINIC | Age: 86
End: 2021-04-21

## 2021-04-21 LAB
ALT SERPL-CCNC: 23 IU/L (ref 5–35)
AST SERPL-CCNC: 24 U/L (ref 5–34)
CREAT SERPL-MCNC: 0.79 MG/DL (ref 0.5–1.3)

## 2021-06-07 ENCOUNTER — OFFICE VISIT (OUTPATIENT)
Dept: INTERNAL MEDICINE | Facility: CLINIC | Age: 86
End: 2021-06-07
Payer: MEDICARE

## 2021-06-07 VITALS
RESPIRATION RATE: 16 BRPM | HEART RATE: 85 BPM | DIASTOLIC BLOOD PRESSURE: 65 MMHG | WEIGHT: 179 LBS | HEIGHT: 66 IN | OXYGEN SATURATION: 96 % | BODY MASS INDEX: 28.77 KG/M2 | TEMPERATURE: 97.6 F | SYSTOLIC BLOOD PRESSURE: 160 MMHG

## 2021-06-07 DIAGNOSIS — E78.5 HYPERLIPIDEMIA LDL GOAL <100: ICD-10-CM

## 2021-06-07 DIAGNOSIS — N18.32 STAGE 3B CHRONIC KIDNEY DISEASE (H): ICD-10-CM

## 2021-06-07 DIAGNOSIS — E78.00 HYPERCHOLESTEROLEMIA: ICD-10-CM

## 2021-06-07 DIAGNOSIS — I10 HYPERTENSION GOAL BP (BLOOD PRESSURE) < 140/90: Primary | ICD-10-CM

## 2021-06-07 DIAGNOSIS — I10 ESSENTIAL HYPERTENSION, BENIGN: ICD-10-CM

## 2021-06-07 DIAGNOSIS — E03.8 OTHER SPECIFIED HYPOTHYROIDISM: ICD-10-CM

## 2021-06-07 DIAGNOSIS — M35.3 PMR (POLYMYALGIA RHEUMATICA) (H): ICD-10-CM

## 2021-06-07 LAB
ALBUMIN SERPL-MCNC: 3.6 G/DL (ref 3.4–5)
ALP SERPL-CCNC: 50 U/L (ref 40–150)
ALT SERPL W P-5'-P-CCNC: 29 U/L (ref 0–50)
ANION GAP SERPL CALCULATED.3IONS-SCNC: 1 MMOL/L (ref 3–14)
AST SERPL W P-5'-P-CCNC: 29 U/L (ref 0–45)
BILIRUB SERPL-MCNC: 1.2 MG/DL (ref 0.2–1.3)
BUN SERPL-MCNC: 26 MG/DL (ref 7–30)
CALCIUM SERPL-MCNC: 9.4 MG/DL (ref 8.5–10.1)
CHLORIDE SERPL-SCNC: 102 MMOL/L (ref 94–109)
CHOLEST SERPL-MCNC: 185 MG/DL
CO2 SERPL-SCNC: 33 MMOL/L (ref 20–32)
CREAT SERPL-MCNC: 0.98 MG/DL (ref 0.52–1.04)
CRP SERPL-MCNC: 5.3 MG/L (ref 0–8)
ERYTHROCYTE [DISTWIDTH] IN BLOOD BY AUTOMATED COUNT: 15.2 % (ref 10–15)
ERYTHROCYTE [SEDIMENTATION RATE] IN BLOOD BY WESTERGREN METHOD: 14 MM/H (ref 0–30)
GFR SERPL CREATININE-BSD FRML MDRD: 53 ML/MIN/{1.73_M2}
GLUCOSE SERPL-MCNC: 90 MG/DL (ref 70–99)
HCT VFR BLD AUTO: 43 % (ref 35–47)
HDLC SERPL-MCNC: 91 MG/DL
HGB BLD-MCNC: 14.1 G/DL (ref 11.7–15.7)
LDLC SERPL CALC-MCNC: 76 MG/DL
MCH RBC QN AUTO: 31.1 PG (ref 26.5–33)
MCHC RBC AUTO-ENTMCNC: 32.8 G/DL (ref 31.5–36.5)
MCV RBC AUTO: 95 FL (ref 78–100)
NONHDLC SERPL-MCNC: 94 MG/DL
PLATELET # BLD AUTO: 304 10E9/L (ref 150–450)
POTASSIUM SERPL-SCNC: 4.1 MMOL/L (ref 3.4–5.3)
PROT SERPL-MCNC: 7.2 G/DL (ref 6.8–8.8)
PTH-INTACT SERPL-MCNC: 42 PG/ML (ref 18–80)
RBC # BLD AUTO: 4.54 10E12/L (ref 3.8–5.2)
SODIUM SERPL-SCNC: 136 MMOL/L (ref 133–144)
TRIGL SERPL-MCNC: 91 MG/DL
WBC # BLD AUTO: 7 10E9/L (ref 4–11)

## 2021-06-07 PROCEDURE — 85652 RBC SED RATE AUTOMATED: CPT | Performed by: INTERNAL MEDICINE

## 2021-06-07 PROCEDURE — 36415 COLL VENOUS BLD VENIPUNCTURE: CPT | Performed by: INTERNAL MEDICINE

## 2021-06-07 PROCEDURE — 80061 LIPID PANEL: CPT | Performed by: INTERNAL MEDICINE

## 2021-06-07 PROCEDURE — 82043 UR ALBUMIN QUANTITATIVE: CPT | Performed by: INTERNAL MEDICINE

## 2021-06-07 PROCEDURE — 86140 C-REACTIVE PROTEIN: CPT | Performed by: INTERNAL MEDICINE

## 2021-06-07 PROCEDURE — 82306 VITAMIN D 25 HYDROXY: CPT | Performed by: INTERNAL MEDICINE

## 2021-06-07 PROCEDURE — 85027 COMPLETE CBC AUTOMATED: CPT | Performed by: INTERNAL MEDICINE

## 2021-06-07 PROCEDURE — 99214 OFFICE O/P EST MOD 30 MIN: CPT | Performed by: INTERNAL MEDICINE

## 2021-06-07 PROCEDURE — 83970 ASSAY OF PARATHORMONE: CPT | Performed by: INTERNAL MEDICINE

## 2021-06-07 PROCEDURE — 80053 COMPREHEN METABOLIC PANEL: CPT | Performed by: INTERNAL MEDICINE

## 2021-06-07 RX ORDER — METOPROLOL SUCCINATE 25 MG/1
25 TABLET, EXTENDED RELEASE ORAL 2 TIMES DAILY
Qty: 180 TABLET | Refills: 0 | Status: SHIPPED | OUTPATIENT
Start: 2021-06-07 | End: 2021-07-08

## 2021-06-07 ASSESSMENT — MIFFLIN-ST. JEOR: SCORE: 1275.75

## 2021-06-07 NOTE — PROGRESS NOTES
Dr Houston's note      Patient's instructions / PLAN:                                                        Plan:  1.  Labs today - [address]   2. Increase Metoprolol to 25 mg twice a day  3. Continue the other meds, same doses for now.  4. Follow up in about a month -- Annual  WEllness and blood pressure check  5. Bring the blood pressure machine from home         ASSESSMENT & PLAN:                                                      (I10) Hypertension goal BP (blood pressure) < 140/90  (primary encounter diagnosis)  Comment: Not controlled   Plan: CBC with platelets, Comprehensive metabolic         panel, Lipid panel reflex to direct LDL         Fasting, Albumin Random Urine Quantitative with        Creat Ratio        as above     (E78.5) Hyperlipidemia LDL goal <100  Comment:   Plan: CBC with platelets, Comprehensive metabolic         panel, Lipid panel reflex to direct LDL Fasting            (E03.8) Hypothyroidism - f/u w Endo - dr Meyers   Comment: Controlled    Plan:     (N18.32) Stage 3b chronic kidney disease  Comment: explained the impornace of having BP Controlled    Plan: Vitamin D Deficiency, Parathyroid Hormone         Intact            (M35.3) PMR (polymyalgia rheumatica) (H)  -- rheumatologist -- dr Russo  Comment: stable   Plan: CRP, inflammation, ESR: Erythrocyte         sedimentation rate                Chief complaint:                                                      Follow up chronic medical problems      SUBJECTIVE:                                                    History of present illness:    HTN  -- BP at home: 111/62, 129/64, 142/60, 140/66  HR 68 - 79       Hyperlipidemia Follow-Up      Are you regularly taking any medication or supplement to lower your cholesterol?   Yes- Simvastatin    Are you having muscle aches or other side effects that you think could be caused by your cholesterol lowering medication?  No    Hypertension Follow-up      Do you check your blood pressure  "regularly outside of the clinic? Yes     Are you following a low salt diet? Yes    Are your blood pressures ever more than 140 on the top number (systolic) OR more   than 90 on the bottom number (diastolic), for example 140/90? No      How many servings of fruits and vegetables do you eat daily?  4 or more    On average, how many sweetened beverages do you drink each day (Examples: soda, juice, sweet tea, etc.  Do NOT count diet or artificially sweetened beverages)?   0    How many days per week do you exercise enough to make your heart beat faster? 3 or less    How many minutes a day do you exercise enough to make your heart beat faster? 9 or less    How many days per week do you miss taking your medication? 0          Review of Systems:                                                      ROS: negative for fever, chills, cough, wheezes, chest pain, shortness of breath, vomiting, abdominal pain, leg swelling       OBJECTIVE:             Physical exam:  Blood pressure (!) 160/65, pulse 85, temperature 97.6  F (36.4  C), temperature source Oral, resp. rate 16, height 1.664 m (5' 5.5\"), weight 81.2 kg (179 lb), last menstrual period 02/26/2003, SpO2 96 %, not currently breastfeeding.     NAD, appears comfortable  Skin: no rashes   Neck: supple, no JVD,  No thyroidmegaly. Lymph nodes nonpalpable cervical and supraclavicular.  Chest: clear to auscultation bilaterally, good respiratory effort  Heart: S1 S2, RRR, no mgr appreciated  Abdomen: soft, not tender, no hepatosplenomegaly or masses appreciated, no abdominal bruit, present bowel sounds  Extremities: no edema,   Neurologic: A, Ox3, no focal signs appreciated    PMHx: reviewed  Past Medical History:   Diagnosis Date     CKD (chronic kidney disease)      Essential hypertension, benign      Unspecified hypothyroidism       PSHx: reviewed  Past Surgical History:   Procedure Laterality Date     ABDOMEN SURGERY       C DEXA, BONE DENSITY, AXIAL SKEL  6/16/2003    Normal " BMD     C REPLANTATION THUMB DISTAL,COMPLETE  2009    left thumb trauma; Dr. Jose surgery     COLONOSCOPY N/A 11/3/2015    Procedure: COLONOSCOPY;  Surgeon: Joanna Acevedo MD;  Location:  GI     HC COLONOSCOPY THRU STOMA, DIAGNOSTIC  10/22/2005    Diverticulosis- Dr. Shea     HC FLEX SIGMOIDOSCOPY W/WO SHERIF SPEC BY BRUSH/WASH  1998    normal     HC REMOVE TONSILS/ADENOIDS,<11 Y/O       HC REMV CATARACT EXTRACAP,INSERT LENS, COMPLEX, W/O ECP  2005    Left eye     SURGICAL HISTORY OF -   2004    macular hole repair- Left eye     ZZC NONSPECIFIC PROCEDURE      Exploratory Laparotomy constricting band     ZZC NONSPECIFIC PROCEDURE       x3        Meds: reviewed  Current Outpatient Medications   Medication Sig Dispense Refill     CALCIUM CITRATE-VITAMIN D 315-200 MG-UNIT PO TABS 2 TABLETS DAILY 90 Tab 3     Cholecalciferol (VITAMIN D3) 25 MCG (1000 UT) CAPS Take 1 capsule by mouth daily       Folic Acid (FOLATE PO)        GLUCOSAMINE HCL 1000 MG PO TABS 2 TABLETS DAILY 90 Tab 3     LACTOBACILLUS PO Take 120 mg by mouth daily       levothyroxine (SYNTHROID/LEVOTHROID) 88 MCG tablet Take 1 tablet (88 mcg) by mouth daily 90 tablet 3     lisinopril (ZESTRIL) 20 MG tablet Take 1 tablet (20 mg) by mouth daily 90 tablet 2     Lutein 6 MG TABS Take 20 mg by mouth daily        methotrexate 2.5 MG tablet Take 10 mg by mouth every 7 days       metoprolol succinate ER (TOPROL-XL) 25 MG 24 hr tablet TAKE 1 TABLET(25 MG) BY MOUTH DAILY 90 tablet 0     MULTIVITAMIN TABS   OR 1 daily       predniSONE (DELTASONE) 5 MG tablet Take 1 tablet (5 mg) by mouth daily       simvastatin (ZOCOR) 10 MG tablet TAKE 1 TABLET BY MOUTH EVERY NIGHT AT BEDTIME 90 tablet 3       Soc Hx: reviewed  Fam Hx: reviewed          Natalie Houston MD  Internal Medicine

## 2021-06-07 NOTE — PATIENT INSTRUCTIONS
Plan:  1.  Labs today - [address]   2. Increase Metoprolol to 25 mg twice a day  3. Continue the other meds, same doses for now.  4. Follow up in about a month -- Annual  WEllness and blood pressure check  5. Bring the blood pressure machine from home

## 2021-06-08 LAB
CREAT UR-MCNC: 64 MG/DL
DEPRECATED CALCIDIOL+CALCIFEROL SERPL-MC: 60 UG/L (ref 20–75)
MICROALBUMIN UR-MCNC: 32 MG/L
MICROALBUMIN/CREAT UR: 50.7 MG/G CR (ref 0–25)

## 2021-06-21 ENCOUNTER — TRANSFERRED RECORDS (OUTPATIENT)
Dept: HEALTH INFORMATION MANAGEMENT | Facility: CLINIC | Age: 86
End: 2021-06-21

## 2021-07-08 ENCOUNTER — OFFICE VISIT (OUTPATIENT)
Dept: INTERNAL MEDICINE | Facility: CLINIC | Age: 86
End: 2021-07-08
Payer: MEDICARE

## 2021-07-08 VITALS
HEART RATE: 88 BPM | SYSTOLIC BLOOD PRESSURE: 144 MMHG | DIASTOLIC BLOOD PRESSURE: 78 MMHG | WEIGHT: 178.3 LBS | OXYGEN SATURATION: 94 % | HEIGHT: 66 IN | TEMPERATURE: 98 F | BODY MASS INDEX: 28.66 KG/M2 | RESPIRATION RATE: 16 BRPM

## 2021-07-08 DIAGNOSIS — M35.3 PMR (POLYMYALGIA RHEUMATICA) (H): ICD-10-CM

## 2021-07-08 DIAGNOSIS — Z00.00 ENCOUNTER FOR MEDICARE ANNUAL WELLNESS EXAM: Primary | ICD-10-CM

## 2021-07-08 DIAGNOSIS — E78.5 HYPERLIPIDEMIA LDL GOAL <100: ICD-10-CM

## 2021-07-08 DIAGNOSIS — I10 HYPERTENSION GOAL BP (BLOOD PRESSURE) < 140/90: ICD-10-CM

## 2021-07-08 DIAGNOSIS — E03.8 OTHER SPECIFIED HYPOTHYROIDISM: ICD-10-CM

## 2021-07-08 DIAGNOSIS — Z78.0 ASYMPTOMATIC POSTMENOPAUSAL STATUS: ICD-10-CM

## 2021-07-08 PROCEDURE — G0439 PPPS, SUBSEQ VISIT: HCPCS | Performed by: INTERNAL MEDICINE

## 2021-07-08 PROCEDURE — 99214 OFFICE O/P EST MOD 30 MIN: CPT | Mod: 25 | Performed by: INTERNAL MEDICINE

## 2021-07-08 RX ORDER — METOPROLOL SUCCINATE 25 MG/1
25 TABLET, EXTENDED RELEASE ORAL 2 TIMES DAILY
Qty: 180 TABLET | Refills: 0 | Status: SHIPPED | OUTPATIENT
Start: 2021-07-08 | End: 2021-10-11

## 2021-07-08 RX ORDER — SIMVASTATIN 10 MG
10 TABLET ORAL AT BEDTIME
Qty: 90 TABLET | Refills: 3 | Status: SHIPPED | OUTPATIENT
Start: 2021-07-08 | End: 2021-10-11

## 2021-07-08 RX ORDER — LISINOPRIL 20 MG/1
20 TABLET ORAL DAILY
Qty: 90 TABLET | Refills: 1 | Status: SHIPPED | OUTPATIENT
Start: 2021-07-08 | End: 2021-09-03

## 2021-07-08 ASSESSMENT — ENCOUNTER SYMPTOMS
BREAST MASS: 0
FREQUENCY: 0
JOINT SWELLING: 0
HEADACHES: 0
WEAKNESS: 1
SHORTNESS OF BREATH: 0
COUGH: 0
NERVOUS/ANXIOUS: 0
DYSURIA: 0
CONSTIPATION: 0
HEARTBURN: 0
DIZZINESS: 0
HEMATOCHEZIA: 0
CHILLS: 0
PARESTHESIAS: 0
HEMATURIA: 0
FEVER: 0
PALPITATIONS: 0
DIARRHEA: 0
ARTHRALGIAS: 0
SORE THROAT: 0
MYALGIAS: 0
ABDOMINAL PAIN: 0
EYE PAIN: 0

## 2021-07-08 ASSESSMENT — MIFFLIN-ST. JEOR: SCORE: 1272.57

## 2021-07-08 ASSESSMENT — ACTIVITIES OF DAILY LIVING (ADL): CURRENT_FUNCTION: NO ASSISTANCE NEEDED

## 2021-07-08 NOTE — PROGRESS NOTES
Dr Houston's note    Patient's instructions / PLAN:                                                        Plan:  1. Continue same meds, same doses for now   2. Continue to monitor the blood pressure  3. Bone density scan --[phone]  4. Follow up in 3 months - for blood pressure and bone density scan   5. The following vaccines are recommended for you. Please check with your insurance about coverage.  Some insurances cover better if you have these vaccines at the pharmacy:  -- Tetanus vaccine       ASSESSMENT & PLAN:                                                        MedProb: HTN, CKD3, PMR - dr Russo, HLipidemia, hypothyroidism     (Z00.00) Encounter for Medicare annual wellness exam  (primary encounter diagnosis)  Comment:   Plan:     (I10) Hypertension goal BP (blood pressure) < 140/90  Comment: Controlled  For age  Plan: metoprolol succinate ER (TOPROL-XL) 25 MG 24 hr        tablet            (E78.5) Hyperlipidemia LDL goal <100  Comment: Controlled    Plan: simvastatin (ZOCOR) 10 MG tablet            (E03.8) Hypothyroidism - f/u w Endo - dr Meyers   Comment:   Plan:     (Z78.0) Asymptomatic postmenopausal status  Comment:   Plan: DX Hip/Pelvis/Spine            (M35.3) PMR (polymyalgia rheumatica) (H)  -- rheumatologist -- dr Russo  Comment: stable   Plan:        Chief Complaint:                                                        Annual exam  Follow up chronic medical problems      SUBJECTIVE:                                                    History of present illness     We reviewed the chronic medical problems as above.   I reviewed the recent tests results in Epic       PMR: on prednisone + methotrexate x 1y   Lipomas and wt gain sec prednisone   June 7 labs -- Discussed      HTN:  -- home /52 - 136/72  73 - 75  -- brought home BP machine  -- today home BP machine: 5-10 points higher than out machine    ROS:     See below     PMHx: - reviewed  Past Medical History:   Diagnosis Date      CKD (chronic kidney disease)      Essential hypertension, benign      Unspecified hypothyroidism        PSHx: reviewed  Past Surgical History:   Procedure Laterality Date     ABDOMEN SURGERY       C DEXA, BONE DENSITY, AXIAL SKEL  2003    Normal BMD     C REPLANTATION THUMB DISTAL,COMPLETE  2009    left thumb trauma; Dr. Jose surgery     COLONOSCOPY N/A 11/3/2015    Procedure: COLONOSCOPY;  Surgeon: Joanna Acevedo MD;  Location:  GI     HC COLONOSCOPY THRU STOMA, DIAGNOSTIC  10/22/2005    Diverticulosis- Dr. Shea     HC FLEX SIGMOIDOSCOPY W/WO SHERIF SPEC BY BRUSH/WASH  1998    normal     HC REMOVE TONSILS/ADENOIDS,<11 Y/O       HC REMV CATARACT EXTRACAP,INSERT LENS, COMPLEX, W/O ECP  2005    Left eye     SURGICAL HISTORY OF -   2004    macular hole repair- Left eye     ZZC NONSPECIFIC PROCEDURE      Exploratory Laparotomy constricting band     ZZC NONSPECIFIC PROCEDURE       x3        Soc Hx: No daily alcohol, no smoking  Social History     Socioeconomic History     Marital status:      Spouse name: Not on file     Number of children: 3     Years of education: Not on file     Highest education level: Not on file   Occupational History     Occupation: office     Employer: RETIRED   Social Needs     Financial resource strain: Not on file     Food insecurity     Worry: Not on file     Inability: Not on file     Transportation needs     Medical: Not on file     Non-medical: Not on file   Tobacco Use     Smoking status: Former Smoker     Packs/day: 0.50     Years: 45.00     Pack years: 22.50     Quit date: 2001     Years since quittin.2     Smokeless tobacco: Never Used   Substance and Sexual Activity     Alcohol use: Yes     Comment: 1 drink per day     Drug use: No     Sexual activity: Never   Lifestyle     Physical activity     Days per week: Not on file     Minutes per session: Not on file     Stress: Not on file   Relationships     Social connections     Talks  on phone: Not on file     Gets together: Not on file     Attends Cheondoism service: Not on file     Active member of club or organization: Not on file     Attends meetings of clubs or organizations: Not on file     Relationship status: Not on file     Intimate partner violence     Fear of current or ex partner: Not on file     Emotionally abused: Not on file     Physically abused: Not on file     Forced sexual activity: Not on file   Other Topics Concern      Service Not Asked     Blood Transfusions Not Asked     Caffeine Concern Yes     Comment: 1 cup per day     Occupational Exposure Not Asked     Hobby Hazards Not Asked     Sleep Concern Not Asked     Stress Concern Not Asked     Weight Concern Not Asked     Special Diet Not Asked     Back Care Not Asked     Exercise Yes     Comment: Active; exercise 4 times per week     Bike Helmet Not Asked     Seat Belt Yes     Self-Exams No     Parent/sibling w/ CABG, MI or angioplasty before 65F 55M? Not Asked   Social History Narrative     Not on file        Fam Hx: reviewed  Family History   Problem Relation Age of Onset     Hypertension Mother           at 100.     Breast Cancer Mother      Thyroid Disease Mother      Diabetes Sister      Hypertension Sister      Neurologic Disorder Daughter         MS     Hypertension Son          Screening: reviewed      All: reviewed    Meds: reviewed  Current Outpatient Medications   Medication Sig Dispense Refill     CALCIUM CITRATE-VITAMIN D 315-200 MG-UNIT PO TABS 2 TABLETS DAILY 90 Tab 3     Cholecalciferol (VITAMIN D3) 25 MCG (1000 UT) CAPS Take 1 capsule by mouth daily       Folic Acid (FOLATE PO)        GLUCOSAMINE HCL 1000 MG PO TABS 2 TABLETS DAILY 90 Tab 3     LACTOBACILLUS PO Take 120 mg by mouth daily       levothyroxine (SYNTHROID/LEVOTHROID) 88 MCG tablet Take 1 tablet (88 mcg) by mouth daily 90 tablet 3     lisinopril (ZESTRIL) 20 MG tablet Take 1 tablet (20 mg) by mouth daily 90 tablet 2     Lutein  "6 MG TABS Take 20 mg by mouth daily        methotrexate 2.5 MG tablet Take 10 mg by mouth every 7 days       metoprolol succinate ER (TOPROL-XL) 25 MG 24 hr tablet Take 1 tablet (25 mg) by mouth 2 times daily 180 tablet 0     MULTIVITAMIN TABS   OR 1 daily       predniSONE (DELTASONE) 5 MG tablet Take 1 tablet (5 mg) by mouth daily       simvastatin (ZOCOR) 10 MG tablet TAKE 1 TABLET BY MOUTH EVERY NIGHT AT BEDTIME 90 tablet 3       OBJECTIVE:                                                    Physical Exam :  Blood pressure (!) 144/78, pulse 88, temperature 98  F (36.7  C), temperature source Oral, resp. rate 16, height 1.664 m (5' 5.5\"), weight 80.9 kg (178 lb 4.8 oz), last menstrual period 02/26/2003, SpO2 94 %, not currently breastfeeding.     NAD, appears comfortable  Skin clear, no rashes  Neck: supple, no JVD,  no thyroidmegaly  Lymph nodes non palpable in the cervical, supraclavicular axillaries,   Chest: clear to auscultation with good respiratory effort  Cardiac: S1S2, RRR, no mgr appreciated  Abdomen: soft, not tender, not distended, audible bowel sound, no hepatosplenomegaly, no palpable masses, no abdominal bruits  Extremities: no cyanosis, clubbing or edema.   Neuro: A, Ox3, no focal signs.  Breast exam in supine and erect position: they are symmetrical, no skin changes, no tenderness or nodes on palpation. Nipples are erect, no skin lesions, no discharge on pressure.    Pelvic exam: deferred, s/p menopause, no symptoms, no hx of abnormal pap       Natalie Houston MD  Internal Medicine         SUBJECTIVE:   Bushra Harmon is a 85 year old female who presents for Preventive Visit.      Patient has been advised of split billing requirements and indicates understanding: Yes   Are you in the first 12 months of your Medicare coverage?  No    Healthy Habits:     In general, how would you rate your overall health?  Excellent    Frequency of exercise:  4-5 days/week    Duration of exercise:  30-45 minutes    Do " "you usually eat at least 4 servings of fruit and vegetables a day, include whole grains    & fiber and avoid regularly eating high fat or \"junk\" foods?  Yes    Taking medications regularly:  Yes    Medication side effects:  None    Ability to successfully perform activities of daily living:  No assistance needed    Home Safety:  No safety concerns identified    Hearing Impairment:  No hearing concerns    In the past 6 months, have you been bothered by leaking of urine?  No    In general, how would you rate your overall mental or emotional health?  Excellent      PHQ-2 Total Score: 0    Additional concerns today:  No    Do you feel safe in your environment? Yes    Have you ever done Advance Care Planning? (For example, a Health Directive, POLST, or a discussion with a medical provider or your loved ones about your wishes): Yes, advance care planning is on file.       Fall risk  Fallen 2 or more times in the past year?: No  Any fall with injury in the past year?: No    Cognitive Screening   1) Repeat 3 items (Leader, Season, Table)    2) Clock draw: NORMAL  3) 3 item recall: Recalls 3 objects  Results: 3 items recalled: COGNITIVE IMPAIRMENT LESS LIKELY    Mini-CogTM Copyright AKI Pryor. Licensed by the author for use in [facility]; reprinted with permission (soob@[facility]). All rights reserved.      Do you have sleep apnea, excessive snoring or daytime drowsiness?: no    Reviewed and updated as needed this visit by clinical staff                 Reviewed and updated as needed this visit by Provider                Social History     Tobacco Use     Smoking status: Former Smoker     Packs/day: 0.50     Years: 45.00     Pack years: 22.50     Quit date: 2001     Years since quittin.2     Smokeless tobacco: Never Used   Substance Use Topics     Alcohol use: Yes     Comment: 1 drink per day         Alcohol Use 2019   Prescreen: >3 drinks/day or >7 drinks/week? No   Prescreen: >3 drinks/day or >7 " drinks/week? -           Hyperlipidemia Follow-Up      Are you regularly taking any medication or supplement to lower your cholesterol?   Yes- Simvastatin    Are you having muscle aches or other side effects that you think could be caused by your cholesterol lowering medication?  No    Hypertension Follow-up      Do you check your blood pressure regularly outside of the clinic? Yes     Are you following a low salt diet? Yes    Are your blood pressures ever more than 140 on the top number (systolic) OR more   than 90 on the bottom number (diastolic), for example 140/90? Yes      Current providers sharing in care for this patient include:   Patient Care Team:  Magalis Woodward MD as PCP - General (Internal Medicine)  Belinda Marcos MD as Assigned Endocrinology Provider  Neva Cabello DPM, Podiatry/Foot and Ankle Surgery as Assigned Musculoskeletal Provider  Tang Chatterjee MD as Assigned Surgical Provider  Natalie Bro MD as Assigned PCP    The following health maintenance items are reviewed in Epic and correct as of today:  Health Maintenance Due   Topic Date Due     ANNUAL REVIEW OF HM ORDERS  Never done     DTAP/TDAP/TD IMMUNIZATION (5 - Td) 05/10/2020     MEDICARE ANNUAL WELLNESS VISIT  06/05/2020       Pertinent mammograms are reviewed under the imaging tab.    Review of Systems   Constitutional: Negative for chills and fever.   HENT: Negative for congestion, ear pain, hearing loss and sore throat.    Eyes: Negative for pain and visual disturbance.   Respiratory: Negative for cough and shortness of breath.    Cardiovascular: Negative for chest pain, palpitations and peripheral edema.   Gastrointestinal: Negative for abdominal pain, constipation, diarrhea, heartburn and hematochezia.   Breasts:  Negative for tenderness, breast mass and discharge.   Genitourinary: Negative for dysuria, frequency, genital sores, hematuria, pelvic pain, urgency, vaginal bleeding and vaginal  "discharge.   Musculoskeletal: Negative for arthralgias, joint swelling and myalgias.   Skin: Negative for rash.   Neurological: Positive for weakness. Negative for dizziness, headaches and paresthesias.   Psychiatric/Behavioral: Negative for mood changes. The patient is not nervous/anxious.          Patient has been advised of split billing requirements and indicates understanding: Yes At the check in, at the    COUNSELING:  Reviewed preventive health counseling, as reflected in patient instructions       Regular exercise       Healthy diet/nutrition    Estimated body mass index is 29.33 kg/m  as calculated from the following:    Height as of 6/7/21: 1.664 m (5' 5.5\").    Weight as of 6/7/21: 81.2 kg (179 lb).        She reports that she quit smoking about 20 years ago. She has a 22.50 pack-year smoking history. She has never used smokeless tobacco.      Appropriate preventive services were discussed with this patient, including applicable screening as appropriate for cardiovascular disease, diabetes, osteopenia/osteoporosis, and glaucoma.  As appropriate for age/gender, discussed screening for colorectal cancer, prostate cancer, breast cancer, and cervical cancer. Checklist reviewing preventive services available has been given to the patient.    Reviewed patients plan of care and provided an AVS. The Basic Care Plan (routine screening as documented in Health Maintenance) for Bushra meets the Care Plan requirement. This Care Plan has been established and reviewed with the Patient.    Counseling Resources:  ATP IV Guidelines  Pooled Cohorts Equation Calculator  Breast Cancer Risk Calculator  Breast Cancer: Medication to Reduce Risk  FRAX Risk Assessment  ICSI Preventive Guidelines  Dietary Guidelines for Americans, 2010  [employer]'s MyPlate  ASA Prophylaxis  Lung CA Screening    Natalie Bro MD  [facility]    Identified Health Risks:  "

## 2021-07-08 NOTE — PATIENT INSTRUCTIONS
Plan:  1. Continue same meds, same doses for now   2. Continue to monitor the blood pressure  3. Bone density scan --[phone]  4. Follow up in 3 months - for blood pressure and bone density scan   5. The following vaccines are recommended for you. Please check with your insurance about coverage.  Some insurances cover better if you have these vaccines at the pharmacy:  -- Tetanus vaccine

## 2021-07-26 ENCOUNTER — HOSPITAL ENCOUNTER (OUTPATIENT)
Dept: MAMMOGRAPHY | Facility: CLINIC | Age: 86
Discharge: HOME | End: 2021-07-26
Attending: INTERNAL MEDICINE | Admitting: INTERNAL MEDICINE
Payer: MEDICARE

## 2021-07-26 DIAGNOSIS — Z12.31 VISIT FOR SCREENING MAMMOGRAM: ICD-10-CM

## 2021-07-26 PROCEDURE — 77063 BREAST TOMOSYNTHESIS BI: CPT

## 2021-08-11 ENCOUNTER — TRANSFERRED RECORDS (OUTPATIENT)
Dept: HEALTH INFORMATION MANAGEMENT | Facility: CLINIC | Age: 86
End: 2021-08-11

## 2021-08-11 LAB
ALT SERPL-CCNC: 26 U/L (ref 5–35)
AST SERPL-CCNC: 28 U/L (ref 5–34)
CREATININE (EXTERNAL): 0.82 MG/DL (ref 0.5–1.3)
GFR ESTIMATED (EXTERNAL): 70.8 ML/MIN/1.73M2
GFR ESTIMATED (IF AFRICAN AMERICAN) (EXTERNAL): NORMAL ML/MIN/1.73M2

## 2021-09-02 ENCOUNTER — ANCILLARY PROCEDURE (OUTPATIENT)
Dept: BONE DENSITY | Facility: CLINIC | Age: 86
End: 2021-09-02
Payer: MEDICARE

## 2021-09-02 ENCOUNTER — ANCILLARY PROCEDURE (OUTPATIENT)
Dept: BONE DENSITY | Facility: CLINIC | Age: 86
End: 2021-09-02
Attending: INTERNAL MEDICINE
Payer: MEDICARE

## 2021-09-02 DIAGNOSIS — I10 HYPERTENSION GOAL BP (BLOOD PRESSURE) < 140/90: Primary | ICD-10-CM

## 2021-09-02 DIAGNOSIS — Z78.0 ASYMPTOMATIC MENOPAUSAL STATE: ICD-10-CM

## 2021-09-02 DIAGNOSIS — Z78.0 ASYMPTOMATIC POSTMENOPAUSAL STATUS: ICD-10-CM

## 2021-09-02 PROCEDURE — 77081 DXA BONE DENSITY APPENDICULR: CPT | Mod: XU | Performed by: INTERNAL MEDICINE

## 2021-09-02 PROCEDURE — 77085 DXA BONE DENSITY AXL VRT FX: CPT | Performed by: INTERNAL MEDICINE

## 2021-09-03 RX ORDER — LISINOPRIL 20 MG/1
TABLET ORAL
Qty: 90 TABLET | Refills: 0 | Status: SHIPPED | OUTPATIENT
Start: 2021-09-03 | End: 2021-10-11

## 2021-09-03 NOTE — TELEPHONE ENCOUNTER
Pending Prescriptions:                       Disp   Refills    lisinopril (ZESTRIL) 20 MG tablet [Pharmac*90 tab*1        Sig: TAKE 1 TABLET(20 MG) BY MOUTH DAILY  Routing refill request to provider for review/approval because:  Fails protocol    Next 5 appointments (look out 90 days)      Oct 11, 2021 10:00 AM  Office Visit with Natalie Bro MD  [facility] ([facility] ) [address]  [facility] [id number] [phone]

## 2021-09-04 ENCOUNTER — HEALTH MAINTENANCE LETTER (OUTPATIENT)
Age: 86
End: 2021-09-04

## 2021-09-15 ENCOUNTER — TRANSFERRED RECORDS (OUTPATIENT)
Dept: HEALTH INFORMATION MANAGEMENT | Facility: CLINIC | Age: 86
End: 2021-09-15

## 2021-10-11 ENCOUNTER — OFFICE VISIT (OUTPATIENT)
Dept: INTERNAL MEDICINE | Facility: CLINIC | Age: 86
End: 2021-10-11
Payer: MEDICARE

## 2021-10-11 VITALS
RESPIRATION RATE: 16 BRPM | TEMPERATURE: 97.2 F | WEIGHT: 180.4 LBS | SYSTOLIC BLOOD PRESSURE: 134 MMHG | BODY MASS INDEX: 28.99 KG/M2 | OXYGEN SATURATION: 95 % | HEART RATE: 87 BPM | DIASTOLIC BLOOD PRESSURE: 64 MMHG | HEIGHT: 66 IN

## 2021-10-11 DIAGNOSIS — M85.80 OSTEOPENIA, UNSPECIFIED LOCATION: Primary | ICD-10-CM

## 2021-10-11 DIAGNOSIS — I10 HYPERTENSION GOAL BP (BLOOD PRESSURE) < 140/90: ICD-10-CM

## 2021-10-11 DIAGNOSIS — E78.5 HYPERLIPIDEMIA LDL GOAL <100: ICD-10-CM

## 2021-10-11 DIAGNOSIS — E03.8 OTHER SPECIFIED HYPOTHYROIDISM: ICD-10-CM

## 2021-10-11 PROCEDURE — 99214 OFFICE O/P EST MOD 30 MIN: CPT | Performed by: INTERNAL MEDICINE

## 2021-10-11 RX ORDER — LISINOPRIL 20 MG/1
20 TABLET ORAL DAILY
Qty: 90 TABLET | Refills: 3 | Status: SHIPPED | OUTPATIENT
Start: 2021-10-11 | End: 2022-07-11

## 2021-10-11 RX ORDER — SIMVASTATIN 10 MG
10 TABLET ORAL AT BEDTIME
Qty: 90 TABLET | Refills: 3 | Status: SHIPPED | OUTPATIENT
Start: 2021-10-11 | End: 2022-07-11

## 2021-10-11 RX ORDER — ALENDRONATE SODIUM 70 MG/1
70 TABLET ORAL
Qty: 12 TABLET | Refills: 0 | Status: SHIPPED | OUTPATIENT
Start: 2021-10-11 | End: 2022-04-05 | Stop reason: SINTOL

## 2021-10-11 RX ORDER — METOPROLOL SUCCINATE 25 MG/1
25 TABLET, EXTENDED RELEASE ORAL 2 TIMES DAILY
Qty: 180 TABLET | Refills: 3 | Status: SHIPPED | OUTPATIENT
Start: 2021-10-11 | End: 2022-08-18

## 2021-10-11 ASSESSMENT — MIFFLIN-ST. JEOR: SCORE: 1282.1

## 2021-10-11 NOTE — PROGRESS NOTES
Dr Houston's note      Patient's instructions / PLAN:                                                        Plan:  1. Alendronate/ Fosamax 70 mg once a week  2. Please show the bone density report to dr Russo  3. Continue the other meds, same doses for now.  4. Schedule AANUAL EXAM after July 8  5. Please make a lab appointment for fasting labs  Few days before the annual exam   Be aware that sometimes it takes more than 3-4 weeks to find an appointment.   It would be advisable to schedule the appointment far in advance so you get get the care and the refills in time.        ASSESSMENT & PLAN:                                                      (M85.80) Osteopenia, unspecified location  (primary encounter diagnosis)  Comment:     We discussed about the med, how to take, the advantages and potential side effects. She will check with her dentist before starting this med, to make sure she does not have infection or contraindications. The patient will read also the info from the pharmacy and call back if questions.  We also discussed about avoiding lifting heavy weights, jumping. Weight bearing exercises are beneficial.    Plan: alendronate (FOSAMAX) 70 MG tablet            (I10) Hypertension goal BP (blood pressure) < 140/90  Comment: Controlled    Plan: lisinopril (ZESTRIL) 20 MG tablet, metoprolol         succinate ER (TOPROL-XL) 25 MG 24 hr tablet,         CBC with platelets, Albumin Random Urine         Quantitative with Creat Ratio, Lipid panel         reflex to direct LDL Fasting, Comprehensive         metabolic panel, CK total            (E03.8) Other specified hypothyroidism  Comment:   Plan: f/u w endo     (E78.5) Hyperlipidemia LDL goal <100  Comment: Controlled    Plan: simvastatin (ZOCOR) 10 MG tablet, CBC with         platelets, Albumin Random Urine Quantitative         with Creat Ratio, Lipid panel reflex to direct         LDL Fasting, Comprehensive metabolic panel, CK         total            "    Chief complaint:                                                      Follow up chronic medical problems       SUBJECTIVE:                                                    History of present illness:     HTN  -- BP -- 105/52  -- 152/71, HR 66 - 77  --     DEXA  -- Discussed    -- she is not sure if she wants to start any meds  -- normal PTH vit D    Hyperlipidemia Follow-Up      Are you regularly taking any medication or supplement to lower your cholesterol?   Yes- simvastatin    Are you having muscle aches or other side effects that you think could be caused by your cholesterol lowering medication?  No    Hypertension Follow-up      Do you check your blood pressure regularly outside of the clinic? Yes     Are you following a low salt diet? Yes    Are your blood pressures ever more than 140 on the top number (systolic) OR more   than 90 on the bottom number (diastolic), for example 140/90? Yes      How many servings of fruits and vegetables do you eat daily?  4 or more    On average, how many sweetened beverages do you drink each day (Examples: soda, juice, sweet tea, etc.  Do NOT count diet or artificially sweetened beverages)?   0    How many days per week do you exercise enough to make your heart beat faster? 3 or less    How many minutes a day do you exercise enough to make your heart beat faster? 20 - 29    How many days per week do you miss taking your medication? 0      Review of Systems:                                                      ROS: negative for fever, chills, cough, wheezes, chest pain, shortness of breath, vomiting, abdominal pain, leg swelling       OBJECTIVE:             Physical exam:  Blood pressure 134/64, pulse 87, temperature 97.2  F (36.2  C), temperature source Tympanic, resp. rate 16, height 1.664 m (5' 5.5\"), weight 81.8 kg (180 lb 6.4 oz), last menstrual period 02/26/2003, SpO2 95 %, not currently breastfeeding.     NAD, appears comfortable  Neurologic: A, Ox3, no focal signs " appreciated    PMHx: reviewed  Past Medical History:   Diagnosis Date     CKD (chronic kidney disease)      Essential hypertension, benign      Unspecified hypothyroidism       PSHx: reviewed  Past Surgical History:   Procedure Laterality Date     ABDOMEN SURGERY       C DEXA, BONE DENSITY, AXIAL SKEL  2003    Normal BMD     C REPLANTATION THUMB DISTAL,COMPLETE  2009    left thumb trauma; Dr. Jose surgery     COLONOSCOPY N/A 11/3/2015    Procedure: COLONOSCOPY;  Surgeon: Joanna Acevedo MD;  Location: [facility]     HC FLEX SIGMOIDOSCOPY W/WO SHERIF SPEC BY BRUSH/WASH  1998    normal     HC REMOVE TONSILS/ADENOIDS,<13 Y/O       HC REMV CATARACT EXTRACAP,INSERT LENS, COMPLEX, W/O ECP  2005    Left eye     SURGICAL HISTORY OF -   2004    macular hole repair- Left eye     ZZC NONSPECIFIC PROCEDURE      Exploratory Laparotomy constricting band     ZZC NONSPECIFIC PROCEDURE       x3     ZZHC COLONOSCOPY THRU STOMA, DIAGNOSTIC  10/22/2005    Diverticulosis- Dr. Shea        Meds: reviewed  Current Outpatient Medications   Medication Sig Dispense Refill     CALCIUM CITRATE-VITAMIN D 315-200 MG-UNIT PO TABS 2 TABLETS DAILY 90 Tab 3     Cholecalciferol (VITAMIN D3) 25 MCG (1000 UT) CAPS Take 1 capsule by mouth daily       Folic Acid (FOLATE PO)        GLUCOSAMINE HCL 1000 MG PO TABS 2 TABLETS DAILY 90 Tab 3     LACTOBACILLUS PO Take 120 mg by mouth daily       levothyroxine (SYNTHROID/LEVOTHROID) 88 MCG tablet Take 1 tablet (88 mcg) by mouth daily 90 tablet 3     lisinopril (ZESTRIL) 20 MG tablet TAKE 1 TABLET(20 MG) BY MOUTH DAILY 90 tablet 0     Lutein 6 MG TABS Take 20 mg by mouth daily        methotrexate 2.5 MG tablet Take 10 mg by mouth every 7 days       metoprolol succinate ER (TOPROL-XL) 25 MG 24 hr tablet Take 1 tablet (25 mg) by mouth 2 times daily 180 tablet 0     MULTIVITAMIN TABS   OR 1 daily       predniSONE (DELTASONE) 5 MG tablet Take 1 tablet (5 mg) by mouth daily        simvastatin (ZOCOR) 10 MG tablet Take 1 tablet (10 mg) by mouth At Bedtime 90 tablet 3       Soc Hx: reviewed  Fam Hx: reviewed          Natalie Houston MD  Internal Medicine

## 2021-10-11 NOTE — PATIENT INSTRUCTIONS
Plan:  1. Alendronate/ Fosamax 70 mg once a week  2. Please show the bone density report to dr Russo  3. Continue the other meds, same doses for now.  4. Schedule AANUAL EXAM after July 8  5. Please make a lab appointment for fasting labs  Few days before the annual exam   Be aware that sometimes it takes more than 3-4 weeks to find an appointment.   It would be advisable to schedule the appointment far in advance so you get get the care and the refills in time.

## 2021-11-11 ENCOUNTER — TRANSFERRED RECORDS (OUTPATIENT)
Dept: HEALTH INFORMATION MANAGEMENT | Facility: CLINIC | Age: 86
End: 2021-11-11
Payer: MEDICARE

## 2021-11-18 ENCOUNTER — OFFICE VISIT (OUTPATIENT)
Dept: URGENT CARE | Facility: URGENT CARE | Age: 86
End: 2021-11-18
Payer: MEDICARE

## 2021-11-18 VITALS
OXYGEN SATURATION: 96 % | RESPIRATION RATE: 16 BRPM | BODY MASS INDEX: 29.17 KG/M2 | WEIGHT: 178 LBS | DIASTOLIC BLOOD PRESSURE: 69 MMHG | SYSTOLIC BLOOD PRESSURE: 163 MMHG | HEART RATE: 99 BPM | TEMPERATURE: 98.8 F

## 2021-11-18 DIAGNOSIS — Z20.822 EXPOSURE TO 2019 NOVEL CORONAVIRUS: Primary | ICD-10-CM

## 2021-11-18 DIAGNOSIS — J20.9 BRONCHOSPASM WITH BRONCHITIS, ACUTE: ICD-10-CM

## 2021-11-18 PROCEDURE — 99214 OFFICE O/P EST MOD 30 MIN: CPT | Performed by: FAMILY MEDICINE

## 2021-11-18 PROCEDURE — U0003 INFECTIOUS AGENT DETECTION BY NUCLEIC ACID (DNA OR RNA); SEVERE ACUTE RESPIRATORY SYNDROME CORONAVIRUS 2 (SARS-COV-2) (CORONAVIRUS DISEASE [COVID-19]), AMPLIFIED PROBE TECHNIQUE, MAKING USE OF HIGH THROUGHPUT TECHNOLOGIES AS DESCRIBED BY CMS-2020-01-R: HCPCS | Performed by: FAMILY MEDICINE

## 2021-11-18 PROCEDURE — U0005 INFEC AGEN DETEC AMPLI PROBE: HCPCS | Performed by: FAMILY MEDICINE

## 2021-11-18 RX ORDER — AZITHROMYCIN 250 MG/1
TABLET, FILM COATED ORAL
Qty: 6 TABLET | Refills: 0 | Status: SHIPPED | OUTPATIENT
Start: 2021-11-18 | End: 2021-11-23

## 2021-11-18 RX ORDER — PREDNISONE 20 MG/1
TABLET ORAL
Qty: 10 TABLET | Refills: 0 | Status: SHIPPED | OUTPATIENT
Start: 2021-11-18 | End: 2021-12-15

## 2021-11-18 NOTE — PROGRESS NOTES
Assessment & Plan   Bronchospasm with bronchitis, acute  Exposure to 2019 novel coronavirus    - Symptomatic COVID-19 Virus (Coronavirus) by PCR Nose  - azithromycin (ZITHROMAX) 250 MG tablet; Take 2 tablets (500 mg) by mouth daily for 1 day, THEN 1 tablet (250 mg) daily for 4 days.  - predniSONE (DELTASONE) 20 MG tablet; 2 tabs once daily x 5 days    Will cover with Zpak and prednisone burst.  Patient agreeable to COVID testing to be sure this is not a breakthrough case of COVID.  Continue with rest and fluids and close Follow-up if no change or worsening sx prn.    Corie Richter MD  [facility]    Obed Tomlinson is a 85 year old who presents for the following health issues     HPI     Hx of PMR- on daily prednisone- labs last week with RHEUM showed slight bump in ESR and CRP.  Has had cough and wheezing worsening in past 4 weeks.  Has felt feverish this week.  Is vaccinated and boostered.  No SOB.  Congested cough with some productive sputum this week only.  Has felt more lethargic.    Review of Systems   Constitutional, HEENT, cardiovascular, pulmonary, GI, , musculoskeletal, neuro, skin, endocrine and psych systems are negative, except as otherwise noted.      Objective    BP (!) 163/69 (BP Location: Right arm, Patient Position: Sitting, Cuff Size: Adult Regular)   Pulse 99   Temp 98.8  F (37.1  C) (Tympanic)   Resp 16   Wt 80.7 kg (178 lb)   LMP 02/26/2003   SpO2 96%   BMI 29.17 kg/m    Body mass index is 29.17 kg/m .  Physical Exam   GENERAL: healthy, alert and no distress  EYES: Eyes grossly normal to inspection, PERRL and conjunctivae and sclerae normal  HENT: B canals clogged with cerumen, nose and mouth without ulcers or lesions  NECK: no adenopathy, no asymmetry, masses, or scars and thyroid normal to palpation  RESP: harsh congested cough, + inspiratory and expiratory wheezes, no rhonchi or rales  CV: regular rate and rhythm, normal S1 S2, no S3 or S4,  no murmur, click or rub, no peripheral edema and peripheral pulses strong  ABDOMEN: soft, nontender, no hepatosplenomegaly, no masses and bowel sounds normal  MS: no gross musculoskeletal defects noted, no edema  PSYCH: mentation appears normal, affect normal/bright

## 2021-11-19 LAB — SARS-COV-2 RNA RESP QL NAA+PROBE: NEGATIVE

## 2021-12-02 ENCOUNTER — ANCILLARY PROCEDURE (OUTPATIENT)
Dept: GENERAL RADIOLOGY | Facility: CLINIC | Age: 86
End: 2021-12-02
Attending: FAMILY MEDICINE
Payer: MEDICARE

## 2021-12-02 ENCOUNTER — OFFICE VISIT (OUTPATIENT)
Dept: URGENT CARE | Facility: URGENT CARE | Age: 86
End: 2021-12-02
Payer: MEDICARE

## 2021-12-02 VITALS
TEMPERATURE: 98.7 F | WEIGHT: 178 LBS | HEART RATE: 91 BPM | BODY MASS INDEX: 29.17 KG/M2 | DIASTOLIC BLOOD PRESSURE: 76 MMHG | OXYGEN SATURATION: 96 % | SYSTOLIC BLOOD PRESSURE: 128 MMHG

## 2021-12-02 DIAGNOSIS — R05.9 COUGH: Primary | ICD-10-CM

## 2021-12-02 DIAGNOSIS — J98.01 BRONCHOSPASM: ICD-10-CM

## 2021-12-02 PROCEDURE — 71046 X-RAY EXAM CHEST 2 VIEWS: CPT | Performed by: RADIOLOGY

## 2021-12-02 PROCEDURE — 99214 OFFICE O/P EST MOD 30 MIN: CPT | Performed by: FAMILY MEDICINE

## 2021-12-02 RX ORDER — PREDNISONE 20 MG/1
TABLET ORAL
Qty: 20 TABLET | Refills: 0 | Status: SHIPPED | OUTPATIENT
Start: 2021-12-02 | End: 2022-04-11

## 2021-12-02 RX ORDER — ALBUTEROL SULFATE 90 UG/1
2 AEROSOL, METERED RESPIRATORY (INHALATION) EVERY 6 HOURS
Qty: 18 G | Refills: 0 | Status: SHIPPED | OUTPATIENT
Start: 2021-12-02 | End: 2022-04-22

## 2021-12-02 NOTE — PROGRESS NOTES
SUBJECTIVE:   Bushra Harmon is a 85 year old female presenting with a chief complaint of cough.  No fever.  Denies any significant sinus congestion.  Will get wheezing symptoms.  Onset of symptoms was 5 week(s) ago.  Course of illness is waxing and waning.    Severity moderate  Current and Associated symptoms: cough, wheezing  Treatment measures tried include Fluids, Rest, prednisone, antibiotic.  Predisposing factors include CKD, immunocompromised, HTN.    Seen in  on 11/18 - RX zpak and prednisone burst given, COVID screen obtained - negative.  Did improve but then symptoms started up again.    No history of asthma, no family history of asthma    On low dose prednisone for polymyalgia rheumatica - followed by Rheumatology    Past Medical History:   Diagnosis Date     CKD (chronic kidney disease)      Essential hypertension, benign      Unspecified hypothyroidism      Current Outpatient Medications   Medication Sig Dispense Refill     alendronate (FOSAMAX) 70 MG tablet Take 1 tablet (70 mg) by mouth every 7 days 12 tablet 0     CALCIUM CITRATE-VITAMIN D 315-200 MG-UNIT PO TABS 2 TABLETS DAILY 90 Tab 3     Cholecalciferol (VITAMIN D3) 25 MCG (1000 UT) CAPS Take 1 capsule by mouth daily       Folic Acid (FOLATE PO)        GLUCOSAMINE HCL 1000 MG PO TABS 2 TABLETS DAILY 90 Tab 3     LACTOBACILLUS PO Take 120 mg by mouth daily       levothyroxine (SYNTHROID/LEVOTHROID) 88 MCG tablet Take 1 tablet (88 mcg) by mouth daily 90 tablet 3     lisinopril (ZESTRIL) 20 MG tablet Take 1 tablet (20 mg) by mouth daily 90 tablet 3     Lutein 6 MG TABS Take 20 mg by mouth daily        methotrexate 2.5 MG tablet Take 10 mg by mouth every 7 days       metoprolol succinate ER (TOPROL-XL) 25 MG 24 hr tablet Take 1 tablet (25 mg) by mouth 2 times daily 180 tablet 3     MULTIVITAMIN TABS   OR 1 daily       predniSONE (DELTASONE) 5 MG tablet Take 1 tablet (5 mg) by mouth daily       simvastatin (ZOCOR) 10 MG tablet Take 1 tablet (10 mg)  by mouth At Bedtime 90 tablet 3     predniSONE (DELTASONE) 20 MG tablet 2 tabs once daily x 5 days (Patient not taking: Reported on 2021) 10 tablet 0     Social History     Tobacco Use     Smoking status: Former Smoker     Packs/day: 0.50     Years: 45.00     Pack years: 22.50     Quit date: 2001     Years since quittin.6     Smokeless tobacco: Never Used   Substance Use Topics     Alcohol use: Yes     Comment: 1 drink per day       ROS:  Review of systems negative except as stated above.    OBJECTIVE:  /76 (BP Location: Right arm, Patient Position: Chair, Cuff Size: Adult Regular)   Pulse 91   Temp 98.7  F (37.1  C) (Oral)   Wt 80.7 kg (178 lb)   LMP 2003   SpO2 96%   Breastfeeding No   BMI 29.17 kg/m    GENERAL APPEARANCE: healthy, alert and no distress  EYES: EOMI,  PERRL, conjunctiva clear  RESP: lungs with diffuse wheezes  PSYCH: mentation appears normal and affect normal/bright    CXR- no acute infiltrate, no pleural effusion, no pneumothorax personally viewed by me      ASSESSMENT/PLAN:  (R05.9) Cough  (primary encounter diagnosis)  Comment: prolong  Plan: XR Chest 2 Views, albuterol (PROAIR         HFA/PROVENTIL HFA/VENTOLIN HFA) 108 (90 Base)         MCG/ACT inhaler            (J98.01) Bronchospasm  Plan: albuterol (PROAIR HFA/PROVENTIL HFA/VENTOLIN         HFA) 108 (90 Base) MCG/ACT inhaler, predniSONE         (DELTASONE) 20 MG tablet            Reassurance given, reviewed symptomatic treatment with tylenol, ibuprofen, plenty of fluids and rest.  RX albuterol inhaler given for wheezing/bronchospasm symptoms.  Will follow up on formal Xray report and notify if any abnormalities.  Patient has underlying PMR and is on low dose prednisone, discussed treatment with prednisone for bronchospasm if not improving with inhaler.    As patient appeared to rebound after prednisone burst, will give prednisone taper instead.  Patient is not keen on prednisone treatment and okay to monitor  bronchospasm symptoms for the next few days, discussed that if symptoms worsening or not resolving well with inhaler, then start prednisone taper.    Follow up with primary provider in 1-2 weeks    Shawn Vieira MD

## 2021-12-07 ENCOUNTER — LAB (OUTPATIENT)
Dept: LAB | Facility: CLINIC | Age: 86
End: 2021-12-07
Payer: MEDICARE

## 2021-12-07 DIAGNOSIS — E03.8 OTHER SPECIFIED HYPOTHYROIDISM: ICD-10-CM

## 2021-12-07 PROCEDURE — 99000 SPECIMEN HANDLING OFFICE-LAB: CPT

## 2021-12-07 PROCEDURE — 84439 ASSAY OF FREE THYROXINE: CPT | Mod: 90

## 2021-12-07 PROCEDURE — 84443 ASSAY THYROID STIM HORMONE: CPT

## 2021-12-07 PROCEDURE — 36415 COLL VENOUS BLD VENIPUNCTURE: CPT

## 2021-12-08 LAB
T4 FREE SERPL-MCNC: 1.34 NG/DL (ref 0.76–1.46)
TSH SERPL DL<=0.005 MIU/L-ACNC: 0.72 MU/L (ref 0.4–4)

## 2021-12-09 NOTE — RESULT ENCOUNTER NOTE
Labs results/imaging studies results noted. Will discuss in next clinic visit 12/15/21.    Here is a copy for your records.  Follow-up in endocrinology Clinic as scheduled/discussed. We will review these studies/results in further detail at that visit, but feel free to contact us with any other questions in the interim.    Please call endocrinology clinic (nurse line: [phone]) if questions.    Belinda Marcos MD

## 2021-12-11 LAB — T4 FREE SERPL DIALY-MCNC: 3.2 NG/DL

## 2021-12-13 NOTE — RESULT ENCOUNTER NOTE
Labs results/imaging studies results noted. Will discuss in next clinic visit 12/15/21.    Here is a copy for your records.  Follow-up in endocrinology Clinic as scheduled/discussed. We will review these studies/results in further detail at that visit, but feel free to contact us with any other questions in the interim.    Please call endocrinology clinic (nurse line: [phone]) if questions.    Belinda Marcos MD

## 2021-12-15 ENCOUNTER — OFFICE VISIT (OUTPATIENT)
Dept: ENDOCRINOLOGY | Facility: CLINIC | Age: 86
End: 2021-12-15
Payer: MEDICARE

## 2021-12-15 VITALS
BODY MASS INDEX: 27.98 KG/M2 | TEMPERATURE: 98.8 F | OXYGEN SATURATION: 97 % | DIASTOLIC BLOOD PRESSURE: 68 MMHG | WEIGHT: 174.1 LBS | RESPIRATION RATE: 16 BRPM | HEART RATE: 75 BPM | HEIGHT: 66 IN | SYSTOLIC BLOOD PRESSURE: 138 MMHG

## 2021-12-15 DIAGNOSIS — E03.8 OTHER SPECIFIED HYPOTHYROIDISM: Primary | ICD-10-CM

## 2021-12-15 PROCEDURE — 84443 ASSAY THYROID STIM HORMONE: CPT | Performed by: INTERNAL MEDICINE

## 2021-12-15 PROCEDURE — 99215 OFFICE O/P EST HI 40 MIN: CPT | Performed by: INTERNAL MEDICINE

## 2021-12-15 PROCEDURE — 36415 COLL VENOUS BLD VENIPUNCTURE: CPT | Performed by: INTERNAL MEDICINE

## 2021-12-15 PROCEDURE — 84439 ASSAY OF FREE THYROXINE: CPT | Mod: 90 | Performed by: INTERNAL MEDICINE

## 2021-12-15 ASSESSMENT — MIFFLIN-ST. JEOR: SCORE: 1248.52

## 2021-12-15 NOTE — PROGRESS NOTES
ENDOCRINOLOGY CLINIC NOTE:    Name: Bushra Harmon  Seen for f/u of Hypothyroidism.she also wants to discuss osteopenia today.p  HPI:  Bushra Harmon is a 86 year old female who presents for the evaluation of :hypothyroidism.   has a past medical history of CKD (chronic kidney disease), Essential hypertension, benign, and Unspecified hypothyroidism.     Was started on prednisone for RA by Rheumatology.  Gained some wt since starting Prednisone. On it X 2 year.  She is also on methotrexate.    #1. Hypothyroidism:  Initially diagnosed at age 42.and since then she is on thyroid hormone replacement.  Currently taking 88 mcg/day (On this dose since 7/9/2020). Dose was decreased at that time based on that.  Labs 3/2021 in range including TSH, FT4 and FT4DL.  Taking generic.  Reports compliance.    Feeling OK. No major s/s.  No longer taking biotin X Aug 2019.    + frontal hair loss.    #2. Osteopenia:    DEXA 9/2021: Osteopenia.  No significant change in bone density of the lumbar spine or of the hip(s). There has been no significant change in bone density of the forearm. No vertebral fractures identified on the VFA.     RISK FACTORS:  Post-menopausal, Height loss 1.5 inches,  Parent history of osteoporosis in her mother with  a hip fracture, Long term corticosteroid therapy   Dairy; 2 servings/day  Calcium 600 mg/day  Vit D 1000 international unit(s)/day  Dental health is OK- no upcoming dental procedure.    She was started on Fosamax by primary care provider but she has not started it as she was worried about side effect of medication.    Exercise: golf in summer.     Palpitations:  No  Changes to hair or skin: chronic problem. + frontal hair loss X 2 years  Diarrhea/Constipation:No  Changes in menses: s/p menopause. Had been on HRT for a while. Stopped at age 67.  Dysphagia or Shortness of breath:No  Tremors:No  Changes in weight: gained some weight.    Heat or cold intolerance: no  History of Lithium or Amiodarone  use:No  Head or neck surgery/radiation:No  Family History of Thyroid Problems: + hypothyroidism in mother and sister  PMH/PSH:  Past Medical History:   Diagnosis Date     CKD (chronic kidney disease)      Essential hypertension, benign      Unspecified hypothyroidism      Past Surgical History:   Procedure Laterality Date     ABDOMEN SURGERY       C DEXA, BONE DENSITY, AXIAL SKEL  2003    Normal BMD     C REPLANTATION THUMB DISTAL,COMPLETE  2009    left thumb trauma; Dr. Jose surgery     COLONOSCOPY N/A 11/3/2015    Procedure: COLONOSCOPY;  Surgeon: Joanna Acevedo MD;  Location: [facility]     HC FLEX SIGMOIDOSCOPY W/WO SHERIF SPEC BY BRUSH/WASH  1998    normal     HC REMOVE TONSILS/ADENOIDS,<11 Y/O       HC REMV CATARACT EXTRACAP,INSERT LENS, COMPLEX, W/O ECP  2005    Left eye     SURGICAL HISTORY OF -   2004    macular hole repair- Left eye     ZZC NONSPECIFIC PROCEDURE      Exploratory Laparotomy constricting band     ZZC NONSPECIFIC PROCEDURE       x3     ZZHC COLONOSCOPY THRU STOMA, DIAGNOSTIC  10/22/2005    Diverticulosis- Dr. Shea     Family Hx:  Family History   Problem Relation Age of Onset     Hypertension Mother           at 100.     Breast Cancer Mother      Thyroid Disease Mother      Diabetes Sister      Hypertension Sister      Neurologic Disorder Daughter         MS     Hypertension Son        Social Hx:  Social History     Socioeconomic History     Marital status:      Spouse name: Not on file     Number of children: 3     Years of education: Not on file     Highest education level: Not on file   Occupational History     Occupation: office     Employer: RETIRED   Tobacco Use     Smoking status: Former Smoker     Packs/day: 0.50     Years: 45.00     Pack years: 22.50     Quit date: 2001     Years since quittin.6     Smokeless tobacco: Never Used   Vaping Use     Vaping Use: Never used   Substance and Sexual Activity     Alcohol use: Yes      Comment: 1 drink per day     Drug use: No     Sexual activity: Never   Other Topics Concern      Service Not Asked     Blood Transfusions Not Asked     Caffeine Concern Yes     Comment: 1 cup per day     Occupational Exposure Not Asked     Hobby Hazards Not Asked     Sleep Concern Not Asked     Stress Concern Not Asked     Weight Concern Not Asked     Special Diet Not Asked     Back Care Not Asked     Exercise Yes     Comment: Active; exercise 4 times per week     Bike Helmet Not Asked     Seat Belt Yes     Self-Exams No     Parent/sibling w/ CABG, MI or angioplasty before 65F 55M? Not Asked   Social History Narrative     Not on file     Social Determinants of Health     Financial Resource Strain: Not on file   Food Insecurity: Not on file   Transportation Needs: Not on file   Physical Activity: Not on file   Stress: Not on file   Social Connections: Not on file   Intimate Partner Violence: Not on file   Housing Stability: Not on file          MEDICATIONS:  has a current medication list which includes the following prescription(s): albuterol, alendronate, calcium citrate-vitamin d, vitamin d3, folic acid, glucosamine hcl, lactobacillus, levothyroxine, lisinopril, lutein, methotrexate, metoprolol succinate er, multivitamin, prednisone, and simvastatin.    ROS   ROS: 10 point ROS neg other than the symptoms noted above in the HPI.    Physical Exam   VS: LMP 02/26/2003   Breastfeeding No   GENERAL: healthy, alert and no distress  EYES: Eyes grossly normal to inspection, conjunctivae and sclerae normal  ENT: no nose swelling, nasal discharge.  Thyroid: no apparent thyroid nodules.  Thyroid appears normal in size and nontender.  CV: RRR, no rubs, gallops, no murmurs  RESP: CTAB, no wheezes, rales, or ronchi  ABDO: +BS  EXTREMITIES: no hand tremors.  NEURO: Cranial nerves grossly intact, mentation intact and speech normal  SKIN: No apparent skin lesions, rash or edema seen   PSYCH: mentation appears normal,  affect normal/bright, judgement and insight intact, normal speech and appearance well-groomed      LABS:  TFTs:  Component      Latest Ref Rng & Units 12/7/2021   T4 Free      0.76 - 1.46 ng/dL 1.34   TSH      0.40 - 4.00 mU/L 0.72   Free T4 Eq Dialysis      1.1 - 2.4 ng/dL 3.2 (H)     All pertinent notes, labs, and images personally reviewed by me.     A/P  Ms.Nancy YUNG Harmon is a 84 year old here for the evaluation of hypothyroidism:    #1 Hypothyroidism(+TPO):   Currently taking 88 mcg/day (on this dose since 7/9/2020)  Noted recent 12/2021 labs with higFT4 Dl and low normal TSH  Taking generic.  Clinically looks euthyorid.  Plan:  Discussed diagnosis, pathophysiology, management and treatment options of condition with patient.  Repeat labs ( she was having some bronchitis like s/s when recent labs were drawn).  Consider dose change based on that.  Please make a lab appointment for blood work and follow up clinic appointment in 1 week after that to discuss results.  Continue to hold off biotin.    Discussed s/s of hypothyroidism and hyperthyroidism to watch for.  The patient indicates understanding of these issues and agrees with the plan.    #2. Osteopenia:  RISK FACTORS:  Post-menopausal, Height loss 1.5 inches,  Parent history of osteoporosis in her mother with  a hip fracture, Long term corticosteroid therapy   Currently she is taking prednisone 5 mg and 2.5 mg every other day.  Followed by rheumatology.  DEXA 9/2021: Osteopenia.  No significant change in bone density of the lumbar spine or of the hip(s). There has been no significant change in bone density of the forearm. No vertebral fractures identified on the VFA.   Dental health is OK- no upcoming dental procedure.  She was started on Fosamax by primary care provider but she has not started it as she was worried about side effect of medication.  Plan:  Discussed diagnosis, pathophysiology, management and treatment options of condition with pt.  Recommend  to start FOSAMAX.  DEXA in 2 years.    Discussed indications, risks and benefits of all medications prescribed, and answered questions to patient's satisfaction.      The pt was advised to    Maintain an adequate calcium and vitamin D intake and to supplement vitamin D if needed to maintain serum levels of 25 hydroxy D (25 OH D) between 30-60 ng/ml.    Limit alcohol intake to no more than 2 servings per day.    Limit caffeine intake.    Maintain an active lifestyle including weight-bearing exercises for at least 30 mins daily.    Take measures to reduce the risk of falling.        Total time spent in with the patient evaluation:  20 min    Additional time spent reviewing pertinent lab tests and chart notes, and documentation: 20  min      Follow-up:  Based on labs.    Belinda Marcos MD  Endocrinology  [facility]  CC: Magalis Woodward    All questions were answered.  The patient indicates understanding of the above issues and agrees with the plan set forth.     Addendum to above note and clinic visit:    Labs reviewed.    See result note/telephone encounter.

## 2021-12-15 NOTE — LETTER
12/15/2021         RE: Bushra Harmon  [address]  [place] [address]        Dear Colleague,    Thank you for referring your patient, Bushra Harmon, to the [facility]. Please see a copy of my visit note below.    ENDOCRINOLOGY CLINIC NOTE:    Name: Bushra Harmon  Seen for f/u of Hypothyroidism.she also wants to discuss osteopenia today.p  HPI:  Bushra Harmon is a 86 year old female who presents for the evaluation of :hypothyroidism.   has a past medical history of CKD (chronic kidney disease), Essential hypertension, benign, and Unspecified hypothyroidism.     Was started on prednisone for RA by Rheumatology.  Gained some wt since starting Prednisone. On it X 2 year.  She is also on methotrexate.    #1. Hypothyroidism:  Initially diagnosed at age 42.and since then she is on thyroid hormone replacement.  Currently taking 88 mcg/day (On this dose since 7/9/2020). Dose was decreased at that time based on that.  Labs 3/2021 in range including TSH, FT4 and FT4DL.  Taking generic.  Reports compliance.    Feeling OK. No major s/s.  No longer taking biotin X Aug 2019.    + frontal hair loss.    #2. Osteopenia:    DEXA 9/2021: Osteopenia.  No significant change in bone density of the lumbar spine or of the hip(s). There has been no significant change in bone density of the forearm. No vertebral fractures identified on the VFA.     RISK FACTORS:  Post-menopausal, Height loss 1.5 inches,  Parent history of osteoporosis in her mother with  a hip fracture, Long term corticosteroid therapy   Dairy; 2 servings/day  Calcium 600 mg/day  Vit D 1000 international unit(s)/day  Dental health is OK- no upcoming dental procedure.    She was started on Fosamax by primary care provider but she has not started it as she was worried about side effect of medication.    Exercise: golf in summer.     Palpitations:  No  Changes to hair or skin: chronic problem. + frontal hair loss X 2  years  Diarrhea/Constipation:No  Changes in menses: s/p menopause. Had been on HRT for a while. Stopped at age 67.  Dysphagia or Shortness of breath:No  Tremors:No  Changes in weight: gained some weight.    Heat or cold intolerance: no  History of Lithium or Amiodarone use:No  Head or neck surgery/radiation:No  Family History of Thyroid Problems: + hypothyroidism in mother and sister  PMH/PSH:  Past Medical History:   Diagnosis Date     CKD (chronic kidney disease)      Essential hypertension, benign      Unspecified hypothyroidism      Past Surgical History:   Procedure Laterality Date     ABDOMEN SURGERY       C DEXA, BONE DENSITY, AXIAL SKEL  2003    Normal BMD     C REPLANTATION THUMB DISTAL,COMPLETE  2009    left thumb trauma; Dr. Jose surgery     COLONOSCOPY N/A 11/3/2015    Procedure: COLONOSCOPY;  Surgeon: Joanna Acevedo MD;  Location: [facility]     HC FLEX SIGMOIDOSCOPY W/WO SHERIF SPEC BY BRUSH/WASH  1998    normal     HC REMOVE TONSILS/ADENOIDS,<13 Y/O       HC REMV CATARACT EXTRACAP,INSERT LENS, COMPLEX, W/O ECP  2005    Left eye     SURGICAL HISTORY OF -   2004    macular hole repair- Left eye     ZZC NONSPECIFIC PROCEDURE      Exploratory Laparotomy constricting band     ZZC NONSPECIFIC PROCEDURE       x3     ZZHC COLONOSCOPY THRU STOMA, DIAGNOSTIC  10/22/2005    Diverticulosis- Dr. Shea     Family Hx:  Family History   Problem Relation Age of Onset     Hypertension Mother           at 100.     Breast Cancer Mother      Thyroid Disease Mother      Diabetes Sister      Hypertension Sister      Neurologic Disorder Daughter         MS     Hypertension Son        Social Hx:  Social History     Socioeconomic History     Marital status:      Spouse name: Not on file     Number of children: 3     Years of education: Not on file     Highest education level: Not on file   Occupational History     Occupation: office     Employer: RETIRED   Tobacco Use      Smoking status: Former Smoker     Packs/day: 0.50     Years: 45.00     Pack years: 22.50     Quit date: 2001     Years since quittin.6     Smokeless tobacco: Never Used   Vaping Use     Vaping Use: Never used   Substance and Sexual Activity     Alcohol use: Yes     Comment: 1 drink per day     Drug use: No     Sexual activity: Never   Other Topics Concern      Service Not Asked     Blood Transfusions Not Asked     Caffeine Concern Yes     Comment: 1 cup per day     Occupational Exposure Not Asked     Hobby Hazards Not Asked     Sleep Concern Not Asked     Stress Concern Not Asked     Weight Concern Not Asked     Special Diet Not Asked     Back Care Not Asked     Exercise Yes     Comment: Active; exercise 4 times per week     Bike Helmet Not Asked     Seat Belt Yes     Self-Exams No     Parent/sibling w/ CABG, MI or angioplasty before 65F 55M? Not Asked   Social History Narrative     Not on file     Social Determinants of Health     Financial Resource Strain: Not on file   Food Insecurity: Not on file   Transportation Needs: Not on file   Physical Activity: Not on file   Stress: Not on file   Social Connections: Not on file   Intimate Partner Violence: Not on file   Housing Stability: Not on file          MEDICATIONS:  has a current medication list which includes the following prescription(s): albuterol, alendronate, calcium citrate-vitamin d, vitamin d3, folic acid, glucosamine hcl, lactobacillus, levothyroxine, lisinopril, lutein, methotrexate, metoprolol succinate er, multivitamin, prednisone, and simvastatin.    ROS   ROS: 10 point ROS neg other than the symptoms noted above in the HPI.    Physical Exam   VS: LMP 2003   Breastfeeding No   GENERAL: healthy, alert and no distress  EYES: Eyes grossly normal to inspection, conjunctivae and sclerae normal  ENT: no nose swelling, nasal discharge.  Thyroid: no apparent thyroid nodules.  Thyroid appears normal in size and nontender.  CV: RRR, no  rubs, gallops, no murmurs  RESP: CTAB, no wheezes, rales, or ronchi  ABDO: +BS  EXTREMITIES: no hand tremors.  NEURO: Cranial nerves grossly intact, mentation intact and speech normal  SKIN: No apparent skin lesions, rash or edema seen   PSYCH: mentation appears normal, affect normal/bright, judgement and insight intact, normal speech and appearance well-groomed      LABS:  TFTs:  Component      Latest Ref Rng & Units 12/7/2021   T4 Free      0.76 - 1.46 ng/dL 1.34   TSH      0.40 - 4.00 mU/L 0.72   Free T4 Eq Dialysis      1.1 - 2.4 ng/dL 3.2 (H)     All pertinent notes, labs, and images personally reviewed by me.     A/P  Ms.Nancy YUNG Harmon is a 84 year old here for the evaluation of hypothyroidism:    #1 Hypothyroidism(+TPO):   Currently taking 88 mcg/day (on this dose since 7/9/2020)  Noted recent 12/2021 labs with higFT4 Dl and low normal TSH  Taking generic.  Clinically looks euthyorid.  Plan:  Discussed diagnosis, pathophysiology, management and treatment options of condition with patient.  Repeat labs ( she was having some bronchitis like s/s when recent labs were drawn).  Consider dose change based on that.  Please make a lab appointment for blood work and follow up clinic appointment in 1 week after that to discuss results.  Continue to hold off biotin.    Discussed s/s of hypothyroidism and hyperthyroidism to watch for.  The patient indicates understanding of these issues and agrees with the plan.    #2. Osteopenia:  RISK FACTORS:  Post-menopausal, Height loss 1.5 inches,  Parent history of osteoporosis in her mother with  a hip fracture, Long term corticosteroid therapy   Currently she is taking prednisone 5 mg and 2.5 mg every other day.  Followed by rheumatology.  DEXA 9/2021: Osteopenia.  No significant change in bone density of the lumbar spine or of the hip(s). There has been no significant change in bone density of the forearm. No vertebral fractures identified on the VFA.   Dental health is OK- no  upcoming dental procedure.  She was started on Fosamax by primary care provider but she has not started it as she was worried about side effect of medication.  Plan:  Discussed diagnosis, pathophysiology, management and treatment options of condition with pt.  Recommend to start FOSAMAX.  DEXA in 2 years.    Discussed indications, risks and benefits of all medications prescribed, and answered questions to patient's satisfaction.      The pt was advised to    Maintain an adequate calcium and vitamin D intake and to supplement vitamin D if needed to maintain serum levels of 25 hydroxy D (25 OH D) between 30-60 ng/ml.    Limit alcohol intake to no more than 2 servings per day.    Limit caffeine intake.    Maintain an active lifestyle including weight-bearing exercises for at least 30 mins daily.    Take measures to reduce the risk of falling.        Total time spent in with the patient evaluation:  20 min    Additional time spent reviewing pertinent lab tests and chart notes, and documentation: 20  min      Follow-up:  Based on labs.    Belinda Marcos MD  Endocrinology  [facility]  CC: Magalis Woodward    All questions were answered.  The patient indicates understanding of the above issues and agrees with the plan set forth.     Addendum to above note and clinic visit:    Labs reviewed.    See result note/telephone encounter.              Again, thank you for allowing me to participate in the care of your patient.        Sincerely,        Belinda Marcos MD

## 2021-12-15 NOTE — NURSING NOTE
ENDOCRINOLOGY INTAKE FORM    Patient Name:  Bushra Harmon  :  1935    Is patient Diabetic?   No  Does patient have non-diabetic or other endocrine issues?  Yes:   Hypothyroidism - f/u w Endo - dr Meyers     Hyperlipidemia LDL goal <100    Vitals: LMP 2003   BMI= There is no height or weight on file to calculate BMI.    Smoking and Alcohol use:  Social History     Tobacco Use     Smoking status: Former Smoker     Packs/day: 0.50     Years: 45.00     Pack years: 22.50     Quit date: 2001     Years since quittin.6     Smokeless tobacco: Never Used   Vaping Use     Vaping Use: Never used   Substance Use Topics     Alcohol use: Yes     Comment: 1 drink per day     Drug use: No     Irasema Henry [facility] Endocrinology [place]  [phone]

## 2021-12-15 NOTE — PATIENT INSTRUCTIONS
[facility]  Dr Marcos, Endocrinology Department    [facility]   [address]  [place] [address]  Appointment [phone]  Fax: [phone]  [place]: Monday - Thursday      Please check the cost coverage and copay with insurance before recommended tests, services and medications (especially if new medications are prescribed).     If ordered, please get blood work done 1 week prior to your next appointment so they will be available to Dr. Marcos at your visit.    Labs today.  Based on labs consider thyroid dose adjustment.    Recommend to start FOSAMAX.  DEXA in 2 years.    The pt was advised to    Maintain an adequate calcium and vitamin D intake and to supplement vitamin D if needed to maintain serum levels of 25 hydroxy D (25 OH D) between 30-60 ng/ml.    Limit alcohol intake to no more than 2 servings per day.    Limit caffeine intake.    Maintain an active lifestyle including weight-bearing exercises for at least 30 mins daily.    Take measures to reduce the risk of falling.    Instructions on Fosamax use and side effects - particularly esophageal adverse events - are carefully reviewed with her. This drug must be taken upon arising for the day on an empty stomach, with a large 6-8 ounce glass of water; she must remain NPO in the upright position for at least 30 minutes afterwards and until after the first food of the day. If esophageal irritation is noted, she will stop the drug and call my office.  Treatment with bisphosphonate therapy will decrease fracture risk 50-70%.   There is risk of osteonecrosis of the jaw in patients using bisphosphonates is approximately 1700-1/100,000, with development most likely related to invasive dental procedures. If an invasive dental procedure was necessary, preferably stop the bisphosphonate approximately 3 months prior to reduce the risk. Let your dentist know that you are on this medication.  The  data available at this time suggests that there is probably a small increase risk of atypical (nontraumatic) subtrochanteric fractures of the femur in patients on bisphosphonate therapy compared to those not on it. One large study suggested that for every 100 fractures prevented with bisphosphonate therapy, less than one femur fracture will occur. Other studies suggest one episode per 2,500 patient years. Patient should call with leg pain.

## 2021-12-16 LAB
T4 FREE SERPL-MCNC: 1.36 NG/DL (ref 0.76–1.46)
TSH SERPL DL<=0.005 MIU/L-ACNC: 1.48 MU/L (ref 0.4–4)

## 2021-12-22 LAB — T4 FREE SERPL DIALY-MCNC: 2.9 NG/DL

## 2021-12-28 ENCOUNTER — TELEPHONE (OUTPATIENT)
Dept: ENDOCRINOLOGY | Facility: CLINIC | Age: 86
End: 2021-12-28
Payer: MEDICARE

## 2021-12-28 ENCOUNTER — TRANSFERRED RECORDS (OUTPATIENT)
Dept: HEALTH INFORMATION MANAGEMENT | Facility: CLINIC | Age: 86
End: 2021-12-28
Payer: MEDICARE

## 2021-12-28 DIAGNOSIS — E03.8 OTHER SPECIFIED HYPOTHYROIDISM: Primary | ICD-10-CM

## 2021-12-28 NOTE — TELEPHONE ENCOUNTER
I called the pt and discussed the below. Pt understands and is scheduled. Pt has refills. Pt did inform me that she will not be taking fosamax due to diarrhea and having a pain in her back and near her eye.

## 2021-12-28 NOTE — TELEPHONE ENCOUNTER
Currently taking 88 mcg/day (on this dose since 7/9/2020)  Noted recent 12/2021 labs with higFT4 Dl and low normal TSH  Taking generic.    Component      Latest Ref Rng & Units 12/15/2021   T4 Free      0.76 - 1.46 ng/dL 1.36   TSH      0.40 - 4.00 mU/L 1.48   Free T4 Eq Dialysis      1.1 - 2.4 ng/dL 2.9 (H)     Improving labs.  Continue current dose of levothyroxine.  Labs in 3 -4 months to follow trend.  Please make a lab appointment for blood work and follow up clinic appointment in 1 week after that to discuss results.    Please inform patient and help schedule.

## 2022-01-04 ENCOUNTER — TRANSFERRED RECORDS (OUTPATIENT)
Dept: HEALTH INFORMATION MANAGEMENT | Facility: CLINIC | Age: 87
End: 2022-01-04
Payer: MEDICARE

## 2022-02-08 ENCOUNTER — OFFICE VISIT (OUTPATIENT)
Dept: INTERNAL MEDICINE | Facility: CLINIC | Age: 87
End: 2022-02-08
Payer: MEDICARE

## 2022-02-08 VITALS
HEART RATE: 94 BPM | SYSTOLIC BLOOD PRESSURE: 150 MMHG | TEMPERATURE: 97.3 F | HEIGHT: 66 IN | RESPIRATION RATE: 18 BRPM | WEIGHT: 180.5 LBS | BODY MASS INDEX: 29.01 KG/M2 | OXYGEN SATURATION: 96 % | DIASTOLIC BLOOD PRESSURE: 64 MMHG

## 2022-02-08 DIAGNOSIS — H61.23 BILATERAL IMPACTED CERUMEN: Primary | ICD-10-CM

## 2022-02-08 DIAGNOSIS — I10 HYPERTENSION GOAL BP (BLOOD PRESSURE) < 140/90: ICD-10-CM

## 2022-02-08 PROCEDURE — 99213 OFFICE O/P EST LOW 20 MIN: CPT | Performed by: INTERNAL MEDICINE

## 2022-02-08 ASSESSMENT — MIFFLIN-ST. JEOR: SCORE: 1277.55

## 2022-02-08 NOTE — PROGRESS NOTES
"      ASSESSMENT & PLAN:                                                      (H61.23) Bilateral impacted cerumen  (primary encounter diagnosis)  Comment:   I advised her for debrox drops and come back to have the wax removed. She doesn't like the drops. She would rather go to the ENT  Plan: Otolaryngology Referral            (I10) Hypertension goal BP (blood pressure) < 140/90  Comment:   Plan: f/uy w nephrology        Chief Complaint:                                                      Ear crackling noise      SUBJECTIVE:                                                    History of present illness     Ear crackling noise  -- since Dec after bronchitis  -- mostly in the morning  -- it was better when she was taking higher dose prednisone for bronchitis and PMR  -- she has hx of external eczematous otitis but it has been better since prednisone  -- no pain    HTN  -- she is working on it w dr Carr  -- home: most of the numbers are 110-120s but she has also BP in the 160s  -- I recheck the BP and it was 160/85    ROS:                                                      ROS: negative for fever, chills, cough, wheezes, chest pain, shortness of breath, vomiting, abdominal pain, leg swelling     OBJECTIVE:                                                    Physical Exam :    Blood pressure (!) 150/64, pulse 94, temperature 97.3  F (36.3  C), temperature source Tympanic, resp. rate 18, height 1.664 m (5' 5.5\"), weight 81.9 kg (180 lb 8 oz), last menstrual period 02/26/2003, SpO2 96 %, not currently breastfeeding.   NAD, appears comfortable  Skin: no rashes   HEENT: PERRLA, EOMI, pink conjunctiva, anicteric sclerae, bilateral impacted wax   Chest: clear to auscultation bilaterally, good respiratory effort  Heart: S1 S2, RRR, no mgr appreciated  Abdomen: soft, not tender,  Extremities: no edema,   Neurologic: A, Ox3, no focal signs appreciated    PMHx: reviewed  Past Medical History:   Diagnosis Date     CKD (chronic " kidney disease)      Essential hypertension, benign      Unspecified hypothyroidism       PSHx: reviewed  Past Surgical History:   Procedure Laterality Date     ABDOMEN SURGERY       C DEXA, BONE DENSITY, AXIAL SKEL  2003    Normal BMD     COLONOSCOPY N/A 11/3/2015    Procedure: COLONOSCOPY;  Surgeon: Joanna Acevedo MD;  Location: [facility]     HC FLEX SIGMOIDOSCOPY W/WO SHERIF SPEC BY BRUSH/WASH  1998    normal     HC REMOVE TONSILS/ADENOIDS,<13 Y/O       HC REMV CATARACT EXTRACAP,INSERT LENS, COMPLEX, W/O ECP  2005    Left eye     SURGICAL HISTORY OF -   2004    macular hole repair- Left eye     Z NONSPECIFIC PROCEDURE      Exploratory Laparotomy constricting band     Z NONSPECIFIC PROCEDURE       x3     ZC REPLANTATION THUMB DISTAL,COMPLETE  2009    left thumb trauma; Dr. Jose surgery     [facility] COLONOSCOPY THRU STOMA, DIAGNOSTIC  10/22/2005    Diverticulosis- Dr. Shea        Meds: reviewed  Current Outpatient Medications   Medication Sig Dispense Refill     albuterol (PROAIR HFA/PROVENTIL HFA/VENTOLIN HFA) 108 (90 Base) MCG/ACT inhaler Inhale 2 puffs into the lungs every 6 hours 18 g 0     alendronate (FOSAMAX) 70 MG tablet Take 1 tablet (70 mg) by mouth every 7 days 12 tablet 0     CALCIUM CITRATE-VITAMIN D 315-200 MG-UNIT PO TABS 2 TABLETS DAILY 90 Tab 3     Cholecalciferol (VITAMIN D3) 25 MCG (1000 UT) CAPS Take 1 capsule by mouth daily       Folic Acid (FOLATE PO)        GLUCOSAMINE HCL 1000 MG PO TABS 2 TABLETS DAILY 90 Tab 3     LACTOBACILLUS PO Take 120 mg by mouth daily       levothyroxine (SYNTHROID/LEVOTHROID) 88 MCG tablet Take 1 tablet (88 mcg) by mouth daily 90 tablet 3     lisinopril (ZESTRIL) 20 MG tablet Take 1 tablet (20 mg) by mouth daily 90 tablet 3     Lutein 6 MG TABS Take 20 mg by mouth daily        methotrexate 2.5 MG tablet Take 10 mg by mouth every 7 days       metoprolol succinate ER (TOPROL-XL) 25 MG 24 hr tablet Take 1 tablet (25 mg) by mouth 2 times  daily 180 tablet 3     MULTIVITAMIN TABS   OR 1 daily       predniSONE (DELTASONE) 20 MG tablet Take 3 tabs by mouth daily x 3 days, then 2 tabs daily x 3 days, then 1 tab daily x 3 days, then 1/2 tab daily x 3 days. 20 tablet 0     simvastatin (ZOCOR) 10 MG tablet Take 1 tablet (10 mg) by mouth At Bedtime 90 tablet 3       Soc Hx: reviewed  Fam Hx: reviewed          Natalie Houston MD  Internal Medicine

## 2022-02-08 NOTE — PATIENT INSTRUCTIONS
Plan:  1. ENT referral   [facility] [phone]  [address]  2. No changes in meds for now

## 2022-02-15 ENCOUNTER — E-VISIT (OUTPATIENT)
Dept: URGENT CARE | Facility: URGENT CARE | Age: 87
End: 2022-02-15
Payer: MEDICARE

## 2022-02-15 DIAGNOSIS — N39.0 ACUTE UTI (URINARY TRACT INFECTION): Primary | ICD-10-CM

## 2022-02-15 NOTE — PATIENT INSTRUCTIONS
Dear Bushra Harmon    After reviewing your responses, I've been able to diagnose you with a urinary tract infection, which is a common infection of the bladder with bacteria.  This is not a sexually transmitted infection, though urinating immediately after intercourse can help prevent infections.  Drinking lots of fluids is also helpful to clear your current infection and prevent the next one.      I have sent a prescription for antibiotics to your pharmacy to treat this infection.    It is important that you take all of your prescribed medication even if your symptoms are improving after a few doses.  Taking all of your medicine helps prevent the symptoms from returning.     If your symptoms worsen, you develop pain in your back or stomach, develop fevers, or are not improving in 5 days, please contact your primary care provider for an appointment or visit any of our convenient Walk-in or Urgent Care Centers to be seen, which can be found on our website here.    Thanks again for choosing us as your health care partner,    Marcus Villalta, DO

## 2022-02-16 ENCOUNTER — LAB (OUTPATIENT)
Dept: LAB | Facility: CLINIC | Age: 87
End: 2022-02-16
Payer: MEDICARE

## 2022-02-16 ENCOUNTER — TRANSFERRED RECORDS (OUTPATIENT)
Dept: HEALTH INFORMATION MANAGEMENT | Facility: CLINIC | Age: 87
End: 2022-02-16

## 2022-02-16 ENCOUNTER — TELEPHONE (OUTPATIENT)
Dept: FAMILY MEDICINE | Facility: CLINIC | Age: 87
End: 2022-02-16

## 2022-02-16 DIAGNOSIS — N39.0 ACUTE UTI (URINARY TRACT INFECTION): ICD-10-CM

## 2022-02-16 DIAGNOSIS — N30.00 ACUTE CYSTITIS WITHOUT HEMATURIA: Primary | ICD-10-CM

## 2022-02-16 LAB
ALBUMIN UR-MCNC: NEGATIVE MG/DL
APPEARANCE UR: CLEAR
BACTERIA #/AREA URNS HPF: ABNORMAL /HPF
BILIRUB UR QL STRIP: NEGATIVE
COLOR UR AUTO: YELLOW
GLUCOSE UR STRIP-MCNC: NEGATIVE MG/DL
HGB UR QL STRIP: ABNORMAL
KETONES UR STRIP-MCNC: NEGATIVE MG/DL
LEUKOCYTE ESTERASE UR QL STRIP: ABNORMAL
NITRATE UR QL: POSITIVE
PH UR STRIP: 5.5 [PH] (ref 5–7)
RBC #/AREA URNS AUTO: ABNORMAL /HPF
SP GR UR STRIP: 1.01 (ref 1–1.03)
UROBILINOGEN UR STRIP-ACNC: 0.2 E.U./DL
WBC #/AREA URNS AUTO: ABNORMAL /HPF

## 2022-02-16 PROCEDURE — 87186 SC STD MICRODIL/AGAR DIL: CPT

## 2022-02-16 PROCEDURE — 87088 URINE BACTERIA CULTURE: CPT

## 2022-02-16 PROCEDURE — 87086 URINE CULTURE/COLONY COUNT: CPT

## 2022-02-16 PROCEDURE — 81001 URINALYSIS AUTO W/SCOPE: CPT

## 2022-02-16 RX ORDER — CEFDINIR 300 MG/1
300 CAPSULE ORAL 2 TIMES DAILY
Qty: 14 CAPSULE | Refills: 0 | Status: SHIPPED | OUTPATIENT
Start: 2022-02-16 | End: 2022-02-23

## 2022-02-17 LAB — BACTERIA UR CULT: ABNORMAL

## 2022-02-18 ENCOUNTER — TELEPHONE (OUTPATIENT)
Dept: URGENT CARE | Facility: URGENT CARE | Age: 87
End: 2022-02-18
Payer: MEDICARE

## 2022-02-18 DIAGNOSIS — N39.0 URINARY TRACT INFECTION WITHOUT HEMATURIA, SITE UNSPECIFIED: Primary | ICD-10-CM

## 2022-02-18 RX ORDER — CIPROFLOXACIN 250 MG/1
250 TABLET, FILM COATED ORAL 2 TIMES DAILY
Qty: 10 TABLET | Refills: 0 | Status: SHIPPED | OUTPATIENT
Start: 2022-02-18 | End: 2022-02-23

## 2022-02-22 ENCOUNTER — TRANSFERRED RECORDS (OUTPATIENT)
Dept: HEALTH INFORMATION MANAGEMENT | Facility: CLINIC | Age: 87
End: 2022-02-22
Payer: MEDICARE

## 2022-03-01 ENCOUNTER — TRANSFERRED RECORDS (OUTPATIENT)
Dept: HEALTH INFORMATION MANAGEMENT | Facility: CLINIC | Age: 87
End: 2022-03-01
Payer: MEDICARE

## 2022-03-01 LAB
ALT SERPL-CCNC: 15 IU/L (ref 5–35)
AST SERPL-CCNC: 33 U/L (ref 5–34)
CREATININE (EXTERNAL): 0.88 MG/DL (ref 0.5–1.3)

## 2022-03-16 ENCOUNTER — APPOINTMENT (OUTPATIENT)
Dept: ULTRASOUND IMAGING | Facility: CLINIC | Age: 87
End: 2022-03-16
Attending: PHYSICIAN ASSISTANT
Payer: MEDICARE

## 2022-03-16 ENCOUNTER — NURSE TRIAGE (OUTPATIENT)
Dept: INTERNAL MEDICINE | Facility: CLINIC | Age: 87
End: 2022-03-16
Payer: MEDICARE

## 2022-03-16 ENCOUNTER — HOSPITAL ENCOUNTER (EMERGENCY)
Facility: CLINIC | Age: 87
Discharge: HOME | End: 2022-03-16
Attending: PHYSICIAN ASSISTANT | Admitting: PHYSICIAN ASSISTANT
Payer: MEDICARE

## 2022-03-16 VITALS
OXYGEN SATURATION: 98 % | RESPIRATION RATE: 20 BRPM | TEMPERATURE: 98.7 F | SYSTOLIC BLOOD PRESSURE: 199 MMHG | DIASTOLIC BLOOD PRESSURE: 101 MMHG | HEART RATE: 88 BPM

## 2022-03-16 DIAGNOSIS — R20.2 LEFT LEG PARESTHESIAS: ICD-10-CM

## 2022-03-16 DIAGNOSIS — M79.662 PAIN OF LEFT LOWER LEG: ICD-10-CM

## 2022-03-16 DIAGNOSIS — R29.898 LEFT LEG WEAKNESS: ICD-10-CM

## 2022-03-16 PROCEDURE — 93971 EXTREMITY STUDY: CPT | Mod: LT

## 2022-03-16 PROCEDURE — 99284 EMERGENCY DEPT VISIT MOD MDM: CPT | Mod: 25

## 2022-03-16 ASSESSMENT — ENCOUNTER SYMPTOMS: MYALGIAS: 1

## 2022-03-16 NOTE — DISCHARGE INSTRUCTIONS
The cause of your symptoms is unclear at this time. There does not appear to be a life-threatening process at this time. With persistent weakness/numbness upon standing, seek further evaluation by orthopedics.  Use Tylenol 1000mg every 8 hours for pain.

## 2022-03-16 NOTE — TELEPHONE ENCOUNTER
I would recommend urgent care or even ER visit if she is unable to get into the clinic today or tomorrow.

## 2022-03-16 NOTE — TELEPHONE ENCOUNTER
S-(situation): left leg numbness, calf tightness    B-(background): Patient with history of surgical complications 2011 resulting in temporary inability to feel leg or walk using left leg.  Still has minor left foot drop.    A-(assessment): Patient reports numbness in left lower leg after sitting for any length of time, onset 1-2 wks ago.  It goes away after walking for less than 1 minute.  Over the past few days she has noted a tightness in left calf, constant.  She denies swelling, redness, warmth, pain or fever.  States it is just a tight sensation.  She denies left leg giving out on her or any falls.  No history of PE/DVT.  No long car travel or plan travel over the past 2 months.     R-(recommendations): Advised patient be seen today.  Attempted to start checking scheduleds but patient said she had to go as she was entering a surprise party and didn;t want to miss the entrance of guest of [facility]. Advised she be seen in  or call clinic back for appt.     Reason for Disposition    Numbness in a leg or foot (i.e., loss of sensation)    Patient wants to be seen    Additional Information    Negative: Looks like a broken bone or dislocated joint (e.g., crooked or deformed)    Negative: Sounds like a life-threatening emergency to the triager    Negative: Followed a hip injury    Negative: Followed a knee injury    Negative: Followed an ankle or foot injury    Negative: Back pain radiating (shooting) into leg(s)    Negative: Foot pain is the main symptom    Negative: Ankle pain is the main symptom    Negative: Knee pain is the main symptom    Negative: Leg swelling is the main symptom    Negative: Difficulty breathing    Negative: Entire foot is cool or blue in comparison to other side    Negative: Unable to walk    Negative: Chest pain    Negative: Fever and red area (or area very tender to touch)    Negative: Fever and swollen joint    Negative: Thigh or calf pain in only one leg and present > 1 hour    Negative:  "Thigh, calf, or ankle swelling in only one leg    Negative: Thigh, calf, or ankle swelling in both legs, but one side is definitely more swollen    Negative: History of prior 'blood clot' in leg or lungs (i.e., deep vein thrombosis, pulmonary embolism)    Negative: History of inherited increased risk of blood clots (e.g., factor 5 Leiden, antithrombin 3, protein C or protein S deficiency, prothrombin mutation)    Negative: Major surgery in the past month    Negative: Hip or leg fracture (broken bone) in past month (or had cast on leg or ankle in past month)    Negative: Illness requiring prolonged bedrest in past month (e.g., immobilization, long hospital stay)    Negative: Long-distance travel in past month (e.g., car, bus, train, plane; with trip lasting 6 or more hours)    Negative: Cancer treatment in the past two months (or has cancer now)    Negative: Patient sounds very sick or weak to the triager    Negative: SEVERE pain (e.g., excruciating, unable to do any normal activities)    Negative: Cast on leg or ankle and now has increasing pain    Negative: Red area or streak and large (> 2 in. or 5 cm)    Negative: Painful rash with multiple small blisters grouped together (i.e., dermatomal distribution or 'band' or 'stripe')    Negative: Looks like a boil, infected sore, deep ulcer, or other infected rash (spreading redness, pus)    Negative: Localized rash is very painful (no fever)    Negative: Localized pain, redness or hard lump along vein    Answer Assessment - Initial Assessment Questions  1. ONSET: \"When did the pain start?\"       Numbness and tingling after sitting for any length of time, feeling of tightness in left calf  2. LOCATION: \"Where is the pain located?\"       Left lower leg, calf  3. PAIN: \"How bad is the pain?\"    (Scale 1-10; or mild, moderate, severe)    -  MILD (1-3): doesn't interfere with normal activities     -  MODERATE (4-7): interferes with normal activities (e.g., work or school) or " "awakens from sleep, limping     -  SEVERE (8-10): excruciating pain, unable to do any normal activities, unable to walk      Denies pain, calls it a tightness  4. WORK OR EXERCISE: \"Has there been any recent work or exercise that involved this part of the body?\"       No known injury recently.  Had surgical complications in 2011 where she loss feeling/function in left leg.  Still goes to therapy on and off to strengthen leg.  5. CAUSE: \"What do you think is causing the leg pain?\"      Does not know.  Looked on internet and has concern she may have a blood clot in leg.  6. OTHER SYMPTOMS: \"Do you have any other symptoms?\" (e.g., chest pain, back pain, breathing difficulty, swelling, rash, fever, numbness, weakness)      Denies chest pain, difficulty breathing, any plane or long car trip in past 2 months.  Denies swelling, pain, redness or warmth of left leg  7. PREGNANCY: \"Is there any chance you are pregnant?\" \"When was your last menstrual period?\"      NA    Protocols used: LEG PAIN-A-OH      "

## 2022-03-16 NOTE — TELEPHONE ENCOUNTER
Patient calling  Her left leg is hurting her a little bit. Tightness around calf.   Please advise. Ok to call and adela [phone]

## 2022-03-16 NOTE — ED PROVIDER NOTES
History     Chief Complaint:  Leg Pain       HPI   Bushra Harmon is a 86 year old female who presents for evaluation of leg pain.  Patient notes that she has had intermittent numbness and weakness to her left leg following back surgery about 11 years ago.  She notes that the last few months she has had worsening numbness and weakness, present only when she goes from sitting to standing.  After a short period of time, symptoms resolved.  Symptoms are not worse with exertion.  Yesterday, she noted some pain to the mid posterior calf, and she became concerned for DVT.  No history of DVT.  No leg swelling.  No chest pain or shortness of breath.  She called the clinic and was advised to come in for further evaluation.  No rash.  No fevers or chills.    ROS:  Review of Systems   Musculoskeletal: Positive for myalgias.   All other systems reviewed and are negative.      Allergies:  No Known Drug Allergies     Medications:    albuterol (PROAIR HFA/PROVENTIL HFA/VENTOLIN HFA) 108 (90 Base) MCG/ACT inhaler  alendronate (FOSAMAX) 70 MG tablet  CALCIUM CITRATE-VITAMIN D 315-200 MG-UNIT PO TABS  Cholecalciferol (VITAMIN D3) 25 MCG (1000 UT) CAPS  Folic Acid (FOLATE PO)  GLUCOSAMINE HCL 1000 MG PO TABS  LACTOBACILLUS PO  levothyroxine (SYNTHROID/LEVOTHROID) 88 MCG tablet  lisinopril (ZESTRIL) 20 MG tablet  Lutein 6 MG TABS  methotrexate 2.5 MG tablet  metoprolol succinate ER (TOPROL-XL) 25 MG 24 hr tablet  MULTIVITAMIN TABS   OR  predniSONE (DELTASONE) 20 MG tablet  simvastatin (ZOCOR) 10 MG tablet        Past Medical History:    Past Medical History:   Diagnosis Date     CKD (chronic kidney disease)      Essential hypertension, benign      Unspecified hypothyroidism      Patient Active Problem List   Diagnosis     Herpes simplex virus (HSV) infection     CKD (chronic kidney disease) stage 3, GFR 30-59 ml/min (H)     Hypertension goal BP (blood pressure) < 140/90     Advanced directives, counseling/discussion      Hyperlipidemia LDL goal <100     Onychomycosis     Hypothyroidism - f/u w Endo - dr Meyers      Lichen sclerosus     PMR (polymyalgia rheumatica) (H)  -- rheumatologist -- dr Karan López        Past Surgical History:    Past Surgical History:   Procedure Laterality Date     ABDOMEN SURGERY       C DEXA, BONE DENSITY, AXIAL SKEL  2003    Normal BMD     COLONOSCOPY N/A 11/3/2015    Procedure: COLONOSCOPY;  Surgeon: Joanna Acevedo MD;  Location:  GI     HC FLEX SIGMOIDOSCOPY W/WO SHERIF SPEC BY BRUSH/WASH  1998    normal     HC REMOVE TONSILS/ADENOIDS,<11 Y/O       HC REMV CATARACT EXTRACAP,INSERT LENS, COMPLEX, W/O ECP  2005    Left eye     SURGICAL HISTORY OF -   2004    macular hole repair- Left eye     ZZC NONSPECIFIC PROCEDURE      Exploratory Laparotomy constricting band     ZZC NONSPECIFIC PROCEDURE       x3     ZZC REPLANTATION THUMB DISTAL,COMPLETE  2009    left thumb trauma; Dr. Jose surgery     ZZ COLONOSCOPY THRU STOMA, DIAGNOSTIC  10/22/2005    Diverticulosis- Dr. Shea        Family History:    family history includes Breast Cancer in her mother; Diabetes in her sister; Hypertension in her mother, sister, and son; Neurologic Disorder in her daughter; Thyroid Disease in her mother.    Social History:   reports that she quit smoking about 20 years ago. She has a 22.50 pack-year smoking history. She has never used smokeless tobacco. She reports current alcohol use. She reports that she does not use drugs.    PCP: Natalie Bro     Physical Exam     Patient Vitals for the past 24 hrs:   BP Temp Temp src Pulse Resp SpO2   22 1850 (!) 199/101 -- -- 88 20 98 %   22 1849 (!) 199/104 -- -- -- 16 96 %   22 1657 (!) 198/100 98.7  F (37.1  C) Temporal 84 20 97 %        Physical Exam  Constitutional: Pleasant. Cooperative.   Eyes: Pupils equally round and reactive  HENT: Head is normal in appearance. Oropharynx is normal with moist  mucus membranes.  Cardiovascular: Regular rate and rhythm and without murmurs.  Respiratory: Normal respiratory effort, lungs are clear bilaterally.  Musculoskeletal: Mild TTP to posterior aspect of left calf. Full ROM of bilateral lower extremities. 2+ DP pulses bilaterally.   Skin: Normal, without rash.  Neurologic: Cranial nerves grossly intact, normal cognition, no focal deficits. Alert and oriented x 3. Sensation intact to bilateral lower extremities.  Psychiatric: Normal affect.  Nursing notes and vital signs reviewed.      Emergency Department Course     Imaging:  US Lower Extremity Venous Duplex Left   Final Result   IMPRESSION:   1.  No deep venous thrombosis in the left lower extremity.        Emergency Department Course:    Reviewed:  I reviewed nursing notes, vitals, past medical history and Care Everywhere    Assessments:   I obtained history and examined the patient as noted above.    I rechecked the patient and explained findings.     Disposition:  The patient was discharged to home.     Impression & Plan      Medical Decision Making:  Bushra Harmon is a 86 year old female who presents to emergency department for evaluation of the leg pain.  Patient is concerned for DVT.  See HPI as above for additional details.  Vitals and physical exam as above.  Differential was broad included DVT, superficial thrombophlebitis, fracture, sprain, strain, referred pain from back, among others.  Ultrasound obtained as above is negative for DVT or other venous pathology.  No falls or trauma to suggest for fracture, and thus no indication for x-ray at this time.  Soft tissue injury is most likely etiology at this time.  Patient this noted when she sits for extended periods of time she feels like her leg is slightly more numb/weak when she stands for a few minutes.  I suspect that this is likely nerve related in etiology, however cannot determine this definitively at this time. She does not have pain with exertion to  suggest for arterial etiology. Overall, felt patient was safe for discharge to home with close f/u with orthopedics. Patient in agreement with plan. Advised Tylenol for pain. Discussed reasons to return. All questions answered. Patient discharged to home in stable condition.    Diagnosis:    ICD-10-CM    1. Pain of left lower leg  M79.662    2. Left leg weakness  R29.898    3. Left leg paresthesias  R20.2         Discharge Medications:  None    3/16/2022   Tang Sutton PA-C     This record was created at least in part using electronic voice recognition software, so please excuse any typographical errors.       Tang Sutton PA-C  03/16/22 1928

## 2022-03-16 NOTE — TELEPHONE ENCOUNTER
Call to patient. Patient informed of Dr. Delgado's below response. Patient verbalized understanding and reports she was planning on going to urgent care in [place].     Raquel GONZALEZ RN   [facility]

## 2022-03-16 NOTE — ED TRIAGE NOTES
Arrives with concerns for one week of left leg weakness and pain in left calf. Denies injury, swelling, or redness. Alert and oriented, ABCs intact.

## 2022-03-17 DIAGNOSIS — E03.8 OTHER SPECIFIED HYPOTHYROIDISM: ICD-10-CM

## 2022-03-17 RX ORDER — LEVOTHYROXINE SODIUM 88 UG/1
TABLET ORAL
Qty: 90 TABLET | Refills: 0 | Status: SHIPPED | OUTPATIENT
Start: 2022-03-17 | End: 2022-06-03

## 2022-03-28 ENCOUNTER — OFFICE VISIT (OUTPATIENT)
Dept: URGENT CARE | Facility: URGENT CARE | Age: 87
End: 2022-03-28
Payer: MEDICARE

## 2022-03-28 VITALS
TEMPERATURE: 98.2 F | DIASTOLIC BLOOD PRESSURE: 79 MMHG | OXYGEN SATURATION: 100 % | HEART RATE: 83 BPM | SYSTOLIC BLOOD PRESSURE: 182 MMHG | RESPIRATION RATE: 20 BRPM

## 2022-03-28 DIAGNOSIS — J06.9 VIRAL UPPER RESPIRATORY TRACT INFECTION WITH COUGH: Primary | ICD-10-CM

## 2022-03-28 LAB
DEPRECATED S PYO AG THROAT QL EIA: NEGATIVE
GROUP A STREP BY PCR: NOT DETECTED

## 2022-03-28 PROCEDURE — U0003 INFECTIOUS AGENT DETECTION BY NUCLEIC ACID (DNA OR RNA); SEVERE ACUTE RESPIRATORY SYNDROME CORONAVIRUS 2 (SARS-COV-2) (CORONAVIRUS DISEASE [COVID-19]), AMPLIFIED PROBE TECHNIQUE, MAKING USE OF HIGH THROUGHPUT TECHNOLOGIES AS DESCRIBED BY CMS-2020-01-R: HCPCS | Performed by: PHYSICIAN ASSISTANT

## 2022-03-28 PROCEDURE — 99213 OFFICE O/P EST LOW 20 MIN: CPT | Mod: CS | Performed by: PHYSICIAN ASSISTANT

## 2022-03-28 PROCEDURE — 87651 STREP A DNA AMP PROBE: CPT | Performed by: PHYSICIAN ASSISTANT

## 2022-03-28 PROCEDURE — U0005 INFEC AGEN DETEC AMPLI PROBE: HCPCS | Performed by: PHYSICIAN ASSISTANT

## 2022-03-28 RX ORDER — AMPICILLIN TRIHYDRATE 500 MG
CAPSULE ORAL
COMMUNITY

## 2022-03-28 NOTE — PATIENT INSTRUCTIONS
Patient was educated on the natural course of viral illness. Strep and COVID PCR are pending. Conservative measures discussed including warm fluids, salt water gargles, Lozenges (Cepacol), and over-the-counter analgesics (Tylenol or Ibuprofen). See your primary care provider if symptoms worsen or do not improve in 7 days. Seek emergency care if you develop severe throat pain, or difficulty swallowing.

## 2022-03-28 NOTE — PROGRESS NOTES
URGENT CARE VISIT:    SUBJECTIVE:   Bushra Harmon is a 86 year old female presenting with a chief complaint of cough - non-productive and sore throat.  Onset was 2 day(s) ago.   She denies the following symptoms: fever, chills, shortness of breath, vomiting and diarrhea  Course of illness is same.    Treatment measures tried include None tried with no relief of symptoms.  Predisposing factors include None.    PMH:   Past Medical History:   Diagnosis Date     CKD (chronic kidney disease)      Essential hypertension, benign      Unspecified hypothyroidism      Allergies: No known drug allergies   Medications:   Current Outpatient Medications   Medication Sig Dispense Refill     albuterol (PROAIR HFA/PROVENTIL HFA/VENTOLIN HFA) 108 (90 Base) MCG/ACT inhaler Inhale 2 puffs into the lungs every 6 hours 18 g 0     BIOTIN 5000 PO        CALCIUM CITRATE-VITAMIN D 315-200 MG-UNIT PO TABS 2 TABLETS DAILY 90 Tab 3     Cholecalciferol (VITAMIN D3) 25 MCG (1000 UT) CAPS Take 1 capsule by mouth daily       Cranberry 450 MG CAPS        Folic Acid (FOLATE PO)        GLUCOSAMINE HCL 1000 MG PO TABS 2 TABLETS DAILY 90 Tab 3     LACTOBACILLUS PO Take 120 mg by mouth daily       levothyroxine (SYNTHROID/LEVOTHROID) 88 MCG tablet TAKE 1 TABLET(88 MCG) BY MOUTH DAILY 90 tablet 0     lisinopril (ZESTRIL) 20 MG tablet Take 1 tablet (20 mg) by mouth daily 90 tablet 3     Lutein 6 MG TABS Take 20 mg by mouth daily        methotrexate 2.5 MG tablet Take 10 mg by mouth every 7 days       metoprolol succinate ER (TOPROL-XL) 25 MG 24 hr tablet Take 1 tablet (25 mg) by mouth 2 times daily 180 tablet 3     MULTIVITAMIN TABS   OR 1 daily       predniSONE (DELTASONE) 20 MG tablet Take 3 tabs by mouth daily x 3 days, then 2 tabs daily x 3 days, then 1 tab daily x 3 days, then 1/2 tab daily x 3 days. 20 tablet 0     simvastatin (ZOCOR) 10 MG tablet Take 1 tablet (10 mg) by mouth At Bedtime 90 tablet 3     alendronate (FOSAMAX) 70 MG tablet Take 1  tablet (70 mg) by mouth every 7 days (Patient not taking: Reported on 3/28/2022) 12 tablet 0     Social History:   Social History     Tobacco Use     Smoking status: Former Smoker     Packs/day: 0.50     Years: 45.00     Pack years: 22.50     Quit date: 2001     Years since quittin.9     Smokeless tobacco: Never Used   Substance Use Topics     Alcohol use: Yes     Comment: 1 drink per day       ROS:  Review of systems negative except as stated above.    OBJECTIVE:  BP (!) 182/79   Pulse 83   Temp 98.2  F (36.8  C) (Oral)   Resp 20   LMP 2003   SpO2 100%   Breastfeeding No   GENERAL APPEARANCE: healthy, alert and no distress  EYES: EOMI,  PERRL, conjunctiva clear  HENT: ear canals and TM's normal.  Mildly erythematous oropharynx.   NECK: supple, nontender, no lymphadenopathy  RESP: lungs clear to auscultation - no rales, rhonchi or wheezes  CV: regular rates and rhythm, normal S1 S2, no murmur noted  SKIN: no suspicious lesions or rashes    Labs:    Results for orders placed or performed in visit on 22   Streptococcus A Rapid Screen w/Reflex to PCR - Clinic Collect     Status: Normal    Specimen: Throat; Swab   Result Value Ref Range    Group A Strep antigen Negative Negative       ASSESSMENT:    ICD-10-CM    1. Viral upper respiratory tract infection with cough  J06.9 Streptococcus A Rapid Screen w/Reflex to PCR - Clinic Collect     Symptomatic; Unknown COVID-19 Virus (Coronavirus) by PCR Nose     Group A Streptococcus PCR Throat Swab       PLAN:  Patient Instructions   Patient was educated on the natural course of viral illness. Strep and COVID PCR are pending. Conservative measures discussed including warm fluids, salt water gargles, Lozenges (Cepacol), and over-the-counter analgesics (Tylenol or Ibuprofen). See your primary care provider if symptoms worsen or do not improve in 7 days. Seek emergency care if you develop severe throat pain, or difficulty swallowing.     Patient verbalized  understanding and is agreeable to plan. The patient was discharged ambulatory and in stable condition.    Mariya Cedeno PA-C ....................  3/28/2022   1:40 PM

## 2022-03-29 LAB — SARS-COV-2 RNA RESP QL NAA+PROBE: NEGATIVE

## 2022-03-30 ENCOUNTER — LAB (OUTPATIENT)
Dept: LAB | Facility: CLINIC | Age: 87
End: 2022-03-30
Payer: MEDICARE

## 2022-03-30 DIAGNOSIS — E03.8 OTHER SPECIFIED HYPOTHYROIDISM: ICD-10-CM

## 2022-03-30 PROCEDURE — 84439 ASSAY OF FREE THYROXINE: CPT | Mod: 90

## 2022-03-30 PROCEDURE — 84439 ASSAY OF FREE THYROXINE: CPT | Mod: 59

## 2022-03-30 PROCEDURE — 99000 SPECIMEN HANDLING OFFICE-LAB: CPT

## 2022-03-30 PROCEDURE — 84443 ASSAY THYROID STIM HORMONE: CPT

## 2022-03-30 PROCEDURE — 36415 COLL VENOUS BLD VENIPUNCTURE: CPT

## 2022-03-31 LAB
T4 FREE SERPL-MCNC: 1.2 NG/DL (ref 0.76–1.46)
TSH SERPL DL<=0.005 MIU/L-ACNC: 2.33 MU/L (ref 0.4–4)

## 2022-03-31 NOTE — RESULT ENCOUNTER NOTE
Labs results/imaging studies results noted. Will discuss in next clinic visit 4/5/22.    Here is a copy for your records.  Follow-up in endocrinology Clinic as scheduled/discussed. We will review these studies/results in further detail at that visit, but feel free to contact us with any other questions in the interim.    Please call endocrinology clinic (nurse line: [phone]) if questions.    Belinda Marcos MD

## 2022-04-05 ENCOUNTER — VIRTUAL VISIT (OUTPATIENT)
Dept: ENDOCRINOLOGY | Facility: CLINIC | Age: 87
End: 2022-04-05
Payer: MEDICARE

## 2022-04-05 DIAGNOSIS — N18.32 STAGE 3B CHRONIC KIDNEY DISEASE (H): ICD-10-CM

## 2022-04-05 DIAGNOSIS — E03.8 OTHER SPECIFIED HYPOTHYROIDISM: Primary | ICD-10-CM

## 2022-04-05 DIAGNOSIS — M81.0 AGE RELATED OSTEOPOROSIS, UNSPECIFIED PATHOLOGICAL FRACTURE PRESENCE: ICD-10-CM

## 2022-04-05 LAB — T4 FREE SERPL DIALY-MCNC: 2.7 NG/DL

## 2022-04-05 PROCEDURE — 99214 OFFICE O/P EST MOD 30 MIN: CPT | Mod: 95 | Performed by: INTERNAL MEDICINE

## 2022-04-05 NOTE — PATIENT INSTRUCTIONS
[facility]  Dr Marcos, Endocrinology Department    [facility]   [address] [id number]  [place] [address]  Appointment [phone]  Fax: [phone]  [facility]: Monday - Thursday      Await FT4Dl levels.  Continue current dose of thyroid hormone replacement based on current labs.  Repeat labs in 6 months or sooner if concerns.  Please make a lab appointment for blood work and follow up clinic appointment in 1 week after that to discuss results.    Stop Fosamax.    Repeat bone density scan 2022 at [facility] based on insurance coverage.  Please check with your insurance if DEXA scan is covered.  Follow-up after DEXA scan.    Take Levothyroxine on an empty stomach. Take it with a full glass of water at least 30 minutes to 1 hour before eating breakfast.   This medicine should be taken at least 4 hours before or 4 hours after these medicines: antacids (Maalox , Mylanta , Tums ), calcium supplements, cholestyramine (Prevalite , Questran ), colestipol (Colestid ), iron supplements, orlistat (Heraclio , Xenical ), simethicone (Gas-X , Mylicon ), and sucralfate (Carafate ).   Swallow the capsule whole. Do not cut or crush it.     The pt was advised to    Maintain an adequate calcium and vitamin D intake and to supplement vitamin D if needed to maintain serum levels of 25 hydroxy D (25 OH D) between 30-60 ng/ml.    Limit alcohol intake to no more than 2 servings per day.    Limit caffeine intake.    Maintain an active lifestyle including weight-bearing exercises for at least 30 mins daily.    Take measures to reduce the risk of falling.

## 2022-04-05 NOTE — PROGRESS NOTES
Bushra Harmon  is being evaluated via a billable video visit.      How would you like to obtain your AVS? [employer]  For the video visit, send the invitation by: Text to cell phone: [phone]  Will anyone else be joining your video visit? Elsa Amato on 4/5/2022 at 10:49 AM

## 2022-04-05 NOTE — NURSING NOTE
Pt states she is taking Prednisone 2.5mg every day. Pt also state she has stopped taking the fosamax. Maria Teresa Lm on 4/5/2022 at 10:48 AM

## 2022-04-05 NOTE — PROGRESS NOTES
"THIS IS A VIDEO VISIT:    Phone call visit/virtual visit encounter:    Name of patient: Bushra Harmon    Date of encounter: 4/5/2022    Time of start of video visit: 11:00    Video started: 11:12    Video ended: 11:26    Provider location: working from home/ [facility]    Patient location: patients home.    Mode of transmission: video/ Doximity    Verbal consent: obtained before starting visit. Pt is agreeable.      The patient has been notified of following:      \"This VIDEO visit will be conducted via a call between you and your physician/provider. We have found that certain health care needs can be provided without the need for a physical exam.  This service lets us provide the care you need with a short phone conversation.  If a prescription is necessary we can send it directly to your pharmacy.  If lab work is needed we can place an order for that and you can then stop by our lab to have the test done at a later time.     With new updates with corona virus patient might be billed as clinic visit.     If during the course of the call the physician/provider feels a telephone visit is not appropriate, you will not be charged for this service.\"      Past medical history, social history, family history, allergy and medications were reviewed and updated as appropriate.  Reviewed pertinent labs, notes, imaging studies personally.    ENDOCRINOLOGY CLINIC NOTE:    Name: Bushra Harmon  Seen for f/u of Hypothyroidism.she also wants to discuss osteopenia today.p  HPI:  Bushra Harmon is a 86 year old female who presents for the evaluation of :hypothyroidism.   has a past medical history of CKD (chronic kidney disease), Essential hypertension, benign, and Unspecified hypothyroidism.    Was started on prednisone for RA by Rheumatology.  Gained some wt since starting Prednisone. On it X 2 year.  She is also on methotrexate.    #1. Hypothyroidism (+TPO):  Initially diagnosed at age 42.and since then she is on thyroid " hormone replacement.  Currently taking levothyroxine 88 mcg/day (On this dose since 7/9/2020). Dose was decreased at that time based on that.    Taking generic.  Reports compliance.    Feeling OK. No major s/s.  No longer taking biotin X Aug 2019.    + frontal hair loss. X chronic.    #2. Osteopenia:    DEXA 9/2021: Osteopenia.  No significant change in bone density of the lumbar spine or of the hip(s). There has been no significant change in bone density of the forearm. No vertebral fractures identified on the VFA.     RISK FACTORS:  Post-menopausal, Height loss 1.5 inches,  Parent history of osteoporosis in her mother with  a hip fracture, Long term corticosteroid therapy   Dairy; 2 servings/day  Calcium 600 mg/day  Vit D 1000 international unit(s)/day  Dental health is OK- no upcoming dental procedure.    She was started on Fosamax by primary care provider but she has not started it as she was worried about side effect of medication.  In last visit 12/2021 she wanted to start fosamax and was started on FOSAMAX.    But after one dose she stopped it as she had diarrhea, joint pain and back pain.    Steroids: On prednisone 2.5 mg/day for PMR.    Exercise: golf in summer.     Palpitations:  No  Changes to hair or skin: chronic problem. + frontal hair loss X 2 years  Diarrhea/Constipation:No  Changes in menses: s/p menopause. Had been on HRT for a while. Stopped at age 67.  Dysphagia or Shortness of breath:No  Tremors:No  Changes in weight: gained some weight.    Heat or cold intolerance: no  History of Lithium or Amiodarone use:No  Head or neck surgery/radiation:No  Family History of Thyroid Problems: + hypothyroidism in mother and sister  PMH/PSH:  Past Medical History:   Diagnosis Date     CKD (chronic kidney disease)      Essential hypertension, benign      Unspecified hypothyroidism      Past Surgical History:   Procedure Laterality Date     ABDOMEN SURGERY       C DEXA, BONE DENSITY, AXIAL SKEL  6/16/2003     Normal BMD     COLONOSCOPY N/A 11/3/2015    Procedure: COLONOSCOPY;  Surgeon: Joanna Acevedo MD;  Location: [facility]     HC FLEX SIGMOIDOSCOPY W/WO SHERIF SPEC BY BRUSH/WASH  1998    normal     HC REMOVE TONSILS/ADENOIDS,<11 Y/O       HC REMV CATARACT EXTRACAP,INSERT LENS, COMPLEX, W/O ECP  2005    Left eye     SURGICAL HISTORY OF -   2004    macular hole repair- Left eye     ZZC NONSPECIFIC PROCEDURE      Exploratory Laparotomy constricting band     ZZC NONSPECIFIC PROCEDURE       x3     ZZC REPLANTATION THUMB DISTAL,COMPLETE  2009    left thumb trauma; Dr. Jose surgery     ZZ COLONOSCOPY THRU STOMA, DIAGNOSTIC  10/22/2005    Diverticulosis- Dr. Shea     Family Hx:  Family History   Problem Relation Age of Onset     Hypertension Mother           at 100.     Breast Cancer Mother      Thyroid Disease Mother      Diabetes Sister      Hypertension Sister      Neurologic Disorder Daughter         MS     Hypertension Son        Social Hx:  Social History     Socioeconomic History     Marital status:      Spouse name: Not on file     Number of children: 3     Years of education: Not on file     Highest education level: Not on file   Occupational History     Occupation: office     Employer: RETIRED   Tobacco Use     Smoking status: Former Smoker     Packs/day: 0.50     Years: 45.00     Pack years: 22.50     Quit date: 2001     Years since quittin.9     Smokeless tobacco: Never Used   Vaping Use     Vaping Use: Never used   Substance and Sexual Activity     Alcohol use: Yes     Comment: 1 drink per day     Drug use: No     Sexual activity: Never   Other Topics Concern      Service Not Asked     Blood Transfusions Not Asked     Caffeine Concern Yes     Comment: 1 cup per day     Occupational Exposure Not Asked     Hobby Hazards Not Asked     Sleep Concern Not Asked     Stress Concern Not Asked     Weight Concern Not Asked     Special Diet Not Asked     Back  Care Not Asked     Exercise Yes     Comment: Active; exercise 4 times per week     Bike Helmet Not Asked     Seat Belt Yes     Self-Exams No     Parent/sibling w/ CABG, MI or angioplasty before 65F 55M? Not Asked   Social History Narrative     Not on file     Social Determinants of Health     Financial Resource Strain: Not on file   Food Insecurity: Not on file   Transportation Needs: Not on file   Physical Activity: Not on file   Stress: Not on file   Social Connections: Not on file   Intimate Partner Violence: Not on file   Housing Stability: Not on file          MEDICATIONS:  has a current medication list which includes the following prescription(s): albuterol, alendronate, biotin, calcium citrate-vitamin d, vitamin d3, cranberry, folic acid, glucosamine hcl, lactobacillus, levothyroxine, lisinopril, lutein, methotrexate, metoprolol succinate er, multivitamin, prednisone, and simvastatin.    ROS   ROS: 10 point ROS neg other than the symptoms noted above in the HPI.    Physical Exam   VS: LMP 02/26/2003   GENERAL: healthy, alert and no distress  EYES: Eyes grossly normal to inspection, conjunctivae and sclerae normal  ENT: no nose swelling, nasal discharge.  Thyroid: no apparent thyroid nodules  RESP: no audible wheeze, cough, or visible cyanosis.  No visible retractions or increased work of breathing.  Able to speak fully in complete sentences.  ABDO: not evaluated.  EXTREMITIES: no hand tremors.  NEURO: Cranial nerves grossly intact, mentation intact and speech normal  SKIN: No apparent skin lesions, rash or edema seen   PSYCH: mentation appears normal, affect normal/bright, judgement and insight intact, normal speech and appearance well-groomed    LABS:  TFTs:  ENDO THYROID LABS-[facility] Latest Ref Rng & Units 3/30/2022   TSH 0.40 - 4.00 mU/L 2.33   FREE T4 0.76 - 1.46 ng/dL 1.20   FT4DL pending.    Component      Latest Ref Rng & Units 12/7/2021   T4 Free      0.76 - 1.46 ng/dL 1.34   TSH      0.40 - 4.00 mU/L  0.72   Free T4 Eq Dialysis      1.1 - 2.4 ng/dL 3.2 (H)     All pertinent notes, labs, and images personally reviewed by me.     A/P  Ms.Nancy YUNG Harmon is a 86 year old here for the evaluation of hypothyroidism:    #1 Hypothyroidism(+TPO):   Currently taking 88 mcg/day (on this dose since 7/9/2020)  Taking generic.  Clinically looks euthyorid.  Plan:  Discussed diagnosis, pathophysiology, management and treatment options of condition with patient.  Await FT4Dl levels.  Continue current dose of thyroid hormone replacement-levothyroxine 88 mcg/day based on current labs.  Repeat labs in 6 months or sooner if concerns.  Please make a lab appointment for blood work and follow up clinic appointment in 1 week after that to discuss results.      Discussed s/s of hypothyroidism and hyperthyroidism to watch for.  The patient indicates understanding of these issues and agrees with the plan.    #2. Osteopenia:  RISK FACTORS:  Post-menopausal, Height loss 1.5 inches,  Parent history of osteoporosis in her mother with  a hip fracture, Long term corticosteroid therapy   Currently she is taking prednisone gram per day for PMR.  Followed by rheumatology.  DEXA 9/2021: Osteopenia.  No significant change in bone density of the lumbar spine or of the hip(s). There has been no significant change in bone density of the forearm. No vertebral fractures identified on the VFA.   Dental health is OK- no upcoming dental procedure.  She took 1 dose of Fosamax but had diarrhea, joint pain and back pain and stopped that.  She is not interested in retrial of Fosamax at this time.  Plan:  Discussed diagnosis, pathophysiology, management and treatment options of condition with pt.  Continue to hold off Fosamax.  DEXA in 2022.  She will check with insurance if DEXA scan is covered by insurance in 2022.    Can consider Boniva or Prolia in future.    Discussed indications, risks and benefits of all medications prescribed, and answered questions to  patient's satisfaction.      The pt was advised to    Maintain an adequate calcium and vitamin D intake and to supplement vitamin D if needed to maintain serum levels of 25 hydroxy D (25 OH D) between 30-60 ng/ml.    Limit alcohol intake to no more than 2 servings per day.    Limit caffeine intake.    Maintain an active lifestyle including weight-bearing exercises for at least 30 mins daily.    Take measures to reduce the risk of falling.      Follow-up:  6 months or sooner    Belinda Marcos MD  Endocrinology  [facility]  CC: Magalis Woodward    All questions were answered.  The patient indicates understanding of the above issues and agrees with the plan set forth.     Addendum to above note and clinic visit:    Labs reviewed.    See result note/telephone encounter.

## 2022-04-05 NOTE — LETTER
"    4/5/2022         RE: Bushra Harmon  [address]  [place] [address]        Dear Colleague,    Thank you for referring your patient, Bushra Harmon, to the [facility]. Please see a copy of my visit note below.    Bushra Harmon  is being evaluated via a billable video visit.      How would you like to obtain your AVS? better.hart  For the video visit, send the invitation by: Text to cell phone: [phone]  Will anyone else be joining your video visit? No      Maria Teresa Amato on 4/5/2022 at 10:49 AM      THIS IS A VIDEO VISIT:    Phone call visit/virtual visit encounter:    Name of patient: Bushra Harmon    Date of encounter: 4/5/2022    Time of start of video visit: 11:00    Video started: 11:12    Video ended: 11:26    Provider location: working from home/ [facility]    Patient location: patients home.    Mode of transmission: video/ Doximity    Verbal consent: obtained before starting visit. Pt is agreeable.      The patient has been notified of following:      \"This VIDEO visit will be conducted via a call between you and your physician/provider. We have found that certain health care needs can be provided without the need for a physical exam.  This service lets us provide the care you need with a short phone conversation.  If a prescription is necessary we can send it directly to your pharmacy.  If lab work is needed we can place an order for that and you can then stop by our lab to have the test done at a later time.     With new updates with corona virus patient might be billed as clinic visit.     If during the course of the call the physician/provider feels a telephone visit is not appropriate, you will not be charged for this service.\"      Past medical history, social history, family history, allergy and medications were reviewed and updated as appropriate.  Reviewed pertinent labs, notes, imaging studies personally.    ENDOCRINOLOGY CLINIC NOTE:    Name: Bushra BARRAGAN " Faustino  Seen for f/u of Hypothyroidism.she also wants to discuss osteopenia today.p  HPI:  Bushra Harmon is a 86 year old female who presents for the evaluation of :hypothyroidism.   has a past medical history of CKD (chronic kidney disease), Essential hypertension, benign, and Unspecified hypothyroidism.    Was started on prednisone for RA by Rheumatology.  Gained some wt since starting Prednisone. On it X 2 year.  She is also on methotrexate.    #1. Hypothyroidism (+TPO):  Initially diagnosed at age 42.and since then she is on thyroid hormone replacement.  Currently taking levothyroxine 88 mcg/day (On this dose since 7/9/2020). Dose was decreased at that time based on that.    Taking generic.  Reports compliance.    Feeling OK. No major s/s.  No longer taking biotin X Aug 2019.    + frontal hair loss. X chronic.    #2. Osteopenia:    DEXA 9/2021: Osteopenia.  No significant change in bone density of the lumbar spine or of the hip(s). There has been no significant change in bone density of the forearm. No vertebral fractures identified on the VFA.     RISK FACTORS:  Post-menopausal, Height loss 1.5 inches,  Parent history of osteoporosis in her mother with  a hip fracture, Long term corticosteroid therapy   Dairy; 2 servings/day  Calcium 600 mg/day  Vit D 1000 international unit(s)/day  Dental health is OK- no upcoming dental procedure.    She was started on Fosamax by primary care provider but she has not started it as she was worried about side effect of medication.  In last visit 12/2021 she wanted to start fosamax and was started on FOSAMAX.    But after one dose she stopped it as she had diarrhea, joint pain and back pain.    Steroids: On prednisone 2.5 mg/day for PMR.    Exercise: golf in summer.     Palpitations:  No  Changes to hair or skin: chronic problem. + frontal hair loss X 2 years  Diarrhea/Constipation:No  Changes in menses: s/p menopause. Had been on HRT for a while. Stopped at age 67.  Dysphagia  or Shortness of breath:No  Tremors:No  Changes in weight: gained some weight.    Heat or cold intolerance: no  History of Lithium or Amiodarone use:No  Head or neck surgery/radiation:No  Family History of Thyroid Problems: + hypothyroidism in mother and sister  PMH/PSH:  Past Medical History:   Diagnosis Date     CKD (chronic kidney disease)      Essential hypertension, benign      Unspecified hypothyroidism      Past Surgical History:   Procedure Laterality Date     ABDOMEN SURGERY       C DEXA, BONE DENSITY, AXIAL SKEL  2003    Normal BMD     COLONOSCOPY N/A 11/3/2015    Procedure: COLONOSCOPY;  Surgeon: Joanna Acevedo MD;  Location: [facility]     HC FLEX SIGMOIDOSCOPY W/WO SHERIF SPEC BY BRUSH/WASH  1998    normal     HC REMOVE TONSILS/ADENOIDS,<13 Y/O       HC REMV CATARACT EXTRACAP,INSERT LENS, COMPLEX, W/O ECP  2005    Left eye     SURGICAL HISTORY OF -   2004    macular hole repair- Left eye     ZZC NONSPECIFIC PROCEDURE      Exploratory Laparotomy constricting band     ZZC NONSPECIFIC PROCEDURE       x3     ZZC REPLANTATION THUMB DISTAL,COMPLETE  2009    left thumb trauma; Dr. Jose surgery     [facility]  10/22/2005    Diverticulosis- Dr. Shea     Family Hx:  Family History   Problem Relation Age of Onset     Hypertension Mother           at 100.     Breast Cancer Mother      Thyroid Disease Mother      Diabetes Sister      Hypertension Sister      Neurologic Disorder Daughter         MS     Hypertension Son        Social Hx:  Social History     Socioeconomic History     Marital status:      Spouse name: Not on file     Number of children: 3     Years of education: Not on file     Highest education level: Not on file   Occupational History     Occupation: office     Employer: RETIRED   Tobacco Use     Smoking status: Former Smoker     Packs/day: 0.50     Years: 45.00     Pack years: 22.50     Quit date: 2001     Years since  quittin.9     Smokeless tobacco: Never Used   Vaping Use     Vaping Use: Never used   Substance and Sexual Activity     Alcohol use: Yes     Comment: 1 drink per day     Drug use: No     Sexual activity: Never   Other Topics Concern      Service Not Asked     Blood Transfusions Not Asked     Caffeine Concern Yes     Comment: 1 cup per day     Occupational Exposure Not Asked     Hobby Hazards Not Asked     Sleep Concern Not Asked     Stress Concern Not Asked     Weight Concern Not Asked     Special Diet Not Asked     Back Care Not Asked     Exercise Yes     Comment: Active; exercise 4 times per week     Bike Helmet Not Asked     Seat Belt Yes     Self-Exams No     Parent/sibling w/ CABG, MI or angioplasty before 65F 55M? Not Asked   Social History Narrative     Not on file     Social Determinants of Health     Financial Resource Strain: Not on file   Food Insecurity: Not on file   Transportation Needs: Not on file   Physical Activity: Not on file   Stress: Not on file   Social Connections: Not on file   Intimate Partner Violence: Not on file   Housing Stability: Not on file          MEDICATIONS:  has a current medication list which includes the following prescription(s): albuterol, alendronate, biotin, calcium citrate-vitamin d, vitamin d3, cranberry, folic acid, glucosamine hcl, lactobacillus, levothyroxine, lisinopril, lutein, methotrexate, metoprolol succinate er, multivitamin, prednisone, and simvastatin.    ROS   ROS: 10 point ROS neg other than the symptoms noted above in the HPI.    Physical Exam   VS: LMP 2003   GENERAL: healthy, alert and no distress  EYES: Eyes grossly normal to inspection, conjunctivae and sclerae normal  ENT: no nose swelling, nasal discharge.  Thyroid: no apparent thyroid nodules  RESP: no audible wheeze, cough, or visible cyanosis.  No visible retractions or increased work of breathing.  Able to speak fully in complete sentences.  ABDO: not evaluated.  EXTREMITIES: no  hand tremors.  NEURO: Cranial nerves grossly intact, mentation intact and speech normal  SKIN: No apparent skin lesions, rash or edema seen   PSYCH: mentation appears normal, affect normal/bright, judgement and insight intact, normal speech and appearance well-groomed    LABS:  TFTs:  ENDO THYROID LABS-[facility] Latest Ref Rng & Units 3/30/2022   TSH 0.40 - 4.00 mU/L 2.33   FREE T4 0.76 - 1.46 ng/dL 1.20   FT4DL pending.    Component      Latest Ref Rng & Units 12/7/2021   T4 Free      0.76 - 1.46 ng/dL 1.34   TSH      0.40 - 4.00 mU/L 0.72   Free T4 Eq Dialysis      1.1 - 2.4 ng/dL 3.2 (H)     All pertinent notes, labs, and images personally reviewed by me.     A/P  Ms.Nancy YUNG Harmon is a 86 year old here for the evaluation of hypothyroidism:    #1 Hypothyroidism(+TPO):   Currently taking 88 mcg/day (on this dose since 7/9/2020)  Taking generic.  Clinically looks euthyorid.  Plan:  Discussed diagnosis, pathophysiology, management and treatment options of condition with patient.  Await FT4Dl levels.  Continue current dose of thyroid hormone replacement-levothyroxine 88 mcg/day based on current labs.  Repeat labs in 6 months or sooner if concerns.  Please make a lab appointment for blood work and follow up clinic appointment in 1 week after that to discuss results.      Discussed s/s of hypothyroidism and hyperthyroidism to watch for.  The patient indicates understanding of these issues and agrees with the plan.    #2. Osteopenia:  RISK FACTORS:  Post-menopausal, Height loss 1.5 inches,  Parent history of osteoporosis in her mother with  a hip fracture, Long term corticosteroid therapy   Currently she is taking prednisone gram per day for PMR.  Followed by rheumatology.  DEXA 9/2021: Osteopenia.  No significant change in bone density of the lumbar spine or of the hip(s). There has been no significant change in bone density of the forearm. No vertebral fractures identified on the VFA.   Dental health is OK- no upcoming  dental procedure.  She took 1 dose of Fosamax but had diarrhea, joint pain and back pain and stopped that.  She is not interested in retrial of Fosamax at this time.  Plan:  Discussed diagnosis, pathophysiology, management and treatment options of condition with pt.  Continue to hold off Fosamax.  DEXA in 2022.  She will check with insurance if DEXA scan is covered by insurance in 2022.    Can consider Boniva or Prolia in future.    Discussed indications, risks and benefits of all medications prescribed, and answered questions to patient's satisfaction.      The pt was advised to    Maintain an adequate calcium and vitamin D intake and to supplement vitamin D if needed to maintain serum levels of 25 hydroxy D (25 OH D) between 30-60 ng/ml.    Limit alcohol intake to no more than 2 servings per day.    Limit caffeine intake.    Maintain an active lifestyle including weight-bearing exercises for at least 30 mins daily.    Take measures to reduce the risk of falling.      Follow-up:  6 months or sooner    Belinda Marcos MD  Endocrinology  [facility]  CC: Magalis Woodward    All questions were answered.  The patient indicates understanding of the above issues and agrees with the plan set forth.     Addendum to above note and clinic visit:    Labs reviewed.    See result note/telephone encounter.              Again, thank you for allowing me to participate in the care of your patient.        Sincerely,        Belinda Marcos MD

## 2022-04-05 NOTE — RESULT ENCOUNTER NOTE
Bushra    Recently done endocrinology lab test/ imaging test showed:  Free T4 DL labs appears improved.   Plan as discussed in clinic visit 4/5/2022.    Here is a copy for your records.    Please call endocrinology clinic (nurse line: [phone]) if questions.    Belinda Marcos MD  Endocrinology   [facility]  April 5, 2022

## 2022-04-07 ENCOUNTER — ANCILLARY PROCEDURE (OUTPATIENT)
Dept: GENERAL RADIOLOGY | Facility: CLINIC | Age: 87
End: 2022-04-07
Attending: FAMILY MEDICINE
Payer: MEDICARE

## 2022-04-07 ENCOUNTER — OFFICE VISIT (OUTPATIENT)
Dept: URGENT CARE | Facility: URGENT CARE | Age: 87
End: 2022-04-07
Payer: MEDICARE

## 2022-04-07 VITALS
WEIGHT: 180 LBS | HEART RATE: 77 BPM | DIASTOLIC BLOOD PRESSURE: 76 MMHG | BODY MASS INDEX: 28.93 KG/M2 | RESPIRATION RATE: 16 BRPM | TEMPERATURE: 98.7 F | OXYGEN SATURATION: 95 % | SYSTOLIC BLOOD PRESSURE: 148 MMHG | HEIGHT: 66 IN

## 2022-04-07 DIAGNOSIS — R05.9 COUGH: ICD-10-CM

## 2022-04-07 DIAGNOSIS — R05.9 COUGH: Primary | ICD-10-CM

## 2022-04-07 DIAGNOSIS — J98.01 BRONCHOSPASM: ICD-10-CM

## 2022-04-07 DIAGNOSIS — J22 ACUTE LOWER RESPIRATORY INFECTION: ICD-10-CM

## 2022-04-07 PROCEDURE — 99214 OFFICE O/P EST MOD 30 MIN: CPT | Performed by: FAMILY MEDICINE

## 2022-04-07 PROCEDURE — 71046 X-RAY EXAM CHEST 2 VIEWS: CPT | Performed by: RADIOLOGY

## 2022-04-07 RX ORDER — PREDNISONE 2.5 MG/1
2.5 TABLET ORAL DAILY
COMMUNITY
Start: 2022-03-04 | End: 2023-03-14

## 2022-04-07 RX ORDER — AZITHROMYCIN 250 MG/1
TABLET, FILM COATED ORAL
Qty: 6 TABLET | Refills: 0 | Status: SHIPPED | OUTPATIENT
Start: 2022-04-07 | End: 2022-04-12

## 2022-04-07 RX ORDER — PREDNISONE 20 MG/1
40 TABLET ORAL DAILY
Qty: 6 TABLET | Refills: 0 | Status: SHIPPED | OUTPATIENT
Start: 2022-04-07 | End: 2022-04-11

## 2022-04-07 NOTE — PROGRESS NOTES
"ASSESSMENT:    ICD-10-CM    1. Cough  R05.9 XR Chest 2 Views   2. Acute lower respiratory infection  J22 azithromycin (ZITHROMAX) 250 MG tablet   3. Bronchospasm  J98.01 predniSONE (DELTASONE) 20 MG tablet     Compared to prior film from December 2021, there appears to be more R perihilar haziness on my reading of today's CXR.  Given this as well as worsening symptoms again after a short period of improvement and patient's slightly lower O2 sat, will cover with antibiotics for possible bacterial etiology.    PLAN:  Patient Instructions   Start azithromycin:  Take two pills today and then one pill daily for the next four days after that.    Prednisone:  Take 40 mg daily for 3 days, then drop back to your usual dosage.    Albuterol inhaler:  Use this every 4 hours as needed to help with wheezing and coughing.  Use 2 puffs per dose.    SUBJECTIVE:  Bushra Harmon is a 86 year old female who presents to  today with worsening cough.  Had sore throat that started March 26 and was quite intense.  This has resolved completely now.  Illness turned into more of a typical viral URI with cough, though with fairly mild nasal symptoms.  Was starting to feel better, but then in the last few days cough seems to be worsening again.  No known fevers.  No chest pain, but does have some tightness.  No significant shortness of breath.  Thinks this feels similar to when she had bronchitis late last year.    OBJECTIVE:  BP (!) 148/76 (BP Location: Right arm, Patient Position: Chair, Cuff Size: Adult Regular)   Pulse 77   Temp 98.7  F (37.1  C) (Oral)   Resp 16   Ht 1.664 m (5' 5.5\")   Wt 81.6 kg (180 lb)   LMP 02/26/2003   SpO2 95%   Breastfeeding No   BMI 29.50 kg/m    GEN: well-appearing, in NAD  ENT: TMs normal, oral MMM, normal pharynx  Lungs:  Diffuse wheezing throughout, unable to determine presence of any crackles due to extent of wheezing and some rhonchi.  CV:  RRR, no murmur noted    CXR: No effusions noted.  " Perihilar area on the R seems hazier than on the previous film, but no other definite infiltrates that I can see.

## 2022-04-07 NOTE — PATIENT INSTRUCTIONS
Start azithromycin:  Take two pills today and then one pill daily for the next four days after that.    Prednisone:  Take 40 mg daily for 3 days, then drop back to your usual dosage.    Albuterol inhaler:  Use this every 4 hours as needed to help with wheezing and coughing.  Use 2 puffs per dose.  
Good

## 2022-04-11 ENCOUNTER — OFFICE VISIT (OUTPATIENT)
Dept: INTERNAL MEDICINE | Facility: CLINIC | Age: 87
End: 2022-04-11
Payer: MEDICARE

## 2022-04-11 VITALS
RESPIRATION RATE: 20 BRPM | DIASTOLIC BLOOD PRESSURE: 77 MMHG | OXYGEN SATURATION: 95 % | BODY MASS INDEX: 29.04 KG/M2 | HEIGHT: 66 IN | HEART RATE: 76 BPM | WEIGHT: 180.7 LBS | TEMPERATURE: 97.7 F | SYSTOLIC BLOOD PRESSURE: 156 MMHG

## 2022-04-11 DIAGNOSIS — J20.9 ACUTE BRONCHITIS, UNSPECIFIED ORGANISM: Primary | ICD-10-CM

## 2022-04-11 DIAGNOSIS — R05.9 COUGH: ICD-10-CM

## 2022-04-11 PROCEDURE — 99213 OFFICE O/P EST LOW 20 MIN: CPT | Performed by: PHYSICIAN ASSISTANT

## 2022-04-11 RX ORDER — CODEINE PHOSPHATE AND GUAIFENESIN 10; 100 MG/5ML; MG/5ML
1-2 SOLUTION ORAL
Qty: 100 ML | Refills: 0 | Status: SHIPPED | OUTPATIENT
Start: 2022-04-11 | End: 2022-04-25

## 2022-04-11 NOTE — PROGRESS NOTES
"  Assessment & Plan     Acute bronchitis, unspecified organism  Symptoms and exam consistent with acute bronchitis. Finishing azithromycin tonight. Did higher dose of pred for 3 days. Discussed potential for prolonged cough with bronchitis. Will have her start Claritin for postnasal drainage (feels this chronically, but it has acutely increased). Robitussin AC at bedtime. Drink plenty of fluids. Recommend albuterol four times per day until symptoms improve, then decrease gradually. Follow-up with new or worsening symptoms    Cough  - guaiFENesin-codeine (ROBITUSSIN AC) 100-10 MG/5ML solution; Take 5-10 mLs by mouth nightly as needed for cough    Prescription drug management  25 minutes spent on the date of the encounter doing chart review, history and exam, documentation and further activities per the note       No follow-ups on file.    Lucia Ardon PA-C  [facility] FATOU Tomlinson is a 86 year old who presents for the following health issues     HPI     ED/UC Followup:    Facility:  [facility]  Date of visit: 3- & 4-7-2022  Reason for visit: cough     Status: a little better, but cough is still very bothersome    Azithromycin--finishing tonight  Prednisone (higher dose x3 days, then back to usual dose for PMR)  Albuterol inhaler--using approx twice per day    Not SOB with activity    Had CXR 4/7    Strep and COVID testing 3/28 (both negative)    Cough dry now  Cough bad at night--laying on side doesn't help  Propping herself up  Wheezing  Cough syrup--Robitussin    Denies h/o COPD or asthma    Feeling more postnasal drainage    Review of Systems   Constitutional, HEENT, cardiovascular, pulmonary, gi and gu systems are negative, except as otherwise noted.      Objective    BP (!) 156/77   Pulse 76   Temp 97.7  F (36.5  C) (Oral)   Resp 20   Ht 1.664 m (5' 5.5\")   Wt 82 kg (180 lb 11.2 oz)   LMP 02/26/2003   SpO2 95%   Breastfeeding No   BMI 29.61 kg/m    Body mass " index is 29.61 kg/m .  Physical Exam   GENERAL: healthy, alert and no distress  RESP: rhonchi throughout and expiratory wheezes throughout  CV: regular rate and rhythm, normal S1 S2, no S3 or S4, no murmur, click or rub, no peripheral edema and peripheral pulses strong  ABDOMEN: soft, nontender, no hepatosplenomegaly, no masses and bowel sounds normal  MS: no gross musculoskeletal defects noted, no edema  SKIN: no suspicious lesions or rashes  PSYCH: mentation appears normal, affect normal/bright    XR Chest 2 Views from UC visit on 4/7    Result Date: 4/7/2022  EXAM: XR CHEST 2 VW LOCATION: [facility] DATE/TIME: 4/7/2022 5:10 PM INDICATION: Worsening cough and wheezing. COMPARISON: None.     IMPRESSION: Hyperexpanded lungs. Lungs are clear. No pleural effusion. Normal heart size. Bony demineralization.

## 2022-04-11 NOTE — PATIENT INSTRUCTIONS
Take Claritin (without the D) to help with drainage  Honey can help with cough  Try over-the-counter Delsym for cough  Continue albuterol inhaler as needed for wheezing

## 2022-04-20 ENCOUNTER — TELEPHONE (OUTPATIENT)
Dept: INTERNAL MEDICINE | Facility: CLINIC | Age: 87
End: 2022-04-20
Payer: MEDICARE

## 2022-04-20 NOTE — TELEPHONE ENCOUNTER
Patient calling  Her rheumatologist provider wants a CT of her chest. Please advise. Patient has been coughing since 3/26/2022  Ok to call and lm [phone]

## 2022-04-21 NOTE — TELEPHONE ENCOUNTER
Call to patient. Advised. Patient is asking if Dr. Houston can please review the notes in her chart and possibly order the CT. States she has been dealing with these issues since November and has had this cough for the past 4 weeks. States all of the information is in her chart. Patient was seen in urgent care on 3/28/22 and 4/7/22 and by Lucia Ardon on 4/11/22. Patient was referred to ENT by Dr. Houston and saw them in February for impacted ears. Patient feels her cough is also ENT related but states she tried to talk to the ENT about the fact that she thinks the cough is related to postnasal drip. Patient states the ENT didn't want to discuss this with her and only cleaned out her ears. Patient with very frequent dry cough and clearing of throat throughout the conversation. Patient really does not want to wait another week to see primary care provider. Offered appointment with another provider. Patient again asking if primary care provider can please review her chart and order a CT. Please advise if CT can be ordered or if patient should schedule with another provider who may be able to see her sooner.

## 2022-04-21 NOTE — TELEPHONE ENCOUNTER
Please have the patient's schedule an appointment for cough and blood pressure ( acute spot or 7:30 is ok)

## 2022-04-22 NOTE — TELEPHONE ENCOUNTER
Call to patient. Advised. Patient would like to be seen sooner than 4/27/22. Suggested appointment with Lucia Ardon as she saw patient for this issue 4 weeks ago. Patient agrees. Scheduled.

## 2022-04-22 NOTE — TELEPHONE ENCOUNTER
Most of the doctors and the specialities address only one thing of the time.     Dr Houston has no control over her schedule.     Thanks for scheduling her on April 27

## 2022-04-25 ENCOUNTER — OFFICE VISIT (OUTPATIENT)
Dept: INTERNAL MEDICINE | Facility: CLINIC | Age: 87
End: 2022-04-25
Payer: MEDICARE

## 2022-04-25 VITALS
TEMPERATURE: 97.2 F | SYSTOLIC BLOOD PRESSURE: 130 MMHG | DIASTOLIC BLOOD PRESSURE: 60 MMHG | HEART RATE: 90 BPM | BODY MASS INDEX: 29.42 KG/M2 | WEIGHT: 179.5 LBS | OXYGEN SATURATION: 97 %

## 2022-04-25 DIAGNOSIS — R06.2 WHEEZING: ICD-10-CM

## 2022-04-25 DIAGNOSIS — R05.9 COUGH: Primary | ICD-10-CM

## 2022-04-25 PROCEDURE — 99213 OFFICE O/P EST LOW 20 MIN: CPT | Performed by: PHYSICIAN ASSISTANT

## 2022-04-25 RX ORDER — INHALER, ASSIST DEVICES
SPACER (EA) MISCELLANEOUS
Qty: 1 EACH | Refills: 0 | Status: SHIPPED | OUTPATIENT
Start: 2022-04-25 | End: 2022-05-11

## 2022-04-25 RX ORDER — CODEINE PHOSPHATE AND GUAIFENESIN 10; 100 MG/5ML; MG/5ML
1-2 SOLUTION ORAL
Qty: 100 ML | Refills: 0 | Status: SHIPPED | OUTPATIENT
Start: 2022-04-25 | End: 2022-04-28

## 2022-04-25 NOTE — PROGRESS NOTES
Assessment & Plan     Cough  CT ordered to assess for methotrexate lung toxicity vs. ongoing infection/inflammation vs. Other. One more refill on Robitussin with codeine. Discussed that this should not be used long term. Should start Claritin as she is having postnasal drainage.  Consider pulm referral after CT obtained.  - CT Chest w/o Contrast; Future  - guaiFENesin-codeine (ROBITUSSIN AC) 100-10 MG/5ML solution; Take 5-10 mLs by mouth nightly as needed for cough    Wheezing  Add a spacer to be used with albuterol inhaler.  - spacer (OPTICHAMBER JANEE) holding chamber; Use with albuterol inhaler    Ordering of each unique test  Prescription drug management  30 minutes spent on the date of the encounter doing chart review, history and exam, documentation and further activities per the note       No follow-ups on file.    [facility] FATOU Tomlinson is a 86 year old who presents for the following health issues     HPI     Follow up cough. Patient requesting prescription refill for cough syrup. 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    Has had multiple visits since November 2021 due to cough/resp symptoms    Delsym helps during the day  Codeine cough syrup helps at night, but she is running low on this  Hasn't taken Claritin as discussed at last visit  Albuterol: using approx 2 per day. Feels like she is using it properly    Has had two normal chest x-rays since symptoms began    Is on methotrexate and low-dose prednisone  Rheum wants her to have a chest CT    Is feeling some postnasal drainage  Blows her nose in the morning  Denies significant nasal congestion    Review of Systems   Constitutional, HEENT, cardiovascular, pulmonary, gi and gu systems are negative, except as otherwise noted.      Objective    /60 (BP Location: Right arm, Patient Position: Chair, Cuff Size: Adult Large)   Pulse 90   Temp 97.2  F (36.2  C) (Oral)   Wt 81.4  kg (179 lb 8 oz)   LMP 02/26/2003   SpO2 97%   BMI 29.42 kg/m    Body mass index is 29.42 kg/m .  Physical Exam   GENERAL: healthy, alert and no distress  RESP: rhonchi throughout and expiratory wheezes throughout  CV: regular rate and rhythm, normal S1 S2, no S3 or S4, no murmur, click or rub, no peripheral edema and peripheral pulses strong  MS: no gross musculoskeletal defects noted, no edema  PSYCH: mentation appears normal, affect normal/bright

## 2022-04-25 NOTE — PATIENT INSTRUCTIONS
Start Claritin once per day    Continue with albuterol every 4 hours as needed (2 puffs at a time). Use the spacer with it.    You'll get a call to schedule the CT

## 2022-04-26 ENCOUNTER — TELEPHONE (OUTPATIENT)
Dept: INTERNAL MEDICINE | Facility: CLINIC | Age: 87
End: 2022-04-26
Payer: MEDICARE

## 2022-04-26 NOTE — TELEPHONE ENCOUNTER
Patient seen by Lucia Ardon and was told a chest CT was ordered. No one has called the patient yet. Please advise. Ok to call and  [phone]

## 2022-04-27 ENCOUNTER — TELEPHONE (OUTPATIENT)
Dept: INTERNAL MEDICINE | Facility: CLINIC | Age: 87
End: 2022-04-27
Payer: MEDICARE

## 2022-04-27 ENCOUNTER — HOSPITAL ENCOUNTER (OUTPATIENT)
Dept: CT IMAGING | Facility: CLINIC | Age: 87
Discharge: HOME | End: 2022-04-27
Attending: PHYSICIAN ASSISTANT | Admitting: PHYSICIAN ASSISTANT
Payer: MEDICARE

## 2022-04-27 DIAGNOSIS — R05.9 COUGH: ICD-10-CM

## 2022-04-27 PROCEDURE — 71250 CT THORAX DX C-: CPT | Mod: MG

## 2022-04-27 NOTE — TELEPHONE ENCOUNTER
Call to patient. Patient advised to call radiology at [phone]. Patient verbalized understanding.     Raquel GONZALEZ RN   [facility]

## 2022-04-27 NOTE — TELEPHONE ENCOUNTER
"Because this medication has codeine in it, patient cannot transfer medication to another pharmacy. This medication would need to be reordered by a provider and sent to the updated pharmacy.     Call to patient. Pharmacy updated. Patient informed of Dr. Lockhart's below response. Patient states: \"I cough so much that I can't sleep. This is the only medicine that I helps me now to sleep so I don't wheeze and only this pharmacy has a little bit of the medication and they are holding it for me.\"    Routing to provider to please resend prescription.     Thank you!    Raquel GONZALEZ RN   [facility]        "

## 2022-04-27 NOTE — TELEPHONE ENCOUNTER
Patient is calling to ask if we can send the rx for guaifenesin with codeine to a [facility] because [facility] is out of this right now.

## 2022-04-27 NOTE — TELEPHONE ENCOUNTER
Patient was seen by Lucia Ardon and this was prescribed by Lucia Ardon,, patient can call [facility] and transferr the prescription to a different [facility], or hold for PCP for tomorrow

## 2022-04-28 ENCOUNTER — TELEPHONE (OUTPATIENT)
Dept: INTERNAL MEDICINE | Facility: CLINIC | Age: 87
End: 2022-04-28
Payer: MEDICARE

## 2022-04-28 DIAGNOSIS — R05.9 COUGH: Primary | ICD-10-CM

## 2022-04-28 RX ORDER — CODEINE PHOSPHATE AND GUAIFENESIN 10; 100 MG/5ML; MG/5ML
1-2 SOLUTION ORAL
Qty: 250 ML | Refills: 0 | Status: SHIPPED | OUTPATIENT
Start: 2022-04-28 | End: 2022-05-11

## 2022-04-28 NOTE — TELEPHONE ENCOUNTER
Patient doesn't really want to wait for Lucia Ardon. She is Dr Houston's patient but couldn't get in to see her so she saw Lucia.

## 2022-04-28 NOTE — TELEPHONE ENCOUNTER
Patient called back and said the rheumatologist saw the results of the chest CT and said she should a pulmonologist. She said this has been going on for about six

## 2022-04-29 ENCOUNTER — TELEPHONE (OUTPATIENT)
Dept: PULMONOLOGY | Facility: CLINIC | Age: 87
End: 2022-04-29
Payer: MEDICARE

## 2022-04-29 RX ORDER — DOXYCYCLINE HYCLATE 100 MG
100 TABLET ORAL 2 TIMES DAILY
Qty: 20 TABLET | Refills: 0 | Status: SHIPPED | OUTPATIENT
Start: 2022-04-29 | End: 2022-07-11

## 2022-04-29 RX ORDER — PREDNISONE 10 MG/1
TABLET ORAL
Qty: 27 TABLET | Refills: 0 | Status: SHIPPED | OUTPATIENT
Start: 2022-04-29 | End: 2022-07-11

## 2022-04-29 NOTE — TELEPHONE ENCOUNTER
Call to patient. Patient informed of Dr. Houston's below response. Patient verbalized understanding.     Appointment scheduled.     Appointments in Next Year    May 11, 2022  9:00 AM  (Arrive by 8:40 AM)  Provider Visit with Natalie Bro MD  [facility] ([facility] ) [phone]   Jul 11, 2022  9:30 AM  (Arrive by 9:10 AM)  Annual Wellness Visit with Natalie Bro MD  [facility] ([facility] ) [phone]   Jul 28, 2022 11:00 AM  MA SCREENING BILATERAL W/ JOSE with RHBCMA2  [facility] ([facility] ) [phone]   Oct 03, 2022 11:15 AM  LAB with [facility] LAB  [facility] ([facility] ) [phone]   Oct 10, 2022 11:00 AM  RETURN ENDOCRINE with Belinda Marcos MD  [facility] ([facility] ) [phone]        Dr. Houston's message also sent to patient via [employer]Wyze per patient's request.     Routing to Lucia Ardon to please provide results of recent CT scan. Thank you!    Raquel GONZALEZ RN   [facility]

## 2022-04-29 NOTE — TELEPHONE ENCOUNTER
Spoke with pt schedule new pt appt pfts and cxr details confirmed with pt. Pt will also be attempting to get a referral from her primary doctor

## 2022-04-29 NOTE — TELEPHONE ENCOUNTER
The provider who ordered the test explains the results. Lucia Ardon will let her know about it.    I review the results. I recommend :     Prednisone 10 mg  -- day 1-4 take 20 mg in am and 20 mg in pm  -- day 5-7 take 20 mg in am  -- day 8-10 take 10 mg in am  -- day 11-14 take 5 mg daily in am      Doxycycline 100 mg twice a day -- avoid sun exposure     Schedule May 11 at 09:00. She can sign up in my chart for the waiting list if any sooner cancellations occur.     If she needs more info or care before May 11, please find her an appointment in [place]

## 2022-05-05 NOTE — TELEPHONE ENCOUNTER
RECORDS RECEIVED FROM: internal    DATE RECEIVED: 8/15/22    NOTES STATUS DETAILS   OFFICE NOTE from referring provider  Self referred    OFFICE NOTE from other specialist internal  4.25.22 Ardon      DISCHARGE SUMMARY from hospital     DISCHARGE REPORT from the ER     MEDICATION LIST internal     IMAGING  (NEED IMAGES AND REPORTS)     CT SCAN internal  4.27.22   CHEST XRAY (CXR) internal  Scheduled 8/15/22  4/7/22, 12.2.21    TESTS     PULMONARY FUNCTION TESTING (PFT) internal  Scheduled 8/15/22

## 2022-05-11 ENCOUNTER — TELEPHONE (OUTPATIENT)
Dept: OTHER | Facility: CLINIC | Age: 87
End: 2022-05-11

## 2022-05-11 ENCOUNTER — OFFICE VISIT (OUTPATIENT)
Dept: INTERNAL MEDICINE | Facility: CLINIC | Age: 87
End: 2022-05-11
Payer: MEDICARE

## 2022-05-11 VITALS
BODY MASS INDEX: 28.82 KG/M2 | HEART RATE: 82 BPM | HEIGHT: 66 IN | OXYGEN SATURATION: 98 % | SYSTOLIC BLOOD PRESSURE: 132 MMHG | WEIGHT: 179.3 LBS | DIASTOLIC BLOOD PRESSURE: 68 MMHG | TEMPERATURE: 96.4 F | RESPIRATION RATE: 15 BRPM

## 2022-05-11 DIAGNOSIS — I72.8 SPLENIC ARTERY ANEURYSM (H): ICD-10-CM

## 2022-05-11 DIAGNOSIS — M35.3 PMR (POLYMYALGIA RHEUMATICA) (H): ICD-10-CM

## 2022-05-11 DIAGNOSIS — J40 FREQUENT EPISODES OF BRONCHITIS: Primary | ICD-10-CM

## 2022-05-11 PROCEDURE — 99214 OFFICE O/P EST MOD 30 MIN: CPT | Performed by: INTERNAL MEDICINE

## 2022-05-11 NOTE — PROGRESS NOTES
Patient's instructions / PLAN:                                                        Plan:  1. Fluticasone inhaler   --  If it is not covered, patient needs to call insurance and find out which equivalent is covered.    2. Continue the other meds, same doses for now.  3. Keep the appointment with the pulmonary   4. Your provider has referred you to: [facility], [phone] dr Caraballo, dr Membreno   5. Keep the July 11 appointment for annual wellness        ASSESSMENT & PLAN:                                                      3 episodes of bronchitis in the last 6 months, will require antibiotics and prednisone.  The chest CT shows inflammation of the airways.  Today she feels better after prednisone and doxycycline.  She has an appointment scheduled with pulmonary in August and I advised her to keep the appointment.  I think she will Benefit from inhaler steroid, but she is reluctant to try it because she developed hoarseness with albuterol inhaler.   She follows up with rheumatology for PMR.  Symptoms are controlled, but to keep in mind that she had high-dose steroids recently for bronchitis  Incidental finding of splenic artery aneurysm with calcification.  I advised her for vascular medicine evaluation    (J40) Frequent episodes of bronchitis  (primary encounter diagnosis)  Comment:   Plan: fluticasone (ARNUITY ELLIPTA) 100 MCG/ACT         inhaler            (I72.8) Splenic artery aneurysm (H)  Comment:   Plan: Vascular Medicine Referral            (M35.3) PMR (polymyalgia rheumatica) (H)  -- rheumatologist -- dr Russo  Comment:   Plan:        Chief Complaint:                                                      freq bronchitis      SUBJECTIVE:                                                    History of present illness     Bronchitis episodes: Nov 2021, Dec 2021, March-April 2022    I prescribed Doxy and Prednisone April 29 and she feels much better  She decreased the Prednisone as per schedule and she  "takes 5 mg now with planning to decrease to 2.5 mg - the usual dose for PMR    She had hoarseness from Albuterol. She doesn't want to try another inhaler.     She plans to do see pulmonary at [facility] but for long term she prefers [place]     ROS:                                                      ROS: negative for fever, chills, cough, wheezes, chest pain, shortness of breath, vomiting, abdominal pain, leg swelling     OBJECTIVE:                                                    Physical Exam :    Blood pressure 132/68, pulse 82, temperature (!) 96.4  F (35.8  C), temperature source Tympanic, resp. rate 15, height 1.664 m (5' 5.5\"), weight 81.3 kg (179 lb 4.8 oz), last menstrual period 2003, SpO2 98 %, not currently breastfeeding.   NAD, appears comfortable  Skin: no rashes   Neck: supple, no JVD, No thyroidmegaly. Lymph nodes nonpalpable cervical and supraclavicular.  Chest: clear to auscultation bilaterally, good respiratory effort  Heart: S1 S2, RRR, no mgr appreciated  Abdomen: soft, not tender,  Extremities: no edema,   Neurologic: A, Ox3, no focal signs appreciated    PMHx: reviewed  Past Medical History:   Diagnosis Date     CKD (chronic kidney disease)      Essential hypertension, benign      Unspecified hypothyroidism       PSHx: reviewed  Past Surgical History:   Procedure Laterality Date     ABDOMEN SURGERY       C DEXA, BONE DENSITY, AXIAL SKEL  2003    Normal BMD     COLONOSCOPY N/A 11/3/2015    Procedure: COLONOSCOPY;  Surgeon: Joanna Acevedo MD;  Location:  GI     HC FLEX SIGMOIDOSCOPY W/WO SHERIF SPEC BY BRUSH/WASH  1998    normal     HC REMOVE TONSILS/ADENOIDS,<13 Y/O       HC REMV CATARACT EXTRACAP,INSERT LENS, COMPLEX, W/O ECP  2005    Left eye     SURGICAL HISTORY OF -   2004    macular hole repair- Left eye     ZZC NONSPECIFIC PROCEDURE      Exploratory Laparotomy constricting band     ZZC NONSPECIFIC PROCEDURE       x3     Z REPLANTATION THUMB " DISTAL,COMPLETE  1/13/2009    left thumb trauma; Dr. Jose surgery     [facility] COLONOSCOPY THRU STOMA, DIAGNOSTIC  10/22/2005    Diverticulosis- Dr. Shea        Meds: reviewed  Current Outpatient Medications   Medication Sig Dispense Refill     BIOTIN 5000 PO        CALCIUM CITRATE-VITAMIN D 315-200 MG-UNIT PO TABS 2 TABLETS DAILY 90 Tab 3     Cholecalciferol (VITAMIN D3) 25 MCG (1000 UT) CAPS Take 1 capsule by mouth daily       Cranberry 450 MG CAPS        Folic Acid (FOLATE PO)        GLUCOSAMINE HCL 1000 MG PO TABS 2 TABLETS DAILY 90 Tab 3     LACTOBACILLUS PO Take 120 mg by mouth daily       levothyroxine (SYNTHROID/LEVOTHROID) 88 MCG tablet TAKE 1 TABLET(88 MCG) BY MOUTH DAILY 90 tablet 0     lisinopril (ZESTRIL) 20 MG tablet Take 1 tablet (20 mg) by mouth daily 90 tablet 3     Lutein 6 MG TABS Take 20 mg by mouth daily        metoprolol succinate ER (TOPROL-XL) 25 MG 24 hr tablet Take 1 tablet (25 mg) by mouth 2 times daily 180 tablet 3     MULTIVITAMIN TABS   OR 1 daily       predniSONE (DELTASONE) 2.5 MG tablet Take 2.5 mg by mouth daily       simvastatin (ZOCOR) 10 MG tablet Take 1 tablet (10 mg) by mouth At Bedtime 90 tablet 3     doxycycline hyclate (VIBRA-TABS) 100 MG tablet Take 1 tablet (100 mg) by mouth 2 times daily (Patient not taking: Reported on 5/11/2022) 20 tablet 0     methotrexate 2.5 MG tablet Take 10 mg by mouth every 7 days (Patient not taking: Reported on 5/11/2022)       predniSONE (DELTASONE) 10 MG tablet -- day 1-4 take 20 mg in am and 20 mg in pm, -- day 5-7 take 20 mg in am, -- day 8-10 take 10 mg in am, -- day 11-14 take 5 mg daily in am (Patient not taking: Reported on 5/11/2022) 27 tablet 0       Soc Hx: reviewed  Fam Hx: reviewed          Natalie Houston MD  Internal Medicine

## 2022-05-11 NOTE — PATIENT INSTRUCTIONS
Plan:  1. Fluticasone inhaler   --  If it is not covered, patient needs to call insurance and find out which equivalent is covered.    2. Continue the other meds, same doses for now.  3. Keep the appointment with the pulmonary   4. Your provider has referred you to: [facility], [phone] dr Caraballo, dr Membreno   5. Keep the July 11 appointment for annual wellness

## 2022-05-11 NOTE — NURSING NOTE
"/68   Pulse 82   Temp (!) 96.4  F (35.8  C) (Tympanic)   Resp 15   Ht 1.664 m (5' 5.5\")   Wt 81.3 kg (179 lb 4.8 oz)   LMP 02/26/2003   SpO2 98%   BMI 29.38 kg/m    Patient in for follow up.  "

## 2022-05-11 NOTE — TELEPHONE ENCOUNTER
Pt referred to [facility] by Natalie Bro MD for Splenic artery aneurysm    Patient has CT chest in [facility] on 4/27/22    Pt needs to be scheduled for consult with vascular surgery .  Will route to scheduling to coordinate an appointment at next available.    Appointment note: Referred to [facility] by Natalie Bro MD for Splenic artery aneurysm. CT chest in Epic.         Jerica BOWMANN, RN    [facility]  Office: [phone]  Fax: [phone]

## 2022-05-12 ENCOUNTER — TELEPHONE (OUTPATIENT)
Dept: INTERNAL MEDICINE | Facility: CLINIC | Age: 87
End: 2022-05-12
Payer: MEDICARE

## 2022-05-12 DIAGNOSIS — R05.9 COUGH: Primary | ICD-10-CM

## 2022-05-12 NOTE — TELEPHONE ENCOUNTER
Patient calls to report that the fluticasone (ARNUITY ELLIPTA) 100 MCG/ACT inhaler is $200.  Can you please prescribe another RX.  Patient also states that albuterol made her loose her voice and feel like her throat was closing.  Please advise.    Patient is requesting RX for regular Claritin.

## 2022-05-12 NOTE — TELEPHONE ENCOUNTER
Routing to PCP.    Call placed to patient. Patient has not met deductible for this year which was the reason for the cost.     Asked patient if they had contacted insurance for preferred medication list yet. Patient had not. Patient knew of discounted medication companies.     Patient reports adverse reaction to albuterol inhaler. Reports feeling like throat was closing and lost voice for a couple hours. Albuterol Inhaler is no longer on active medication list.    Patient requesting prescription for Claritin.    Please advise.

## 2022-05-13 RX ORDER — LORATADINE 10 MG/1
10 TABLET ORAL DAILY
Qty: 30 TABLET | Refills: 0 | Status: SHIPPED | OUTPATIENT
Start: 2022-05-13 | End: 2022-07-11

## 2022-05-13 NOTE — TELEPHONE ENCOUNTER
LOV: Plan:  1. Fluticasone inhaler   --  If it is not covered, patient needs to call insurance and find out which equivalent is covered.    2. Continue the other meds, same doses for now.  3. Keep the appointment with the pulmonary   4. Your provider has referred you to: [facility], [phone] dr Caraballo, dr Membreno   5. Keep the July 11 appointment for annual wellness    Claritin RX done

## 2022-05-13 NOTE — TELEPHONE ENCOUNTER
Patient called back and said her insurance told her there is nothing else covered. She has an appointment with Dr Caraballo on 6/14/22 that she will keep. She is going to do without the inhaler for now but she will try the claritin.

## 2022-05-16 ENCOUNTER — TELEPHONE (OUTPATIENT)
Dept: INTERNAL MEDICINE | Facility: CLINIC | Age: 87
End: 2022-05-16
Payer: MEDICARE

## 2022-05-16 NOTE — TELEPHONE ENCOUNTER
Patient did purchase the inhaler that was prescribed and will take her first does today. Her question is does she need to continue taking the Claritin as well?       Best number to call back [phone]

## 2022-05-16 NOTE — TELEPHONE ENCOUNTER
Per AVS patient is to continue all other meds, advised patient of that direction. MANNY Bradshaw R.N.

## 2022-05-18 ENCOUNTER — OFFICE VISIT (OUTPATIENT)
Dept: OTHER | Facility: CLINIC | Age: 87
End: 2022-05-18
Attending: INTERNAL MEDICINE
Payer: MEDICARE

## 2022-05-18 VITALS
DIASTOLIC BLOOD PRESSURE: 66 MMHG | SYSTOLIC BLOOD PRESSURE: 130 MMHG | BODY MASS INDEX: 30.39 KG/M2 | WEIGHT: 178 LBS | HEART RATE: 80 BPM | HEIGHT: 64 IN

## 2022-05-18 DIAGNOSIS — I72.8 SPLENIC ARTERY ANEURYSM (H): Primary | ICD-10-CM

## 2022-05-18 PROCEDURE — 99204 OFFICE O/P NEW MOD 45 MIN: CPT | Performed by: SURGERY

## 2022-05-18 PROCEDURE — G0463 HOSPITAL OUTPT CLINIC VISIT: HCPCS

## 2022-05-18 NOTE — PROGRESS NOTES
Vascular Surgery Clinic     Bushra Harmon MRN# [id number]   YOB: 1935 Age: 86 year old        Reason for Clinic Visit: Splenic artery aneurysm              History of Present Illness:   Bushra Harmon is an 86 year old female who presents for evaluation of an incidentally found splenic artery aneurysm.     She had recurrent bronchitis and underwent CT imaging of her chest, which revealed a calcified splenic artery aneurysm. She did not previously know about this aneurysm and has no other history of visceral artery aneurysms. She does get intermittent abdominal pain, which she attributes to her cholelithiasis, but says this is typically post-prandial, at night, and resolves somewhat spontaneously. She does not have any other abdominal complaints.               Past Medical History:   I have personally reviewed the following:   Past Medical History:   Diagnosis Date     CKD (chronic kidney disease)      Essential hypertension, benign      Unspecified hypothyroidism    PMR, on prednisone  Recurrent bronchitis          Past Surgical History:   I have personally reviewed the following:   Past Surgical History:   Procedure Laterality Date     C DEXA, BONE DENSITY, AXIAL SKEL  2003    Normal BMD     CATARACT EXTRACTION Left      COLONOSCOPY N/A 2015    Procedure: COLONOSCOPY;  Surgeon: Joanna Acevedo MD;  Location:  GI     HC REMOVE TONSILS/ADENOIDS,<13 Y/O       LAPAROTOMY EXPLORATORY  1985    Exploratory Laparotomy: constricting band around small bowel, simple lysis     SURGICAL HISTORY OF -   2004    macular hole repair- Left eye     [facility] NONSPECIFIC PROCEDURE       x3     [facility] REPLANTATION THUMB DISTAL,COMPLETE  2009    left thumb trauma; Dr. Jose surgery     Left hip replacement         Social History:   I have personally reviewed the following:   Social History     Tobacco Use     Smoking status: Former Smoker     Packs/day: 0.50     Years: 45.00     Pack years: 22.50      Quit date: 2001     Years since quittin.0     Smokeless tobacco: Never Used   Substance Use Topics     Alcohol use: Yes     Comment: 1 drink per day     Quit smoking 20 years ago; smoked from high school onward, up to 1 ppd x 30-40 years. Former drinker of 1 scotch a day, has not had any alcohol in 2 years while on prednisone. No illicit drug use. 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 Socially busy now; enjoys golfing.           Family History:     Family History   Problem Relation Age of Onset     Hypertension Mother           at 100.     Breast Cancer Mother      Thyroid Disease Mother      Diabetes Sister      Hypertension Sister      Neurologic Disorder Daughter         MS     Hypertension Son    No known family history of aneurysms, hemophilias or hypercoagulabilities.           Allergies:     Allergies   Allergen Reactions     No Known Drug Allergies              Medications:     Current Outpatient Medications Ordered in Epic   Medication     BIOTIN 5000 PO     CALCIUM CITRATE-VITAMIN D 315-200 MG-UNIT PO TABS     Cholecalciferol (VITAMIN D3) 25 MCG (1000 UT) CAPS     Cranberry 450 MG CAPS     fluticasone (ARNUITY ELLIPTA) 100 MCG/ACT inhaler     Folic Acid (FOLATE PO)     GLUCOSAMINE HCL 1000 MG PO TABS     LACTOBACILLUS PO     levothyroxine (SYNTHROID/LEVOTHROID) 88 MCG tablet     lisinopril (ZESTRIL) 20 MG tablet     loratadine (CLARITIN) 10 MG tablet     Lutein 6 MG TABS     metoprolol succinate ER (TOPROL-XL) 25 MG 24 hr tablet     MULTIVITAMIN TABS   OR     predniSONE (DELTASONE) 2.5 MG tablet     simvastatin (ZOCOR) 10 MG tablet     doxycycline hyclate (VIBRA-TABS) 100 MG tablet     methotrexate 2.5 MG tablet     predniSONE (DELTASONE) 10 MG tablet     No current Epic-ordered facility-administered medications on file.             Review of Systems:   The 10 point Review of Systems is negative other than noted in the HPI          Physical Exam:   Vitals  "were reviewed      BP: 130/66 Pulse: 80              General: sitting comfortably on exam table, NAD.   Neuro/Psych: A&O x 4, pleasantly conversant   HENT: EOMI and conjugate. Moist mucous membranes.   Cardiac: Regular rate and rhythm, no m/g appreciated.   Pulm: Lungs CTAB, no w/r/r.  Abd: Soft, non-distended, no tenderness to palpation. Centripetal obesity.  Extrem: Grossly normal and symmetric ROM in all four extremities. No edema.  Skin: Clean, dry, no rashes or wounds.  Vasc: Palpable BL DP pulses. Scattered bilateral telangiectasias.           Data:   Labs:       Lab Results   Component Value Date     06/07/2021    Lab Results   Component Value Date    CHLORIDE 102 06/07/2021    Lab Results   Component Value Date    BUN 26 06/07/2021      Lab Results   Component Value Date    POTASSIUM 4.1 06/07/2021    Lab Results   Component Value Date    CO2 33 06/07/2021    Lab Results   Component Value Date    CR 0.98 06/07/2021        Lab Results   Component Value Date    WBC 7.0 06/07/2021    HGB 14.1 06/07/2021    HCT 43.0 06/07/2021    MCV 95 06/07/2021     06/07/2021       I have reviewed the following images:  CT Chest (4/27/22): \"Mild to moderate mosaic perfusion in both lungs which is nonspecific, but most commonly associated with small airway inflammation with underlying air trapping. 2. Tiny noncalcified bilateral pulmonary nodules and no follow-up is needed unless patient has high risk factors or history of malignancy: then a one-year follow-up unenhanced CT examination could be performed according to Fleischner's criteria. 3. Cholelithiasis. 4. 9 mm x 8 mm heavily calcified aneurysm involving the mid splenic artery. 5. No inflammatory acute infiltrates seen in the lungs.\"        Splenic artery aneurysm         Assessment and Plan:   Ms. Harmon is an 86 year old female with history of HTN, hypothyroidism, PMR on prednisone, osteoarthritis, and recurrent bronchitis, who was found to have an " incidental 9 mm splenic artery aneurysm.       Reviewed the imaging with Ms. Harmon.     Explained that the aneurysms generally do not need repair when less than 2-4 cm. Her age group, the calcified walls, and the small size are all considered relatively protective factors and reduce the statistical likelihood of rupture.     As she is a former smoker and I do not see a screening ultrasound or definitive abdominal imaging in her records to exclude an abdominal aortic aneurysm, will order a screening AAA duplex US. Visceral artery aneurysms, such as her splenic aneurysm, tend to occur in isolation and are not necessarily associated with higher risk of other aneurysm disease.     Will call her to review the results of the AAA US.     No further imaging is needed for the splenic artery aneurysm; surveillance could be considered in 3-5 years, but she has very low likelihood of significant size progression or need for repair.     If the AAA US is normal, will have her follow up as needed in future, if any concerns or questions arise.     Dipika Nunez MD    Total time spent on the date of this encounter doing: chart review, review of test results, patient visit, physical exam, education, counseling, developing plan of care, and documenting = 45 minutes

## 2022-05-18 NOTE — PROGRESS NOTES
"[facility]        Patient is here for a consult.    Pt is currently taking no meds that would impact our treatment plan.    /66 (BP Location: Right arm, Patient Position: Chair, Cuff Size: Adult Regular)   Pulse 80   Ht 5' 4\" (1.626 m)   Wt 178 lb (80.7 kg)   LMP 02/26/2003   Breastfeeding No   BMI 30.55 kg/m      The provider has been notified that the patient has no concerns.     Questions patient would like addressed today are: N/A.    Refills are needed: N/A    Has homecare services and agency name:  Elsa Garcias MA    "

## 2022-05-19 ENCOUNTER — TRANSFERRED RECORDS (OUTPATIENT)
Dept: HEALTH INFORMATION MANAGEMENT | Facility: CLINIC | Age: 87
End: 2022-05-19
Payer: MEDICARE

## 2022-05-19 ENCOUNTER — TRANSFERRED RECORDS (OUTPATIENT)
Dept: INTERNAL MEDICINE | Facility: CLINIC | Age: 87
End: 2022-05-19
Payer: MEDICARE

## 2022-05-19 LAB
ALT SERPL-CCNC: 22 IU/L (ref 5–35)
AST SERPL-CCNC: 35 U/L (ref 5–34)
CREATININE (EXTERNAL): 0.91 MG/DL (ref 0.5–1.3)

## 2022-06-01 ENCOUNTER — HOSPITAL ENCOUNTER (OUTPATIENT)
Dept: ULTRASOUND IMAGING | Facility: CLINIC | Age: 87
Discharge: HOME | End: 2022-06-01
Attending: SURGERY | Admitting: SURGERY
Payer: MEDICARE

## 2022-06-01 ENCOUNTER — VIRTUAL VISIT (OUTPATIENT)
Dept: OTHER | Facility: CLINIC | Age: 87
End: 2022-06-01
Payer: MEDICARE

## 2022-06-01 DIAGNOSIS — I72.8 SPLENIC ARTERY ANEURYSM (H): Primary | ICD-10-CM

## 2022-06-01 DIAGNOSIS — I72.8 SPLENIC ARTERY ANEURYSM (H): ICD-10-CM

## 2022-06-01 PROCEDURE — 93978 VASCULAR STUDY: CPT

## 2022-06-01 PROCEDURE — 99443 PR PHYSICIAN TELEPHONE EVALUATION 21-30 MIN: CPT | Mod: 95

## 2022-06-01 NOTE — PROGRESS NOTES
VASCULAR SURGERY PROGRESS NOTE    LOCATION:  [facility]    Bushra Harmon  Medical Record #:  [id number]  YOB: 1935  Age:  86 year old     Date of Service: 6/1/2022    PRIMARY CARE PROVIDER: Natalie Bro    Reason for visit:  Follow-up for AAA screening results/splenic artery aneurysm    IMPRESSION:  Ms. Harmon is an 86 year old female who had an incidental finding of a 9 mm splenic artery aneurysm. The patient was seen in clinic for this finding, screened for abdominal aortic aneurysm done on 6/1. The patient is asymptomatic, no AAA seen on ultrasound today. The patient has no complaints.     RECOMMENDATION/RISKS: Low likelihood of significant size progression or need for repair of the splenic artery aneurysm, can have surveillance every 3-5 years, no further imaging needed at this time. No further intervention in regards to AAA screening. 15 minutes was spent on the phone call discussing results and plan of care.       HPI:  Bushra Harmon is a 86 year old female with past medical history significant for hypertension, hypothyroidism, PMR on prednisone, osteoarthritis, and recurrent bronchitis.    REVIEW OF SYSTEMS:    A 12 point ROS was reviewed and is negative except for what is listed above in HPI.    PHH:    Past Medical History:   Diagnosis Date     CKD (chronic kidney disease)      Essential hypertension, benign      PMR (polymyalgia rheumatica) (H)      Unspecified hypothyroidism           Past Surgical History:   Procedure Laterality Date     AS TOTAL HIP ARTHROPLASTY Left      C DEXA, BONE DENSITY, AXIAL SKEL  06/16/2003    Normal BMD     CATARACT EXTRACTION Left      COLONOSCOPY N/A 11/03/2015    Procedure: COLONOSCOPY;  Surgeon: Joanna Acevedo MD;  Location:  GI     HC REMOVE TONSILS/ADENOIDS,<11 Y/O       LAPAROTOMY EXPLORATORY  1985    Exploratory Laparotomy: constricting band around small bowel, simple lysis     SURGICAL HISTORY OF -   06/2004     macular hole repair- Left eye     [facility] NONSPECIFIC PROCEDURE       x3     [facility] REPLANTATION THUMB DISTAL,COMPLETE  2009    left thumb trauma; Dr. Jose surgery       ALLERGIES:  No known drug allergies    MEDS:    Current Outpatient Medications:      BIOTIN 5000 PO, , Disp: , Rfl:      CALCIUM CITRATE-VITAMIN D 315-200 MG-UNIT PO TABS, 2 TABLETS DAILY, Disp: 90 Tab, Rfl: 3     Cholecalciferol (VITAMIN D3) 25 MCG (1000 UT) CAPS, Take 1 capsule by mouth daily, Disp: , Rfl:      Cranberry 450 MG CAPS, , Disp: , Rfl:      doxycycline hyclate (VIBRA-TABS) 100 MG tablet, Take 1 tablet (100 mg) by mouth 2 times daily, Disp: 20 tablet, Rfl: 0     fluticasone (ARNUITY ELLIPTA) 100 MCG/ACT inhaler, Inhale 1 puff into the lungs daily, Disp: 30 each, Rfl: 2     Folic Acid (FOLATE PO), , Disp: , Rfl:      GLUCOSAMINE HCL 1000 MG PO TABS, 2 TABLETS DAILY, Disp: 90 Tab, Rfl: 3     LACTOBACILLUS PO, Take 120 mg by mouth daily, Disp: , Rfl:      levothyroxine (SYNTHROID/LEVOTHROID) 88 MCG tablet, TAKE 1 TABLET(88 MCG) BY MOUTH DAILY, Disp: 90 tablet, Rfl: 0     lisinopril (ZESTRIL) 20 MG tablet, Take 1 tablet (20 mg) by mouth daily, Disp: 90 tablet, Rfl: 3     loratadine (CLARITIN) 10 MG tablet, Take 1 tablet (10 mg) by mouth daily, Disp: 30 tablet, Rfl: 0     Lutein 6 MG TABS, Take 20 mg by mouth daily , Disp: , Rfl:      metoprolol succinate ER (TOPROL-XL) 25 MG 24 hr tablet, Take 1 tablet (25 mg) by mouth 2 times daily, Disp: 180 tablet, Rfl: 3     MULTIVITAMIN TABS   OR, 1 daily, Disp: , Rfl:      predniSONE (DELTASONE) 10 MG tablet, -- day 1-4 take 20 mg in am and 20 mg in pm, -- day 5-7 take 20 mg in am, -- day 8-10 take 10 mg in am, -- day 11-14 take 5 mg daily in am, Disp: 27 tablet, Rfl: 0     predniSONE (DELTASONE) 2.5 MG tablet, Take 2.5 mg by mouth daily, Disp: , Rfl:      simvastatin (ZOCOR) 10 MG tablet, Take 1 tablet (10 mg) by mouth At Bedtime, Disp: 90 tablet, Rfl: 3     methotrexate 2.5 MG tablet, Take  10 mg by mouth every 7 days, Disp: , Rfl:     SOCIAL HABITS:    History   Smoking Status     Former Smoker     Packs/day: 0.50     Years: 45.00     Quit date: 2001   Smokeless Tobacco     Never Used     Social History    Substance and Sexual Activity      Alcohol use: Yes        Comment: 1 drink per day      History   Drug Use No       FAMILY HISTORY:    Family History   Problem Relation Age of Onset     Hypertension Mother           at 100.     Breast Cancer Mother      Thyroid Disease Mother      Diabetes Sister      Hypertension Sister      Neurologic Disorder Daughter         MS     Hypertension Son        PE:  LMP 2003   Wt Readings from Last 1 Encounters:   22 178 lb (80.7 kg)     There is no height or weight on file to calculate BMI.    DIAGNOSTIC STUDIES:     Images:  No results found.    I personally reviewed the images and my interpretation is there is no evidence of AAA.     LABS:      Sodium   Date Value Ref Range Status   2021 136 133 - 144 mmol/L Final   2020 135 133 - 144 mmol/L Final   2019 137 133 - 144 mmol/L Final     Urea Nitrogen   Date Value Ref Range Status   2021 26 7 - 30 mg/dL Final   2020 25 7 - 30 mg/dL Final   2019 24 7 - 30 mg/dL Final     Hemoglobin   Date Value Ref Range Status   2021 14.1 11.7 - 15.7 g/dL Final   2019 15.2 11.7 - 15.7 g/dL Final   2018 13.9 11.7 - 15.7 g/dL Final     Platelet Count   Date Value Ref Range Status   2021 304 150 - 450 10e9/L Final   2019 296 150 - 450 10e9/L Final   2018 272 150 - 450 10e9/L Final     INR   Date Value Ref Range Status   2011 1.08 0.86 - 1.14 Final   2011 1.03 0.86 - 1.14 Final   2008 1.0@         30 minutes spent on the day of encounter doing chart review, history and exam, documentation, and further activities as noted.     Erinn Monzon NP  VASCULAR SURGERY

## 2022-06-01 NOTE — PROGRESS NOTES
Bushra is a 86 year old who is being evaluated via a billable telephone visit.      What phone number would you like to be contacted at?  How would you like to obtain your AVS? Pritesh Garcias MA

## 2022-06-03 DIAGNOSIS — E03.8 OTHER SPECIFIED HYPOTHYROIDISM: ICD-10-CM

## 2022-06-03 RX ORDER — LEVOTHYROXINE SODIUM 88 UG/1
TABLET ORAL
Qty: 90 TABLET | Refills: 0 | Status: SHIPPED | OUTPATIENT
Start: 2022-06-03 | End: 2022-09-12

## 2022-06-14 ENCOUNTER — TRANSFERRED RECORDS (OUTPATIENT)
Dept: HEALTH INFORMATION MANAGEMENT | Facility: CLINIC | Age: 87
End: 2022-06-14

## 2022-07-11 ENCOUNTER — TELEPHONE (OUTPATIENT)
Dept: INTERNAL MEDICINE | Facility: CLINIC | Age: 87
End: 2022-07-11

## 2022-07-11 ENCOUNTER — OFFICE VISIT (OUTPATIENT)
Dept: INTERNAL MEDICINE | Facility: CLINIC | Age: 87
End: 2022-07-11
Payer: MEDICARE

## 2022-07-11 VITALS
TEMPERATURE: 97.1 F | SYSTOLIC BLOOD PRESSURE: 158 MMHG | HEIGHT: 64 IN | WEIGHT: 179.4 LBS | OXYGEN SATURATION: 97 % | RESPIRATION RATE: 18 BRPM | BODY MASS INDEX: 30.63 KG/M2 | HEART RATE: 78 BPM | DIASTOLIC BLOOD PRESSURE: 72 MMHG

## 2022-07-11 DIAGNOSIS — E03.8 OTHER SPECIFIED HYPOTHYROIDISM: ICD-10-CM

## 2022-07-11 DIAGNOSIS — E78.5 HYPERLIPIDEMIA LDL GOAL <100: ICD-10-CM

## 2022-07-11 DIAGNOSIS — Z00.00 ROUTINE GENERAL MEDICAL EXAMINATION AT A HEALTH CARE FACILITY: Primary | ICD-10-CM

## 2022-07-11 DIAGNOSIS — I10 HYPERTENSION GOAL BP (BLOOD PRESSURE) < 140/90: ICD-10-CM

## 2022-07-11 DIAGNOSIS — N18.32 STAGE 3B CHRONIC KIDNEY DISEASE (H): ICD-10-CM

## 2022-07-11 DIAGNOSIS — J44.9 CHRONIC OBSTRUCTIVE PULMONARY DISEASE, UNSPECIFIED COPD TYPE (H): ICD-10-CM

## 2022-07-11 LAB
ERYTHROCYTE [DISTWIDTH] IN BLOOD BY AUTOMATED COUNT: 13.7 % (ref 10–15)
HCT VFR BLD AUTO: 43.5 % (ref 35–47)
HGB BLD-MCNC: 14.5 G/DL (ref 11.7–15.7)
MCH RBC QN AUTO: 30.1 PG (ref 26.5–33)
MCHC RBC AUTO-ENTMCNC: 33.3 G/DL (ref 31.5–36.5)
MCV RBC AUTO: 90 FL (ref 78–100)
PLATELET # BLD AUTO: 268 10E3/UL (ref 150–450)
RBC # BLD AUTO: 4.81 10E6/UL (ref 3.8–5.2)
WBC # BLD AUTO: 8.2 10E3/UL (ref 4–11)

## 2022-07-11 PROCEDURE — G0439 PPPS, SUBSEQ VISIT: HCPCS | Performed by: INTERNAL MEDICINE

## 2022-07-11 PROCEDURE — 82550 ASSAY OF CK (CPK): CPT | Performed by: INTERNAL MEDICINE

## 2022-07-11 PROCEDURE — 36415 COLL VENOUS BLD VENIPUNCTURE: CPT | Performed by: INTERNAL MEDICINE

## 2022-07-11 PROCEDURE — 80053 COMPREHEN METABOLIC PANEL: CPT | Performed by: INTERNAL MEDICINE

## 2022-07-11 PROCEDURE — 99214 OFFICE O/P EST MOD 30 MIN: CPT | Mod: 25 | Performed by: INTERNAL MEDICINE

## 2022-07-11 PROCEDURE — 80061 LIPID PANEL: CPT | Performed by: INTERNAL MEDICINE

## 2022-07-11 PROCEDURE — 82043 UR ALBUMIN QUANTITATIVE: CPT | Performed by: INTERNAL MEDICINE

## 2022-07-11 PROCEDURE — 85027 COMPLETE CBC AUTOMATED: CPT | Performed by: INTERNAL MEDICINE

## 2022-07-11 RX ORDER — LISINOPRIL 20 MG/1
20 TABLET ORAL DAILY
Qty: 90 TABLET | Refills: 3 | Status: SHIPPED | OUTPATIENT
Start: 2022-07-11 | End: 2023-05-07 | Stop reason: DRUGHIGH

## 2022-07-11 RX ORDER — SIMVASTATIN 10 MG
10 TABLET ORAL AT BEDTIME
Qty: 90 TABLET | Refills: 3 | Status: SHIPPED | OUTPATIENT
Start: 2022-07-11 | End: 2023-07-25

## 2022-07-11 RX ORDER — METOPROLOL SUCCINATE 50 MG/1
50 TABLET, EXTENDED RELEASE ORAL DAILY
Qty: 180 TABLET | Refills: 0 | Status: SHIPPED | OUTPATIENT
Start: 2022-07-11 | End: 2022-07-12

## 2022-07-11 ASSESSMENT — ENCOUNTER SYMPTOMS
SHORTNESS OF BREATH: 0
HEMATURIA: 0
DYSURIA: 0
HEARTBURN: 0
HEADACHES: 0
FEVER: 0
MYALGIAS: 0
ABDOMINAL PAIN: 0
PARESTHESIAS: 0
NAUSEA: 0
FREQUENCY: 0
DIZZINESS: 0
CONSTIPATION: 0
SORE THROAT: 0
EYE PAIN: 0
HEMATOCHEZIA: 0
WEAKNESS: 0
ARTHRALGIAS: 0
DIARRHEA: 0
COUGH: 0
JOINT SWELLING: 0
BREAST MASS: 0
CHILLS: 0
PALPITATIONS: 0
NERVOUS/ANXIOUS: 0

## 2022-07-11 ASSESSMENT — ACTIVITIES OF DAILY LIVING (ADL): CURRENT_FUNCTION: NO ASSISTANCE NEEDED

## 2022-07-11 NOTE — PROGRESS NOTES
Dr Houston's note    Patient's instructions / PLAN:                                                        Plan:  1.  Labs today - [address]   2. Increase Metoprolol to 50 mg twice a day  -- hold the morning pill if the blood pressure is less than 110 or the pulse less than 60  3. Continue the other meds, same doses for now.   4. schedule a follow up appointment in 4-6 weeks for the blood pressure   -- bring the machine and the numbers with you   ( in office SD or 7:30 - ok)        ASSESSMENT & PLAN:                                                      (Z00.00) Routine general medical examination at a health care facility  (primary encounter diagnosis)  Comment:   Plan: CBC with platelets, CK total, Comprehensive         metabolic panel, Lipid panel reflex to direct         LDL Fasting, Albumin Random Urine Quantitative         with Creat Ratio            (I10) Hypertension goal BP (blood pressure) < 140/90  Comment: Not controlled   Rechecked - /80  Plan: CBC with platelets, CK total, Comprehensive         metabolic panel, Lipid panel reflex to direct         LDL Fasting, Albumin Random Urine Quantitative         with Creat Ratio, lisinopril (ZESTRIL) 20 MG         tablet, metoprolol succinate ER (TOPROL XL) 50         MG 24 hr tablet            (E78.5) Hyperlipidemia LDL goal <100  Comment:   Plan: CBC with platelets, CK total, Comprehensive         metabolic panel, Lipid panel reflex to direct         LDL Fasting, Albumin Random Urine Quantitative         with Creat Ratio, simvastatin (ZOCOR) 10 MG         tablet            (N18.32) Stage 3b chronic kidney disease (H)  Comment: stable   Plan: CBC with platelets, Comprehensive metabolic         panel, Lipid panel reflex to direct LDL         Fasting, Albumin Random Urine Quantitative with        Creat Ratio        F/u w nephro    (E03.8) Hypothyroidism - f/u w Endo - dr Meyers   Comment:   Plan:     (J44.9) Chronic obstructive pulmonary disease,  unspecified COPD type (H)  Comment: stable   Plan: fluticasone (ARNUITY ELLIPTA) 100 MCG/ACT         inhaler        F/u w pulmonary        Chief Complaint:                                                        Annual exam  Follow up chronic medical problems      SUBJECTIVE:                                                    History of present illness     We reviewed the chronic medical problems as above.   I reviewed the recent tests results in Epic     COPD, ex-smoker  -- eval by dr Caraballo, pulmonary. Note reviewed  -- Arnuity Elipta prescribed by dr Caraballo     HTN  -- BP at home fluctuates 170 - 100  -- most of the numbers in 130s    ROS:     See below        PMHx: - reviewed  Past Medical History:   Diagnosis Date     CKD (chronic kidney disease)      Essential hypertension, benign      PMR (polymyalgia rheumatica) (H)      Unspecified hypothyroidism        PSHx: reviewed  Past Surgical History:   Procedure Laterality Date     AS TOTAL HIP ARTHROPLASTY Left      C DEXA, BONE DENSITY, AXIAL SKEL  2003    Normal BMD     CATARACT EXTRACTION Left      COLONOSCOPY N/A 2015    Procedure: COLONOSCOPY;  Surgeon: Joanna Acevedo MD;  Location:  GI     HC REMOVE TONSILS/ADENOIDS,<13 Y/O       LAPAROTOMY EXPLORATORY  1985    Exploratory Laparotomy: constricting band around small bowel, simple lysis     SURGICAL HISTORY OF -   2004    macular hole repair- Left eye     [facility] NONSPECIFIC PROCEDURE       x3     [facility] REPLANTATION THUMB DISTAL,COMPLETE  2009    left thumb trauma; Dr. Jose surgery        Soc Hx: No daily alcohol, no smoking  Social History     Socioeconomic History     Marital status:      Spouse name: Not on file     Number of children: 3     Years of education: Not on file     Highest education level: Not on file   Occupational History     Occupation: office     Employer: RETIRED   Tobacco Use     Smoking status: Former Smoker     Packs/day: 0.50     Years: 45.00     Pack  years: 22.50     Quit date: 2001     Years since quittin.2     Smokeless tobacco: Never Used   Vaping Use     Vaping Use: Never used   Substance and Sexual Activity     Alcohol use: Yes     Comment: 1 drink per day     Drug use: No     Sexual activity: Never   Other Topics Concern      Service Not Asked     Blood Transfusions Not Asked     Caffeine Concern Yes     Comment: 1 cup per day     Occupational Exposure Not Asked     Hobby Hazards Not Asked     Sleep Concern Not Asked     Stress Concern Not Asked     Weight Concern Not Asked     Special Diet Not Asked     Back Care Not Asked     Exercise Yes     Comment: Active; exercise 4 times per week     Bike Helmet Not Asked     Seat Belt Yes     Self-Exams No     Parent/sibling w/ CABG, MI or angioplasty before 65F 55M? Not Asked   Social History Narrative     Not on file     Social Determinants of Health     Financial Resource Strain: Not on file   Food Insecurity: Not on file   Transportation Needs: Not on file   Physical Activity: Not on file   Stress: Not on file   Social Connections: Not on file   Intimate Partner Violence: Not on file   Housing Stability: Not on file        Fam Hx: reviewed  Family History   Problem Relation Age of Onset     Hypertension Mother           at 100.     Breast Cancer Mother      Thyroid Disease Mother      Diabetes Sister      Hypertension Sister      Neurologic Disorder Daughter         MS     Hypertension Son          Screening: reviewed      All: reviewed    Meds: reviewed  Current Outpatient Medications   Medication Sig Dispense Refill     BIOTIN 5000 PO        CALCIUM CITRATE-VITAMIN D 315-200 MG-UNIT PO TABS 2 TABLETS DAILY 90 Tab 3     Cholecalciferol (VITAMIN D3) 25 MCG (1000 UT) CAPS Take 1 capsule by mouth daily       Cranberry 450 MG CAPS        doxycycline hyclate (VIBRA-TABS) 100 MG tablet Take 1 tablet (100 mg) by mouth 2 times daily 20 tablet 0     fluticasone (ARNUITY ELLIPTA) 100  "MCG/ACT inhaler Inhale 1 puff into the lungs daily 30 each 2     Folic Acid (FOLATE PO)        GLUCOSAMINE HCL 1000 MG PO TABS 2 TABLETS DAILY 90 Tab 3     LACTOBACILLUS PO Take 120 mg by mouth daily       levothyroxine (SYNTHROID/LEVOTHROID) 88 MCG tablet TAKE 1 TABLET(88 MCG) BY MOUTH DAILY 90 tablet 0     lisinopril (ZESTRIL) 20 MG tablet Take 1 tablet (20 mg) by mouth daily 90 tablet 3     loratadine (CLARITIN) 10 MG tablet Take 1 tablet (10 mg) by mouth daily 30 tablet 0     Lutein 6 MG TABS Take 20 mg by mouth daily        methotrexate 2.5 MG tablet Take 10 mg by mouth every 7 days       metoprolol succinate ER (TOPROL-XL) 25 MG 24 hr tablet Take 1 tablet (25 mg) by mouth 2 times daily 180 tablet 3     MULTIVITAMIN TABS   OR 1 daily       predniSONE (DELTASONE) 10 MG tablet -- day 1-4 take 20 mg in am and 20 mg in pm, -- day 5-7 take 20 mg in am, -- day 8-10 take 10 mg in am, -- day 11-14 take 5 mg daily in am 27 tablet 0     predniSONE (DELTASONE) 2.5 MG tablet Take 2.5 mg by mouth daily       simvastatin (ZOCOR) 10 MG tablet Take 1 tablet (10 mg) by mouth At Bedtime 90 tablet 3       OBJECTIVE:                                                    Physical Exam :  Blood pressure (!) 158/72, pulse 78, temperature 97.1  F (36.2  C), temperature source Tympanic, resp. rate 18, height 1.626 m (5' 4\"), weight 81.4 kg (179 lb 6.4 oz), last menstrual period 02/26/2003, SpO2 97 %, not currently breastfeeding.     NAD, appears comfortable  Skin clear, no rashes  Neck: supple, no JVD,  no thyroidmegaly  Lymph nodes non palpable in the cervical, supraclavicular axillaries,   Chest: clear to auscultation with good respiratory effort  Cardiac: S1S2, RRR, no mgr appreciated  Abdomen: soft, not tender, not distended, audible bowel sound, no hepatosplenomegaly, no palpable masses, no abdominal bruits  Extremities: no cyanosis, clubbing or edema.   Neuro: A, Ox3, no focal signs.  Breast exam in supine and erect position: they " "are symmetrical, no skin changes, no tenderness or nodes on palpation. Nipples are erect, no skin lesions, no discharge on pressure.    Pelvic exam: deferred, s/p menopause, no symptoms, no hx of abnormal pap         Natalie Houston MD  Internal Medicine       SUBJECTIVE:   Bushra Harmon is a 86 year old female who presents for Preventive Visit.      Patient has been advised of split billing requirements and indicates understanding: Yes  Are you in the first 12 months of your Medicare coverage?  No    Healthy Habits:     In general, how would you rate your overall health?  Good    Frequency of exercise:  2-3 days/week    Duration of exercise:  15-30 minutes    Do you usually eat at least 4 servings of fruit and vegetables a day, include whole grains    & fiber and avoid regularly eating high fat or \"junk\" foods?  Yes    Taking medications regularly:  Yes    Medication side effects:  None    Ability to successfully perform activities of daily living:  No assistance needed    Home Safety:  No safety concerns identified    Hearing Impairment:  No hearing concerns    In the past 6 months, have you been bothered by leaking of urine?  No    In general, how would you rate your overall mental or emotional health?  Good      PHQ-2 Total Score: 0    Additional concerns today:  No    Do you feel safe in your environment? Yes    Have you ever done Advance Care Planning? (For example, a Health Directive, POLST, or a discussion with a medical provider or your loved ones about your wishes): Yes, advance care planning is on file.       Fall risk  Fallen 2 or more times in the past year?: No  Any fall with injury in the past year?: No    Cognitive Screening   1) Repeat 3 items (Leader, Season, Table)    2) Clock draw: NORMAL  3) 3 item recall: Recalls 3 objects  Results: 3 items recalled: COGNITIVE IMPAIRMENT LESS LIKELY    Mini-CogTM Copyright S Roya. Licensed by the author for use in [facility]; reprinted with " permission (roberto@[facility]). All rights reserved.      Do you have sleep apnea, excessive snoring or daytime drowsiness?: no    Reviewed and updated as needed this visit by clinical staff   Tobacco  Allergies  Meds   Med Hx  Surg Hx  Fam Hx  Soc Hx          Reviewed and updated as needed this visit by Provider                   Social History     Tobacco Use     Smoking status: Former Smoker     Packs/day: 0.50     Years: 45.00     Pack years: 22.50     Quit date: 2001     Years since quittin.2     Smokeless tobacco: Never Used   Substance Use Topics     Alcohol use: Yes     Comment: 1 drink per day         Alcohol Use 2021   Prescreen: >3 drinks/day or >7 drinks/week? No   Prescreen: >3 drinks/day or >7 drinks/week? -           Hyperlipidemia Follow-Up      Are you regularly taking any medication or supplement to lower your cholesterol?   Yes- simvastatin    Are you having muscle aches or other side effects that you think could be caused by your cholesterol lowering medication?  No    Hypertension Follow-up      Do you check your blood pressure regularly outside of the clinic? Yes     Are you following a low salt diet? Yes    Are your blood pressures ever more than 140 on the top number (systolic) OR more   than 90 on the bottom number (diastolic), for example 140/90? Yes      Current providers sharing in care for this patient include:   Patient Care Team:  Natalie Bro MD as PCP - General (Internal Medicine)  Belinda Marcos MD as Assigned Endocrinology Provider  Natalie Bro MD as Assigned PCP  Erinn Monzon NP as Assigned Heart and Vascular Provider    The following health maintenance items are reviewed in Epic and correct as of today:  Health Maintenance Due   Topic Date Due     ANNUAL REVIEW OF HM ORDERS  Never done     BMP  2022     LIPID  2022     MICROALBUMIN  2022     COVID-19 Vaccine (5 - Booster for Pfizer series) 2022  "    HEMOGLOBIN  06/07/2022     MEDICARE ANNUAL WELLNESS VISIT  07/08/2022     Labs reviewed in EPIC        Pertinent mammograms are reviewed under the imaging tab.    Review of Systems   Constitutional: Negative for chills and fever.   HENT: Negative for congestion, ear pain, hearing loss and sore throat.    Eyes: Negative for pain and visual disturbance.   Respiratory: Negative for cough and shortness of breath.    Cardiovascular: Negative for chest pain, palpitations and peripheral edema.   Gastrointestinal: Negative for abdominal pain, constipation, diarrhea, heartburn, hematochezia and nausea.   Breasts:  Negative for tenderness, breast mass and discharge.   Genitourinary: Negative for dysuria, frequency, genital sores, hematuria, pelvic pain, urgency, vaginal bleeding and vaginal discharge.   Musculoskeletal: Negative for arthralgias, joint swelling and myalgias.   Skin: Negative for rash.   Neurological: Negative for dizziness, weakness, headaches and paresthesias.   Psychiatric/Behavioral: Negative for mood changes. The patient is not nervous/anxious.            Patient has been advised of split billing requirements and indicates understanding: Yes At the check in, at the      COUNSELING:  Reviewed preventive health counseling, as reflected in patient instructions       Regular exercise       Healthy diet/nutrition    Estimated body mass index is 30.55 kg/m  as calculated from the following:    Height as of this encounter: 1.626 m (5' 4\").    Weight as of 5/18/22: 80.7 kg (178 lb).    Weight management plan: Discussed healthy diet and exercise guidelines    She reports that she quit smoking about 21 years ago. She has a 22.50 pack-year smoking history. She has never used smokeless tobacco.      Appropriate preventive services were discussed with this patient, including applicable screening as appropriate for cardiovascular disease, diabetes, osteopenia/osteoporosis, and glaucoma.  As appropriate for " age/gender, discussed screening for colorectal cancer, prostate cancer, breast cancer, and cervical cancer. Checklist reviewing preventive services available has been given to the patient.    Reviewed patients plan of care and provided an AVS. The Basic Care Plan (routine screening as documented in Health Maintenance) for Bushra meets the Care Plan requirement. This Care Plan has been established and reviewed with the Patient.    Counseling Resources:  ATP IV Guidelines  Pooled Cohorts Equation Calculator  Breast Cancer Risk Calculator  Breast Cancer: Medication to Reduce Risk  FRAX Risk Assessment  ICSI Preventive Guidelines  Dietary Guidelines for Americans, 2010  USDA's MyPlate  ASA Prophylaxis  Lung CA Screening    Natalie Bro MD  [facility]    Identified Health Risks:

## 2022-07-11 NOTE — PATIENT INSTRUCTIONS
Plan:  1.  Labs today - [address]   2. Increase Metoprolol to 50 mg twice a day  -- hold the morning pill if the blood pressure is less than 110 or the pulse less than 60  3. Continue the other meds, same doses for now.   4. schedule a follow up appointment in 4-6 weeks for the blood pressure   -- bring the machine and the numbers with you   ( in office SD or 7:30 - ok)

## 2022-07-12 LAB
ALBUMIN SERPL-MCNC: 3.6 G/DL (ref 3.4–5)
ALP SERPL-CCNC: 62 U/L (ref 40–150)
ALT SERPL W P-5'-P-CCNC: 25 U/L (ref 0–50)
ANION GAP SERPL CALCULATED.3IONS-SCNC: 6 MMOL/L (ref 3–14)
AST SERPL W P-5'-P-CCNC: 24 U/L (ref 0–45)
BILIRUB SERPL-MCNC: 1 MG/DL (ref 0.2–1.3)
BUN SERPL-MCNC: 24 MG/DL (ref 7–30)
CALCIUM SERPL-MCNC: 9.6 MG/DL (ref 8.5–10.1)
CHLORIDE BLD-SCNC: 105 MMOL/L (ref 94–109)
CHOLEST SERPL-MCNC: 191 MG/DL
CK SERPL-CCNC: 93 U/L (ref 30–225)
CO2 SERPL-SCNC: 29 MMOL/L (ref 20–32)
CREAT SERPL-MCNC: 0.89 MG/DL (ref 0.52–1.04)
FASTING STATUS PATIENT QL REPORTED: YES
GFR SERPL CREATININE-BSD FRML MDRD: 64 ML/MIN/1.73M2
GLUCOSE BLD-MCNC: 98 MG/DL (ref 70–99)
HDLC SERPL-MCNC: 86 MG/DL
LDLC SERPL CALC-MCNC: 89 MG/DL
NONHDLC SERPL-MCNC: 105 MG/DL
POTASSIUM BLD-SCNC: 4.5 MMOL/L (ref 3.4–5.3)
PROT SERPL-MCNC: 7.1 G/DL (ref 6.8–8.8)
SODIUM SERPL-SCNC: 140 MMOL/L (ref 133–144)
TRIGL SERPL-MCNC: 80 MG/DL

## 2022-07-12 RX ORDER — METOPROLOL SUCCINATE 50 MG/1
50 TABLET, EXTENDED RELEASE ORAL 2 TIMES DAILY
Qty: 180 TABLET | Refills: 0 | Status: SHIPPED | OUTPATIENT
Start: 2022-07-12 | End: 2022-08-18

## 2022-07-12 RX ORDER — METOPROLOL SUCCINATE 50 MG/1
50 TABLET, EXTENDED RELEASE ORAL DAILY
Qty: 180 TABLET | Refills: 0 | Status: SHIPPED | OUTPATIENT
Start: 2022-07-12 | End: 2022-07-12

## 2022-07-12 NOTE — TELEPHONE ENCOUNTER
Pharmacy calls and RX was resent as only once a day again and it needs to be twice per day.  Please resend again.

## 2022-07-13 LAB
CREAT UR-MCNC: 80 MG/DL
MICROALBUMIN UR-MCNC: 118 MG/L
MICROALBUMIN/CREAT UR: 147.5 MG/G CR (ref 0–25)

## 2022-07-28 ENCOUNTER — HOSPITAL ENCOUNTER (OUTPATIENT)
Dept: MAMMOGRAPHY | Facility: CLINIC | Age: 87
Discharge: HOME | End: 2022-07-28
Attending: INTERNAL MEDICINE | Admitting: INTERNAL MEDICINE
Payer: MEDICARE

## 2022-07-28 DIAGNOSIS — Z12.31 VISIT FOR SCREENING MAMMOGRAM: ICD-10-CM

## 2022-07-28 PROCEDURE — 77067 SCR MAMMO BI INCL CAD: CPT

## 2022-08-15 ENCOUNTER — PRE VISIT (OUTPATIENT)
Dept: PULMONOLOGY | Facility: CLINIC | Age: 87
End: 2022-08-15
Payer: MEDICARE

## 2022-08-18 ENCOUNTER — OFFICE VISIT (OUTPATIENT)
Dept: INTERNAL MEDICINE | Facility: CLINIC | Age: 87
End: 2022-08-18
Payer: MEDICARE

## 2022-08-18 VITALS
HEART RATE: 68 BPM | TEMPERATURE: 97.2 F | BODY MASS INDEX: 31.26 KG/M2 | OXYGEN SATURATION: 97 % | WEIGHT: 183.1 LBS | HEIGHT: 64 IN | DIASTOLIC BLOOD PRESSURE: 65 MMHG | RESPIRATION RATE: 18 BRPM | SYSTOLIC BLOOD PRESSURE: 133 MMHG

## 2022-08-18 DIAGNOSIS — I10 HYPERTENSION GOAL BP (BLOOD PRESSURE) < 140/90: Primary | ICD-10-CM

## 2022-08-18 PROCEDURE — 99213 OFFICE O/P EST LOW 20 MIN: CPT | Performed by: INTERNAL MEDICINE

## 2022-08-18 RX ORDER — CARVEDILOL 25 MG/1
TABLET ORAL
Qty: 1 TABLET | Refills: 0
Start: 2022-08-18 | End: 2023-03-14

## 2022-08-18 RX ORDER — CARVEDILOL 6.25 MG/1
TABLET ORAL
Qty: 180 TABLET | Refills: 0 | Status: SHIPPED | OUTPATIENT
Start: 2022-08-18 | End: 2023-03-14

## 2022-08-18 RX ORDER — CARVEDILOL 25 MG/1
25 TABLET ORAL 2 TIMES DAILY WITH MEALS
COMMUNITY
End: 2022-08-18

## 2022-08-18 RX ORDER — GUAIFENESIN AND DEXTROMETHORPHAN HYDROBROMIDE 600; 30 MG/1; MG/1
1 TABLET, EXTENDED RELEASE ORAL EVERY 12 HOURS
COMMUNITY

## 2022-08-18 NOTE — PATIENT INSTRUCTIONS
Plan:  1. Move the Lisinopril to the evening/bedtime  2. Decrease Carvedilol to 18.75 mg twice a day  Am: 12.5 mg ( 1/2 tab of 25 mg) + 6.25 mg  Pm: 12.5 mg ( 1/2 tab of 25 mg) + 6.25 mg  3. Follow up after July 11 2023 for ANNUAL EXAM or sooner if needed

## 2022-08-18 NOTE — PROGRESS NOTES
Dr Houston's note      Patient's instructions / PLAN:                                                        Plan:  1. Move the Lisinopril to the evening/bedtime  2. Decrease Carvedilol to 18.75 mg twice a day  Am: 12.5 mg ( 1/2 tab of 25 mg) + 6.25 mg  Pm: 12.5 mg ( 1/2 tab of 25 mg) + 6.25 mg  3. Follow up after July 11 2023 for ANNUAL EXAM or sooner if needed          ASSESSMENT & PLAN:                                                      (I10) Hypertension goal BP (blood pressure) < 140/90  (primary encounter diagnosis)  Comment: better control, but now hypotension episodes   Plan: carvedilol (COREG) 6.25 MG tablet, carvedilol         (COREG) 25 MG tablet               Chief complaint:                                                      HTN    SUBJECTIVE:                                                    History of present illness:         Bushra is a 86 year old, presenting for the following health issues:  Patient is being seen for an 1 month follow up.      appointment w nephrology a week ago. Metoprolol changed to Carvedilol.  BP fluctuates 150-160s before the morning pills and  after the pills   This am 74/41 and she feklt weak and tired       LOV: Plan:  1.  Labs today - [address]   2. Increase Metoprolol to 50 mg twice a day  -- hold the morning pill if the blood pressure is less than 110 or the pulse less than 60  3. Continue the other meds, same doses for now.   4. schedule a follow up appointment in 4-6 weeks for the blood pressure   -- bring the machine and the numbers with you   ( in office SD or 7:30 - ok)       Hyperlipidemia Follow-Up      Are you regularly taking any medication or supplement to lower your cholesterol?   Yes- Simvastatin    Are you having muscle aches or other side effects that you think could be caused by your cholesterol lowering medication?  No    Hypertension Follow-up      Do you check your blood pressure regularly outside of the clinic? Yes     Are you following a  "low salt diet? Yes    Are your blood pressures ever more than 140 on the top number (systolic) OR more   than 90 on the bottom number (diastolic), for example 140/90? Yes      How many servings of fruits and vegetables do you eat daily?  4 or more    On average, how many sweetened beverages do you drink each day (Examples: soda, juice, sweet tea, etc.  Do NOT count diet or artificially sweetened beverages)?   0    How many days per week do you exercise enough to make your heart beat faster? 4    How many minutes a day do you exercise enough to make your heart beat faster? 30 - 60    How many days per week do you miss taking your medication? 0        Review of Systems:                                                      ROS: negative for fever, chills, cough, wheezes, chest pain, shortness of breath, vomiting, abdominal pain, leg swelling       OBJECTIVE:             Physical exam:  Blood pressure 133/65, pulse 68, temperature 97.2  F (36.2  C), temperature source Tympanic, resp. rate 18, height 1.626 m (5' 4\"), weight 83.1 kg (183 lb 1.6 oz), last menstrual period 02/26/2003, SpO2 97 %, not currently breastfeeding.     NAD, appears comfortable  Neurologic: A, Ox3, no focal signs appreciated    PMHx: reviewed  Past Medical History:   Diagnosis Date     CKD (chronic kidney disease)      Essential hypertension, benign      PMR (polymyalgia rheumatica) (H)      Unspecified hypothyroidism       PSHx: reviewed  Past Surgical History:   Procedure Laterality Date     AS TOTAL HIP ARTHROPLASTY Left      C DEXA, BONE DENSITY, AXIAL SKEL  06/16/2003    Normal BMD     CATARACT EXTRACTION Left      COLONOSCOPY N/A 11/03/2015    Procedure: COLONOSCOPY;  Surgeon: Joanna Acevedo MD;  Location: [facility]     HC REMOVE TONSILS/ADENOIDS,<13 Y/O       LAPAROTOMY EXPLORATORY  1985    Exploratory Laparotomy: constricting band around small bowel, simple lysis     SURGICAL HISTORY OF -   06/2004    macular hole repair- Left eye     ZZC " NONSPECIFIC PROCEDURE       x3     ZZC REPLANTATION THUMB DISTAL,COMPLETE  2009    left thumb trauma; Dr. Jose surgery        Meds: reviewed  Current Outpatient Medications   Medication Sig Dispense Refill     BIOTIN 5000 PO        CALCIUM CITRATE-VITAMIN D 315-200 MG-UNIT PO TABS 2 TABLETS DAILY 90 Tab 3     carvedilol (COREG) 25 MG tablet Take 25 mg by mouth 2 times daily (with meals)       Cholecalciferol (VITAMIN D3) 25 MCG (1000 UT) CAPS Take 1 capsule by mouth daily       Cranberry 450 MG CAPS        cyanocobalamin (VITAMIN B-12) 2500 MCG SUBL sublingual tablet Place 2,500 mcg under the tongue daily       dextromethorphan-guaiFENesin (MUCINEX DM)  MG 12 hr tablet Take 1 tablet by mouth every 12 hours       Fluticasone-Umeclidin-Vilanterol (TRELEGY ELLIPTA) 100-62.5-25 MCG/INH oral inhaler Inhale 1 puff into the lungs daily       GLUCOSAMINE HCL 1000 MG PO TABS 2 TABLETS DAILY 90 Tab 3     LACTOBACILLUS PO Take 120 mg by mouth daily       levothyroxine (SYNTHROID/LEVOTHROID) 88 MCG tablet TAKE 1 TABLET(88 MCG) BY MOUTH DAILY 90 tablet 0     lisinopril (ZESTRIL) 20 MG tablet Take 1 tablet (20 mg) by mouth daily 90 tablet 3     Lutein 6 MG TABS Take 20 mg by mouth daily        MULTIVITAMIN TABS   OR 1 daily       predniSONE (DELTASONE) 2.5 MG tablet Take 2.5 mg by mouth daily       simvastatin (ZOCOR) 10 MG tablet Take 1 tablet (10 mg) by mouth At Bedtime 90 tablet 3       Soc Hx: reviewed  Fam Hx: reviewed          Natalie Houston MD  Internal Medicine

## 2022-09-15 ENCOUNTER — TRANSFERRED RECORDS (OUTPATIENT)
Dept: LAB | Facility: CLINIC | Age: 87
End: 2022-09-15

## 2022-09-15 ENCOUNTER — TRANSFERRED RECORDS (OUTPATIENT)
Dept: HEALTH INFORMATION MANAGEMENT | Facility: CLINIC | Age: 87
End: 2022-09-15

## 2022-09-15 LAB
ALT SERPL-CCNC: 22 IU/L (ref 5–35)
AST SERPL-CCNC: 29 U/L (ref 5–34)
CREATININE (EXTERNAL): 0.87 MG/DL (ref 0.5–1.3)

## 2022-09-16 ENCOUNTER — TRANSFERRED RECORDS (OUTPATIENT)
Dept: LAB | Facility: CLINIC | Age: 87
End: 2022-09-16

## 2022-10-03 ENCOUNTER — LAB (OUTPATIENT)
Dept: LAB | Facility: CLINIC | Age: 87
End: 2022-10-03
Payer: MEDICARE

## 2022-10-03 DIAGNOSIS — E03.8 OTHER SPECIFIED HYPOTHYROIDISM: ICD-10-CM

## 2022-10-03 LAB
T4 FREE SERPL-MCNC: 1.29 NG/DL (ref 0.76–1.46)
TSH SERPL DL<=0.005 MIU/L-ACNC: 3.49 MU/L (ref 0.4–4)

## 2022-10-03 PROCEDURE — 84439 ASSAY OF FREE THYROXINE: CPT | Mod: 59

## 2022-10-03 PROCEDURE — 36415 COLL VENOUS BLD VENIPUNCTURE: CPT

## 2022-10-03 PROCEDURE — 84439 ASSAY OF FREE THYROXINE: CPT | Mod: 90

## 2022-10-03 PROCEDURE — 99000 SPECIMEN HANDLING OFFICE-LAB: CPT

## 2022-10-03 PROCEDURE — 84443 ASSAY THYROID STIM HORMONE: CPT

## 2022-10-07 LAB — T4 FREE SERPL DIALY-MCNC: 2.4 NG/DL

## 2022-10-10 ENCOUNTER — TELEPHONE (OUTPATIENT)
Dept: INTERNAL MEDICINE | Facility: CLINIC | Age: 87
End: 2022-10-10

## 2022-10-10 ENCOUNTER — VIRTUAL VISIT (OUTPATIENT)
Dept: ENDOCRINOLOGY | Facility: CLINIC | Age: 87
End: 2022-10-10
Payer: MEDICARE

## 2022-10-10 DIAGNOSIS — M81.0 AGE RELATED OSTEOPOROSIS, UNSPECIFIED PATHOLOGICAL FRACTURE PRESENCE: Primary | ICD-10-CM

## 2022-10-10 DIAGNOSIS — E06.3 HYPOTHYROIDISM DUE TO HASHIMOTO'S THYROIDITIS: ICD-10-CM

## 2022-10-10 PROCEDURE — 99214 OFFICE O/P EST MOD 30 MIN: CPT | Mod: 95 | Performed by: INTERNAL MEDICINE

## 2022-10-10 ASSESSMENT — ENCOUNTER SYMPTOMS
LEG PAIN: 0
LIGHT-HEADEDNESS: 0
HYPOTENSION: 1
SYNCOPE: 0
HYPERTENSION: 1
EXERCISE INTOLERANCE: 0
PALPITATIONS: 0
SLEEP DISTURBANCES DUE TO BREATHING: 0
ORTHOPNEA: 0

## 2022-10-10 NOTE — TELEPHONE ENCOUNTER
Patient calls to ask if she should get the pneumonia vacc?     Best number to call back [phone]

## 2022-10-10 NOTE — LETTER
"    10/10/2022         RE: Bushra Harmon  [address]  [place] [address]        Dear Colleague,    Thank you for referring your patient, Bushra Harmon, to the [facility]. Please see a copy of my visit note below.    Bushra Harmon is being evaluated via a billable video visit.        How would you like to obtain your AVS? MyChart  For the video visit, send the invitation by: Send to e-mail at: [email or web address]  Will anyone else be joining your video visit? No      THIS IS A VIDEO VISIT:    Phone call visit/virtual visit encounter:    Name of patient: Bushra Harmon    Date of encounter: 10/10/2022    Time of start of video visit: 11:01    Video started: 11:10    Video ended: 11:23    Provider location: working from [place]/ [facility]    Patient location: patients home.    Mode of transmission: video/ Doximity    Verbal consent: obtained before starting visit. Pt is agreeable.      The patient has been notified of following:      \"This VIDEO visit will be conducted via a call between you and your physician/provider. We have found that certain health care needs can be provided without the need for a physical exam.  This service lets us provide the care you need with a short phone conversation.  If a prescription is necessary we can send it directly to your pharmacy.  If lab work is needed we can place an order for that and you can then stop by our lab to have the test done at a later time.     With new updates with corona virus patient might be billed as clinic visit.     If during the course of the call the physician/provider feels a telephone visit is not appropriate, you will not be charged for this service.\"      Past medical history, social history, family history, allergy and medications were reviewed and updated as appropriate.  Reviewed pertinent labs, notes, imaging studies personally.    ENDOCRINOLOGY CLINIC NOTE:    Name: Bushra Harmon  Seen for f/u of Hypothyroidism and " Osteopenia today.  HPI:  Bushra Harmon is a 86 year old female who presents for the evaluation of :hypothyroidism.   has a past medical history of CKD (chronic kidney disease), Essential hypertension, benign, PMR (polymyalgia rheumatica) (H), and Unspecified hypothyroidism.    Was started on prednisone for RA by Rheumatology.  Gained some wt since starting Prednisone. On it X 2 year.  She is also on methotrexate.    #1. Hypothyroidism (+TPO):  Initially diagnosed at age 42.and since then she is on thyroid hormone replacement.  Currently taking levothyroxine 88 mcg/day (On this dose since 7/9/2020). Dose was decreased at that time based on that.    Taking generic.  Reports compliance.    Feeling OK. No major s/s.  No longer taking biotin X Aug 2019.    + frontal hair loss. X chronic.    Labs 9/2022 in range.    Weight is stable.    #2. Osteopenia:    DEXA 9/2021: Osteopenia.  No significant change in bone density of the lumbar spine or of the hip(s). There has been no significant change in bone density of the forearm. No vertebral fractures identified on the VFA.     RISK FACTORS:  Post-menopausal, Height loss 1.5 inches,  Parent history of osteoporosis in her mother with  a hip fracture, Long term corticosteroid therapy   Dairy: 2 servings/day  Calcium 600 mg/day  Vit D 1000 international unit(s)/day  Dental health is OK- no upcoming dental procedure.    She was started on Fosamax by primary care provider but she has not started it as she was worried about side effect of medication.  In last visit 12/2021 she wanted to start fosamax and was started on FOSAMAX.    But after one dose she stopped it as she had diarrhea, joint pain and back pain.    Steroids: On prednisone 2.5 mg/day for PMR.    Exercise: golf in summer.     Palpitations:  No  Changes to hair or skin: chronic problem. + frontal hair loss X 2 years  Diarrhea/Constipation:No  Changes in menses: s/p menopause. Had been on HRT for a while. Stopped at age  67.  Dysphagia or Shortness of breath:No  Tremors:No  Changes in weight: gained some weight. Mostly stable.    Heat or cold intolerance: no  History of Lithium or Amiodarone use:No  Head or neck surgery/radiation:No  Family History of Thyroid Problems: + hypothyroidism in mother and sister  PMH/PSH:  Past Medical History:   Diagnosis Date     CKD (chronic kidney disease)      Essential hypertension, benign      PMR (polymyalgia rheumatica) (H)      Unspecified hypothyroidism      Past Surgical History:   Procedure Laterality Date     AS TOTAL HIP ARTHROPLASTY Left      C DEXA, BONE DENSITY, AXIAL SKEL  2003    Normal BMD     CATARACT EXTRACTION Left      COLONOSCOPY N/A 2015    Procedure: COLONOSCOPY;  Surgeon: Joanna Acevedo MD;  Location: [facility]     HC REMOVE TONSILS/ADENOIDS,<13 Y/O       LAPAROTOMY EXPLORATORY  1985    Exploratory Laparotomy: constricting band around small bowel, simple lysis     SURGICAL HISTORY OF -   2004    macular hole repair- Left eye     ZZC NONSPECIFIC PROCEDURE       x3     ZZC REPLANTATION THUMB DISTAL,COMPLETE  2009    left thumb trauma; Dr. Jose surgery     Family Hx:  Family History   Problem Relation Age of Onset     Hypertension Mother           at 100.     Breast Cancer Mother 85     Thyroid Disease Mother      Diabetes Sister      Hypertension Sister      Neurologic Disorder Daughter         MS     Hypertension Son      Ovarian Cancer No family hx of        Social Hx:  Social History     Socioeconomic History     Marital status:      Spouse name: Not on file     Number of children: 3     Years of education: Not on file     Highest education level: Not on file   Occupational History     Occupation: office     Employer: RETIRED   Tobacco Use     Smoking status: Former     Packs/day: 0.50     Years: 45.00     Pack years: 22.50     Types: Cigarettes     Quit date: 2001     Years since quittin.4     Smokeless tobacco:  Never   Vaping Use     Vaping Use: Never used   Substance and Sexual Activity     Alcohol use: Yes     Comment: 1 drink per day     Drug use: No     Sexual activity: Never   Other Topics Concern      Service Not Asked     Blood Transfusions Not Asked     Caffeine Concern Yes     Comment: 1 cup per day     Occupational Exposure Not Asked     Hobby Hazards Not Asked     Sleep Concern Not Asked     Stress Concern Not Asked     Weight Concern Not Asked     Special Diet Not Asked     Back Care Not Asked     Exercise Yes     Comment: Active; exercise 4 times per week     Bike Helmet Not Asked     Seat Belt Yes     Self-Exams No     Parent/sibling w/ CABG, MI or angioplasty before 65F 55M? Not Asked   Social History Narrative     Not on file     Social Determinants of Health     Financial Resource Strain: Not on file   Food Insecurity: Not on file   Transportation Needs: Not on file   Physical Activity: Not on file   Stress: Not on file   Social Connections: Not on file   Intimate Partner Violence: Not on file   Housing Stability: Not on file          MEDICATIONS:  has a current medication list which includes the following prescription(s): biotin, calcium citrate-vitamin d, carvedilol, carvedilol, vitamin d3, cranberry, cyanocobalamin, dextromethorphan-guaifenesin, fluticasone-umeclidin-vilanterol, glucosamine hcl, lactobacillus, levothyroxine, lisinopril, lutein, multivitamin, prednisone, and simvastatin.    ROS   ROS: 10 point ROS neg other than the symptoms noted above in the HPI.    Physical Exam   VS: LMP 02/26/2003   GENERAL: healthy, alert and no distress  EYES: Eyes grossly normal to inspection, conjunctivae and sclerae normal  ENT: no nose swelling, nasal discharge.  Thyroid: no apparent thyroid nodules  RESP: no audible wheeze, cough, or visible cyanosis.  No visible retractions or increased work of breathing.  Able to speak fully in complete sentences.  ABDO: not evaluated.  EXTREMITIES: no hand  tremors.  NEURO: Cranial nerves grossly intact, mentation intact and speech normal  SKIN: No apparent skin lesions, rash or edema seen   PSYCH: mentation appears normal, affect normal/bright, judgement and insight intact, normal speech and appearance well-groomed    LABS:  TFTs:  ENDO THYROID LABS-[facility] Latest Ref Rng & Units 10/3/2022   TSH 0.40 - 4.00 mU/L 3.49   FREE T4 0.76 - 1.46 ng/dL 1.29     Component      Latest Ref Rng & Units 10/3/2022   Free T4 Eq Dialysis      1.1 - 2.4 ng/dL 2.4       VIT D:  Vitamin D Deficiency Screening Results:  Lab Results   Component Value Date    VITDT 60 06/07/2021    VITDT 58 04/28/2020    VITDT 53 06/04/2018    VITDT 53 05/27/2016     All pertinent notes, labs, and images personally reviewed by me.     A/P  Ms.Nancy YUNG Harmon is a 86 year old here for the evaluation of hypothyroidism:    #1. Hypothyroidism(+TPO):   Currently taking 88 mcg/day (on this dose since 7/9/2020)  Taking generic.  Clinically looks euthyorid.  Plan:  Discussed diagnosis, pathophysiology, management and treatment options of condition with patient.  Continue current dose of thyroid hormone replacement-levothyroxine 88 mcg/day.  Repeat labs in 1 year or sooner if concerns.  Please make a lab appointment for blood work and follow up clinic appointment in 1 week after that to discuss results.      Discussed s/s of hypothyroidism and hyperthyroidism to watch for.  The patient indicates understanding of these issues and agrees with the plan.    #2. Osteopenia:  RISK FACTORS:  Post-menopausal, Height loss 1.5 inches,  Parent history of osteoporosis in her mother with  a hip fracture, Long term corticosteroid therapy   Currently she is taking prednisone 2.5  gram per day for PMR.  Followed by rheumatology.  DEXA 9/2021: Osteopenia.  No significant change in bone density of the lumbar spine or of the hip(s). There has been no significant change in bone density of the forearm. No vertebral fractures identified on  the A.   Dental health is OK- no upcoming dental procedure.  She took 1 dose of Fosamax but had diarrhea, joint pain and back pain and stopped that.  She is not interested in retrial of Fosamax at this time.  Plan:  Discussed diagnosis, pathophysiology, management and treatment options of condition with pt.  Continue to hold off Fosamax.  DEXA in 9/2023.  Can consider Boniva or Prolia in future.    Discussed indications, risks and benefits of all medications prescribed, and answered questions to patient's satisfaction.      The pt was advised to    Maintain an adequate calcium and vitamin D intake and to supplement vitamin D if needed to maintain serum levels of 25 hydroxy D (25 OH D) between 30-60 ng/ml.    Limit alcohol intake to no more than 2 servings per day.    Limit caffeine intake.    Maintain an active lifestyle including weight-bearing exercises for at least 30 mins daily.    Take measures to reduce the risk of falling.      Follow-up:  1 year.    Belinda Marcos MD  Endocrinology  [facility]/[place]  CC: Magalis Woodward    All questions were answered.  The patient indicates understanding of the above issues and agrees with the plan set forth.     Addendum to above note and clinic visit:    Labs reviewed.    See result note/telephone encounter.          Answers for HPI/ROS submitted by the patient on 10/10/2022  General Symptoms: No  Skin Symptoms: No  HENT Symptoms: No  EYE SYMPTOMS: No  HEART SYMPTOMS: Yes  LUNG SYMPTOMS: No  INTESTINAL SYMPTOMS: No  URINARY SYMPTOMS: No  GYNECOLOGIC SYMPTOMS: No  BREAST SYMPTOMS: No  SKELETAL SYMPTOMS: No  BLOOD SYMPTOMS: No  NERVOUS SYSTEM SYMPTOMS: No  MENTAL HEALTH SYMPTOMS: No  Chest pain or pressure: No  Fast or irregular heartbeat: No  Pain in legs with walking: No  Trouble breathing while lying down: No  Fingers or toes appear blue: No  High blood pressure: Yes  Low blood pressure: Yes  Fainting: No  Murmurs: No  Pacemaker: No  Varicose veins:  No  Edema or swelling: No  Wake up at night with shortness of breath: No  Light-headedness: No  Exercise intolerance: No          Again, thank you for allowing me to participate in the care of your patient.        Sincerely,        Belinda Marcos MD

## 2022-10-10 NOTE — PROGRESS NOTES
"THIS IS A VIDEO VISIT:    Phone call visit/virtual visit encounter:    Name of patient: Bushra Harmon    Date of encounter: 10/10/2022    Time of start of video visit: 11:01    Video started: 11:10    Video ended: 11:23    Provider location: working from [place]/ [facility]    Patient location: patients home.    Mode of transmission: video/ Doximity    Verbal consent: obtained before starting visit. Pt is agreeable.      The patient has been notified of following:      \"This VIDEO visit will be conducted via a call between you and your physician/provider. We have found that certain health care needs can be provided without the need for a physical exam.  This service lets us provide the care you need with a short phone conversation.  If a prescription is necessary we can send it directly to your pharmacy.  If lab work is needed we can place an order for that and you can then stop by our lab to have the test done at a later time.     With new updates with corona virus patient might be billed as clinic visit.     If during the course of the call the physician/provider feels a telephone visit is not appropriate, you will not be charged for this service.\"      Past medical history, social history, family history, allergy and medications were reviewed and updated as appropriate.  Reviewed pertinent labs, notes, imaging studies personally.    ENDOCRINOLOGY CLINIC NOTE:    Name: Bushra Harmon  Seen for f/u of Hypothyroidism and Osteopenia today.  HPI:  Bushra Harmon is a 86 year old female who presents for the evaluation of :hypothyroidism.   has a past medical history of CKD (chronic kidney disease), Essential hypertension, benign, PMR (polymyalgia rheumatica) (H), and Unspecified hypothyroidism.    Was started on prednisone for RA by Rheumatology.  Gained some wt since starting Prednisone. On it X 2 year.  She is also on methotrexate.    #1. Hypothyroidism (+TPO):  Initially diagnosed at age 42.and since then she is " on thyroid hormone replacement.  Currently taking levothyroxine 88 mcg/day (On this dose since 7/9/2020). Dose was decreased at that time based on that.    Taking generic.  Reports compliance.    Feeling OK. No major s/s.  No longer taking biotin X Aug 2019.    + frontal hair loss. X chronic.    Labs 9/2022 in range.    Weight is stable.    #2. Osteopenia:    DEXA 9/2021: Osteopenia.  No significant change in bone density of the lumbar spine or of the hip(s). There has been no significant change in bone density of the forearm. No vertebral fractures identified on the VFA.     RISK FACTORS:  Post-menopausal, Height loss 1.5 inches,  Parent history of osteoporosis in her mother with  a hip fracture, Long term corticosteroid therapy   Dairy: 2 servings/day  Calcium 600 mg/day  Vit D 1000 international unit(s)/day  Dental health is OK- no upcoming dental procedure.    She was started on Fosamax by primary care provider but she has not started it as she was worried about side effect of medication.  In last visit 12/2021 she wanted to start fosamax and was started on FOSAMAX.    But after one dose she stopped it as she had diarrhea, joint pain and back pain.    Steroids: On prednisone 2.5 mg/day for PMR.    Exercise: golf in summer.     Palpitations:  No  Changes to hair or skin: chronic problem. + frontal hair loss X 2 years  Diarrhea/Constipation:No  Changes in menses: s/p menopause. Had been on HRT for a while. Stopped at age 67.  Dysphagia or Shortness of breath:No  Tremors:No  Changes in weight: gained some weight. Mostly stable.    Heat or cold intolerance: no  History of Lithium or Amiodarone use:No  Head or neck surgery/radiation:No  Family History of Thyroid Problems: + hypothyroidism in mother and sister  PMH/PSH:  Past Medical History:   Diagnosis Date     CKD (chronic kidney disease)      Essential hypertension, benign      PMR (polymyalgia rheumatica) (H)      Unspecified hypothyroidism      Past Surgical  History:   Procedure Laterality Date     AS TOTAL HIP ARTHROPLASTY Left      C DEXA, BONE DENSITY, AXIAL SKEL  2003    Normal BMD     CATARACT EXTRACTION Left      COLONOSCOPY N/A 2015    Procedure: COLONOSCOPY;  Surgeon: Joanna Acevedo MD;  Location: [facility]     HC REMOVE TONSILS/ADENOIDS,<11 Y/O       LAPAROTOMY EXPLORATORY      Exploratory Laparotomy: constricting band around small bowel, simple lysis     SURGICAL HISTORY OF -   2004    macular hole repair- Left eye     ZZC NONSPECIFIC PROCEDURE       x3     ZZC REPLANTATION THUMB DISTAL,COMPLETE  2009    left thumb trauma; Dr. Jose surgery     Family Hx:  Family History   Problem Relation Age of Onset     Hypertension Mother           at 100.     Breast Cancer Mother 85     Thyroid Disease Mother      Diabetes Sister      Hypertension Sister      Neurologic Disorder Daughter         MS     Hypertension Son      Ovarian Cancer No family hx of        Social Hx:  Social History     Socioeconomic History     Marital status:      Spouse name: Not on file     Number of children: 3     Years of education: Not on file     Highest education level: Not on file   Occupational History     Occupation: office     Employer: RETIRED   Tobacco Use     Smoking status: Former     Packs/day: 0.50     Years: 45.00     Pack years: 22.50     Types: Cigarettes     Quit date: 2001     Years since quittin.4     Smokeless tobacco: Never   Vaping Use     Vaping Use: Never used   Substance and Sexual Activity     Alcohol use: Yes     Comment: 1 drink per day     Drug use: No     Sexual activity: Never   Other Topics Concern      Service Not Asked     Blood Transfusions Not Asked     Caffeine Concern Yes     Comment: 1 cup per day     Occupational Exposure Not Asked     Hobby Hazards Not Asked     Sleep Concern Not Asked     Stress Concern Not Asked     Weight Concern Not Asked     Special Diet Not Asked     Back Care  Not Asked     Exercise Yes     Comment: Active; exercise 4 times per week     Bike Helmet Not Asked     Seat Belt Yes     Self-Exams No     Parent/sibling w/ CABG, MI or angioplasty before 65F 55M? Not Asked   Social History Narrative     Not on file     Social Determinants of Health     Financial Resource Strain: Not on file   Food Insecurity: Not on file   Transportation Needs: Not on file   Physical Activity: Not on file   Stress: Not on file   Social Connections: Not on file   Intimate Partner Violence: Not on file   Housing Stability: Not on file          MEDICATIONS:  has a current medication list which includes the following prescription(s): biotin, calcium citrate-vitamin d, carvedilol, carvedilol, vitamin d3, cranberry, cyanocobalamin, dextromethorphan-guaifenesin, fluticasone-umeclidin-vilanterol, glucosamine hcl, lactobacillus, levothyroxine, lisinopril, lutein, multivitamin, prednisone, and simvastatin.    ROS   ROS: 10 point ROS neg other than the symptoms noted above in the HPI.    Physical Exam   VS: LMP 02/26/2003   GENERAL: healthy, alert and no distress  EYES: Eyes grossly normal to inspection, conjunctivae and sclerae normal  ENT: no nose swelling, nasal discharge.  Thyroid: no apparent thyroid nodules  RESP: no audible wheeze, cough, or visible cyanosis.  No visible retractions or increased work of breathing.  Able to speak fully in complete sentences.  ABDO: not evaluated.  EXTREMITIES: no hand tremors.  NEURO: Cranial nerves grossly intact, mentation intact and speech normal  SKIN: No apparent skin lesions, rash or edema seen   PSYCH: mentation appears normal, affect normal/bright, judgement and insight intact, normal speech and appearance well-groomed    LABS:  TFTs:  ENDO THYROID LABS-[facility] Latest Ref Rng & Units 10/3/2022   TSH 0.40 - 4.00 mU/L 3.49   FREE T4 0.76 - 1.46 ng/dL 1.29     Component      Latest Ref Rng & Units 10/3/2022   Free T4 Eq Dialysis      1.1 - 2.4 ng/dL 2.4       VIT  D:  Vitamin D Deficiency Screening Results:  Lab Results   Component Value Date    VITDT 60 06/07/2021    VITDT 58 04/28/2020    VITDT 53 06/04/2018    VITDT 53 05/27/2016     All pertinent notes, labs, and images personally reviewed by me.     A/P  Ms.Nancy YUNG Harmon is a 86 year old here for the evaluation of hypothyroidism:    #1. Hypothyroidism(+TPO):   Currently taking 88 mcg/day (on this dose since 7/9/2020)  Taking generic.  Clinically looks euthyorid.  Plan:  Discussed diagnosis, pathophysiology, management and treatment options of condition with patient.  Continue current dose of thyroid hormone replacement-levothyroxine 88 mcg/day.  Repeat labs in 1 year or sooner if concerns.  Please make a lab appointment for blood work and follow up clinic appointment in 1 week after that to discuss results.      Discussed s/s of hypothyroidism and hyperthyroidism to watch for.  The patient indicates understanding of these issues and agrees with the plan.    #2. Osteopenia:  RISK FACTORS:  Post-menopausal, Height loss 1.5 inches,  Parent history of osteoporosis in her mother with  a hip fracture, Long term corticosteroid therapy   Currently she is taking prednisone 2.5  gram per day for PMR.  Followed by rheumatology.  DEXA 9/2021: Osteopenia.  No significant change in bone density of the lumbar spine or of the hip(s). There has been no significant change in bone density of the forearm. No vertebral fractures identified on the VFA.   Dental health is OK- no upcoming dental procedure.  She took 1 dose of Fosamax but had diarrhea, joint pain and back pain and stopped that.  She is not interested in retrial of Fosamax at this time.  Plan:  Discussed diagnosis, pathophysiology, management and treatment options of condition with pt.  Continue to hold off Fosamax.  DEXA in 9/2023.  Can consider Boniva or Prolia in future.    Discussed indications, risks and benefits of all medications prescribed, and answered questions to  patient's satisfaction.      The pt was advised to    Maintain an adequate calcium and vitamin D intake and to supplement vitamin D if needed to maintain serum levels of 25 hydroxy D (25 OH D) between 30-60 ng/ml.    Limit alcohol intake to no more than 2 servings per day.    Limit caffeine intake.    Maintain an active lifestyle including weight-bearing exercises for at least 30 mins daily.    Take measures to reduce the risk of falling.      Follow-up:  1 year.    Belinda Marcos MD  Endocrinology  [facility]  CC: Magalis Woodward    All questions were answered.  The patient indicates understanding of the above issues and agrees with the plan set forth.     Addendum to above note and clinic visit:    Labs reviewed.    See result note/telephone encounter.          Answers for HPI/ROS submitted by the patient on 10/10/2022  General Symptoms: No  Skin Symptoms: No  HENT Symptoms: No  EYE SYMPTOMS: No  HEART SYMPTOMS: Yes  LUNG SYMPTOMS: No  INTESTINAL SYMPTOMS: No  URINARY SYMPTOMS: No  GYNECOLOGIC SYMPTOMS: No  BREAST SYMPTOMS: No  SKELETAL SYMPTOMS: No  BLOOD SYMPTOMS: No  NERVOUS SYSTEM SYMPTOMS: No  MENTAL HEALTH SYMPTOMS: No  Chest pain or pressure: No  Fast or irregular heartbeat: No  Pain in legs with walking: No  Trouble breathing while lying down: No  Fingers or toes appear blue: No  High blood pressure: Yes  Low blood pressure: Yes  Fainting: No  Murmurs: No  Pacemaker: No  Varicose veins: No  Edema or swelling: No  Wake up at night with shortness of breath: No  Light-headedness: No  Exercise intolerance: No

## 2022-10-10 NOTE — PATIENT INSTRUCTIONS
[facility]  Dr Marcos, Endocrinology Department    [facility]   [address] [id number]  [place] [address]  Appointment [phone]  Fax: [phone]  [facility]: Monday - Thursday      Thyroid labs are in acceptable range.  Continue current dose of thyroid hormone replacement- levothyroxine 88 mcg/day.    Labs in 2023.  DEXA in 2023 ( at [facility])  Please make a lab appointment for blood work and follow up clinic appointment in 1 week after that to discuss results.    The pt was advised to  Maintain an adequate calcium and vitamin D intake and to supplement vitamin D if needed to maintain serum levels of 25 hydroxy D (25 OH D) between 30-60 ng/ml.  Limit alcohol intake to no more than 2 servings per day.  Limit caffeine intake.  Maintain an active lifestyle including weight-bearing exercises for at least 30 mins daily.  Take measures to reduce the risk of falling.    You should get 1000- 1200 mg/day calcium in divided doses of no more than 500 mg/dose.  This INCLUDES what is in your food as well as what is in any supplements you may be taking.    Dietary sources of calcium:: These also contain vitamin D  Milk                            8 oz            300 mg calcium  Yogurt                          1 cup           400 mg calcium   Hard cheese                     1.5 oz          300 mg  Cottage cheese                  2 cup           300 mg  Orange juice with Calcium       8 oz            300 mg  Low fat dairy sources are recommended      You should get 30 minutes of moderate weight bearing exercise on most days of the week .  Weight bearing exercise includes such things as walking, jogging, hiking, dancing.  You should also get Strength training 2 or more times/week in addition to other weight -being exercise. Strength training uses weight or resistance beyond that seen in everyday activities -(pilates, weight training with free weights, weight machines  or resistance bands)    Take Levothyroxine on an empty stomach. Take it with a full glass of water at least 30 minutes to 1 hour before eating breakfast.   This medicine should be taken at least 4 hours before or 4 hours after these medicines: antacids (Maalox , Mylanta , Tums ), calcium supplements, cholestyramine (Prevalite , Questran ), colestipol (Colestid ), iron supplements, orlistat (Heraclio , Xenical ), simethicone (Gas-X , Mylicon ), and sucralfate (Carafate ).   Swallow the capsule whole. Do not cut or crush it.

## 2022-10-10 NOTE — NURSING NOTE
Patient denies any changes since echeck-in regarding medication and allergies and states all information entered during echeck-in remains accurate.  Taylor Myhre,NICKF

## 2022-10-10 NOTE — PROGRESS NOTES
Bushra Harmon is being evaluated via a billable video visit.        How would you like to obtain your AVS? MyChart  For the video visit, send the invitation by: Send to e-mail at: [email or web address]  Will anyone else be joining your video visit? No

## 2022-10-30 ENCOUNTER — OFFICE VISIT (OUTPATIENT)
Dept: URGENT CARE | Facility: URGENT CARE | Age: 87
End: 2022-10-30
Payer: MEDICARE

## 2022-10-30 VITALS
OXYGEN SATURATION: 95 % | DIASTOLIC BLOOD PRESSURE: 85 MMHG | TEMPERATURE: 98 F | HEART RATE: 75 BPM | SYSTOLIC BLOOD PRESSURE: 161 MMHG

## 2022-10-30 DIAGNOSIS — I10 BENIGN ESSENTIAL HYPERTENSION: ICD-10-CM

## 2022-10-30 DIAGNOSIS — B34.9 VIRAL SYNDROME: ICD-10-CM

## 2022-10-30 DIAGNOSIS — Z20.822 SUSPECTED COVID-19 VIRUS INFECTION: ICD-10-CM

## 2022-10-30 DIAGNOSIS — R35.0 URINARY FREQUENCY: Primary | ICD-10-CM

## 2022-10-30 DIAGNOSIS — J44.9 CHRONIC OBSTRUCTIVE PULMONARY DISEASE, UNSPECIFIED COPD TYPE (H): ICD-10-CM

## 2022-10-30 LAB
ALBUMIN UR-MCNC: 30 MG/DL
APPEARANCE UR: CLEAR
BILIRUB UR QL STRIP: NEGATIVE
COLOR UR AUTO: YELLOW
FLUAV AG SPEC QL IA: NEGATIVE
FLUBV AG SPEC QL IA: NEGATIVE
GLUCOSE UR STRIP-MCNC: NEGATIVE MG/DL
HGB UR QL STRIP: ABNORMAL
KETONES UR STRIP-MCNC: NEGATIVE MG/DL
LEUKOCYTE ESTERASE UR QL STRIP: NEGATIVE
NITRATE UR QL: NEGATIVE
PH UR STRIP: 6 [PH] (ref 5–7)
RBC #/AREA URNS AUTO: ABNORMAL /HPF
SP GR UR STRIP: 1.01 (ref 1–1.03)
SQUAMOUS #/AREA URNS AUTO: ABNORMAL /LPF
UROBILINOGEN UR STRIP-ACNC: 0.2 E.U./DL
WBC #/AREA URNS AUTO: ABNORMAL /HPF

## 2022-10-30 PROCEDURE — 87804 INFLUENZA ASSAY W/OPTIC: CPT | Mod: 59 | Performed by: PHYSICIAN ASSISTANT

## 2022-10-30 PROCEDURE — U0005 INFEC AGEN DETEC AMPLI PROBE: HCPCS | Performed by: PHYSICIAN ASSISTANT

## 2022-10-30 PROCEDURE — 99214 OFFICE O/P EST MOD 30 MIN: CPT | Mod: CS | Performed by: PHYSICIAN ASSISTANT

## 2022-10-30 PROCEDURE — U0003 INFECTIOUS AGENT DETECTION BY NUCLEIC ACID (DNA OR RNA); SEVERE ACUTE RESPIRATORY SYNDROME CORONAVIRUS 2 (SARS-COV-2) (CORONAVIRUS DISEASE [COVID-19]), AMPLIFIED PROBE TECHNIQUE, MAKING USE OF HIGH THROUGHPUT TECHNOLOGIES AS DESCRIBED BY CMS-2020-01-R: HCPCS | Performed by: PHYSICIAN ASSISTANT

## 2022-10-30 PROCEDURE — 81001 URINALYSIS AUTO W/SCOPE: CPT | Performed by: PHYSICIAN ASSISTANT

## 2022-10-30 NOTE — PATIENT INSTRUCTIONS
COVID results will be in MyChart in 24-48 hours.     Follow up with primary care and nephrology regarding your blood pressure    Adult Self-Care for Colds  Colds are caused by viruses. They can t be cured with antibiotics. However, you can relieve symptoms and support your body s efforts to heal itself. No matter which symptoms you have, be sure to drink plenty of fluids (water or clear soup); stop smoking and drinking alcohol; and get plenty of rest.      Understand a fever  Take your temperature several times a day. If your fever is 100.4 F (38.0 C) for more than a day, call your doctor.  Relax, lie down. Go to bed if you want. Just get off your feet and rest. Also, drink plenty of fluids to avoid dehydration.  Take acetaminophen or a nonsteroidal anti-inflammatory agent (NSAID), such as ibuprofen.  Treat a troubled nose kindly  Breathe steam or heated humidified air to open blocked nasal passages.  a hot shower or use a vaporizer. Be careful not to get burned by the steam.  Saline nasal sprays and Mucinex help open a stuffy nose. Antihistamines can also help, but they can cause side effects such as drowsiness and drying of the eyes, nose, and mouth.  Soothe a sore throat and cough  Gargle every 2 hours with 1/4 teaspoon of salt dissolved in 1/2 cup of warm water. Suck on throat lozenges and cough drops to moisten your throat (those containing menthol work best).  Cough medicines are available but it is unclear how effective they actually are.  Try honey for cough  Take acetaminophen or an NSAID, such as ibuprofen to ease throat pain  Ease digestive problems  Put fluid back into your body. Take frequent sips of clear liquids such as water or broth. Do not drink beverages with a lot of sugar in them, such as juices and sodas. These can make diarrhea worse. Older children and adults can drink sports drinks.  As your appetite returns, you can resume your normal diet. Ask your doctor whether there are any  foods you should avoid.  When to seek medical care  When you first notice symptoms, ask your health care provider if antiviral medications are appropriate. Antibiotics should not be taken for colds or flu. Also, call your doctor if you have any of the following symptoms or if you aren t feeling better after 7 days:  Shortness of breath  Pain or pressure in the chest or abdomen  Worsening symptoms, especially after a period of improvement  Fever of 100.4 F  (38.0 C) or higher, or fever that doesn t go down with medication  Sudden dizziness or confusion  Severe or continued vomiting  Signs of dehydration, including extreme thirst, dark urine, infrequent urination, dry mouth  Spotted, red, or very sore throat      [id number] [email or web address]. [address] [place] [address], [place] [address]. All rights reserved. This information is not intended as a substitute for professional medical care. Always follow your healthcare professional's instructions.

## 2022-10-30 NOTE — PROGRESS NOTES
Assessment/Plan:    No acute distress or toxicity noted. Lungs CTAB, no signs of pneumonia. Flu test negative. Suspect viral illness. Supportive treatments discussed- continue use of over the counter treatments such as ibuprofen, acetaminophen, guaifenesin as needed.  Pt's COPD is mild, no wheezing or SOB noted. Do not feel antibiotics or corticosteroids are required.  Discussed that symptoms do overlap with possible COVID-19, and patient was tested for this today. Isolate while awaiting test results. If positive, will be contacted regarding treatment.    UA is normal, no evidence of UTI. F/u with PCP if urinary symptoms persist.     BP elevated today, pt has hx of HTN. Advised she continue her lisinopril, carvedilol and continue working with nephrology to get this under better control. No CP, SOB, HA, vision changes to suggest hypertensive urgency/emergency.    See patient instructions below.  At the end of the encounter, I discussed results, diagnosis, medications. Discussed red flags for immediate return to clinic/ER, as well as indications for follow up if no improvement. Patient understood and agreed to plan. Patient was stable for discharge.      ICD-10-CM    1. Urinary frequency  R35.0 UA macro with reflex to Microscopic and Culture - Clinc Collect     Urine Microscopic      2. Viral syndrome  B34.9 Influenza A & B Antigen      3. Suspected COVID-19 virus infection  Z20.822 Symptomatic; Unknown COVID-19 Virus (Coronavirus) by PCR Nose      4. Benign essential hypertension  I10       5. Chronic obstructive pulmonary disease, unspecified COPD type (H)  J44.9             Return in about 1 week (around 11/6/2022) for Follow up w/ primary care provider if not better.    GURINDER ArroyoA, PA-C  [facility]  [place]    ------------------------------------------------------------------------------------------------------------------------------------------------------------------------  HPI:  Bushra Harmon is a 86 year old female with history of PMR, COPD, and stage 3 CKD who presents for evaluation of nasal congestion, fatigue, sore throat, cough, and subjective fever onset 3 days ago. She uses Trelegy Ellipta for her COPD. She states she never has SOB or wheezing with her COPD, and does not have a chronic cough. She does not use albuterol. No treatments tried. Patient reports no myalgias, loss of sense of taste or smell, headache, chest pain, shortness of breath, abdominal pain, nausea, vomiting, diarrhea, rash, or any other symptoms. No known sick contacts/COVID exposure.     She also notes urinary frequency for about 1.5-2 weeks. No dysuria or hematuria, flank pain. Notes she has had UTIs in the past without dysuria.    BP has not been well controlled recently. Her meds have been adjusted several times lately, is treated for this by nephrology primarily. BP has been between 120-180/70-90.      Past Medical History:   Diagnosis Date     CKD (chronic kidney disease)      Essential hypertension, benign      PMR (polymyalgia rheumatica) (H)      Unspecified hypothyroidism        Vitals:    10/30/22 1232   BP: (!) 161/85   BP Location: Right arm   Patient Position: Sitting   Cuff Size: Adult Regular   Pulse: 75   Temp: 98  F (36.7  C)   TempSrc: Tympanic   SpO2: 95%       Physical Exam  Vitals and nursing note reviewed.   HENT:      Right Ear: Tympanic membrane normal.      Left Ear: Tympanic membrane normal.      Nose: Nose normal.      Mouth/Throat:      Mouth: Mucous membranes are moist.      Pharynx: Uvula midline. Posterior oropharyngeal erythema present.      Tonsils: No tonsillar exudate or tonsillar abscesses.      Comments: Cobblestoning of posterior pharynx  Cardiovascular:      Rate and Rhythm: Normal rate  and regular rhythm.   Pulmonary:      Effort: Pulmonary effort is normal.      Breath sounds: Normal breath sounds.   Abdominal:      Tenderness: There is no right CVA tenderness or left CVA tenderness.   Neurological:      Mental Status: She is alert.         Labs/Imaging:  Results for orders placed or performed in visit on 10/30/22 (from the past 24 hour(s))   Influenza A & B Antigen    Specimen: Nose; Swab   Result Value Ref Range    Influenza A antigen Negative Negative    Influenza B antigen Negative Negative    Narrative    Test results must be correlated with clinical data. If necessary, results should be confirmed by a molecular assay or viral culture.   UA macro with reflex to Microscopic and Culture - Clinc Collect    Specimen: Urine, Midstream   Result Value Ref Range    Color Urine Yellow Colorless, Straw, Light Yellow, Yellow    Appearance Urine Clear Clear    Glucose Urine Negative Negative, 1000 , >=2000 mg/dL    Bilirubin Urine Negative Negative    Ketones Urine Negative Negative, 160  mg/dL    Specific Gravity Urine 1.010 1.003 - 1.035    Blood Urine Small (A) Negative    pH Urine 6.0 5.0 - 7.0    Protein Albumin Urine 30 (A) Negative, 300 , >=2000 mg/dL    Urobilinogen Urine 0.2 0.2, 1.0 E.U./dL    Nitrite Urine Negative Negative    Leukocyte Esterase Urine Negative Negative   Urine Microscopic   Result Value Ref Range    RBC Urine 0-2 0-2 /HPF /HPF    WBC Urine 0-5 0-5 /HPF /HPF    Squamous Epithelials Urine Few (A) None Seen /LPF    Narrative    Urine Culture not indicated     No results found for this or any previous visit (from the past 24 hour(s)).      Patient Instructions     COVID results will be in MyChart in 24-48 hours.     Follow up with primary care and nephrology regarding your blood pressure    Adult Self-Care for Colds  Colds are caused by viruses. They can t be cured with antibiotics. However, you can relieve symptoms and support your body s efforts to heal itself. No matter which  symptoms you have, be sure to drink plenty of fluids (water or clear soup); stop smoking and drinking alcohol; and get plenty of rest.      Understand a fever    Take your temperature several times a day. If your fever is 100.4 F (38.0 C) for more than a day, call your doctor.    Relax, lie down. Go to bed if you want. Just get off your feet and rest. Also, drink plenty of fluids to avoid dehydration.    Take acetaminophen or a nonsteroidal anti-inflammatory agent (NSAID), such as ibuprofen.  Treat a troubled nose kindly    Breathe steam or heated humidified air to open blocked nasal passages.  a hot shower or use a vaporizer. Be careful not to get burned by the steam.    Saline nasal sprays and Mucinex help open a stuffy nose. Antihistamines can also help, but they can cause side effects such as drowsiness and drying of the eyes, nose, and mouth.  Soothe a sore throat and cough    Gargle every 2 hours with 1/4 teaspoon of salt dissolved in 1/2 cup of warm water. Suck on throat lozenges and cough drops to moisten your throat (those containing menthol work best).    Cough medicines are available but it is unclear how effective they actually are.    Try honey for cough    Take acetaminophen or an NSAID, such as ibuprofen to ease throat pain  Ease digestive problems    Put fluid back into your body. Take frequent sips of clear liquids such as water or broth. Do not drink beverages with a lot of sugar in them, such as juices and sodas. These can make diarrhea worse. Older children and adults can drink sports drinks.    As your appetite returns, you can resume your normal diet. Ask your doctor whether there are any foods you should avoid.  When to seek medical care  When you first notice symptoms, ask your health care provider if antiviral medications are appropriate. Antibiotics should not be taken for colds or flu. Also, call your doctor if you have any of the following symptoms or if you aren t feeling better  after 7 days:    Shortness of breath    Pain or pressure in the chest or abdomen    Worsening symptoms, especially after a period of improvement    Fever of 100.4 F  (38.0 C) or higher, or fever that doesn t go down with medication    Sudden dizziness or confusion    Severe or continued vomiting    Signs of dehydration, including extreme thirst, dark urine, infrequent urination, dry mouth    Spotted, red, or very sore throat      4593-0142 The [employer]. [address] [place] [address], [place] [address]. All rights reserved. This information is not intended as a substitute for professional medical care. Always follow your healthcare professional's instructions.

## 2022-10-31 LAB — SARS-COV-2 RNA RESP QL NAA+PROBE: NEGATIVE

## 2022-11-01 ENCOUNTER — TELEPHONE (OUTPATIENT)
Dept: INTERNAL MEDICINE | Facility: CLINIC | Age: 87
End: 2022-11-01

## 2022-11-01 DIAGNOSIS — Z01.89 PATIENT REQUEST FOR DIAGNOSTIC TESTING: ICD-10-CM

## 2022-11-01 DIAGNOSIS — R19.8 RECTAL DISCHARGE: Primary | ICD-10-CM
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
## 2022-11-01 NOTE — TELEPHONE ENCOUNTER
Patient two time separate times when she passes gas she has a yellow jell like drainage. She denies pain. No other issues. She would like to know if its caused from stress. Please advise. Ok to call and adela [phone]

## 2022-11-07 ENCOUNTER — TELEPHONE (OUTPATIENT)
Dept: INTERNAL MEDICINE | Facility: CLINIC | Age: 87
End: 2022-11-07

## 2022-11-07 NOTE — TELEPHONE ENCOUNTER
Reason for Call:  Other appointment    Detailed comments: Bushra was seen by Dr Rosemarie Prieto, and referred to a gastroenterologist, but they wanted her to follow up with her PCP again, and she has bronchitis and wants to be seen as soon as possible.      Phone Number Patient can be reached at: Home number on file [phone] (home)    Best Time: Anytime    Can we leave a detailed message on this number? YES    Call taken on 11/7/2022 at 4:36 PM by Tara Granda

## 2022-11-07 NOTE — TELEPHONE ENCOUNTER
Patient calling, GI refused the referral and was told to see pcp first.    Wants to discuss bronchitis.    Patient also transferred to scheduling.    Irasema Henry CMA  [facility] [place]  [phone]

## 2022-11-08 NOTE — TELEPHONE ENCOUNTER
Called patient and she stated she has a URI and is coughing and coughing and thinks she has bronchitis.  Patient stated has a history of COPD and wants to catch it early if needing antibiotics or prednisone.  Assisted with scheduling an appointment.  Denied any need for triage.    Next 5 appointments (look out 90 days)    Nov 10, 2022  2:00 PM  (Arrive by 1:40 PM)  Provider Visit with Ochoa Butler NP  [facility] ([facility] ) [facility] [address] [facility] [address] [phone]

## 2022-11-08 NOTE — TELEPHONE ENCOUNTER
GI called patient and told her that the referral for her to be seen was denied.  Patient states that they refused to tell her why it was denied.  Patient is asking PCP to address and/or refer to [facility] in [place] to be seen.  Please address and advise patient.

## 2022-11-10 ENCOUNTER — OFFICE VISIT (OUTPATIENT)
Dept: INTERNAL MEDICINE | Facility: CLINIC | Age: 87
End: 2022-11-10
Payer: MEDICARE

## 2022-11-10 ENCOUNTER — ANCILLARY PROCEDURE (OUTPATIENT)
Dept: GENERAL RADIOLOGY | Facility: CLINIC | Age: 87
End: 2022-11-10
Payer: MEDICARE

## 2022-11-10 VITALS
RESPIRATION RATE: 16 BRPM | DIASTOLIC BLOOD PRESSURE: 88 MMHG | WEIGHT: 179.2 LBS | OXYGEN SATURATION: 95 % | HEIGHT: 64 IN | HEART RATE: 89 BPM | SYSTOLIC BLOOD PRESSURE: 162 MMHG | TEMPERATURE: 98.3 F | BODY MASS INDEX: 30.59 KG/M2

## 2022-11-10 DIAGNOSIS — R05.1 ACUTE COUGH: ICD-10-CM

## 2022-11-10 DIAGNOSIS — J44.9 CHRONIC OBSTRUCTIVE PULMONARY DISEASE, UNSPECIFIED COPD TYPE (H): Primary | ICD-10-CM

## 2022-11-10 DIAGNOSIS — J44.9 CHRONIC OBSTRUCTIVE PULMONARY DISEASE, UNSPECIFIED COPD TYPE (H): ICD-10-CM

## 2022-11-10 DIAGNOSIS — R19.8 CHANGE IN BOWEL MOVEMENT: ICD-10-CM

## 2022-11-10 LAB
BASOPHILS # BLD AUTO: 0 10E3/UL (ref 0–0.2)
BASOPHILS NFR BLD AUTO: 0 %
EOSINOPHIL # BLD AUTO: 0.2 10E3/UL (ref 0–0.7)
EOSINOPHIL NFR BLD AUTO: 2 %
ERYTHROCYTE [DISTWIDTH] IN BLOOD BY AUTOMATED COUNT: 14.3 % (ref 10–15)
HCT VFR BLD AUTO: 43.3 % (ref 35–47)
HGB BLD-MCNC: 14.5 G/DL (ref 11.7–15.7)
IMM GRANULOCYTES # BLD: 0 10E3/UL
IMM GRANULOCYTES NFR BLD: 0 %
LYMPHOCYTES # BLD AUTO: 2.1 10E3/UL (ref 0.8–5.3)
LYMPHOCYTES NFR BLD AUTO: 17 %
MCH RBC QN AUTO: 30 PG (ref 26.5–33)
MCHC RBC AUTO-ENTMCNC: 33.5 G/DL (ref 31.5–36.5)
MCV RBC AUTO: 90 FL (ref 78–100)
MONOCYTES # BLD AUTO: 1 10E3/UL (ref 0–1.3)
MONOCYTES NFR BLD AUTO: 8 %
NEUTROPHILS # BLD AUTO: 9.4 10E3/UL (ref 1.6–8.3)
NEUTROPHILS NFR BLD AUTO: 73 %
PLATELET # BLD AUTO: 303 10E3/UL (ref 150–450)
RBC # BLD AUTO: 4.83 10E6/UL (ref 3.8–5.2)
WBC # BLD AUTO: 12.8 10E3/UL (ref 4–11)

## 2022-11-10 PROCEDURE — 36415 COLL VENOUS BLD VENIPUNCTURE: CPT

## 2022-11-10 PROCEDURE — 99213 OFFICE O/P EST LOW 20 MIN: CPT

## 2022-11-10 PROCEDURE — 71046 X-RAY EXAM CHEST 2 VIEWS: CPT | Mod: TC | Performed by: RADIOLOGY

## 2022-11-10 PROCEDURE — 85025 COMPLETE CBC W/AUTO DIFF WBC: CPT

## 2022-11-10 RX ORDER — PREDNISONE 20 MG/1
20 TABLET ORAL 2 TIMES DAILY
Qty: 10 TABLET | Refills: 0 | Status: SHIPPED | OUTPATIENT
Start: 2022-11-10 | End: 2023-03-14

## 2022-11-10 NOTE — PROGRESS NOTES
Assessment & Plan     Chronic obstructive pulmonary disease, unspecified COPD type (H)  Patient has wheeze bilaterally and I think she would benefit from short prednisone burst. I will obtain XR and CBC to check for infection. CBC has elevated WBC and elevated neutrophils. Will treat with Z-pac  - XR Chest 2 Views; Future  - CBC with platelets and differential; Future  - predniSONE (DELTASONE) 20 MG tablet; Take 1 tablet (20 mg) by mouth 2 times daily  - CBC with platelets and differential    Acute cough  As above  - XR Chest 2 Views; Future  - CBC with platelets and differential; Future  - predniSONE (DELTASONE) 20 MG tablet; Take 1 tablet (20 mg) by mouth 2 times daily  - CBC with platelets and differential    Change in bowel movement  May be related to current illness, or psychological stress. If stool is consistently abnormal for a week or more we can do infection testing. I refer to GI for their opinion but patient may need colonoscopy in future  - Adult GI  Referral - Consult Only; Future    Hypertension  Following with nephrology.    Return in about 2 weeks (around 11/24/2022), or if symptoms worsen or fail to improve.    Ochoa Butler NP  [facility] FATOU Tomlinson is a 86 year old accompanied by her self, presenting for the following health issues:  RECHECK ([facility] follow up on bronchitis.)      History of Present Illness       Reason for visit:  Possible bronchitis  Symptom onset:  1-2 weeks ago  Symptom intensity:  Moderate  Symptom progression:  Staying the same  Had these symptoms before:  Yes  Has tried/received treatment for these symptoms:  Yes  Previous treatment was successful:  Yes  Prior treatment description:  Antibiotics and prednisone  What makes it worse:  No  What makes it better:  Above pills    She eats 4 or more servings of fruits and vegetables daily.She consumes 1 sweetened beverage(s) daily.She exercises with enough  "effort to increase her heart rate 9 or less minutes per day.  She exercises with enough effort to increase her heart rate 4 days per week.   She is taking medications regularly.       ED/UC Followup:    Facility:  [facility]  Date of visit: 10/30/22  Reason for visit: Urinary frequency, viral syndrome, suspected covid infection, hypertension, COPD  Current Status: Still has cough, wondering if she needs to be tested for covid again    10/27 was in [place] and went to UC on 10/30/22. She has seen in UC    She had weakness, chills and fevers but is now feeling better but is still  She is spitting up light green sputum usually  Does not feel SOB    Patient having bowel problem in addition to UC follow up  Had a  \"Clear gel\" like substance for about a day and a half. 10 days later it started again,  She has GI referral but they denied your referral, denies blood, no abdominal pain or cramping just the feeling of having to go to bathroom  Has been taking imodium when symptoms occur so she is able to go about her business. Each episode has lasted about 3 days. She is currently having a problem starting today. She brought in a stool sample.      Review of Systems   Constitutional, HEENT, cardiovascular, pulmonary, GI, , musculoskeletal, neuro, skin, endocrine and psych systems are negative, except as otherwise noted.      Objective    BP (!) 182/78 (BP Location: Right arm, Patient Position: Sitting, Cuff Size: Adult Large)   Pulse 89   Temp 98.3  F (36.8  C) (Tympanic)   Resp 16   Ht 1.626 m (5' 4\")   Wt 81.3 kg (179 lb 3.2 oz)   LMP 02/26/2003   SpO2 95%   BMI 30.76 kg/m    Body mass index is 30.76 kg/m .  Physical Exam   GENERAL: alert and no distress  EYES: Eyes grossly normal to inspection, PERRL and conjunctivae and sclerae normal  HENT: ear canals and TM's normal, nose and mouth without ulcers or lesions  NECK: no adenopathy, no asymmetry, masses, or scars and thyroid normal to " palpation  RESP: lungs clear to auscultation - no rales, rhonchi or wheezes  CV: regular rate and rhythm, normal S1 S2, no S3 or S4, no murmur, click or rub, no peripheral edema and peripheral pulses strong  ABDOMEN: soft, nontender, no hepatosplenomegaly, no masses and bowel sounds normal  MS: no gross musculoskeletal defects noted, no edema  SKIN: no suspicious lesions or rashes  NEURO: Normal strength and tone, mentation intact and speech normal  PSYCH: mentation appears normal, affect normal/bright

## 2022-11-10 NOTE — PATIENT INSTRUCTIONS
Our lab is in [address]. I will comment on your results when they are all back.  X-ray is on the other side of the elevator

## 2022-11-11 RX ORDER — AZITHROMYCIN 250 MG/1
TABLET, FILM COATED ORAL
Qty: 6 TABLET | Refills: 0 | Status: SHIPPED | OUTPATIENT
Start: 2022-11-11 | End: 2022-11-16

## 2022-11-16 ENCOUNTER — TRANSFERRED RECORDS (OUTPATIENT)
Dept: HEALTH INFORMATION MANAGEMENT | Facility: CLINIC | Age: 87
End: 2022-11-16

## 2022-12-20 ENCOUNTER — TRANSFERRED RECORDS (OUTPATIENT)
Dept: HEALTH INFORMATION MANAGEMENT | Facility: CLINIC | Age: 87
End: 2022-12-20

## 2023-01-23 ENCOUNTER — TRANSFERRED RECORDS (OUTPATIENT)
Dept: HEALTH INFORMATION MANAGEMENT | Facility: CLINIC | Age: 88
End: 2023-01-23
Payer: MEDICARE

## 2023-01-23 LAB
ALT SERPL-CCNC: 20 IU/L (ref 5–35)
AST SERPL-CCNC: 26 U/L (ref 5–34)
CREATININE (EXTERNAL): 0.83 MG/DL (ref 0.5–1.3)

## 2023-01-31 ENCOUNTER — TELEPHONE (OUTPATIENT)
Dept: INTERNAL MEDICINE | Facility: CLINIC | Age: 88
End: 2023-01-31
Payer: MEDICARE

## 2023-01-31 NOTE — TELEPHONE ENCOUNTER
Patient Quality Outreach    Patient is due for the following:   Hypertension -  Hypertension follow-up visit    Next Steps:   Schedule a office visit for blood pressure follow up.    Type of outreach:    Phone, left message for patient/parent to call back.    Next Steps:  Reach out within 90 days via Letter.    Max number of attempts reached: No. Will try again in 90 days if patient still on fail list.    Questions for provider review:    None     Alysha Mendieta

## 2023-02-27 ENCOUNTER — TRANSFERRED RECORDS (OUTPATIENT)
Dept: HEALTH INFORMATION MANAGEMENT | Facility: CLINIC | Age: 88
End: 2023-02-27

## 2023-03-08 ENCOUNTER — NURSE TRIAGE (OUTPATIENT)
Dept: INTERNAL MEDICINE | Facility: CLINIC | Age: 88
End: 2023-03-08
Payer: MEDICARE

## 2023-03-08 NOTE — TELEPHONE ENCOUNTER
Patient calling  Since Monday her left breast would have a pulsing sensation. Not too painful but she feels this pulsing. Patient also stated the breast is swollen. Last mammo was negative for any problems. Please advise. Ok to call and adela [phone]
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
## 2023-03-14 ENCOUNTER — OFFICE VISIT (OUTPATIENT)
Dept: INTERNAL MEDICINE | Facility: CLINIC | Age: 88
End: 2023-03-14
Payer: MEDICARE

## 2023-03-14 VITALS
BODY MASS INDEX: 31.16 KG/M2 | WEIGHT: 182.5 LBS | OXYGEN SATURATION: 98 % | RESPIRATION RATE: 16 BRPM | DIASTOLIC BLOOD PRESSURE: 80 MMHG | TEMPERATURE: 98 F | HEIGHT: 64 IN | SYSTOLIC BLOOD PRESSURE: 136 MMHG | HEART RATE: 66 BPM

## 2023-03-14 DIAGNOSIS — E78.5 HYPERLIPIDEMIA LDL GOAL <100: ICD-10-CM

## 2023-03-14 DIAGNOSIS — M35.3 PMR (POLYMYALGIA RHEUMATICA) (H): ICD-10-CM

## 2023-03-14 DIAGNOSIS — E06.3 HYPOTHYROIDISM DUE TO HASHIMOTO'S THYROIDITIS: ICD-10-CM

## 2023-03-14 DIAGNOSIS — I10 HYPERTENSION, UNSPECIFIED TYPE: ICD-10-CM

## 2023-03-14 DIAGNOSIS — Z01.818 PREOPERATIVE EXAMINATION: Primary | ICD-10-CM

## 2023-03-14 DIAGNOSIS — J44.9 CHRONIC OBSTRUCTIVE PULMONARY DISEASE, UNSPECIFIED COPD TYPE (H): ICD-10-CM

## 2023-03-14 DIAGNOSIS — H26.9 CATARACT OF RIGHT EYE, UNSPECIFIED CATARACT TYPE: ICD-10-CM

## 2023-03-14 PROCEDURE — 99214 OFFICE O/P EST MOD 30 MIN: CPT | Performed by: INTERNAL MEDICINE

## 2023-03-14 RX ORDER — CARVEDILOL 6.25 MG/1
TABLET ORAL
Qty: 180 TABLET | Refills: 0
Start: 2023-03-14 | End: 2023-05-07

## 2023-03-14 RX ORDER — PREDNISONE 2.5 MG/1
2.5 TABLET ORAL DAILY
COMMUNITY
Start: 2023-03-14 | End: 2023-03-14

## 2023-03-14 RX ORDER — PREDNISONE 2.5 MG/1
TABLET ORAL
COMMUNITY
Start: 2023-03-14 | End: 2023-07-13

## 2023-03-14 NOTE — PROGRESS NOTES
[address] [address]  Phone: [phone]  Primary Provider: Natalie Bro  Pre-op Performing Provider: ALVARADO OLIVEIRA      PREOPERATIVE EVALUATION:  Today's date: 3/14/2023    Bushra Harmon is a 87 year old female who presents for a preoperative evaluation.    Surgical Information:  Surgery/Procedure: Cataract Right eye  Surgery Location: [facility]  Surgeon: Dr Dixon  Surgery Date: 03/15  Time of Surgery: 1230pm  Where patient plans to recover: At home with family  Fax number for surgical facility: [phone]    Type of Anesthesia Anticipated: Local with MAC    Assessment & Plan     The proposed surgical procedure is considered LOW risk.    Preoperative examination  Appears medically stable to proceed with surgery as scheduled    Cataract of right eye, unspecified cataract type  Cataract right eye affecting visual quality/acuity    Hypertension, unspecified type  Controlled.  Continue carvedilol/lisinopril    PMR (polymyalgia rheumatica) (H)    On chronic low-dose prednisone 2.5 mg every other day.  Asymptomatic.  Recent labs from January 2023 show CRP 0.25 and sed rate 30    Hyperlipidemia LDL goal <100  Controlled.  Continue statin therapy with simvastatin    Hypothyroidism due to Hashimoto's thyroiditis  Controlled.  TSH 3.49 October 2022.  Continue levothyroxine per endocrinology    Chronic obstructive pulmonary disease  Controlled.  Continue Trelegy    Risks and Recommendations:  The patient has the following additional risks and recommendations for perioperative complications:   - No identified additional risk factors other than previously addressed    Patient instructions   Approval given to proceed with proposed procedure, without further diagnostic evaluation..  No solid food after midnight prior to your  procedure. May have clear liquids up to 2 hours prior to the  procedure   Take prednisone,  carvedilol and levothyroxine with a  glass water to swallow the medication when you first get up the  AM    Use Trelegy the night before surgery as normal and take lisinopril at that time as usual   Hold other supplements the morning of surgery      RECOMMENDATION:  APPROVAL GIVEN to proceed with proposed procedure, without further diagnostic evaluation.       Subjective     HPI related to upcoming procedure:   Meeting patient for the first time today.  Normally followed by other clinic.  History of cataract right eye with reduced visual acuity and presenting for clearance to undergo surgical treatment.  Regarding exercise tolerance, patient lives in a 3 level home and frequently walks up and down the stairs and also does shopping without chest pain or shortness of breath    Preop Questions 3/14/2023   1. Have you ever had a heart attack or stroke? No   2. Have you ever had surgery on your heart or blood vessels, such as a stent placement, a coronary artery bypass, or surgery on an artery in your head, neck, heart, or legs? No   3. Do you have chest pain with activity? No   4. Do you have a history of  heart failure? No   5. Do you currently have a cold, bronchitis or symptoms of other infection? No   6. Do you have a cough, shortness of breath, or wheezing? No   7. Do you or anyone in your family have previous history of blood clots? No   8. Do you or does anyone in your family have a serious bleeding problem such as prolonged bleeding following surgeries or cuts? No   9. Have you ever had problems with anemia or been told to take iron pills? No   10. Have you had any abnormal blood loss such as black, tarry or bloody stools, or abnormal vaginal bleeding? No   11. Have you ever had a blood transfusion? No   12. Are you willing to have a blood transfusion if it is medically needed before, during, or after your surgery? Yes   13. Have you or any of your relatives ever had problems with anesthesia? No   14. Do you  have sleep apnea, excessive snoring or daytime drowsiness? No   15. Do you have any artifical heart valves or other implanted medical devices like a pacemaker, defibrillator, or continuous glucose monitor? No   16. Do you have artificial joints? YES - left GLENN   17. Are you allergic to latex? No       Health Care Directive:  Patient has a Health Care Directive on file      Preoperative Review of :   reviewed - no record of controlled substances prescribed.      Status of Chronic Conditions:  COPD - Patient has a longstanding history of moderate-severe COPD . Patient has been doing well overall noting NO SYMPTOMS and continues on medication regimen consisting of Trelegy without adverse reactions or side effects.    HYPERLIPIDEMIA - Patient has a long history of significant Hyperlipidemia requiring medication for treatment with recent good control. Patient reports no problems or side effects with the medication.     HYPERTENSION - Patient has longstanding history of HTN , currently denies any symptoms referable to elevated blood pressure. Specifically denies chest pain, palpitations, dyspnea, orthopnea, PND or peripheral edema. Blood pressure readings have been in normal range. Current medication regimen is as listed below. Patient denies any side effects of medication.     HYPOTHYROIDISM - Patient has a longstanding history of chronic Hypothyroidism. Patient has been doing well, noting no tremor, insomnia, hair loss or changes in skin texture. Continues to take medications as directed, without adverse reactions or side effects. Last TSH   Lab Results   Component Value Date    TSH 3.49 10/03/2022   .       PMR - chronically on prednisone.  Patient states she has been on a low-dose prednisone 2.5 mg every other day dosage for at least the last 6 months.  Asymptomatic.  Recent sed rate 30 with CRP 0.25    Review of Systems  CONSTITUTIONAL: NEGATIVE for fever, chills. Weight up 2 pounds  INTEGUMENTARY/SKIN:  NEGATIVE for worrisome rashes, moles or lesions  EYES: See HPI. Right cataract. Chronic mild acuity loss left from previous procedures  ENT/MOUTH: NEGATIVE for ear, mouth and throat problems  RESP: NEGATIVE for significant cough or SOB with use Trelegy. Uses in PM  CV: NEGATIVE for exertional chest pain with shopping, palpitations or peripheral edema  GI: NEGATIVE for nausea, abdominal pain, heartburn, or change in bowel habits  : NEGATIVE for frequency, dysuria, or hematuria  MUSCULOSKELETAL: NEGATIVE for significant arthralgias or myalgia with use of prednisone. Hx PMR. CRP normal jan 2023.  Followed by Rheum  NEURO: NEGATIVE for weakness, dizziness or paresthesias  ENDOCRINE: NEGATIVE for temperature intolerance. On statin  HEME: NEGATIVE for bleeding problems  PSYCHIATRIC: NEGATIVE for changes in mood or affect    Patient Active Problem List    Diagnosis Date Noted     Hypothyroidism due to Hashimoto's thyroiditis 10/10/2022     Priority: Medium     Chronic obstructive pulmonary disease, unspecified COPD type (H) 07/11/2022     Priority: Medium     Age related osteoporosis, unspecified pathological fracture presence 04/05/2022     Priority: Medium     PMR (polymyalgia rheumatica) (H)  -- rheumatologist -- dr Russo 08/03/2020     Priority: Medium     Lichen sclerosus 04/16/2020     Priority: Medium     Hypothyroidism - f/u w Endo - dr Meyers  05/31/2017     Priority: Medium     Proteinuria 11/16/2015     Priority: Medium     Onychomycosis 12/05/2011     Priority: Medium     Advanced directives, counseling/discussion 05/18/2011     Priority: Medium     Patient states has Advance Directive and will bring in a copy to clinic.  5/18/2011.         Hyperlipidemia LDL goal <100 05/18/2011     Priority: Medium     Hypertension goal BP (blood pressure) < 140/90 06/29/2010     Priority: Medium     CKD (chronic kidney disease) stage 3, GFR 30-59 ml/min (H) 05/10/2010     Priority: Medium     Herpes simplex virus (HSV)  infection 2006     Priority: Medium     cold sores  Problem list name updated by automated process. Provider to review        Past Medical History:   Diagnosis Date     Age related osteoporosis, unspecified pathological fracture presence 2022     Chronic obstructive pulmonary disease, unspecified COPD type (H) 2022     Essential hypertension, benign      Hyperlipidemia LDL goal <100 2011     Hypertension, unspecified type 3/14/2023     Hypothyroidism due to Hashimoto's thyroiditis 10/10/2022     PMR (polymyalgia rheumatica) (H)      PMR (polymyalgia rheumatica) (H)  -- rheumatologist -- dr Russo 8/3/2020     Unspecified hypothyroidism      Past Surgical History:   Procedure Laterality Date     AS TOTAL HIP ARTHROPLASTY Left      C DEXA, BONE DENSITY, AXIAL SKEL  2003    Normal BMD     CATARACT EXTRACTION Left      COLONOSCOPY N/A 2015    Procedure: COLONOSCOPY;  Surgeon: Joanna Acevedo MD;  Location:  GI     HC REMOVE TONSILS/ADENOIDS,<11 Y/O       LAPAROTOMY EXPLORATORY      Exploratory Laparotomy: constricting band around small bowel, simple lysis     SURGICAL HISTORY OF -   2004    macular hole repair- Left eye     [facility] NONSPECIFIC PROCEDURE       x3     [facility] REPLANTATION THUMB DISTAL,COMPLETE  2009    left thumb trauma; Dr. Jose surgery     Current Outpatient Medications   Medication Sig Dispense Refill     BIOTIN 5000 PO        CALCIUM CITRATE-VITAMIN D 315-200 MG-UNIT PO TABS 2 TABLETS DAILY 90 Tab 3     carvedilol (COREG) 6.25 MG tablet Take 1 tab by mouth in AM and 2 tabs by mouth in  tablet 0     Cholecalciferol (VITAMIN D3) 25 MCG (1000 UT) CAPS Take 1 capsule by mouth daily       Cranberry 450 MG CAPS        cyanocobalamin (VITAMIN B-12) 2500 MCG SUBL sublingual tablet Place 2,500 mcg under the tongue daily       dextromethorphan-guaiFENesin (MUCINEX DM)  MG 12 hr tablet Take 1 tablet by mouth every 12 hours        "Fluticasone-Umeclidin-Vilanterol (TRELEGY ELLIPTA) 100-62.5-25 MCG/INH oral inhaler Inhale 1 puff into the lungs daily       GLUCOSAMINE HCL 1000 MG PO TABS 2 TABLETS DAILY 90 Tab 3     LACTOBACILLUS PO Take 120 mg by mouth daily       levothyroxine (SYNTHROID/LEVOTHROID) 88 MCG tablet Take 1 tablet (88 mcg) by mouth daily 90 tablet 3     lisinopril (ZESTRIL) 20 MG tablet Take 1 tablet (20 mg) by mouth daily 90 tablet 3     Lutein 6 MG TABS Take 20 mg by mouth daily        MULTIVITAMIN TABS   OR 1 daily       predniSONE (DELTASONE) 2.5 MG tablet Take 2.5 mg by mouth every other day       simvastatin (ZOCOR) 10 MG tablet Take 1 tablet (10 mg) by mouth At Bedtime 90 tablet 3       Allergies   Allergen Reactions     No Known Drug Allergies         Social History     Tobacco Use     Smoking status: Former     Packs/day: 0.50     Years: 45.00     Pack years: 22.50     Types: Cigarettes     Quit date: 2001     Years since quittin.8     Smokeless tobacco: Never   Substance Use Topics     Alcohol use: Yes     Comment: 1 drink per day     Family History   Problem Relation Age of Onset     Hypertension Mother           at 100.     Breast Cancer Mother 85     Thyroid Disease Mother      Diabetes Sister      Hypertension Sister      Neurologic Disorder Daughter         MS     Hypertension Son      Ovarian Cancer No family hx of      History   Drug Use No         Objective     /80   Pulse 66   Temp 98  F (36.7  C) (Tympanic)   Resp 16   Ht 1.626 m (5' 4\")   Wt 82.8 kg (182 lb 8 oz)   LMP 2003   SpO2 98%   BMI 31.33 kg/m      Physical Exam  Eye: PERRL, EOMI. Right cataract  HENT: ear canals and TM's normal and nose and mouth without ulcers or lesions   Neck: no adenopathy. Thyroid normal to palpation. No bruits  Pulm: Lungs clear to auscultation   CV: Regular rates and rhythm  GI: Soft, nontender, Normal active bowel sounds, No hepatosplenomegaly or masses palpable  Ext: Peripheral pulses " intact. No edema.  Neuro: Normal strength and tone, sensory exam grossly normal      Recent Labs   Lab Test 11/10/22  1453 07/11/22  1023 06/07/21  0956   HGB 14.5 14.5 14.1    268 304   NA  --  140 136   POTASSIUM  --  4.5 4.1   CR  --  0.89 0.98        Diagnostics:  No labs were ordered during this visit.   No EKG required for low risk surgery (cataract, skin procedure, breast biopsy, etc).    Revised Cardiac Risk Index (RCRI):  The patient has the following serious cardiovascular risks for perioperative complications:   - No serious cardiac risks = 0 points     RCRI Interpretation: 0 points: Class I (very low risk - 0.4% complication rate)           Signed Electronically by: Rafael Nathan MD  Copy of this evaluation report is provided to requesting physician.

## 2023-03-14 NOTE — PATIENT INSTRUCTIONS
Approval given to proceed with proposed procedure, without further diagnostic evaluation..  No solid food after midnight prior to your  procedure. May have clear liquids up to 2 hours prior to the  procedure   Take prednisone, carvedilol and levothyroxine with a  glass water to swallow the medication when you first get up the  AM    Use Trelegy the night before surgery as normal and take lisinopril at that time as usual   Hold other supplements the morning of surgery

## 2023-03-29 ENCOUNTER — TELEPHONE (OUTPATIENT)
Dept: GASTROENTEROLOGY | Facility: CLINIC | Age: 88
End: 2023-03-29
Payer: MEDICARE

## 2023-03-29 NOTE — TELEPHONE ENCOUNTER
[facility] / [facility] Coordinator Name: Obed Pyle call number: [phone]    Insurance: medicare  We do not accept [employer] patients.    SCHEDULED PROVIDER:  HORACE PROVIDERS: CHRISTOS  No orders needed if scheduled with their provider.    DATE: 4/10   TIME 10:30  LOCATION: [facility]  PROCEDURE: FLEXIBLE SIGMOIDOSCOPY [Flex Sig]   DIAGNOSIS : Change in bowel function [R19.8]   SEDATION: CS      Details/Prep Information -  Yes. [facility] will be sending details and prep information to patient.  Confirm who is responsible for sending prep details.    ADDITIONAL INFORMATION:

## 2023-04-10 ENCOUNTER — HOSPITAL ENCOUNTER (OUTPATIENT)
Dept: GENERAL RADIOLOGY | Facility: CLINIC | Age: 88
Discharge: HOME | End: 2023-04-10
Attending: COLON & RECTAL SURGERY
Payer: MEDICARE

## 2023-04-10 ENCOUNTER — HOSPITAL ENCOUNTER (OUTPATIENT)
Facility: CLINIC | Age: 88
Discharge: HOME | End: 2023-04-10
Attending: COLON & RECTAL SURGERY | Admitting: COLON & RECTAL SURGERY
Payer: MEDICARE

## 2023-04-10 VITALS
RESPIRATION RATE: 10 BRPM | HEART RATE: 75 BPM | OXYGEN SATURATION: 95 % | WEIGHT: 180 LBS | HEIGHT: 64 IN | DIASTOLIC BLOOD PRESSURE: 88 MMHG | SYSTOLIC BLOOD PRESSURE: 190 MMHG | TEMPERATURE: 98 F | BODY MASS INDEX: 30.73 KG/M2

## 2023-04-10 DIAGNOSIS — R19.8 CHANGE IN BOWEL FUNCTION: ICD-10-CM

## 2023-04-10 LAB — FLEXIBLE SIGMOIDOSCOPY: NORMAL

## 2023-04-10 PROCEDURE — 74283 THER NMA RDCTJ INTUS/OBSTRCJ: CPT

## 2023-04-10 PROCEDURE — 45330 DIAGNOSTIC SIGMOIDOSCOPY: CPT | Performed by: COLON & RECTAL SURGERY

## 2023-04-10 PROCEDURE — G0500 MOD SEDAT ENDO SERVICE >5YRS: HCPCS | Performed by: COLON & RECTAL SURGERY

## 2023-04-10 RX ORDER — LIDOCAINE 40 MG/G
CREAM TOPICAL
Status: DISCONTINUED | OUTPATIENT
Start: 2023-04-10 | End: 2023-04-10 | Stop reason: HOSPADM

## 2023-04-10 RX ORDER — FLUMAZENIL 0.1 MG/ML
0.2 INJECTION, SOLUTION INTRAVENOUS
Status: DISCONTINUED | OUTPATIENT
Start: 2023-04-10 | End: 2023-04-10 | Stop reason: HOSPADM

## 2023-04-10 RX ORDER — ONDANSETRON 2 MG/ML
4 INJECTION INTRAMUSCULAR; INTRAVENOUS
Status: DISCONTINUED | OUTPATIENT
Start: 2023-04-10 | End: 2023-04-10 | Stop reason: HOSPADM

## 2023-04-10 RX ORDER — FENTANYL CITRATE 50 UG/ML
50-100 INJECTION, SOLUTION INTRAMUSCULAR; INTRAVENOUS EVERY 5 MIN PRN
Status: DISCONTINUED | OUTPATIENT
Start: 2023-04-10 | End: 2023-04-10 | Stop reason: HOSPADM

## 2023-04-10 RX ORDER — ATROPINE SULFATE 0.1 MG/ML
1 INJECTION INTRAVENOUS
Status: DISCONTINUED | OUTPATIENT
Start: 2023-04-10 | End: 2023-04-10 | Stop reason: HOSPADM

## 2023-04-10 RX ORDER — SIMETHICONE 40MG/0.6ML
133 SUSPENSION, DROPS(FINAL DOSAGE FORM)(ML) ORAL
Status: DISCONTINUED | OUTPATIENT
Start: 2023-04-10 | End: 2023-04-10 | Stop reason: HOSPADM

## 2023-04-10 RX ORDER — NALOXONE HYDROCHLORIDE 0.4 MG/ML
0.4 INJECTION, SOLUTION INTRAMUSCULAR; INTRAVENOUS; SUBCUTANEOUS
Status: DISCONTINUED | OUTPATIENT
Start: 2023-04-10 | End: 2023-04-10 | Stop reason: HOSPADM

## 2023-04-10 RX ORDER — ONDANSETRON 2 MG/ML
4 INJECTION INTRAMUSCULAR; INTRAVENOUS EVERY 6 HOURS PRN
Status: DISCONTINUED | OUTPATIENT
Start: 2023-04-10 | End: 2023-04-10 | Stop reason: HOSPADM

## 2023-04-10 RX ORDER — NALOXONE HYDROCHLORIDE 0.4 MG/ML
0.2 INJECTION, SOLUTION INTRAMUSCULAR; INTRAVENOUS; SUBCUTANEOUS
Status: DISCONTINUED | OUTPATIENT
Start: 2023-04-10 | End: 2023-04-10 | Stop reason: HOSPADM

## 2023-04-10 RX ORDER — PROCHLORPERAZINE MALEATE 5 MG
5 TABLET ORAL EVERY 6 HOURS PRN
Status: DISCONTINUED | OUTPATIENT
Start: 2023-04-10 | End: 2023-04-10 | Stop reason: HOSPADM

## 2023-04-10 RX ORDER — EPINEPHRINE 1 MG/ML
0.1 INJECTION, SOLUTION INTRAMUSCULAR; SUBCUTANEOUS
Status: DISCONTINUED | OUTPATIENT
Start: 2023-04-10 | End: 2023-04-10 | Stop reason: HOSPADM

## 2023-04-10 RX ORDER — ONDANSETRON 4 MG/1
4 TABLET, ORALLY DISINTEGRATING ORAL EVERY 6 HOURS PRN
Status: DISCONTINUED | OUTPATIENT
Start: 2023-04-10 | End: 2023-04-10 | Stop reason: HOSPADM

## 2023-04-10 RX ORDER — DIPHENHYDRAMINE HYDROCHLORIDE 50 MG/ML
25-50 INJECTION INTRAMUSCULAR; INTRAVENOUS
Status: DISCONTINUED | OUTPATIENT
Start: 2023-04-10 | End: 2023-04-10 | Stop reason: HOSPADM

## 2023-04-10 RX ADMIN — DIATRIZOATE MEGLUMINE AND DIATRIZOATE SODIUM 480 ML: 660; 100 SOLUTION ORAL; RECTAL at 09:16

## 2023-04-10 ASSESSMENT — ACTIVITIES OF DAILY LIVING (ADL): ADLS_ACUITY_SCORE: 35

## 2023-04-10 NOTE — H&P
Pre-Endoscopy History and Physical     Bushra Harmon MRN# [id number]   YOB: 1935 Age: 87 year old     Date of Procedure: 4/10/2023  Primary care provider: Natalie Bro  Type of Endoscopy: sigmoidoscopy  Reason for Procedure: change in bowel habits  Type of Anesthesia Anticipated: Moderate Sedation    HPI:    Bushra is a 87 year old female who will be undergoing the above procedure.      A history and physical has been performed. The patient's medications and allergies have been reviewed. The risks and benefits of the procedure and the sedation options and risks were discussed with the patient.  All questions were answered and informed consent was obtained.      She denies a personal or family history of anesthesia complications or bleeding disorders.     Allergies   Allergen Reactions     No Known Drug Allergies         Prior to Admission Medications   Prescriptions Last Dose Informant Patient Reported? Taking?   BIOTIN 5000 PO 2023 at   Yes Yes   CALCIUM CITRATE-VITAMIN D 315-200 MG-UNIT PO TABS 4/10/2023 at 0700  No Yes   Si TABLETS DAILY   Cholecalciferol (VITAMIN D3) 25 MCG (1000 UT) CAPS 4/10/2023 at 0700  Yes Yes   Sig: Take 1 capsule by mouth daily   Cranberry 450 MG CAPS 2023 at   Yes Yes   Fluticasone-Umeclidin-Vilanterol (TRELEGY ELLIPTA) 100-62.5-25 MCG/INH oral inhaler 2023 at   Yes Yes   Sig: Inhale 1 puff into the lungs daily   GLUCOSAMINE HCL 1000 MG PO TABS 4/10/2023 at 0700  No Yes   Si TABLETS DAILY   LACTOBACILLUS PO 2023  Yes Yes   Sig: Take 120 mg by mouth daily   Lutein 6 MG TABS 2023 at   Yes Yes   Sig: Take 20 mg by mouth daily    MULTIVITAMIN TABS   OR 2023 at   Yes Yes   Si daily   carvedilol (COREG) 6.25 MG tablet 4/10/2023 at 0700  No Yes   Sig: Take 1 tab by mouth in AM and 2 tabs by mouth in PM   cyanocobalamin (VITAMIN B-12) 2500 MCG SUBL sublingual tablet 4/10/2023 at 0700  Yes Yes   Sig: Place  2,500 mcg under the tongue daily   dextromethorphan-guaiFENesin (MUCINEX DM)  MG 12 hr tablet 4/10/2023 at 0700  Yes Yes   Sig: Take 1 tablet by mouth every 12 hours   levothyroxine (SYNTHROID/LEVOTHROID) 88 MCG tablet 4/9/2023 at 2000  No Yes   Sig: Take 1 tablet (88 mcg) by mouth daily   lisinopril (ZESTRIL) 20 MG tablet 4/9/2023 at 2000  No Yes   Sig: Take 1 tablet (20 mg) by mouth daily   predniSONE (DELTASONE) 2.5 MG tablet 4/10/2023 at 0700  Yes Yes   Sig: Take 2.5 mg by mouth every other day   simvastatin (ZOCOR) 10 MG tablet 4/9/2023 at 2000  No Yes   Sig: Take 1 tablet (10 mg) by mouth At Bedtime      Facility-Administered Medications: None       Patient Active Problem List   Diagnosis     Herpes simplex virus (HSV) infection     Advanced directives, counseling/discussion     Hyperlipidemia LDL goal <100     Onychomycosis     Hypothyroidism - f/u w Endo - dr Meyers      Lichen sclerosus     PMR (polymyalgia rheumatica) (H)  -- rheumatologist -- dr Russo     Proteinuria     Age related osteoporosis, unspecified pathological fracture presence     Chronic obstructive pulmonary disease, unspecified COPD type (H)     Hypothyroidism due to Hashimoto's thyroiditis     Hypertension, unspecified type        Past Medical History:   Diagnosis Date     Age related osteoporosis, unspecified pathological fracture presence 4/5/2022     Chronic obstructive pulmonary disease, unspecified COPD type (H) 7/11/2022     Essential hypertension, benign      Hyperlipidemia LDL goal <100 5/18/2011     Hypertension, unspecified type 3/14/2023     Hypothyroidism due to Hashimoto's thyroiditis 10/10/2022     PMR (polymyalgia rheumatica) (H)      PMR (polymyalgia rheumatica) (H)  -- rheumatologist -- dr Russo 8/3/2020     Unspecified hypothyroidism         Past Surgical History:   Procedure Laterality Date     AS TOTAL HIP ARTHROPLASTY Left      C DEXA, BONE DENSITY, AXIAL SKEL  06/16/2003    Normal BMD     CATARACT EXTRACTION  "Left      COLONOSCOPY N/A 2015    Procedure: COLONOSCOPY;  Surgeon: Joanna Acevedo MD;  Location: [facility]     HC REMOVE TONSILS/ADENOIDS,<11 Y/O       LAPAROTOMY EXPLORATORY  1985    Exploratory Laparotomy: constricting band around small bowel, simple lysis     SURGICAL HISTORY OF -   2004    macular hole repair- Left eye     [facility] NONSPECIFIC PROCEDURE       x3     [facility] REPLANTATION THUMB DISTAL,COMPLETE  2009    left thumb trauma; Dr. Jose surgery       Social History     Tobacco Use     Smoking status: Former     Packs/day: 0.50     Years: 45.00     Pack years: 22.50     Types: Cigarettes     Quit date: 2001     Years since quittin.9     Smokeless tobacco: Never   Vaping Use     Vaping status: Never Used     Passive vaping exposure: Yes   Substance Use Topics     Alcohol use: Yes     Comment: 1 drink per day       Family History   Problem Relation Age of Onset     Hypertension Mother           at 100.     Breast Cancer Mother 85     Thyroid Disease Mother      Diabetes Sister      Hypertension Sister      Neurologic Disorder Daughter         MS     Hypertension Son      Ovarian Cancer No family hx of        REVIEW OF SYSTEMS:     5 point ROS negative except as noted above in HPI, including Gen., Resp., CV, GI &  system review.      PHYSICAL EXAM:   BP (!) 179/80   Pulse 74   Temp 98  F (36.7  C) (Temporal)   Resp 18   Ht 1.626 m (5' 4\")   Wt 81.6 kg (180 lb)   LMP 2003   SpO2 94%   BMI 30.90 kg/m   Estimated body mass index is 30.9 kg/m  as calculated from the following:    Height as of this encounter: 1.626 m (5' 4\").    Weight as of this encounter: 81.6 kg (180 lb).   GENERAL APPEARANCE: healthy and alert  MENTAL STATUS: alert  AIRWAY EXAM: Mallampatti Class I (visualization of the soft palate, fauces, uvula, anterior and posterior pillars)  RESP: lungs clear to auscultation - no rales, rhonchi or wheezes  CV: regular rates and " rhythm      DIAGNOSTICS:    Not indicated      IMPRESSION   ASA Class 2 - Mild systemic disease        PLAN:       Plan for colonoscopy. We discussed the risks, benefits and alternatives and the patient wished to proceed.    The above has been forwarded to the consulting provider.      Signed Electronically by: Marina Gordon MD, MD  April 10, 2023

## 2023-04-17 ENCOUNTER — VIRTUAL VISIT (OUTPATIENT)
Dept: URGENT CARE | Facility: CLINIC | Age: 88
End: 2023-04-17
Payer: MEDICARE

## 2023-04-17 ENCOUNTER — NURSE TRIAGE (OUTPATIENT)
Dept: INTERNAL MEDICINE | Facility: CLINIC | Age: 88
End: 2023-04-17

## 2023-04-17 DIAGNOSIS — U07.1 CLINICAL DIAGNOSIS OF COVID-19: Primary | ICD-10-CM

## 2023-04-17 DIAGNOSIS — U07.1 INFECTION DUE TO 2019 NOVEL CORONAVIRUS: Primary | ICD-10-CM

## 2023-04-17 PROCEDURE — 99441 PR PHYSICIAN TELEPHONE EVALUATION 5-10 MIN: CPT | Mod: CS

## 2023-04-17 NOTE — PROGRESS NOTES
"Bushra is a 87 year old who is being evaluated via a billable phone visit.      Tested + this AM.  Saturday sx started 4/15.  First time getting COVID.  HA, low grade temp.  COVID vaccinated and boosted.  +CKD    Assessment & Plan     Clinical diagnosis of COVID-19    - nirmatrelvir and ritonavir (PAXLOVID) 150 mg/100 mg therapy pack; Take 2 tablets by mouth 2 times daily for 5 days (Take one tablet of Nirmatelvir and 1 tablet of Ritonavir twice daily for 5 days)      COVID-19 positive patient.  Encounter for consideration of medication intervention. Patient does qualify for a prescription. Full discussion with patient including medication options, risks and benefits. Potential drug interactions reviewed with patient.     Treatment Planned Paxlovid kidney-safe dosing sent to [facility] [place]    Temporary change to home medications:   Hold Trelegy inhaler while on Paxlovid x 5 days.  It is safe to use the albuterol inhaler or nebulizer with Paxlovid.  Hold simvastatin while on Paxlovid x 10 days.  May restart on day 11.  Reduce dose of prednisone by 50% taking 1/2 tab once  every other day whille on Paxlovid x 5 days. May resume full tab after completion of Paxlovid.     Estimated body mass index is 30.9 kg/m  as calculated from the following:    Height as of 4/10/23: 1.626 m (5' 4\").    Weight as of 4/10/23: 81.6 kg (180 lb).  GFR Estimate   Date Value Ref Range Status   07/11/2022 64 >60 mL/min/1.73m2 Final     Comment:     Effective December 21, 2021 eGFRcr in adults is calculated using the 2021 CKD-EPI creatinine equation which includes age and gender (Laila sterling al., NEJM, DOI: 10.1056/GQOQrw8449975)   06/07/2021 53 (L) >60 mL/min/[1.73_m2] Final     Comment:     Non  GFR Calc  Starting 12/18/2018, serum creatinine based estimated GFR (eGFR) will be   calculated using the Chronic Kidney Disease Epidemiology Collaboration   (CKD-EPI) equation.       Elicia Richter MD  [facility] " [place] [facility]    Subjective   Bushra is a 87 year old, presenting for the following health issues:  No chief complaint on file.         View : No data to display.              HPI     Tested + this AM.  Saturday sx started 4/15.  First time getting COVID.  HA, low grade temp.  COVID vaccinated and boosted.    Review of Systems   Constitutional, HEENT, cardiovascular, pulmonary, GI, , musculoskeletal, neuro, skin, endocrine and psych systems are negative, except as otherwise noted.      Objective           Vitals:  No vitals were obtained today due to virtual visit.    Physical Exam   GENERAL: Healthy, alert and no distress  PSYCH: mentation appears normal and affect normal/bright          Phone call duration # 10 minutes.

## 2023-04-17 NOTE — TELEPHONE ENCOUNTER
Situation:  Tested + for Covid today     Background:  COPD   Reviewed medications  and history-all correct  Does not take St. Wort   Has never had covid before     Assessment:  Symptoms started 15th   Tired   Body aches   Mild sore throat   Denied SOB  Minor headache with Tylenol every 8 hours   No chest pain   No fever highest temp [address]   No diarrhea  No cough  No nausea/vomiting   No dizziness  No runny nose     Home 02 was 95% while on the phone   Recommendation:    reviewed isolation guidelines   Monitor 02 > 90%   OTC  meds as directed Tylenol   See care advise.  Advised new, persistent or worsening symptoms  call triage line     Red flags symptoms reviewed- [id number]/911    Patient denied any other questions or concerns and agrees with plan.       RN COVID TREATMENT VISIT  04/17/23    The patient has been triaged and does not require a higher level of care.    Bushra Harmon  87 year old  Current weight? 178 lbs     Has the patient been seen by a primary care provider at an [facility] or [facility] within the past two years? Yes.   Have you been in close proximity to/do you have a known exposure to a person with a confirmed case of influenza? No.     General treatment eligibility:  Date of positive COVID test (PCR or at home)?  4/17/23    Are you or have you been hospitalized for this COVID-19 infection? No.   Have you received monoclonal antibodies or antiviral treatment for COVID-19 since this positive test? No.   Do you have any of the following conditions that place you at risk of being very sick from COVID-19?   - Age 50 years or older  - Chronic lung diseases such as asthma, bronchiectasis, COPD, interstitial lung disease, pulmonary embolism, pulmonary hypertension   - Overweight or Obesity (BMI >85th percentile or BMI 25 or higher)  Yes, patient has at least one high risk condition as noted above.     Current COVID symptoms:   - fatigue  - muscle or body aches  -  headache  - sore throat  Yes. Patient has at least one symptom as selected.     How many days since symptoms started? 5 days or less. Established patient, 12 years or older weighing at least 88.2 lbs, who has symptoms that started in the past 5 days, has not been hospitalized nor received treatment already, and is at risk for being very sick from COVID-19.     Treatment eligibility by RN:    Are you currently pregnant or nursing? No    Do you have a clinically significant hypersensitivity to nirmatrelvir or ritonavir, or toxic epidermal necrolysis (TEN) or Obregon-Eron Syndrome? No    Do you have a history of hepatitis, any hepatic impairment on the Problem List (such as Child-Butler Class C, cirrhosis, fatty liver disease, alcoholic liver disease), or was the last liver lab (hepatic panel, ALT, AST, ALK Phos, bilirubin) elevated in the past 6 months? No    Do you have any history of severe renal impairment (eGFR < 30mL/min)? No    Is patient eligible to continue? Yes   Yes, patient meets all eligibility requirements  Current Outpatient Medications   Medication Sig Dispense Refill     BIOTIN 5000 PO        CALCIUM CITRATE-VITAMIN D 315-200 MG-UNIT PO TABS 2 TABLETS DAILY 90 Tab 3     carvedilol (COREG) 6.25 MG tablet Take 1 tab by mouth in AM and 2 tabs by mouth in  tablet 0     Cholecalciferol (VITAMIN D3) 25 MCG (1000 UT) CAPS Take 1 capsule by mouth daily       Cranberry 450 MG CAPS        cyanocobalamin (VITAMIN B-12) 2500 MCG SUBL sublingual tablet Place 2,500 mcg under the tongue daily       dextromethorphan-guaiFENesin (MUCINEX DM)  MG 12 hr tablet Take 1 tablet by mouth every 12 hours       Fluticasone-Umeclidin-Vilanterol (TRELEGY ELLIPTA) 100-62.5-25 MCG/INH oral inhaler Inhale 1 puff into the lungs daily       GLUCOSAMINE HCL 1000 MG PO TABS 2 TABLETS DAILY 90 Tab 3     LACTOBACILLUS PO Take 120 mg by mouth daily       levothyroxine (SYNTHROID/LEVOTHROID) 88 MCG tablet Take 1 tablet (88 mcg)  by mouth daily 90 tablet 3     lisinopril (ZESTRIL) 20 MG tablet Take 1 tablet (20 mg) by mouth daily 90 tablet 3     Lutein 6 MG TABS Take 20 mg by mouth daily        MULTIVITAMIN TABS   OR 1 daily       predniSONE (DELTASONE) 2.5 MG tablet Take 2.5 mg by mouth every other day       simvastatin (ZOCOR) 10 MG tablet Take 1 tablet (10 mg) by mouth At Bedtime 90 tablet 3       Medications from List 1 of the standing order (on medications that exclude the use of Paxlovid) that patient is taking: NONE. Is patient taking [place]'s Wort? No  Is patient taking Paula's Wort or any meds from List 1? No.   Medications from List 2 of the standing order (on meds that provider needs to adjust) that patient is taking: NONE. Is patient on any of the meds from List 2? No.   Medications from List 3 of standing order (on meds that a RN needs to adjust) that patient is taking: corticosteriods (most common - Prednisone (Deltasone, Orapred) dexamethasone (Decadron, Dxevo), methylprednisolone (Medrol, Methylpred-DP), hydrocortisone (Ala-Ortiz, Cetacort)): Is patient using inhaled or topical corticosteroids? No, patient is taking oral corticosteroids and instructed patient to see a provider for COVID treatment options. They will be transferred to a  at the end of this callThey will be transferred to a  at the end of this call.  simvastatin (Zocor, FloLipid): Instructed patient to stop taking simvastatin while taking Paxlovid and first dose of Paxlovid must be at least 12 hours after last dose of simvastatin.  Instructed to restart simvastatin 5 days after the completion of Paxlovid.  Is patient on any meds from List 3? Yes. Patient is on at least one med that needs a provider visit and will be scheduled or transferred to a  at the end of this call.   Aniya Gray RN     Scheduled for covid treatment today.     Future Appointments 4/17/2023 - 10/14/2023      Date Visit Type Length Department Provider      4/17/2023  3:00 PM COVID TREATMENT VIRTUAL VISIT 30 min VU VIRTUAL URGENT CARE VU VIRTUAL CARE PROVIDER              5/22/2023 11:20 AM CT CHEST HI-RESOLUTION WO CONT 20 min RH CT SCAN [facility] [id number]    Location Instructions:     [facility] [address] [place] [address]  Parking Imaging customers can park either in the lot to the right side of the driveway (opposite the parking ramp) as you approach the Specialty Care Center, or in the parking ramp.  Entrance and check-in location Enter at the front entrance (pictured to the right). As you enter the lobby, the Imaging Center is on the first floor, just past the elevators. Please check-in for your appointment at the desk in the Imaging Center waiting area, [address]              7/13/2023  9:30 AM ANNUAL WELLNESS 30 min RI JOSUÉ Bro, Natalie Friedman MD    Location Instructions:     [facility] is in the [facility] at [address] next to [facility]. This is near the [address] split and the [address] exits off of [address]. To reach the clinic from [place] [address], turn north onto [address], then turn east on [address]. Clinic parking is available next to the [facility], which is just east of the hospital s main entrance.               7/31/2023 11:20 AM MA SCREENING BILATERAL W/ JOSE 15 min RH [facility] [id number]    Location Instructions:     [facility] Building [address], [address] [place] [address]   Parking  Breast Center customers can park in the lot adjacent to the entrance to the [facility].  Entrance and check-in location  Enter at the front entrance, under the canopy. Take the stairs or elevator from the main entrance to the 2nd floor. Please check-in for your appointment at the desk in the Breast Center  waiting area.                   Reason for Disposition    [1] HIGH RISK for severe COVID complications (e.g., weak immune system, age > 64 years, obesity with BMI of 30 or higher, pregnant, chronic lung disease or other chronic medical condition) AND [2] COVID symptoms (e.g., cough, fever)  (Exceptions: Already seen by PCP and no new or worsening symptoms.)    [1] COVID-19 diagnosed by positive lab test (e.g., PCR, rapid self-test kit) AND [2] mild symptoms (e.g., cough, fever, others) AND [3] no complications or SOB    Additional Information    Negative: SEVERE difficulty breathing (e.g., struggling for each breath, speaks in single words)    Negative: Difficult to awaken or acting confused (e.g., disoriented, slurred speech)    Negative: Bluish (or gray) lips or face now    Negative: Shock suspected (e.g., cold/pale/clammy skin, too weak to stand, low BP, rapid pulse)    Negative: Sounds like a life-threatening emergency to the triager    Negative: [1] Diagnosed or suspected COVID-19 AND [2] symptoms lasting 3 or more weeks    Negative: [1] COVID-19 exposure AND [2] no symptoms    Negative: COVID-19 vaccine reaction suspected (e.g., fever, headache, muscle aches) occurring 1 to 3 days after getting vaccine    Negative: COVID-19 vaccine, questions about    Negative: [1] Lives with someone known to have influenza (flu test positive) AND [2] flu-like symptoms (e.g., cough, runny nose, sore throat, SOB; with or without fever)    Negative: [1] Adult with possible COVID-19 symptoms AND [2] triager concerned about severity of symptoms or other causes    Negative: COVID-19 and breastfeeding, questions about    Negative: SEVERE or constant chest pain or pressure  (Exception: Mild central chest pain, present only when coughing.)    Negative: MODERATE difficulty breathing (e.g., speaks in phrases, SOB even at rest, pulse 100-120)    Negative: Headache and stiff neck (can't touch chin to chest)    Negative: Oxygen level  (e.g., pulse oximetry) 90 percent or lower    Negative: Chest pain or pressure  (Exception: MILD central chest pain, present only when coughing)    Negative: Patient sounds very sick or weak to the triager    Negative: MILD difficulty breathing (e.g., minimal/no SOB at rest, SOB with walking, pulse <100)    Negative: [1] Fever > 101 F (38.3 C) AND [2] over 60 years of age    Negative: Fever > 103 F (39.4 C)    Negative: [1] Fever > 100.0 F (37.8 C) AND [2] bedridden (e.g., nursing home patient, CVA, chronic illness, recovering from surgery)    Negative: [1] HIGH RISK patient AND [2] influenza is widespread in the community AND [3] ONE OR MORE respiratory symptoms: cough, sore throat, runny or stuffy nose    Negative: [1] HIGH RISK patient AND [2] influenza exposure within the last 7 days AND [3] ONE OR MORE respiratory symptoms: cough, sore throat, runny or stuffy nose    Negative: Oxygen level (e.g., pulse oximetry) 91 to 94 percent    Negative: [1] COVID-19 infection suspected by caller or triager AND [2] mild symptoms (cough, fever, or others) AND [3] negative COVID-19 rapid test    Negative: Fever present > 3 days (72 hours)    Negative: [1] Fever returns after gone for over 24 hours AND [2] symptoms worse or not improved    Negative: [1] Continuous (nonstop) coughing interferes with work or school AND [2] no improvement using cough treatment per Care Advice    Negative: Cough present > 3 weeks    Negative: [1] COVID-19 diagnosed by positive lab test (e.g., PCR, rapid self-test kit) AND [2] NO symptoms (e.g., cough, fever, others)    Protocols used: CORONAVIRUS (COVID-19) DIAGNOSED OR [id number]

## 2023-04-24 ENCOUNTER — TRANSFERRED RECORDS (OUTPATIENT)
Dept: HEALTH INFORMATION MANAGEMENT | Facility: CLINIC | Age: 88
End: 2023-04-24
Payer: MEDICARE

## 2023-04-24 LAB
ALT SERPL-CCNC: 27 IU/L (ref 5–35)
AST SERPL-CCNC: 30 U/L (ref 5–34)
CREATININE (EXTERNAL): 0.83 MG/DL (ref 0.5–1.3)
GFR ESTIMATED (EXTERNAL): 69.4 ML/MIN/1.73M2
GFR ESTIMATED (IF AFRICAN AMERICAN) (EXTERNAL): NORMAL ML/MIN/1.73M2

## 2023-05-07 ENCOUNTER — APPOINTMENT (OUTPATIENT)
Dept: CT IMAGING | Facility: CLINIC | Age: 88
End: 2023-05-07
Attending: PHYSICIAN ASSISTANT
Payer: MEDICARE

## 2023-05-07 ENCOUNTER — HOSPITAL ENCOUNTER (EMERGENCY)
Facility: CLINIC | Age: 88
Discharge: HOME | End: 2023-05-07
Attending: PHYSICIAN ASSISTANT | Admitting: PHYSICIAN ASSISTANT
Payer: MEDICARE

## 2023-05-07 VITALS
TEMPERATURE: 97.1 F | HEART RATE: 66 BPM | SYSTOLIC BLOOD PRESSURE: 188 MMHG | RESPIRATION RATE: 18 BRPM | DIASTOLIC BLOOD PRESSURE: 83 MMHG | OXYGEN SATURATION: 91 %

## 2023-05-07 DIAGNOSIS — I10 ESSENTIAL HYPERTENSION: ICD-10-CM

## 2023-05-07 DIAGNOSIS — R51.9 HEADACHE: ICD-10-CM

## 2023-05-07 LAB
ANION GAP SERPL CALCULATED.3IONS-SCNC: 10 MMOL/L (ref 7–15)
BASOPHILS # BLD AUTO: 0 10E3/UL (ref 0–0.2)
BASOPHILS NFR BLD AUTO: 0 %
BUN SERPL-MCNC: 24.9 MG/DL (ref 8–23)
CALCIUM SERPL-MCNC: 9.5 MG/DL (ref 8.8–10.2)
CHLORIDE SERPL-SCNC: 100 MMOL/L (ref 98–107)
CREAT SERPL-MCNC: 0.85 MG/DL (ref 0.51–0.95)
DEPRECATED HCO3 PLAS-SCNC: 27 MMOL/L (ref 22–29)
EOSINOPHIL # BLD AUTO: 0.2 10E3/UL (ref 0–0.7)
EOSINOPHIL NFR BLD AUTO: 2 %
ERYTHROCYTE [DISTWIDTH] IN BLOOD BY AUTOMATED COUNT: 14.2 % (ref 10–15)
GFR SERPL CREATININE-BSD FRML MDRD: 67 ML/MIN/1.73M2
GLUCOSE SERPL-MCNC: 130 MG/DL (ref 70–99)
HCT VFR BLD AUTO: 42.6 % (ref 35–47)
HGB BLD-MCNC: 14 G/DL (ref 11.7–15.7)
HOLD SPECIMEN: NORMAL
HOLD SPECIMEN: NORMAL
IMM GRANULOCYTES # BLD: 0 10E3/UL
IMM GRANULOCYTES NFR BLD: 0 %
LYMPHOCYTES # BLD AUTO: 2.1 10E3/UL (ref 0.8–5.3)
LYMPHOCYTES NFR BLD AUTO: 31 %
MCH RBC QN AUTO: 30.4 PG (ref 26.5–33)
MCHC RBC AUTO-ENTMCNC: 32.9 G/DL (ref 31.5–36.5)
MCV RBC AUTO: 92 FL (ref 78–100)
MONOCYTES # BLD AUTO: 0.6 10E3/UL (ref 0–1.3)
MONOCYTES NFR BLD AUTO: 9 %
NEUTROPHILS # BLD AUTO: 3.9 10E3/UL (ref 1.6–8.3)
NEUTROPHILS NFR BLD AUTO: 58 %
NRBC # BLD AUTO: 0 10E3/UL
NRBC BLD AUTO-RTO: 0 /100
PLATELET # BLD AUTO: 270 10E3/UL (ref 150–450)
POTASSIUM SERPL-SCNC: 3.9 MMOL/L (ref 3.4–5.3)
RBC # BLD AUTO: 4.61 10E6/UL (ref 3.8–5.2)
SODIUM SERPL-SCNC: 137 MMOL/L (ref 136–145)
T4 FREE SERPL-MCNC: 1.57 NG/DL (ref 0.9–1.7)
TROPONIN T SERPL HS-MCNC: 14 NG/L
TSH SERPL DL<=0.005 MIU/L-ACNC: 6.23 UIU/ML (ref 0.3–4.2)
WBC # BLD AUTO: 6.8 10E3/UL (ref 4–11)

## 2023-05-07 PROCEDURE — 70450 CT HEAD/BRAIN W/O DYE: CPT | Mod: MA

## 2023-05-07 PROCEDURE — 84439 ASSAY OF FREE THYROXINE: CPT | Performed by: PHYSICIAN ASSISTANT

## 2023-05-07 PROCEDURE — 85004 AUTOMATED DIFF WBC COUNT: CPT | Performed by: PHYSICIAN ASSISTANT

## 2023-05-07 PROCEDURE — 93005 ELECTROCARDIOGRAM TRACING: CPT

## 2023-05-07 PROCEDURE — 250N000013 HC RX MED GY IP 250 OP 250 PS 637: Performed by: PHYSICIAN ASSISTANT

## 2023-05-07 PROCEDURE — 99285 EMERGENCY DEPT VISIT HI MDM: CPT | Mod: 25

## 2023-05-07 PROCEDURE — 84443 ASSAY THYROID STIM HORMONE: CPT | Performed by: PHYSICIAN ASSISTANT

## 2023-05-07 PROCEDURE — 80048 BASIC METABOLIC PNL TOTAL CA: CPT | Performed by: PHYSICIAN ASSISTANT

## 2023-05-07 PROCEDURE — 36415 COLL VENOUS BLD VENIPUNCTURE: CPT | Performed by: PHYSICIAN ASSISTANT

## 2023-05-07 PROCEDURE — 84484 ASSAY OF TROPONIN QUANT: CPT | Performed by: PHYSICIAN ASSISTANT

## 2023-05-07 RX ORDER — CARVEDILOL 6.25 MG/1
6.25 TABLET ORAL ONCE
Status: COMPLETED | OUTPATIENT
Start: 2023-05-07 | End: 2023-05-07

## 2023-05-07 RX ORDER — CARVEDILOL 6.25 MG/1
TABLET ORAL
Qty: 180 TABLET | Refills: 0 | Status: SHIPPED | OUTPATIENT
Start: 2023-05-07 | End: 2023-08-17

## 2023-05-07 RX ORDER — LISINOPRIL 30 MG/1
30 TABLET ORAL AT BEDTIME
Qty: 30 TABLET | Refills: 0 | Status: SHIPPED | OUTPATIENT
Start: 2023-05-07 | End: 2023-08-17

## 2023-05-07 RX ORDER — LISINOPRIL 10 MG/1
20 TABLET ORAL ONCE
Status: COMPLETED | OUTPATIENT
Start: 2023-05-07 | End: 2023-05-07

## 2023-05-07 RX ORDER — LISINOPRIL 10 MG/1
20 TABLET ORAL ONCE
Status: DISCONTINUED | OUTPATIENT
Start: 2023-05-07 | End: 2023-05-07

## 2023-05-07 RX ORDER — CARVEDILOL 6.25 MG/1
12.5 TABLET ORAL ONCE
Status: COMPLETED | OUTPATIENT
Start: 2023-05-07 | End: 2023-05-07

## 2023-05-07 RX ORDER — LISINOPRIL 10 MG/1
10 TABLET ORAL ONCE
Status: COMPLETED | OUTPATIENT
Start: 2023-05-07 | End: 2023-05-07

## 2023-05-07 RX ORDER — ACETAMINOPHEN 500 MG
1000 TABLET ORAL ONCE
Status: COMPLETED | OUTPATIENT
Start: 2023-05-07 | End: 2023-05-07

## 2023-05-07 RX ADMIN — ACETAMINOPHEN 1000 MG: 500 TABLET ORAL at 15:18

## 2023-05-07 RX ADMIN — CARVEDILOL 6.25 MG: 6.25 TABLET, FILM COATED ORAL at 15:18

## 2023-05-07 RX ADMIN — LISINOPRIL 10 MG: 10 TABLET ORAL at 16:33

## 2023-05-07 RX ADMIN — CARVEDILOL 12.5 MG: 6.25 TABLET, FILM COATED ORAL at 16:33

## 2023-05-07 RX ADMIN — LISINOPRIL 20 MG: 10 TABLET ORAL at 15:08

## 2023-05-07 ASSESSMENT — ENCOUNTER SYMPTOMS
HEMATURIA: 0
DIFFICULTY URINATING: 0
SHORTNESS OF BREATH: 0
WEAKNESS: 0
DYSURIA: 0
NUMBNESS: 0
HEADACHES: 1

## 2023-05-07 ASSESSMENT — ACTIVITIES OF DAILY LIVING (ADL)
ADLS_ACUITY_SCORE: 35
ADLS_ACUITY_SCORE: 35

## 2023-05-07 NOTE — ED TRIAGE NOTES
Pt arrives with c/o ongoing issues with BP. Pt reports her blood pressure has been high recently (since October) and she has been doing a lot of research about it and checking it multiple times a day. Pt denies headache or chest pain.     Triage Assessment     Row Name 05/07/23 2261       Triage Assessment (Adult)    Airway WDL WDL       Respiratory WDL    Respiratory WDL WDL       Skin Circulation/Temperature WDL    Skin Circulation/Temperature WDL WDL       Cardiac WDL    Cardiac WDL WDL       Peripheral/Neurovascular WDL    Peripheral Neurovascular WDL WDL       Cognitive/Neuro/Behavioral WDL    Cognitive/Neuro/Behavioral WDL WDL

## 2023-05-07 NOTE — ED PROVIDER NOTES
History     Chief Complaint:  Hypertension     HPI   Bushra Harmon is a 87 year old female with a history of hypertension, COPD, PMR who presents with recent increased blood pressure readings. She is taking Lisinopril 20 mg at night as well as carvedilol 6.25 am and 12.5 pm. Her carvedilol was started after switching from metoprolol on 8/18/22. She just got over a Covid rebound after finishing a course of Paxlovid. Upon evaluation, she has a mild dull, diffuse headache which does not worsen when she chews. No chest pain, back pain, shortness of breath, leg swelling, vission change, numbness, weakness or abnormal micturition. No alcohol. Patient quit smoking 27 years ago. No pseudoephedrine use. She has been taking Mucinex as well  As two Tylenol 650 mg every eight hours. She has been taking prednisone 1.4 mg every other day.  She has been urinating normally.  The patient brings with her a booklet of recordings of her blood pressures taken at home that show that she has been fluctuating between 140s and 200s systolic over the last few weeks.  Diastolics between 50s and 100's.      Independent Historian: patient        Review of External Notes: 8/18/2022 office visit with Dr. Bro.  Reviewed transition from metoprolol to carvedilol, also on levothyroxine issues with BP control and adjusting meds noted.    ROS:  Review of Systems   Eyes: Negative for visual disturbance.   Respiratory: Negative for shortness of breath.    Cardiovascular: Negative for chest pain and leg swelling.   Genitourinary: Negative for difficulty urinating, dysuria, enuresis, hematuria and urgency.   Neurological: Positive for headaches. Negative for weakness and numbness.   All other systems reviewed and are negative.      Allergies:  No Known Drug Allergy     Medications:    Carvedilol   Cyanocobalamin  Trelegy Ellipta  Levothyroxine   Lisinopril   Prednisone  Simvastatin     Past Medical History:    Osteoporosis    COPD  Hypertension   Hyperlipidemia   Hypothyroidism  PMR  Lichen sclerosus  HSV    Past Surgical History:    Cataract extraction  Tonsillectomy and Adenoidectomy   Laparotomy   Total hip arthoplasty      Family History:    Mother: hypertension, breast cancer, thyroid disease   Sister: diabetes, hypertension   Daughter: multiple sclerosis   Son: hypertension     Social History:  The patient presents to the ED by herself.   PCP: Natalie Bro     Physical Exam     Patient Vitals for the past 24 hrs:   BP Temp Temp src Pulse Resp SpO2   05/07/23 1730 (!) 188/83 -- -- 66 -- 91 %   05/07/23 1715 (!) 199/85 -- -- -- -- 92 %   05/07/23 1700 (!) 205/99 -- -- 72 -- 91 %   05/07/23 1645 -- -- -- -- -- 95 %   05/07/23 1633 (!) 225/105 -- -- -- -- 95 %   05/07/23 1630 (!) 225/105 -- -- -- -- 94 %   05/07/23 1615 -- -- -- -- -- 96 %   05/07/23 1600 (!) 213/100 -- -- 72 -- 95 %   05/07/23 1545 (!) 227/103 -- -- 73 -- 98 %   05/07/23 1543 (!) 227/103 -- -- 75 -- 97 %   05/07/23 1530 -- -- -- -- -- 94 %   05/07/23 1515 -- -- -- -- -- 94 %   05/07/23 1508 (!) 233/98 -- -- -- -- 96 %   05/07/23 1340 -- -- -- -- -- 98 %   05/07/23 1330 (!) 241/105 -- -- -- -- --   05/07/23 1139 (!) 204/87 97.1  F (36.2  C) Temporal 77 18 97 %        Physical Exam  General: Awake, alert, non-toxic.  Head:  Scalp is NC/AT  Eyes:  Conjunctiva normal, PERRL.  Extraocular movements intact.  No temporal artery tenderness.  ENT:  The external nose and ears are normal.     Oropharynx clear, uvula midline.  Neck:  Normal range of motion without rigidity.  CV:  Regular rate and rhythm    No pathologic murmur, rubs, or gallops.  Resp:  Breath sounds are clear bilaterally    Non-labored, no retractions or accessory muscle use  Abdomen: Abdomen is soft, no distension, no tenderness, no masses.  MS:  No lower extremity edema/swelling.  Extremities without joint swelling or redness.  Skin:  Warm and dry, No rash or lesions noted.  Neuro: Alert  and oriented.  GCS 15 5/5 strength bilaterally in arms and legs.  Normal sensation to touch.  Cranial nerves II through XII intact.  Normal finger-to-nose testing bilaterally.. Gait normal.  Psych:  Awake. Alert. Normal affect. Appropriate interactions.      Emergency Department Course     ECG results from 05/07/23   EKG 12-lead, tracing only     Value    Systolic Blood Pressure     Diastolic Blood Pressure     Ventricular Rate 72    Atrial Rate 72    AR Interval 148    QRS Duration 70        QTc 409    P Axis 72    R AXIS 15    T Axis 74    Interpretation ECG      Sinus rhythm  Possible Left atrial enlargement  Septal infarct , age undetermined  Abnormal ECG  When compared with ECG of 04-JAN-2005 08:57,  Septal infarct is now Present         Imaging:  Head CT w/o contrast   Final Result   IMPRESSION:   1.  No CT evidence for acute intracranial process.   2.  Brain atrophy and presumed chronic microvascular ischemic changes as above.        Report per radiology    Laboratory:  Labs Ordered and Resulted from Time of ED Arrival to Time of ED Departure   BASIC METABOLIC PANEL - Abnormal       Result Value    Sodium 137      Potassium 3.9      Chloride 100      Carbon Dioxide (CO2) 27      Anion Gap 10      Urea Nitrogen 24.9 (*)     Creatinine 0.85      Calcium 9.5      Glucose 130 (*)     GFR Estimate 67     TSH WITH FREE T4 REFLEX - Abnormal    TSH 6.23 (*)    TROPONIN T, HIGH SENSITIVITY - Normal    Troponin T, High Sensitivity 14     T4 FREE - Normal    Free T4 1.57     CBC WITH PLATELETS AND DIFFERENTIAL    WBC Count 6.8      RBC Count 4.61      Hemoglobin 14.0      Hematocrit 42.6      MCV 92      MCH 30.4      MCHC 32.9      RDW 14.2      Platelet Count 270      % Neutrophils 58      % Lymphocytes 31      % Monocytes 9      % Eosinophils 2      % Basophils 0      % Immature Granulocytes 0      NRBCs per 100 WBC 0      Absolute Neutrophils 3.9      Absolute Lymphocytes 2.1      Absolute Monocytes 0.6       Absolute Eosinophils 0.2      Absolute Basophils 0.0      Absolute Immature Granulocytes 0.0      Absolute NRBCs 0.0            Emergency Department Course & Assessments:  Interventions:  Medications   lisinopril (ZESTRIL) tablet 20 mg (20 mg Oral $Given 23 1508)   carvedilol (COREG) tablet 6.25 mg (6.25 mg Oral $Given 23 1518)   acetaminophen (TYLENOL) tablet 1,000 mg (1,000 mg Oral $Given 23 1518)   lisinopril (ZESTRIL) tablet 10 mg (10 mg Oral $Given 23 1633)   carvedilol (COREG) tablet 12.5 mg (12.5 mg Oral $Given 23 1633)      Independent Interpretation (X-rays, CTs, rhythm strip):  I reviewed the head CT and note no evidence of bleed or mass.    Social Determinants of Health affecting care:  none    Assessments:  1351 I obtained the history and examined the patient as noted above.     Disposition:  The patient was discharged to home.     Impression & Plan      Medical Decision Makin-year-old female presents with blood pressure concerns that her blood pressures have been higher over the last several weeks.  Also with some intermittent mild headache.  Broad differential considered.  On evaluation patient is markedly hypertensive but otherwise very well-appearing resting comfortably on gurney.  Work-up here is otherwise reassuring.  She has no chest pain shortness of breath or evidence of ACS CHF or dissection.  EKG shows no arrhythmia or ischemia and troponin is within normal limits.  Her kidney function is baseline.  TSH slightly elevated but normal free T4.  I did obtain a head CT scan which shows no evidence of bleed or other acute abnormality and she has a completely normal neurologic exam and nothing to suggest stroke.  Suspect intermittent headaches related to her recent COVID versus possibly related to poorly controlled blood pressures.  She is not encephalopathic or vomiting nothing to suggest hypertensive encephalopathy or press syndrome.  No evidence of other hypertensive  emergency or indication for admission or IV lowering.  Nothing to suggest secondary cause of hypertension no tachycardia do not suspect pheochromocytoma.  Given her history of polymyalgia rheumatica and age I did consider temporal arteritis as a possible cause for her headaches though I think this is very unlikely given no vision changes no temporal artery tenderness or pain with mastication, intermittent nature of headaches as well as diffuse mild localization of pain.  No evidence of other emergent causes of headaches.    Patient currently takes carvedilol 6.25 in the AM and 12.5 at night as well as 20 mg of lisinopril at night.  Given her documented readings over the last several weeks she likely needs increased blood pressure control.  She prefers to increase these medications as opposed to starting a new medication.  I did discuss the need to gradually lower blood pressure to avoid complications or significant symptoms.  She was given her nighttime oral doses of medications here at newly increased dosage and monitored for several hours.  Her blood pressure did remain still quite elevated but did begin to gradually come down and was much better on recheck.  I will increase her lisinopril from 20 to 30 mg nightly and also increase her carvedilol to 12.5 mg twice daily.  I will have her call her primary care provider tomorrow to discuss further and monitor blood pressures closely.  Since she already took her nighttime medications she will not take them again tonight.  She will return if new or worsening symptoms worsening or persistent headache chest pain vomiting vision changes or other new or worsening symptoms or concerns.        Diagnosis:    ICD-10-CM    1. Headache  R51.9       2. Essential hypertension  I10            Discharge Medications:  Discharge Medication List as of 5/7/2023  5:29 PM             Scribe Disclosure:  I, Joshua Kjer, am serving as a scribe at 1:52 PM on 5/7/2023 to document services  personally performed by Rolo Neal PA-C based on my observations and the provider's statements to me.    5/7/2023   Rolo Neal PA-C Etten, Clark Ellsworth, PA-C  05/07/23 1808

## 2023-05-07 NOTE — DISCHARGE INSTRUCTIONS
I am adjusting your blood pressure medications.  You will now take 2 tablets of your 6.25 mg carvedilol in the morning and 2 tablets at night.  You will stop taking your 20 mg lisinopril tablets at night and start taking the new prescription for the 30 mg lisinopril tablets every night instead.  You should call your doctor tomorrow to discuss your blood pressure regimen.  We gave you your blood pressure medications for tonight early use he should not take them again this evening.  Discharge Instructions  Hypertension - High Blood Pressure    During you visit to the Emergency Department, your blood pressure was higher than the recommended blood pressure.  This may be related to stress, pain, medication or other temporary conditions. In these cases, your blood pressure may return to normal on its own. If you have a history of high blood pressure, you may need to have your provider adjust your medications. Sometimes, your high measurement here may indicate that you have developed high blood pressure that will stay high unless it is treated. As a general rule, high blood pressure causes problems over years rather than days, weeks, or months. So, while it is important to treat blood pressure, it is rarely important to treat blood pressure immediately. Occasionally we will begin a medication in the Emergency Department; more often we will recommend close follow-up for medications with a primary doctor/clinic.    Generally, every Emergency Department visit should have a follow-up clinic visit with either a primary or a specialty clinic/provider. Please follow-up as instructed by your emergency provider today.    Return to the Emergency Department if you start to have:  A severe headache.  Chest pain.  Shortness of breath.  Weakness or numbness that affects one part of the body.  Confusion.  Vision changes.  Significant swelling of legs and/or eyes.  A reaction to any medication started in the Emergency Department.    What  can I do to help myself?  Avoid alcohol.  Take any blood pressure medicine that you are prescribed.  Get a good night s sleep.  Lower your salt intake.  Exercise.  Lose weight.  Manage stress.  See your doctor regularly    If blood pressure medication was started in the Emergency Department:  The medicine may not have an immediate effect. The body and brain determine what blood pressure you have. The medicine s job is to retrain the body s  thermostat  to a lower blood pressure.  You will need to follow up with your provider to see how this medicine is working for you.  If you were given a prescription for medicine here today, be sure to read all of the information (including the package insert) that comes with your prescription.  This will include important information about the medicine, its side effects, and any warnings that you need to know about.  The pharmacist who fills the prescription can provide more information and answer questions you may have about the medicine.  If you have questions or concerns that the pharmacist cannot address, please call or return to the Emergency Department.   Remember that you can always come back to the Emergency Department if you are not able to see your regular provider in the amount of time listed above, if you get any new symptoms, or if there is anything that worries you.  Discharge Instructions  Headache    You were seen today for a headache. Headaches may be caused by many different things such as muscle tension, sinus inflammation, anxiety and stress, having too little sleep, too much alcohol, some medical conditions or injury. You may have a migraine, which is caused by changes in the blood vessels in your head.  At this time your provider does not find that your headache is a sign of anything dangerous or life-threatening.  However, sometimes the signs of serious illness do not show up right away.      Generally, every Emergency Department visit should have a  follow-up clinic visit with either a primary or a specialty clinic/provider. Please follow-up as instructed by your emergency provider today.    Return to the Emergency Department if:  You get a new fever of 100.4 F or higher.  Your headache gets much worse.  You get a stiff neck with your headache.  You get a new headache that is significantly different or worse than headaches you have had before.  You are vomiting (throwing up) and cannot keep food or water down.  You have blurry or double vision or other problems with your eyes.  You have a new weakness on one side of your body.  You have difficulty with balance which is new.  You or your family thinks you are confused.  You have a seizure.    What can I do to help myself?  Pain medications - You may take a pain medication such as Tylenol  (acetaminophen), Advil , Motrin  (ibuprofen) or Aleve  (naproxen).  Take a pain reliever as soon as you notice symptoms.  Starting medications as soon as you start to have symptoms may lessen the amount of pain you have.  Relaxing in a quiet, dark room may help.  Get enough sleep and eat meals regularly.  You may need to watch for certain foods or other things which may trigger your headaches.  Keeping a journal of your headaches and possible triggers may help you and your primary provider to identify things which you should avoid which may be causing your headaches.  If you were given a prescription for medicine here today, be sure to read all of the information (including the package insert) that comes with your prescription.  This will include important information about the medicine, its side effects, and any warnings that you need to know about.  The pharmacist who fills the prescription can provide more information and answer questions you may have about the medicine.  If you have questions or concerns that the pharmacist cannot address, please call or return to the Emergency Department.   Remember that you can always  come back to the Emergency Department if you are not able to see your regular provider in the amount of time listed above, if you get any new symptoms, or if there is anything that worries you.

## 2023-05-08 LAB
ATRIAL RATE - MUSE: 72 BPM
DIASTOLIC BLOOD PRESSURE - MUSE: NORMAL MMHG
INTERPRETATION ECG - MUSE: NORMAL
P AXIS - MUSE: 72 DEGREES
PR INTERVAL - MUSE: 148 MS
QRS DURATION - MUSE: 70 MS
QT - MUSE: 374 MS
QTC - MUSE: 409 MS
R AXIS - MUSE: 15 DEGREES
SYSTOLIC BLOOD PRESSURE - MUSE: NORMAL MMHG
T AXIS - MUSE: 74 DEGREES
VENTRICULAR RATE- MUSE: 72 BPM

## 2023-05-22 ENCOUNTER — HOSPITAL ENCOUNTER (OUTPATIENT)
Dept: CT IMAGING | Facility: CLINIC | Age: 88
Discharge: HOME | End: 2023-05-22
Attending: INTERNAL MEDICINE | Admitting: INTERNAL MEDICINE
Payer: MEDICARE

## 2023-05-22 DIAGNOSIS — R91.8 OTHER NONSPECIFIC ABNORMAL FINDING OF LUNG FIELD: ICD-10-CM

## 2023-05-22 PROCEDURE — 71250 CT THORAX DX C-: CPT

## 2023-06-21 ENCOUNTER — TRANSFERRED RECORDS (OUTPATIENT)
Dept: HEALTH INFORMATION MANAGEMENT | Facility: CLINIC | Age: 88
End: 2023-06-21

## 2023-06-26 ENCOUNTER — TRANSFERRED RECORDS (OUTPATIENT)
Dept: HEALTH INFORMATION MANAGEMENT | Facility: CLINIC | Age: 88
End: 2023-06-26
Payer: MEDICARE

## 2023-06-27 ENCOUNTER — TRANSFERRED RECORDS (OUTPATIENT)
Dept: HEALTH INFORMATION MANAGEMENT | Facility: CLINIC | Age: 88
End: 2023-06-27
Payer: MEDICARE

## 2023-07-13 ENCOUNTER — TELEPHONE (OUTPATIENT)
Dept: INTERNAL MEDICINE | Facility: CLINIC | Age: 88
End: 2023-07-13

## 2023-07-13 ENCOUNTER — OFFICE VISIT (OUTPATIENT)
Dept: INTERNAL MEDICINE | Facility: CLINIC | Age: 88
End: 2023-07-13
Payer: MEDICARE

## 2023-07-13 VITALS
HEART RATE: 60 BPM | BODY MASS INDEX: 31.36 KG/M2 | WEIGHT: 183.7 LBS | RESPIRATION RATE: 18 BRPM | TEMPERATURE: 96.4 F | HEIGHT: 64 IN | SYSTOLIC BLOOD PRESSURE: 160 MMHG | DIASTOLIC BLOOD PRESSURE: 72 MMHG | OXYGEN SATURATION: 97 %

## 2023-07-13 DIAGNOSIS — I10 HYPERTENSION, UNSPECIFIED TYPE: ICD-10-CM

## 2023-07-13 DIAGNOSIS — E16.2 HYPOGLYCEMIC SYNDROME: Primary | ICD-10-CM

## 2023-07-13 PROBLEM — R19.8 DIGESTIVE SYMPTOM: Status: ACTIVE | Noted: 2022-11-16

## 2023-07-13 PROBLEM — R19.4 CHANGE IN BOWEL HABITS: Status: ACTIVE | Noted: 2023-07-13

## 2023-07-13 PROBLEM — R19.8 RECTAL DISCHARGE: Status: ACTIVE | Noted: 2023-07-13

## 2023-07-13 PROBLEM — N18.9 CHRONIC KIDNEY DISEASE: Status: ACTIVE | Noted: 2023-07-13

## 2023-07-13 PROBLEM — E06.3 HASHIMOTO'S THYROIDITIS: Status: ACTIVE | Noted: 2023-07-13

## 2023-07-13 PROCEDURE — 99214 OFFICE O/P EST MOD 30 MIN: CPT | Performed by: INTERNAL MEDICINE

## 2023-07-13 RX ORDER — LANCETS
EACH MISCELLANEOUS
Qty: 100 EACH | Refills: 0 | Status: SHIPPED | OUTPATIENT
Start: 2023-07-13

## 2023-07-13 RX ORDER — AMLODIPINE BESYLATE 5 MG/1
5 TABLET ORAL DAILY
COMMUNITY
End: 2023-08-17

## 2023-07-13 ASSESSMENT — ENCOUNTER SYMPTOMS
FREQUENCY: 0
COUGH: 0
EYE PAIN: 0
SHORTNESS OF BREATH: 0
HEMATURIA: 0
PALPITATIONS: 0
WEAKNESS: 0
NERVOUS/ANXIOUS: 0
HEARTBURN: 0
ABDOMINAL PAIN: 0
FEVER: 0
ARTHRALGIAS: 0
HEMATOCHEZIA: 0
CHILLS: 0
SORE THROAT: 0
HEADACHES: 0
NAUSEA: 0
JOINT SWELLING: 0
DIARRHEA: 0
PARESTHESIAS: 0
DYSURIA: 0
DIZZINESS: 0
CONSTIPATION: 0

## 2023-07-13 ASSESSMENT — PAIN SCALES - GENERAL: PAINLEVEL: NO PAIN (0)

## 2023-07-13 ASSESSMENT — ACTIVITIES OF DAILY LIVING (ADL): CURRENT_FUNCTION: NO ASSISTANCE NEEDED

## 2023-07-13 NOTE — TELEPHONE ENCOUNTER
[facility] calling  With todays medications they need todays signed chart notes faxed to them in order to bill Medicare  [facility] fax is [phone]

## 2023-07-13 NOTE — PATIENT INSTRUCTIONS
Plan:  1. Continue Lisinopril and Carvedilol same dose  2. I suggest to stop Amlodipine because swollen legs  3. I suggest to start Terazosin 2-5 mg at bed time  4. If possible check blood sugars in the morn ing and when you don't feel well  5. Reschedule ANNUAL EXAM for Aug 17

## 2023-07-13 NOTE — PROGRESS NOTES
Patient's instructions / PLAN:                                                      Plan:  1. Continue Lisinopril and Carvedilol same dose  2. I suggest to stop Amlodipine because swollen legs  3. I suggest to start Terazosin 2-5 mg at bed time  4. If possible check blood sugars in the morn ing and when you don't feel well  5. Reschedule ANNUAL EXAM for Aug 17    ASSESSMENT & PLAN:                                                      (E16.2) Hypoglycemic syndrome  (primary encounter diagnosis)  Comment: Symptomatic hypoglycemia.   Plan: blood glucose monitoring (NO BRAND SPECIFIED)         meter device kit, blood glucose (NO BRAND         SPECIFIED) test strip, thin (NO BRAND         SPECIFIED) lancets        See above    (I10) Hypertension, unspecified type  Comment: Not controlled. Patient will verify with nephrology to see if they agree with the medication changes  Plan: See above    Chief Complaint:                                                      Hypertension  Hypoglycemia ?    The patient was scheduled today for annual exam, but since her blood pressure is elevated, history of hard to manage blood pressure, we will reschedule the annual exam and we will concentrate on her present symptoms    SUBJECTIVE:                                                    History of present illness     Hypertension  -- BP elevated today  -- She is checking BP at home multiple times in the day  -- BP has been over 200 ten times in the past 2 months  -- She will get headaches when BP is very elevated  -- She was seen at the ED on 5/7/23 for high blood pressure and med changes were made  -- There has been 9 med changes in the past year for her BP  -- Currently taking lisinopril 40 mg at bedtime, amlodipine 5 mg at bedtime, and carvedilol 18.75 mg BID.   -- She wishes to stop the amlodipine due to bilateral ankle swelling     Hypoglycemia  -- She suspects she is hypoglycemic  -- Most days around 4 pm she will start sweating  "and get light headed.   -- Symptoms clear when she drinks juice  -- She has fainted because of this many years ago  -- She is interested in checking glucose levels at home    ROS:                                                      ROS: negative for fever, chills, cough, wheezes, chest pain, shortness of breath, vomiting, abdominal pain, leg swelling     A 10-point review of systems was obtained.  Those pertinent are above and in the in the Subjective section.  The rest of the systems are negative.      This document serves as a record of the services and decisions personally performed and made by Natalie Bro MD. It was created on his behalf by Hilary Hyatt, a physician assistant student. The creation of this document is based on the provider's statements to the student.  July 13, 2023     OBJECTIVE:                                                    Physical Exam :    Blood pressure (!) 160/72, pulse 60, temperature (!) 96.4  F (35.8  C), temperature source Tympanic, resp. rate 18, height 1.626 m (5' 4\"), weight 83.3 kg (183 lb 11.2 oz), SpO2 97 %, not currently breastfeeding.     NAD, appears comfortable  Skin: no rashes   HEENT: PERRLA, EOMI, pink conjunctiva, anicteric sclerae, bilateral tympanic membranes are clinically normal, oropharynx is normal color  Neck: supple, no JVD, No thyroidmegaly. Lymph nodes nonpalpable cervical and supraclavicular.  Chest: clear to auscultation bilaterally, good respiratory effort  Heart: S1 S2, RRR, no mgr appreciated  Abdomen: soft, not tender, no hepatosplenomegaly or masses appreciated, no abdominal bruit, present bowel sounds  Extremities: no edema, clubbing, cyanosis.  Neurologic: A, Ox3, no focal signs appreciated    PMHx: reviewed  Past Medical History:   Diagnosis Date     Age related osteoporosis, unspecified pathological fracture presence 4/5/2022     Chronic obstructive pulmonary disease, unspecified COPD type (H) 7/11/2022     Essential hypertension, " benign      Hyperlipidemia LDL goal <100 2011     Hypertension, unspecified type 3/14/2023     Hypothyroidism due to Hashimoto's thyroiditis 10/10/2022     PMR (polymyalgia rheumatica) (H)      PMR (polymyalgia rheumatica) (H)  -- rheumatologist -- dr Russo 8/3/2020     Unspecified hypothyroidism       PSHx: reviewed  Past Surgical History:   Procedure Laterality Date     AS TOTAL HIP ARTHROPLASTY Left      C DEXA, BONE DENSITY, AXIAL SKEL  2003    Normal BMD     CATARACT EXTRACTION Left      COLONOSCOPY N/A 2015    Procedure: COLONOSCOPY;  Surgeon: Joanna Acevedo MD;  Location:  GI     HC REMOVE TONSILS/ADENOIDS,<15 Y/O       LAPAROTOMY EXPLORATORY  1985    Exploratory Laparotomy: constricting band around small bowel, simple lysis     SIGMOIDOSCOPY FLEXIBLE N/A 4/10/2023    Procedure: Sigmoidoscopy flexible without sedation;  Surgeon: Marina Gordon MD;  Location:  GI     SURGICAL HISTORY OF -   2004    macular hole repair- Left eye     Z NONSPECIFIC PROCEDURE       x3     Z REPLANTATION THUMB DISTAL,COMPLETE  2009    left thumb trauma; Dr. Jose surgery        Meds: reviewed  Current Outpatient Medications   Medication Sig Dispense Refill     amLODIPine (NORVASC) 5 MG tablet Take 5 mg by mouth daily       BIOTIN 5000 PO        CALCIUM CITRATE-VITAMIN D 315-200 MG-UNIT PO TABS 2 TABLETS DAILY 90 Tab 3     carvedilol (COREG) 6.25 MG tablet Take 2 tabs by mouth in AM and 2 tabs by mouth in  tablet 0     Cholecalciferol (VITAMIN D3) 25 MCG (1000 UT) CAPS Take 1 capsule by mouth daily       Cranberry 450 MG CAPS        cyanocobalamin (VITAMIN B-12) 2500 MCG SUBL sublingual tablet Place 2,500 mcg under the tongue daily       dextromethorphan-guaiFENesin (MUCINEX DM)  MG 12 hr tablet Take 1 tablet by mouth every 12 hours       Fluticasone-Umeclidin-Vilanterol (TRELEGY ELLIPTA) 100-62.5-25 MCG/INH oral inhaler Inhale 1 puff into the lungs daily        "GLUCOSAMINE HCL 1000 MG PO TABS 2 TABLETS DAILY 90 Tab 3     LACTOBACILLUS PO Take 120 mg by mouth daily       levothyroxine (SYNTHROID/LEVOTHROID) 88 MCG tablet Take 1 tablet (88 mcg) by mouth daily 90 tablet 3     lisinopril (ZESTRIL) 30 MG tablet Take 1 tablet (30 mg) by mouth At Bedtime for 30 days 30 tablet 0     Lutein 6 MG TABS Take 20 mg by mouth daily        MULTIVITAMIN TABS   OR 1 daily       simvastatin (ZOCOR) 10 MG tablet Take 1 tablet (10 mg) by mouth At Bedtime 90 tablet 3     Soc Hx: reviewed  Fam Hx: reviewed    Chart documentation was completed, in part, with [employer] voice-recognition software. Even though reviewed, some grammatical, spelling, and word errors may remain.    The information in this document, created by the physician assistant student for me, accurately reflects the services I personally performed and the decisions made by me. I have reviewed and approved this document for accuracy prior to leaving the patient care area.  July 13, 2023     Natalie Houston MD  Internal Medicine     SUBJECTIVE:   Bushra is a 87 year old who presents for Preventive Visit.       No data to display      Roomed by: Alysha Mendieta 07/13/2023        Are you in the first 12 months of your Medicare coverage?  No    Healthy Habits:     In general, how would you rate your overall health?  Good    Frequency of exercise:  2-3 days/week    Duration of exercise:  15-30 minutes    Do you usually eat at least 4 servings of fruit and vegetables a day, include whole grains    & fiber and avoid regularly eating high fat or \"junk\" foods?  Yes    Taking medications regularly:  Yes    Medication side effects:  Other    Ability to successfully perform activities of daily living:  No assistance needed    Home Safety:  No safety concerns identified    Hearing Impairment:  No hearing concerns    In the past 6 months, have you been bothered by leaking of urine?  No    In general, how would you rate your overall mental or emotional " health?  Good    Additional concerns today:  Yes    Have you ever done Advance Care Planning? (For example, a Health Directive, POLST, or a discussion with a medical provider or your loved ones about your wishes): Yes, advance care planning is on file.    Fall risk  Fallen 2 or more times in the past year?: No  Any fall with injury in the past year?: No    Cognitive Screening   1) Repeat 3 items (Leader, Season, Table)    2) Clock draw: NORMAL  3) 3 item recall: Recalls 3 objects  Results: 3 items recalled: COGNITIVE IMPAIRMENT LESS LIKELY    Mini-CogTM Copyright S Roya. Licensed by the author for use in [facility]; reprinted with permission (socyrus@[facility]). All rights reserved.      Do you have sleep apnea, excessive snoring or daytime drowsiness?: no    Reviewed and updated as needed this visit by clinical staff                  Reviewed and updated as needed this visit by Provider                 Social History     Tobacco Use     Smoking status: Former     Packs/day: 0.50     Years: 45.00     Pack years: 22.50     Types: Cigarettes     Quit date: 2001     Years since quittin.2     Smokeless tobacco: Never   Substance Use Topics     Alcohol use: Yes     Comment: 1 drink per day             2022     9:09 AM   Alcohol Use   Prescreen: >3 drinks/day or >7 drinks/week? Not Applicable     Do you have a current opioid prescription? No  Do you use any other controlled substances or medications that are not prescribed by a provider? None    Hyperlipidemia Follow-Up      Are you regularly taking any medication or supplement to lower your cholesterol?   Yes- simvastatin    Are you having muscle aches or other side effects that you think could be caused by your cholesterol lowering medication?  No    Hypertension Follow-up      Do you check your blood pressure regularly outside of the clinic? Yes     Are you following a low salt diet? Yes    Are your blood pressures ever more than 140 on the top  number (systolic) OR more   than 90 on the bottom number (diastolic), for example 140/90? Yes      Current providers sharing in care for this patient include:   Patient Care Team:  Natalie Bro MD as PCP - General (Internal Medicine)  Belinda Marcos MD as Assigned Endocrinology Provider  Natalie Bro MD as Assigned PCP  Erinn Monzon NP as Assigned Heart and Vascular Provider    The following health maintenance items are reviewed in Epic and correct as of today:  Health Maintenance   Topic Date Due     COPD ACTION PLAN  Never done     COVID-19 Vaccine (6 - Pfizer series) 01/29/2023     MEDICARE ANNUAL WELLNESS VISIT  07/11/2023     ANNUAL REVIEW OF HM ORDERS  08/18/2023     INFLUENZA VACCINE (1) 09/01/2023     FALL RISK ASSESSMENT  11/10/2023     TSH W/FREE T4 REFLEX  05/07/2024     ADVANCE CARE PLANNING  07/11/2027     COLORECTAL CANCER SCREENING  04/10/2028     DTAP/TDAP/TD IMMUNIZATION (2 - Td or Tdap) 08/13/2031     SPIROMETRY  Completed     PHQ-2 (once per calendar year)  Completed     Pneumococcal Vaccine: 65+ Years  Completed     ZOSTER IMMUNIZATION  Completed     IPV IMMUNIZATION  Aged Out     MENINGITIS IMMUNIZATION  Aged Out     Labs reviewed in EPIC        Pertinent mammograms are reviewed under the imaging tab.    Review of Systems   Constitutional: Negative for chills and fever.   HENT: Negative for congestion, ear pain, hearing loss and sore throat.    Eyes: Positive for visual disturbance. Negative for pain.   Respiratory: Negative for cough and shortness of breath.    Cardiovascular: Positive for peripheral edema. Negative for chest pain and palpitations.   Gastrointestinal: Negative for abdominal pain, constipation, diarrhea, heartburn, hematochezia and nausea.   Genitourinary: Negative for dysuria, frequency, genital sores, hematuria and urgency.   Musculoskeletal: Negative for arthralgias and joint swelling.   Skin: Negative for rash.   Neurological:  "Negative for dizziness, weakness, headaches and paresthesias.   Psychiatric/Behavioral: Negative for mood changes. The patient is not nervous/anxious.        Patient has been advised of split billing requirements and indicates understanding: Yes Discussed      COUNSELING:  Reviewed preventive health counseling, as reflected in patient instructions       Regular exercise       Healthy diet/nutrition    BMI:   Estimated body mass index is 30.9 kg/m  as calculated from the following:    Height as of 4/10/23: 1.626 m (5' 4\").    Weight as of 4/10/23: 81.6 kg (180 lb).     She reports that she quit smoking about 22 years ago. Her smoking use included cigarettes. She has a 22.50 pack-year smoking history. She has never used smokeless tobacco.    Appropriate preventive services were discussed with this patient, including applicable screening as appropriate for cardiovascular disease, diabetes, osteopenia/osteoporosis, and glaucoma.  As appropriate for age/gender, discussed screening for colorectal cancer, prostate cancer, breast cancer, and cervical cancer. Checklist reviewing preventive services available has been given to the patient.    Reviewed patients plan of care and provided an AVS. The Basic Care Plan (routine screening as documented in Health Maintenance) for Bushra meets the Care Plan requirement. This Care Plan has been established and reviewed with the Patient.          Natalie Bro MD  [facility]    Identified Health Risks:    I have reviewed Opioid Use Disorder and Substance Use Disorder risk factors and made any needed referrals.     "

## 2023-07-14 ENCOUNTER — TELEPHONE (OUTPATIENT)
Dept: INTERNAL MEDICINE | Facility: CLINIC | Age: 88
End: 2023-07-14
Payer: MEDICARE

## 2023-07-14 NOTE — TELEPHONE ENCOUNTER
Patient calls, Dr. Houston had her reschedule her wellness exam and focused appointment yesterday on BP concerns. However, her appointment is later in the morning and patient has fainted in the past from not eating. She asks if she should come in the day before for the fasting labs that Dr. Houston orders with the Wellness exam. Advised patient that she can have a separate lab only appointment scheduled after her visit with Dr. Houston rather than before. Patient agrees with plan.     Maria Teresa Segura RN  [facility]

## 2023-07-17 ENCOUNTER — TELEPHONE (OUTPATIENT)
Dept: INTERNAL MEDICINE | Facility: CLINIC | Age: 88
End: 2023-07-17
Payer: MEDICARE

## 2023-07-17 NOTE — TELEPHONE ENCOUNTER
Patient Quality Outreach    Patient is due for the following:   Hypertension -  Hypertension follow-up visit    Next Steps:   Schedule a office visit for blood pressure follow up.    Type of outreach:    Phone, left message for patient/parent to call back.    Next Steps:  Reach out within 90 days via [email or web address].    Max number of attempts reached: No. Will try again in 90 days if patient still on fail list.    Questions for provider review:    None           Alysha Mendieta

## 2023-07-25 DIAGNOSIS — E78.5 HYPERLIPIDEMIA LDL GOAL <100: ICD-10-CM

## 2023-07-26 RX ORDER — SIMVASTATIN 10 MG
10 TABLET ORAL AT BEDTIME
Qty: 90 TABLET | Refills: 0 | Status: SHIPPED | OUTPATIENT
Start: 2023-07-26 | End: 2023-10-16

## 2023-07-26 NOTE — TELEPHONE ENCOUNTER
Medication is being filled for 1 time refill only due to:  Patient needs labs ldl.    Upcoming appointment:  Future Appointments 7/26/2023 - 1/22/2024        Date Visit Type Length Department Provider     7/31/2023 11:20 AM MA SCREENING BILATERAL W/ JOSE 15 min [id number]    Location Instructions:     [facility] Building [address], [address] [place] [address]   Parking  Breast Center customers can park in the lot adjacent to the entrance to the [facility].  Entrance and check-in location  Enter at the front entrance, under the canopy. Take the stairs or elevator from the main entrance to the 2nd floor. Please check-in for your appointment at the desk in the Breast Center waiting area.              8/17/2023 11:00 AM ANNUAL WELLNESS 30 min RI IM Natalie Bro MD    Location Instructions:     [facility] is in the [facility] at [address] next to [facility]. This is near the [address] split and the [address] exits off of [address]. To reach the clinic from [place] [address], turn north onto [address], then turn east on [address]. Clinic parking is available next to the [facility], which is just east of the hospital s main entrance.               9/6/2023 11:30 AM DX HIP/PELVIS/SPINE 30 min RI DEXA [id number]              9/26/2023 11:15 AM LAB 15 min RI LABORATORY RI LAB    Location Instructions:     The clinic is located at [address], [address] in [place].&nbsp; We offer free parking in our on-site lot.              10/5/2023 11:00 AM RETURN ENDOCRINE 30 min RI ENDOCRINOLOGY Belinda Marcos MD                   Thank you,  Jean Claude Franco, Triage RN [facility]  12:37 PM 7/26/2023

## 2023-07-31 ENCOUNTER — HOSPITAL ENCOUNTER (OUTPATIENT)
Dept: MAMMOGRAPHY | Facility: CLINIC | Age: 88
Discharge: HOME | End: 2023-07-31
Attending: INTERNAL MEDICINE | Admitting: INTERNAL MEDICINE
Payer: MEDICARE

## 2023-07-31 DIAGNOSIS — Z12.31 VISIT FOR SCREENING MAMMOGRAM: ICD-10-CM

## 2023-07-31 PROCEDURE — 77067 SCR MAMMO BI INCL CAD: CPT

## 2023-08-04 ENCOUNTER — OFFICE VISIT (OUTPATIENT)
Dept: INTERNAL MEDICINE | Facility: CLINIC | Age: 88
End: 2023-08-04
Payer: MEDICARE

## 2023-08-04 ENCOUNTER — HOSPITAL ENCOUNTER (OUTPATIENT)
Dept: MAMMOGRAPHY | Facility: CLINIC | Age: 88
Discharge: HOME | End: 2023-08-04
Attending: INTERNAL MEDICINE
Payer: MEDICARE

## 2023-08-04 ENCOUNTER — HOSPITAL ENCOUNTER (OUTPATIENT)
Dept: ULTRASOUND IMAGING | Facility: CLINIC | Age: 88
Discharge: HOME | End: 2023-08-04
Attending: INTERNAL MEDICINE
Payer: MEDICARE

## 2023-08-04 ENCOUNTER — NURSE TRIAGE (OUTPATIENT)
Dept: INTERNAL MEDICINE | Facility: CLINIC | Age: 88
End: 2023-08-04

## 2023-08-04 VITALS
SYSTOLIC BLOOD PRESSURE: 179 MMHG | BODY MASS INDEX: 31.58 KG/M2 | OXYGEN SATURATION: 98 % | WEIGHT: 185 LBS | RESPIRATION RATE: 18 BRPM | DIASTOLIC BLOOD PRESSURE: 76 MMHG | HEART RATE: 71 BPM | HEIGHT: 64 IN

## 2023-08-04 DIAGNOSIS — R92.8 ABNORMAL MAMMOGRAM: ICD-10-CM

## 2023-08-04 DIAGNOSIS — I10 ESSENTIAL HYPERTENSION: Primary | ICD-10-CM

## 2023-08-04 DIAGNOSIS — E06.3 HYPOTHYROIDISM DUE TO HASHIMOTO'S THYROIDITIS: ICD-10-CM

## 2023-08-04 DIAGNOSIS — R11.0 NAUSEA: ICD-10-CM

## 2023-08-04 DIAGNOSIS — M81.0 AGE RELATED OSTEOPOROSIS, UNSPECIFIED PATHOLOGICAL FRACTURE PRESENCE: ICD-10-CM

## 2023-08-04 DIAGNOSIS — N18.31 STAGE 3A CHRONIC KIDNEY DISEASE (H): ICD-10-CM

## 2023-08-04 LAB
ANION GAP SERPL CALCULATED.3IONS-SCNC: 12 MMOL/L (ref 7–15)
BUN SERPL-MCNC: 21 MG/DL (ref 8–23)
CALCIUM SERPL-MCNC: 9.5 MG/DL (ref 8.8–10.2)
CHLORIDE SERPL-SCNC: 94 MMOL/L (ref 98–107)
CREAT SERPL-MCNC: 0.89 MG/DL (ref 0.51–0.95)
DEPRECATED HCO3 PLAS-SCNC: 23 MMOL/L (ref 22–29)
GFR SERPL CREATININE-BSD FRML MDRD: 63 ML/MIN/1.73M2
GLUCOSE SERPL-MCNC: 126 MG/DL (ref 70–99)
POTASSIUM SERPL-SCNC: 4.5 MMOL/L (ref 3.4–5.3)
SODIUM SERPL-SCNC: 129 MMOL/L (ref 136–145)
T4 FREE SERPL-MCNC: 1.7 NG/DL (ref 0.9–1.7)
TSH SERPL DL<=0.005 MIU/L-ACNC: 7.57 UIU/ML (ref 0.3–4.2)

## 2023-08-04 PROCEDURE — 76642 ULTRASOUND BREAST LIMITED: CPT | Mod: LT

## 2023-08-04 PROCEDURE — 99214 OFFICE O/P EST MOD 30 MIN: CPT | Performed by: INTERNAL MEDICINE

## 2023-08-04 PROCEDURE — 80048 BASIC METABOLIC PNL TOTAL CA: CPT | Performed by: INTERNAL MEDICINE

## 2023-08-04 PROCEDURE — 77061 BREAST TOMOSYNTHESIS UNI: CPT | Mod: LT

## 2023-08-04 PROCEDURE — 36415 COLL VENOUS BLD VENIPUNCTURE: CPT | Performed by: INTERNAL MEDICINE

## 2023-08-04 PROCEDURE — 84443 ASSAY THYROID STIM HORMONE: CPT | Performed by: INTERNAL MEDICINE

## 2023-08-04 PROCEDURE — 99000 SPECIMEN HANDLING OFFICE-LAB: CPT | Performed by: INTERNAL MEDICINE

## 2023-08-04 PROCEDURE — 82306 VITAMIN D 25 HYDROXY: CPT | Performed by: INTERNAL MEDICINE

## 2023-08-04 PROCEDURE — 84439 ASSAY OF FREE THYROXINE: CPT | Performed by: INTERNAL MEDICINE

## 2023-08-04 RX ORDER — ONDANSETRON 4 MG/1
4 TABLET, ORALLY DISINTEGRATING ORAL EVERY 8 HOURS PRN
Qty: 10 TABLET | Refills: 0 | Status: SHIPPED | OUTPATIENT
Start: 2023-08-04 | End: 2023-12-26

## 2023-08-04 NOTE — TELEPHONE ENCOUNTER
Patient walked into the clinic at aproximately 1:20 pm after mammogram. Discussed with Dr. Patel. She will see patient. Advised.

## 2023-08-04 NOTE — TELEPHONE ENCOUNTER
Pt states her BP has been elevated. She sees Dr Dutton at Nephrology. She does not have current medication list in Epic. He manages her BP.     Headaches, chills, vomited yesterday.   Heavy chest at 2 AM, this was very early Thursday AM. Lasted a few minutes.  Right in the middle. No arm pain, did not radiate.     The [facility] PA noted at OV on 7/25 stated she has a history of having headaches, and nausea.     Yesterday, 8/3, /82 at 2 AM. She took Hydralazine at midnight.   Then took Carvedilol at 2 am  Yesterday AM when she got up, /54     Didn't eat much today, still doesn't feel well.   Today 157/67 hr 68. BEFORE PILLS   After 139/64 HR 76 after pills    Sometimes has low BP. She will have high BP and then LOW BP.     Pt has Mammogram at 12:15 today. Should she go to ED, for the chest pain on Thursday?   She is asking about a lab test.(?)        Per [facility]:   Current antihypertensives:  - Carvedilol 18.75 mg BID  - Lisinopril 40 mg QHS  - Hydralazine 25 mg TID  - OFF amlodipine 5 mg daily (stopped 7/14/2023)   Reason for Disposition   Systolic BP >= 180 OR Diastolic >= 110    Additional Information   Negative: Sounds like a life-threatening emergency to the triager   Negative: Symptom is main concern (e.g., headache, chest pain)   Negative: Low blood pressure is main concern   Negative: Systolic BP >= 160 OR Diastolic >= 100, and any cardiac or neurologic symptoms (e.g., chest pain, difficulty breathing, unsteady gait, blurred vision)   Negative: Pregnant 20 or more weeks (or postpartum < 6 weeks) with new hand or face swelling   Negative: Pregnant 20 or more weeks (or postpartum < 6 weeks) and Systolic BP >= 160 OR Diastolic >= 100   Negative: Patient sounds very sick or weak to the triager   Negative: Systolic BP >= 200 OR Diastolic >= 120 and having NO cardiac or neurologic symptoms   Negative: Pregnant 20 or more weeks (or postpartum < 6 weeks) with Systolic BP >= 140 OR Diastolic >=  90   Negative: Systolic BP >= 180 OR Diastolic >= 110, and missed most recent dose of blood pressure medication    Protocols used: Blood Pressure - High-A-OH

## 2023-08-04 NOTE — PROGRESS NOTES
Assessment & Plan     Essential hypertension  Follows up with the nephrology team for blood pressure management.  From chart review of external documentation: Blood pressure seems to be lab with blood pressure dropped to as low as systolic 80 and blood pressure increases to as high as 160.   Blood pressure elevated in office today and patient has not taken the afternoon hydralazine dosing.  Continues on the carvedilol medication as well as the lisinopril.  Overall, symptoms are much improved and patient feels much better.  Had transient chest pain around 2 nights ago when blood pressure was elevated 195/82 at 2 AM.  Took hydralazine at midnight and Coreg and blood pressure was improved when she got up the next morning.  Transient chest pain most likely linked to increased blood pressure.  Will hold off on chest pain enzyme evaluation (discussed with the patient) due to lapse of timing between chest pain and the visit today and no recurrence of symptoms.  - Basic metabolic panel  (Ca, Cl, CO2, Creat, Gluc, K, Na, BUN); Future  - Basic metabolic panel  (Ca, Cl, CO2, Creat, Gluc, K, Na, BUN)    Stage 3a chronic kidney disease (H)  Update Electrolytes/creatinine check     Nausea  Intermittent infrequent symptoms of nausea.  Discussed with the patient who does not endorse any specific triggers to the nausea or any other underlying symptoms.  Not entirely sure as to what may be causing the nausea but discussed with the patient on monitoring symptoms .does endorse feeling hungry and overall improvement in symptoms.  - ondansetron (ZOFRAN ODT) 4 MG ODT tab; Take 1 tablet (4 mg) by mouth every 8 hours as needed for nausea    Hypothyroidism due to Hashimoto's thyroiditis  - TSH  - T4 free  - Thyroxine Free by [facility]    Age related osteoporosis, unspecified pathological fracture presence  - Vitamin D Deficiency                 Hailey Patel MD  [facility]    Obed Tomlinson is a 87  "year old, presenting for the following health issues:  Hypertension      History of Present Illness       Vascular Disease:  She presents for follow up of vascular disease.     She never takes nitroglycerin. She is not taking daily aspirin.    She eats 4 or more servings of fruits and vegetables daily.She consumes 0 sweetened beverage(s) daily.She exercises with enough effort to increase her heart rate 9 or less minutes per day.  She exercises with enough effort to increase her heart rate 3 or less days per week.   She is taking medications regularly.     Headaches, chills, vomited yesterday.   Heavy chest at 2 AM, this was very early Thursday AM. Lasted a few minutes.  Right in the middle. No arm pain, did not radiate.   Yesterday, 8/3, /82 at 2 AM. She took Hydralazine at midnight.   Then took Carvedilol at 2 am  Yesterday AM when she got up, /54      Didn't eat much today, still doesn't feel well.   Today 157/67 hr 68. BEFORE PILLS   After 139/64 HR 76 after pills     Sometimes has low BP. She will have high BP and then LOW BP.       Review of Systems   Constitutional, HEENT, cardiovascular, pulmonary, gi and gu systems are negative, except as otherwise noted.      Objective    BP (!) 179/76   Pulse 71   Resp 18   Ht 1.626 m (5' 4\")   Wt 83.9 kg (185 lb)   LMP  (LMP Unknown)   SpO2 98%   Breastfeeding No   BMI 31.76 kg/m    Body mass index is 31.76 kg/m .  Physical Exam   GENERAL: healthy, alert and no distress  RESP: lungs clear to auscultation - no rales, rhonchi or wheezes  CV: regular rate and rhythm, normal S1 S2  MS: no gross musculoskeletal defects noted, no edema  NEURO: Normal strength and tone, mentation intact and speech normal  PSYCH: mentation appears normal, affect normal/bright                      "

## 2023-08-05 LAB — DEPRECATED CALCIDIOL+CALCIFEROL SERPL-MC: 63 UG/L (ref 20–75)

## 2023-08-07 ENCOUNTER — TELEPHONE (OUTPATIENT)
Dept: ENDOCRINOLOGY | Facility: CLINIC | Age: 88
End: 2023-08-07

## 2023-08-07 ENCOUNTER — MYC MEDICAL ADVICE (OUTPATIENT)
Dept: ENDOCRINOLOGY | Facility: CLINIC | Age: 88
End: 2023-08-07
Payer: MEDICARE

## 2023-08-07 LAB — T4 FREE SERPL DIALY-MCNC: 2.8 NG/DL

## 2023-08-07 NOTE — TELEPHONE ENCOUNTER
[facility] Call Center    Phone Message    May a detailed message be left on voicemail: yes     Reason for Call: Symptoms or Concerns     If patient has red-flag symptoms, warm transfer to triage line    Current symptom or concern: Per Patient is wanting to get a call back. Patient states she had blood work done on 08/04/2023 and her TSH was really high. Patient states she has been feeling fatigued, having baby chills, nausea and a fever. Patient states she has been battling high blood pressure for the past year and states her blood pressure today was 216/100 and that it all ties together. Patient states she is wanting to know what she should do and if her medication Levothyroxine should be adjusted, Please advise.    Symptoms have been present for:  several weeks    Has patient previously been seen for this? No    By N/A    Date: N/A    Are there any new or worsening symptoms? Yes: Patient states this has been going on for several weeks and is getting worse.     Action Taken: Message routed to:  [facility] Endo    Travel Screening: Not Applicable

## 2023-08-07 NOTE — TELEPHONE ENCOUNTER
Offer video visit on 08/08 at 10:30 per Dr. Marcos.    Message sent via Lynk.      Irasema Henry [facility]  [phone]

## 2023-08-08 ENCOUNTER — VIRTUAL VISIT (OUTPATIENT)
Dept: ENDOCRINOLOGY | Facility: CLINIC | Age: 88
End: 2023-08-08
Payer: MEDICARE

## 2023-08-08 VITALS — HEIGHT: 65 IN | WEIGHT: 175 LBS | BODY MASS INDEX: 29.16 KG/M2

## 2023-08-08 DIAGNOSIS — M81.0 AGE RELATED OSTEOPOROSIS, UNSPECIFIED PATHOLOGICAL FRACTURE PRESENCE: ICD-10-CM

## 2023-08-08 DIAGNOSIS — E06.3 HYPOTHYROIDISM DUE TO HASHIMOTO'S THYROIDITIS: Primary | ICD-10-CM

## 2023-08-08 PROCEDURE — 99214 OFFICE O/P EST MOD 30 MIN: CPT | Mod: VID | Performed by: INTERNAL MEDICINE

## 2023-08-08 ASSESSMENT — PAIN SCALES - GENERAL: PAINLEVEL: NO PAIN (0)

## 2023-08-08 NOTE — PATIENT INSTRUCTIONS
[facility]  Dr Marcos, Endocrinology Department    [place] [address] [facility]  [place] [address]  Appointment [phone]  Fax: [phone]  [place]: Monday - Thursday      Continue levothyroxine 88 mcg/day  Labs and DEXA as scheduled.  Follow up as scheduled.

## 2023-08-08 NOTE — LETTER
"    8/8/2023         RE: Bushra Harmon  [address]  [place] [address]        Dear Colleague,    Thank you for referring your patient, Bushra Harmon, to the [facility]. Please see a copy of my visit note below.    THIS IS A VIDEO VISIT:    Phone call visit/virtual visit encounter:    Name of patient: Bushra Harmon    Date of encounter: 8/8/2023    Time of start of video visit: 10:31    Video started: 10:36    Video ended: 10:48    Provider location: working from home/ [facility]    Patient location: patients home.    Mode of transmission: video/ Doximity    Verbal consent: obtained before starting visit. Pt is agreeable.      The patient has been notified of following:      \"This VIDEO visit will be conducted via a call between you and your physician/provider. We have found that certain health care needs can be provided without the need for a physical exam.  This service lets us provide the care you need with a short phone conversation.  If a prescription is necessary we can send it directly to your pharmacy.  If lab work is needed we can place an order for that and you can then stop by our lab to have the test done at a later time.     With new updates with corona virus patient might be billed as clinic visit.     If during the course of the call the physician/provider feels a telephone visit is not appropriate, you will not be charged for this service.\"      Past medical history, social history, family history, allergy and medications were reviewed and updated as appropriate.  Reviewed pertinent labs, notes, imaging studies personally.    ENDOCRINOLOGY CLINIC NOTE:    Name: Bushra Harmon  Seen for f/u of Hypothyroidism and Osteopenia today.  HPI:  Bushra Harmon is a 87 year old female who presents for the evaluation of :hypothyroidism.   has a past medical history of Age related osteoporosis, unspecified pathological fracture presence (4/5/2022), Chronic obstructive " pulmonary disease, unspecified COPD type (H) (7/11/2022), Essential hypertension, benign, Hyperlipidemia LDL goal <100 (5/18/2011), Hypertension, unspecified type (3/14/2023), Hypothyroidism due to Hashimoto's thyroiditis (10/10/2022), PMR (polymyalgia rheumatica) (H), PMR (polymyalgia rheumatica) (H)  -- rheumatologist -- dr Russo (8/3/2020), and Unspecified hypothyroidism.    Was started on prednisone for RA by Rheumatology.  Gained some wt since starting Prednisone. On it X 2 year.  She is also on methotrexate.    #1. Hypothyroidism (+TPO):  Initially diagnosed at age 42.and since then she is on thyroid hormone replacement.  Currently taking levothyroxine 88 mcg/day (On this dose since 7/9/2020). Dose was decreased at that time based on that.    Taking generic.  Reports compliance.    Feeling OK. No major s/s.  No longer taking biotin X Aug 2019.    + frontal hair loss. X chronic.      Weight is stable.    #2. Osteopenia:    DEXA 9/2021: Osteopenia.  No significant change in bone density of the lumbar spine or of the hip(s). There has been no significant change in bone density of the forearm. No vertebral fractures identified on the VFA.     RISK FACTORS:  Post-menopausal, Height loss 1.5 inches,  Parent history of osteoporosis in her mother with  a hip fracture, Long term corticosteroid therapy   Dairy: 2 servings/day  Calcium 600 mg/day  Vit D 1000 international unit(s)/day  Dental health is OK- no upcoming dental procedure.    She was started on Fosamax by primary care provider but she has not started it as she was worried about side effect of medication.  In last visit 12/2021 she wanted to start fosamax and was started on FOSAMAX- She took 1 dose of Fosamax but had diarrhea, joint pain and back pain and stopped that.  She is not interested in retrial of Fosamax at this time.    Feeling tired- has to nap during day.  + HA    Steroids: On prednisone 2.5 mg/day for PMR.    Exercise: golf in  summer.     Palpitations:  No  Changes to hair or skin: chronic problem. + frontal hair loss X 2 years  Diarrhea/Constipation:No  Changes in menses: s/p menopause. Had been on HRT for a while. Stopped at age 67.  Dysphagia or Shortness of breath:No  Tremors:No  Changes in weight: gained some weight. Mostly stable.    Heat or cold intolerance: no  History of Lithium or Amiodarone use:No  Head or neck surgery/radiation:No  Family History of Thyroid Problems: + hypothyroidism in mother and sister  PMH/PSH:  Past Medical History:   Diagnosis Date     Age related osteoporosis, unspecified pathological fracture presence 2022     Chronic obstructive pulmonary disease, unspecified COPD type (H) 2022     Essential hypertension, benign      Hyperlipidemia LDL goal <100 2011     Hypertension, unspecified type 3/14/2023     Hypothyroidism due to Hashimoto's thyroiditis 10/10/2022     PMR (polymyalgia rheumatica) (H)      PMR (polymyalgia rheumatica) (H)  -- rheumatologist -- dr Russo 8/3/2020     Unspecified hypothyroidism      Past Surgical History:   Procedure Laterality Date     AS TOTAL HIP ARTHROPLASTY Left      C DEXA, BONE DENSITY, AXIAL SKEL  2003    Normal BMD     CATARACT EXTRACTION Left      COLONOSCOPY N/A 2015    Procedure: COLONOSCOPY;  Surgeon: Joanna Acevedo MD;  Location:  GI     HC REMOVE TONSILS/ADENOIDS,<11 Y/O       LAPAROTOMY EXPLORATORY  1985    Exploratory Laparotomy: constricting band around small bowel, simple lysis     SIGMOIDOSCOPY FLEXIBLE N/A 4/10/2023    Procedure: Sigmoidoscopy flexible without sedation;  Surgeon: Marina Gordon MD;  Location:  GI     SURGICAL HISTORY OF -   2004    macular hole repair- Left eye     [facility] NONSPECIFIC PROCEDURE       x3     [facility] REPLANTATION THUMB DISTAL,COMPLETE  2009    left thumb trauma; Dr. Jose surgery     Family Hx:  Family History   Problem Relation Age of Onset     Hypertension Mother            at 100.     Breast Cancer Mother 85     Thyroid Disease Mother      Diabetes Sister      Hypertension Sister      Neurologic Disorder Daughter         MS     Hypertension Son      Ovarian Cancer No family hx of        Social Hx:  Social History     Socioeconomic History     Marital status:      Spouse name: Not on file     Number of children: 3     Years of education: Not on file     Highest education level: Not on file   Occupational History     Occupation: office     Employer: RETIRED   Tobacco Use     Smoking status: Former     Packs/day: 0.50     Years: 45.00     Pack years: 22.50     Types: Cigarettes     Quit date: 2001     Years since quittin.2     Passive exposure: Past     Smokeless tobacco: Never   Vaping Use     Vaping Use: Never used   Substance and Sexual Activity     Alcohol use: Yes     Comment: 1 drink per day     Drug use: No     Sexual activity: Never   Other Topics Concern      Service Not Asked     Blood Transfusions Not Asked     Caffeine Concern Yes     Comment: 1 cup per day     Occupational Exposure Not Asked     Hobby Hazards Not Asked     Sleep Concern Not Asked     Stress Concern Not Asked     Weight Concern Not Asked     Special Diet Not Asked     Back Care Not Asked     Exercise Yes     Comment: Active; exercise 4 times per week     Bike Helmet Not Asked     Seat Belt Yes     Self-Exams No     Parent/sibling w/ CABG, MI or angioplasty before 65F 55M? Not Asked   Social History Narrative     Not on file     Social Determinants of Health     Financial Resource Strain: Not on file   Food Insecurity: Not on file   Transportation Needs: Not on file   Physical Activity: Not on file   Stress: Not on file   Social Connections: Not on file   Intimate Partner Violence: Not on file   Housing Stability: Not on file          MEDICATIONS:  has a current medication list which includes the following prescription(s): amlodipine, biotin, blood glucose, blood glucose  "monitoring, calcium citrate-vitamin d, carvedilol, vitamin d3, cranberry, cyanocobalamin, dextromethorphan-guaifenesin, fluticasone-umeclidin-vilanterol, glucosamine hcl, lactobacillus, levothyroxine, lisinopril, lutein, multivitamin, ondansetron, simvastatin, and thin.    ROS   ROS: 10 point ROS neg other than the symptoms noted above in the HPI.    Physical Exam   VS: Ht 1.638 m (5' 4.5\")   Wt 79.4 kg (175 lb)   LMP  (LMP Unknown)   BMI 29.57 kg/m    GENERAL: healthy, alert and no distress  EYES: Eyes grossly normal to inspection, conjunctivae and sclerae normal  ENT: no nose swelling, nasal discharge.  Thyroid: no apparent thyroid nodules  RESP: no audible wheeze, cough, or visible cyanosis.  No visible retractions or increased work of breathing.  Able to speak fully in complete sentences.  ABDO: not evaluated.  EXTREMITIES: no hand tremors.  NEURO: Cranial nerves grossly intact, mentation intact and speech normal  SKIN: No apparent skin lesions, rash or edema seen   PSYCH: mentation appears normal, affect normal/bright, judgement and insight intact, normal speech and appearance well-groomed    LABS:  TFTs:  Component      Latest Ref Rng 8/4/2023  3:28 PM   TSH      0.30 - 4.20 uIU/mL 7.57 (H)    T4 Free      0.90 - 1.70 ng/dL 1.70    Free T4 Eq Dialysis      1.1 - 2.4 ng/dL 2.8 (H)       Legend:  (H) High    VIT D:  Vitamin D Deficiency Screening Results:  Lab Results   Component Value Date    VITDT 63 08/04/2023    VITDT 60 06/07/2021    VITDT 58 04/28/2020    VITDT 53 06/04/2018    VITDT 53 05/27/2016     All pertinent notes, labs, and images personally reviewed by me.     A/P  Ms.Nancy YUNG Harmon is a 87 year old here for the evaluation of hypothyroidism:    #1. Hypothyroidism(+TPO):   Currently taking 88 mcg/day (on this dose since 7/9/2020)  Taking generic.  + fatigue.  Noted recent labs with high TSH and high FT4DL  Plan:  Discussed diagnosis, pathophysiology, management and treatment options of condition " with patient.  Discussed atypical labs with high TSH and high Ft4.  In the setting of old age and no major concerning s/s of overrepalcement--Continue current dose of thyroid hormone replacement-levothyroxine 88 mcg/day.  Repeat labs as scheduled.  Please make a lab appointment for blood work and follow up clinic appointment in 1 week after that to discuss results.    Discussed s/s of hypothyroidism and hyperthyroidism to watch for.  The patient indicates understanding of these issues and agrees with the plan.    #2. Osteopenia:  RISK FACTORS:  Post-menopausal, Height loss 1.5 inches,  Parent history of osteoporosis in her mother with  a hip fracture, Long term corticosteroid therapy   Currently she is taking prednisone 2.5  gram per day for PMR.  Followed by rheumatology.  DEXA 9/2021: Osteopenia.  No significant change in bone density of the lumbar spine or of the hip(s). There has been no significant change in bone density of the forearm. No vertebral fractures identified on the VFA.   Dental health is OK- no upcoming dental procedure.  She took 1 dose of Fosamax but had diarrhea, joint pain and back pain and stopped that.  She is not interested in retrial of Fosamax at this time.  Plan:  Discussed diagnosis, pathophysiology, management and treatment options of condition with pt.  Continue to hold off Fosamax.  DEXA in 9/2023 as scheduled.  Can consider Boniva or Prolia in future.  Follow up as scheduled.    Discussed indications, risks and benefits of all medications prescribed, and answered questions to patient's satisfaction.      The pt was advised to    Maintain an adequate calcium and vitamin D intake and to supplement vitamin D if needed to maintain serum levels of 25 hydroxy D (25 OH D) between 30-60 ng/ml.    Limit alcohol intake to no more than 2 servings per day.    Limit caffeine intake.    Maintain an active lifestyle including weight-bearing exercises for at least 30 mins daily.    Take measures to  reduce the risk of falling.      Follow-up:  1 year.    Belinda Marcos MD  Endocrinology  [facility]  CC: Magalis Woodward    All questions were answered.  The patient indicates understanding of the above issues and agrees with the plan set forth.     Addendum to above note and clinic visit:    Labs reviewed.    See result note/telephone encounter.            Again, thank you for allowing me to participate in the care of your patient.        Sincerely,        Belinda Marcos MD

## 2023-08-08 NOTE — PROGRESS NOTES
"THIS IS A VIDEO VISIT:    Phone call visit/virtual visit encounter:    Name of patient: Bushra Harmon    Date of encounter: 8/8/2023    Time of start of video visit: 10:31    Video started: 10:36    Video ended: 10:48    Provider location: working from [place]/ [facility]    Patient location: patients home.    Mode of transmission: video/ Doximity    Verbal consent: obtained before starting visit. Pt is agreeable.      The patient has been notified of following:      \"This VIDEO visit will be conducted via a call between you and your physician/provider. We have found that certain health care needs can be provided without the need for a physical exam.  This service lets us provide the care you need with a short phone conversation.  If a prescription is necessary we can send it directly to your pharmacy.  If lab work is needed we can place an order for that and you can then stop by our lab to have the test done at a later time.     With new updates with corona virus patient might be billed as clinic visit.     If during the course of the call the physician/provider feels a telephone visit is not appropriate, you will not be charged for this service.\"      Past medical history, social history, family history, allergy and medications were reviewed and updated as appropriate.  Reviewed pertinent labs, notes, imaging studies personally.    ENDOCRINOLOGY CLINIC NOTE:    Name: Bushra Harmon  Seen for f/u of Hypothyroidism and Osteopenia today.  HPI:  Bushra Harmon is a 87 year old female who presents for the evaluation of :hypothyroidism.   has a past medical history of Age related osteoporosis, unspecified pathological fracture presence (4/5/2022), Chronic obstructive pulmonary disease, unspecified COPD type (H) (7/11/2022), Essential hypertension, benign, Hyperlipidemia LDL goal <100 (5/18/2011), Hypertension, unspecified type (3/14/2023), Hypothyroidism due to Hashimoto's thyroiditis (10/10/2022), PMR (polymyalgia " rheumatica) (H), PMR (polymyalgia rheumatica) (H)  -- rheumatologist -- dr Russo (8/3/2020), and Unspecified hypothyroidism.    Was started on prednisone for RA by Rheumatology.  Gained some wt since starting Prednisone. On it X 2 year.  She is also on methotrexate.    #1. Hypothyroidism (+TPO):  Initially diagnosed at age 42.and since then she is on thyroid hormone replacement.  Currently taking levothyroxine 88 mcg/day (On this dose since 7/9/2020). Dose was decreased at that time based on that.    Taking generic.  Reports compliance.    Feeling OK. No major s/s.  No longer taking biotin X Aug 2019.    + frontal hair loss. X chronic.      Weight is stable.    #2. Osteopenia:    DEXA 9/2021: Osteopenia.  No significant change in bone density of the lumbar spine or of the hip(s). There has been no significant change in bone density of the forearm. No vertebral fractures identified on the VFA.     RISK FACTORS:  Post-menopausal, Height loss 1.5 inches,  Parent history of osteoporosis in her mother with  a hip fracture, Long term corticosteroid therapy   Dairy: 2 servings/day  Calcium 600 mg/day  Vit D 1000 international unit(s)/day  Dental health is OK- no upcoming dental procedure.    She was started on Fosamax by primary care provider but she has not started it as she was worried about side effect of medication.  In last visit 12/2021 she wanted to start fosamax and was started on FOSAMAX- She took 1 dose of Fosamax but had diarrhea, joint pain and back pain and stopped that.  She is not interested in retrial of Fosamax at this time.    Feeling tired- has to nap during day.  + HA    Steroids: On prednisone 2.5 mg/day for PMR.    Exercise: golf in summer.     Palpitations:  No  Changes to hair or skin: chronic problem. + frontal hair loss X 2 years  Diarrhea/Constipation:No  Changes in menses: s/p menopause. Had been on HRT for a while. Stopped at age 67.  Dysphagia or Shortness of breath:No  Tremors:No  Changes in  weight: gained some weight. Mostly stable.    Heat or cold intolerance: no  History of Lithium or Amiodarone use:No  Head or neck surgery/radiation:No  Family History of Thyroid Problems: + hypothyroidism in mother and sister  PMH/PSH:  Past Medical History:   Diagnosis Date     Age related osteoporosis, unspecified pathological fracture presence 2022     Chronic obstructive pulmonary disease, unspecified COPD type (H) 2022     Essential hypertension, benign      Hyperlipidemia LDL goal <100 2011     Hypertension, unspecified type 3/14/2023     Hypothyroidism due to Hashimoto's thyroiditis 10/10/2022     PMR (polymyalgia rheumatica) (H)      PMR (polymyalgia rheumatica) (H)  -- rheumatologist -- dr Russo 8/3/2020     Unspecified hypothyroidism      Past Surgical History:   Procedure Laterality Date     AS TOTAL HIP ARTHROPLASTY Left      C DEXA, BONE DENSITY, AXIAL SKEL  2003    Normal BMD     CATARACT EXTRACTION Left      COLONOSCOPY N/A 2015    Procedure: COLONOSCOPY;  Surgeon: Joanna Acevedo MD;  Location:  GI     HC REMOVE TONSILS/ADENOIDS,<11 Y/O       LAPAROTOMY EXPLORATORY  1985    Exploratory Laparotomy: constricting band around small bowel, simple lysis     SIGMOIDOSCOPY FLEXIBLE N/A 4/10/2023    Procedure: Sigmoidoscopy flexible without sedation;  Surgeon: Marina Gordon MD;  Location:  GI     SURGICAL HISTORY OF -   2004    macular hole repair- Left eye     [facility] [place] NONSPECIFIC PROCEDURE       x3     [facility] [place] REPLANTATION THUMB DISTAL,COMPLETE  2009    left thumb trauma; Dr. Jose surgery     Family Hx:  Family History   Problem Relation Age of Onset     Hypertension Mother           at 100.     Breast Cancer Mother 85     Thyroid Disease Mother      Diabetes Sister      Hypertension Sister      Neurologic Disorder Daughter         MS     Hypertension Son      Ovarian Cancer No family hx of        Social Hx:  Social History     Socioeconomic  History     Marital status:      Spouse name: Not on file     Number of children: 3     Years of education: Not on file     Highest education level: Not on file   Occupational History     Occupation: office     Employer: RETIRED   Tobacco Use     Smoking status: Former     Packs/day: 0.50     Years: 45.00     Pack years: 22.50     Types: Cigarettes     Quit date: 2001     Years since quittin.2     Passive exposure: Past     Smokeless tobacco: Never   Vaping Use     Vaping Use: Never used   Substance and Sexual Activity     Alcohol use: Yes     Comment: 1 drink per day     Drug use: No     Sexual activity: Never   Other Topics Concern      Service Not Asked     Blood Transfusions Not Asked     Caffeine Concern Yes     Comment: 1 cup per day     Occupational Exposure Not Asked     Hobby Hazards Not Asked     Sleep Concern Not Asked     Stress Concern Not Asked     Weight Concern Not Asked     Special Diet Not Asked     Back Care Not Asked     Exercise Yes     Comment: Active; exercise 4 times per week     Bike Helmet Not Asked     Seat Belt Yes     Self-Exams No     Parent/sibling w/ CABG, MI or angioplasty before 65F 55M? Not Asked   Social History Narrative     Not on file     Social Determinants of Health     Financial Resource Strain: Not on file   Food Insecurity: Not on file   Transportation Needs: Not on file   Physical Activity: Not on file   Stress: Not on file   Social Connections: Not on file   Intimate Partner Violence: Not on file   Housing Stability: Not on file          MEDICATIONS:  has a current medication list which includes the following prescription(s): amlodipine, biotin, blood glucose, blood glucose monitoring, calcium citrate-vitamin d, carvedilol, vitamin d3, cranberry, cyanocobalamin, dextromethorphan-guaifenesin, fluticasone-umeclidin-vilanterol, glucosamine hcl, lactobacillus, levothyroxine, lisinopril, lutein, multivitamin, ondansetron, simvastatin, and thin.    ROS  "  ROS: 10 point ROS neg other than the symptoms noted above in the HPI.    Physical Exam   VS: Ht 1.638 m (5' 4.5\")   Wt 79.4 kg (175 lb)   LMP  (LMP Unknown)   BMI 29.57 kg/m    GENERAL: healthy, alert and no distress  EYES: Eyes grossly normal to inspection, conjunctivae and sclerae normal  ENT: no nose swelling, nasal discharge.  Thyroid: no apparent thyroid nodules  RESP: no audible wheeze, cough, or visible cyanosis.  No visible retractions or increased work of breathing.  Able to speak fully in complete sentences.  ABDO: not evaluated.  EXTREMITIES: no hand tremors.  NEURO: Cranial nerves grossly intact, mentation intact and speech normal  SKIN: No apparent skin lesions, rash or edema seen   PSYCH: mentation appears normal, affect normal/bright, judgement and insight intact, normal speech and appearance well-groomed    LABS:  TFTs:  Component      Latest Ref Rng 8/4/2023  3:28 PM   TSH      0.30 - 4.20 uIU/mL 7.57 (H)    T4 Free      0.90 - 1.70 ng/dL 1.70    Free T4 Eq Dialysis      1.1 - 2.4 ng/dL 2.8 (H)       Legend:  (H) High    VIT D:  Vitamin D Deficiency Screening Results:  Lab Results   Component Value Date    VITDT 63 08/04/2023    VITDT 60 06/07/2021    VITDT 58 04/28/2020    VITDT 53 06/04/2018    VITDT 53 05/27/2016     All pertinent notes, labs, and images personally reviewed by me.     A/P  Ms.Nancy YUNG Harmon is a 87 year old here for the evaluation of hypothyroidism:    #1. Hypothyroidism(+TPO):   Currently taking 88 mcg/day (on this dose since 7/9/2020)  Taking generic.  + fatigue.  Noted recent labs with high TSH and high FT4DL  Plan:  Discussed diagnosis, pathophysiology, management and treatment options of condition with patient.  Discussed atypical labs with high TSH and high Ft4.  In the setting of old age and no major concerning s/s of overrepalcement--Continue current dose of thyroid hormone replacement-levothyroxine 88 mcg/day.  Repeat labs as scheduled.  Please make a lab " appointment for blood work and follow up clinic appointment in 1 week after that to discuss results.    Discussed s/s of hypothyroidism and hyperthyroidism to watch for.  The patient indicates understanding of these issues and agrees with the plan.    #2. Osteopenia:  RISK FACTORS:  Post-menopausal, Height loss 1.5 inches,  Parent history of osteoporosis in her mother with  a hip fracture, Long term corticosteroid therapy   Currently she is taking prednisone 2.5  gram per day for PMR.  Followed by rheumatology.  DEXA 9/2021: Osteopenia.  No significant change in bone density of the lumbar spine or of the hip(s). There has been no significant change in bone density of the forearm. No vertebral fractures identified on the VFA.   Dental health is OK- no upcoming dental procedure.  She took 1 dose of Fosamax but had diarrhea, joint pain and back pain and stopped that.  She is not interested in retrial of Fosamax at this time.  Plan:  Discussed diagnosis, pathophysiology, management and treatment options of condition with pt.  Continue to hold off Fosamax.  DEXA in 9/2023 as scheduled.  Can consider Boniva or Prolia in future.  Follow up as scheduled.    Discussed indications, risks and benefits of all medications prescribed, and answered questions to patient's satisfaction.      The pt was advised to    Maintain an adequate calcium and vitamin D intake and to supplement vitamin D if needed to maintain serum levels of 25 hydroxy D (25 OH D) between 30-60 ng/ml.    Limit alcohol intake to no more than 2 servings per day.    Limit caffeine intake.    Maintain an active lifestyle including weight-bearing exercises for at least 30 mins daily.    Take measures to reduce the risk of falling.      Follow-up:  1 year.    Belinda Marcos MD  Endocrinology  [facility]  CC: Magalis Woodward    All questions were answered.  The patient indicates understanding of the above issues and agrees with the plan set  forth.     Addendum to above note and clinic visit:    Labs reviewed.    See result note/telephone encounter.

## 2023-08-08 NOTE — NURSING NOTE
Is the patient currently in the state of [place]? YES    Visit mode:VIDEO    If the visit is dropped, the patient can be reconnected by: VIDEO VISIT: Text to cell phone: [phone]    Will anyone else be joining the visit? NO      How would you like to obtain your AVS? MyChart    Are changes needed to the allergy or medication list? NO    Please remove meds marked not taking and flagged for removal.    Reason for visit: RECHECK    Pain more than one location:  no  Neli Vogel

## 2023-08-17 ENCOUNTER — OFFICE VISIT (OUTPATIENT)
Dept: INTERNAL MEDICINE | Facility: CLINIC | Age: 88
End: 2023-08-17
Payer: MEDICARE

## 2023-08-17 VITALS
TEMPERATURE: 98.3 F | HEART RATE: 78 BPM | OXYGEN SATURATION: 96 % | BODY MASS INDEX: 31.5 KG/M2 | DIASTOLIC BLOOD PRESSURE: 64 MMHG | RESPIRATION RATE: 18 BRPM | HEIGHT: 64 IN | WEIGHT: 184.5 LBS | SYSTOLIC BLOOD PRESSURE: 132 MMHG

## 2023-08-17 DIAGNOSIS — M35.3 PMR (POLYMYALGIA RHEUMATICA) (H): ICD-10-CM

## 2023-08-17 DIAGNOSIS — E78.5 HYPERLIPIDEMIA LDL GOAL <100: ICD-10-CM

## 2023-08-17 DIAGNOSIS — N18.32 STAGE 3B CHRONIC KIDNEY DISEASE (H): ICD-10-CM

## 2023-08-17 DIAGNOSIS — E87.1 HYPONATREMIA: ICD-10-CM

## 2023-08-17 DIAGNOSIS — R53.82 CHRONIC FATIGUE: ICD-10-CM

## 2023-08-17 DIAGNOSIS — I10 HYPERTENSION, UNSPECIFIED TYPE: ICD-10-CM

## 2023-08-17 DIAGNOSIS — R60.0 BILATERAL LEG EDEMA: ICD-10-CM

## 2023-08-17 DIAGNOSIS — Z00.00 ROUTINE GENERAL MEDICAL EXAMINATION AT A HEALTH CARE FACILITY: Primary | ICD-10-CM

## 2023-08-17 LAB
ANION GAP SERPL CALCULATED.3IONS-SCNC: 13 MMOL/L (ref 7–15)
BUN SERPL-MCNC: 20.8 MG/DL (ref 8–23)
CALCIUM SERPL-MCNC: 9.9 MG/DL (ref 8.8–10.2)
CHLORIDE SERPL-SCNC: 94 MMOL/L (ref 98–107)
CREAT SERPL-MCNC: 0.92 MG/DL (ref 0.51–0.95)
DEPRECATED HCO3 PLAS-SCNC: 24 MMOL/L (ref 22–29)
GFR SERPL CREATININE-BSD FRML MDRD: 61 ML/MIN/1.73M2
GLUCOSE SERPL-MCNC: 98 MG/DL (ref 70–99)
MAGNESIUM SERPL-MCNC: 2.1 MG/DL (ref 1.7–2.3)
POTASSIUM SERPL-SCNC: 5 MMOL/L (ref 3.4–5.3)
SODIUM SERPL-SCNC: 131 MMOL/L (ref 136–145)

## 2023-08-17 PROCEDURE — 36415 COLL VENOUS BLD VENIPUNCTURE: CPT | Performed by: INTERNAL MEDICINE

## 2023-08-17 PROCEDURE — 83735 ASSAY OF MAGNESIUM: CPT | Performed by: INTERNAL MEDICINE

## 2023-08-17 PROCEDURE — G0439 PPPS, SUBSEQ VISIT: HCPCS | Performed by: INTERNAL MEDICINE

## 2023-08-17 PROCEDURE — 99214 OFFICE O/P EST MOD 30 MIN: CPT | Mod: 25 | Performed by: INTERNAL MEDICINE

## 2023-08-17 PROCEDURE — 80048 BASIC METABOLIC PNL TOTAL CA: CPT | Performed by: INTERNAL MEDICINE

## 2023-08-17 RX ORDER — LISINOPRIL 40 MG/1
40 TABLET ORAL DAILY
COMMUNITY
Start: 2023-08-17

## 2023-08-17 RX ORDER — CARVEDILOL 6.25 MG/1
6.25 TABLET ORAL 2 TIMES DAILY WITH MEALS
Qty: 180 TABLET | Refills: 0 | COMMUNITY
Start: 2023-08-17 | End: 2024-06-04 | Stop reason: SINTOL

## 2023-08-17 ASSESSMENT — ENCOUNTER SYMPTOMS
NAUSEA: 1
HEADACHES: 1
CHILLS: 1
BREAST MASS: 0
WEAKNESS: 1

## 2023-08-17 ASSESSMENT — PAIN SCALES - GENERAL: PAINLEVEL: NO PAIN (0)

## 2023-08-17 ASSESSMENT — ACTIVITIES OF DAILY LIVING (ADL): CURRENT_FUNCTION: NO ASSISTANCE NEEDED

## 2023-08-17 NOTE — PATIENT INSTRUCTIONS
Plan:  Schedule follow up appointment with nephrology   2.  Labs today - [address]   3. Continue same meds, same doses for now   4. Echocardiogram -- To schedule this test please call [place] [facility] at: [phone]

## 2023-08-17 NOTE — PROGRESS NOTES
Dr Houston's note    Patient's instructions / PLAN:                                                        Plan:  Schedule follow up appointment with nephrology   2.  Labs today - [address]   3. Continue same meds, same doses for now   4. Echocardiogram -- To schedule this test please call [facility] at: [phone]       ASSESSMENT & PLAN:                                                      (Z00.00) Routine general medical examination at a health care facility  (primary encounter diagnosis)  Comment:   Plan:     (R53.82) Chronic fatigue  Comment:   Plan: Basic metabolic panel, Magnesium          (I10) Hypertension, unspecified type  (E87.1) Hyponatremia  (N18.32) Stage 3b chronic kidney disease (H)  (R60.0) Bilateral leg edema  Comment: She has been struggling with blood pressure control, hyponatremia and leg edema  Plan: Echocardiogram Complete        Labs   Follow-up with nephrology      (M35.3) PMR (polymyalgia rheumatica) (H)  -- rheumatologist -- dr Russo  Comment: Stable  Plan: Continue prednisone as per rheumatology    (E78.5) Hyperlipidemia LDL goal <100  Comment: Controlled based on prior numbers  Plan: Continue same meds, same doses for now        Chief Complaint:                                                        Annual exam  Follow up chronic medical problems      SUBJECTIVE:                                                    History of present illness     We reviewed the chronic medical problems as above.   I reviewed the recent tests results in Epic     Still c/o of nausea ( hasn't taken the anti-nausea meds) small fevers, fatigue    Hypokalemia  -- Na 133 on July 25, and 129 on Aug 4    I reviewed the notes from the [facility]     Aug 8  -- appointment w Parvin for thyroid and osteopenia     Aug 4 -- appointment w dr Patel for HTN and nausea    July 25 -- appointment w nephrology         July 13 LOV: Plan:  1. Continue Lisinopril and Carvedilol same dose  2. I suggest to stop Amlodipine because swollen  legs  3. I suggest to start Terazosin 2-5 mg at bed time  4. If possible check blood sugars in the morn ing and when you don't feel well  5. Reschedule ANNUAL EXAM for Aug 17       ROS:     See below    PMHx: - reviewed  Past Medical History:   Diagnosis Date     Age related osteoporosis, unspecified pathological fracture presence 2022     Chronic obstructive pulmonary disease, unspecified COPD type (H) 2022     Essential hypertension, benign      Hyperlipidemia LDL goal <100 2011     Hypertension, unspecified type 3/14/2023     Hypothyroidism due to Hashimoto's thyroiditis 10/10/2022     PMR (polymyalgia rheumatica) (H)      PMR (polymyalgia rheumatica) (H)  -- rheumatologist -- dr Russo 8/3/2020     Unspecified hypothyroidism        PSHx: reviewed  Past Surgical History:   Procedure Laterality Date     AS TOTAL HIP ARTHROPLASTY Left      C DEXA, BONE DENSITY, AXIAL SKEL  2003    Normal BMD     CATARACT EXTRACTION Left      COLONOSCOPY N/A 2015    Procedure: COLONOSCOPY;  Surgeon: Joanna Acevedo MD;  Location:  GI     HC REMOVE TONSILS/ADENOIDS,<13 Y/O       LAPAROTOMY EXPLORATORY      Exploratory Laparotomy: constricting band around small bowel, simple lysis     SIGMOIDOSCOPY FLEXIBLE N/A 4/10/2023    Procedure: Sigmoidoscopy flexible without sedation;  Surgeon: Marina Gordon MD;  Location:  GI     SURGICAL HISTORY OF -   2004    macular hole repair- Left eye     [facility] NONSPECIFIC PROCEDURE       x3     [facility] REPLANTATION THUMB DISTAL,COMPLETE  2009    left thumb trauma; Dr. Jose surgery        Soc Hx: No daily alcohol, no smoking  Social History     Socioeconomic History     Marital status:      Spouse name: Not on file     Number of children: 3     Years of education: Not on file     Highest education level: Not on file   Occupational History     Occupation: office     Employer: RETIRED   Tobacco Use     Smoking status: Former     Packs/day: 0.50      Years: 45.00     Pack years: 22.50     Types: Cigarettes     Quit date: 2001     Years since quittin.3     Passive exposure: Past     Smokeless tobacco: Never   Vaping Use     Vaping Use: Never used   Substance and Sexual Activity     Alcohol use: Yes     Comment: 1 drink per day     Drug use: No     Sexual activity: Never   Other Topics Concern      Service Not Asked     Blood Transfusions Not Asked     Caffeine Concern Yes     Comment: 1 cup per day     Occupational Exposure Not Asked     Hobby Hazards Not Asked     Sleep Concern Not Asked     Stress Concern Not Asked     Weight Concern Not Asked     Special Diet Not Asked     Back Care Not Asked     Exercise Yes     Comment: Active; exercise 4 times per week     Bike Helmet Not Asked     Seat Belt Yes     Self-Exams No     Parent/sibling w/ CABG, MI or angioplasty before 65F 55M? Not Asked   Social History Narrative     Not on file     Social Determinants of Health     Financial Resource Strain: Not on file   Food Insecurity: Not on file   Transportation Needs: Not on file   Physical Activity: Not on file   Stress: Not on file   Social Connections: Not on file   Intimate Partner Violence: Not on file   Housing Stability: Not on file        Fam Hx: reviewed  Family History   Problem Relation Age of Onset     Hypertension Mother           at 100.     Breast Cancer Mother 85     Thyroid Disease Mother      Diabetes Sister      Hypertension Sister      Neurologic Disorder Daughter         MS     Hypertension Son      Ovarian Cancer No family hx of          Screening: reviewed      All: reviewed    Meds: reviewed  Current Outpatient Medications   Medication Sig Dispense Refill     amLODIPine (NORVASC) 5 MG tablet Take 5 mg by mouth daily       BIOTIN 5000 PO        blood glucose (NO BRAND SPECIFIED) test strip Use to test blood sugar 3 times daily or as directed. To accompany: Blood Glucose Monitor Brands: per insurance. 100 strip 0  "    blood glucose monitoring (NO BRAND SPECIFIED) meter device kit Use to test blood sugar 3 times daily or as directed. Preferred blood glucose meter OR supplies to accompany: Blood Glucose Monitor Brands: per insurance. 1 kit 0     CALCIUM CITRATE-VITAMIN D 315-200 MG-UNIT PO TABS 2 TABLETS DAILY 90 Tab 3     carvedilol (COREG) 6.25 MG tablet Take 2 tabs by mouth in AM and 2 tabs by mouth in  tablet 0     Cholecalciferol (VITAMIN D3) 25 MCG (1000 UT) CAPS Take 1 capsule by mouth daily       Cranberry 450 MG CAPS        cyanocobalamin (VITAMIN B-12) 2500 MCG SUBL sublingual tablet Place 2,500 mcg under the tongue daily       dextromethorphan-guaiFENesin (MUCINEX DM)  MG 12 hr tablet Take 1 tablet by mouth every 12 hours       Fluticasone-Umeclidin-Vilanterol (TRELEGY ELLIPTA) 100-62.5-25 MCG/INH oral inhaler Inhale 1 puff into the lungs daily       GLUCOSAMINE HCL 1000 MG PO TABS 2 TABLETS DAILY 90 Tab 3     LACTOBACILLUS PO Take 120 mg by mouth daily       levothyroxine (SYNTHROID/LEVOTHROID) 88 MCG tablet Take 1 tablet (88 mcg) by mouth daily 90 tablet 3     Lutein 6 MG TABS Take 20 mg by mouth daily        MULTIVITAMIN TABS   OR 1 daily       ondansetron (ZOFRAN ODT) 4 MG ODT tab Take 1 tablet (4 mg) by mouth every 8 hours as needed for nausea 10 tablet 0     simvastatin (ZOCOR) 10 MG tablet Take 1 tablet (10 mg) by mouth At Bedtime 90 tablet 0     thin (NO BRAND SPECIFIED) lancets Use with lanceting device. To accompany: Blood Glucose Monitor Brands: per insurance. 100 each 0     lisinopril (ZESTRIL) 30 MG tablet Take 1 tablet (30 mg) by mouth At Bedtime for 30 days 30 tablet 0       OBJECTIVE:                                                    Physical Exam :  Blood pressure 132/64, pulse 78, temperature 98.3  F (36.8  C), temperature source Tympanic, resp. rate 18, height 1.626 m (5' 4\"), weight 83.7 kg (184 lb 8 oz), SpO2 96 %, not currently breastfeeding.     NAD, appears comfortable  Skin " "clear, no rashes  Neck: supple, no JVD,  no thyroidmegaly  Lymph nodes non palpable in the cervical, supraclavicular axillaries,   Chest: clear to auscultation with good respiratory effort  Cardiac: S1S2, RRR, no mgr appreciated  Abdomen: soft, not tender, not distended, audible bowel sound, no hepatosplenomegaly, no palpable masses, no abdominal bruits  Extremities: no cyanosis, clubbing + 2 edema.   Neuro: A, Ox3, no focal signs.  Breast exam in supine and erect position: they are symmetrical, no skin changes, no tenderness or nodes on palpation. Nipples are erect, no skin lesions, no discharge on pressure.    Pelvic exam: deferred, s/p menopause, no symptoms, no hx of abnormal pap         Natalie Houston MD  Internal Medicine         SUBJECTIVE:   Bushra is a 87 year old who presents for Preventive Visit.      8/17/2023    10:39 AM   Additional Questions   Roomed by Alysha Mendieta       Are you in the first 12 months of your Medicare coverage?  No    Healthy Habits:     In general, how would you rate your overall health?  Good    Frequency of exercise:  2-3 days/week    Duration of exercise:  15-30 minutes    Do you usually eat at least 4 servings of fruit and vegetables a day, include whole grains    & fiber and avoid regularly eating high fat or \"junk\" foods?  Yes    Taking medications regularly:  Yes    Medication side effects:  Not applicable    Ability to successfully perform activities of daily living:  No assistance needed    Home Safety:  No safety concerns identified    Hearing Impairment:  No hearing concerns    In the past 6 months, have you been bothered by leaking of urine?  No    In general, how would you rate your overall mental or emotional health?  Good    Additional concerns today:  Yes        Have you ever done Advance Care Planning? (For example, a Health Directive, POLST, or a discussion with a medical provider or your loved ones about your wishes): Yes, advance care planning is on file.     "   Fall risk  Fallen 2 or more times in the past year?: No  Any fall with injury in the past year?: No    Cognitive Screening   1) Repeat 3 items (Leader, Season, Table)    2) Clock draw: NORMAL  3) 3 item recall: Recalls 3 objects  Results: 3 items recalled: COGNITIVE IMPAIRMENT LESS LIKELY    Mini-CogTM Copyright AKI Pryor. Licensed by the author for use in [facility]; reprinted with permission ([email or web address]). All rights reserved.      Do you have sleep apnea, excessive snoring or daytime drowsiness? : daytime drowsiness.    Reviewed and updated as needed this visit by clinical staff                  Reviewed and updated as needed this visit by Provider                 Social History     Tobacco Use     Smoking status: Former     Packs/day: 0.50     Years: 45.00     Pack years: 22.50     Types: Cigarettes     Quit date: 2001     Years since quittin.3     Passive exposure: Past     Smokeless tobacco: Never   Substance Use Topics     Alcohol use: Yes     Comment: 1 drink per day             2023     9:11 AM   Alcohol Use   Prescreen: >3 drinks/day or >7 drinks/week? No     Do you have a current opioid prescription? No  Do you use any other controlled substances or medications that are not prescribed by a provider? None          Diabetes Follow-up    How often are you checking your blood sugar? A few times a month  What time of day are you checking your blood sugars (select all that apply)?  Before and after meals  Have you had any blood sugars above 200?  No  Have you had any blood sugars below 70?  No  What symptoms do you notice when your blood sugar is low?  None  What concerns do you have today about your diabetes? None and Other:    Do you have any of these symptoms? (Select all that apply)  No numbness or tingling in feet.  No redness, sores or blisters on feet.  No complaints of excessive thirst.  No reports of blurry vision.  No significant changes to weight.          Hyperlipidemia  Follow-Up    Are you regularly taking any medication or supplement to lower your cholesterol?   Yes- simvastatin  Are you having muscle aches or other side effects that you think could be caused by your cholesterol lowering medication?  No    Hypertension Follow-up    Do you check your blood pressure regularly outside of the clinic? Yes   Are you following a low salt diet? Yes  Are your blood pressures ever more than 140 on the top number (systolic) OR more   than 90 on the bottom number (diastolic), for example 140/90? Yes    BP Readings from Last 2 Encounters:   08/04/23 (!) 179/76   07/13/23 (!) 160/72     Hemoglobin A1C (%)   Date Value   05/07/2009 5.8     LDL Cholesterol Calculated (mg/dL)   Date Value   07/11/2022 89   06/07/2021 76   04/20/2020 92       Current providers sharing in care for this patient include:   Patient Care Team:  Natalie Bro MD as PCP - General (Internal Medicine)  Belinda Marcos MD as Assigned Endocrinology Provider  Natalie Bro MD as Assigned PCP  Erinn Monzon NP as Assigned Heart and Vascular Provider    The following health maintenance items are reviewed in Epic and correct as of today:  Health Maintenance   Topic Date Due     COPD ACTION PLAN  Never done     MEDICARE ANNUAL WELLNESS VISIT  07/11/2023     LIPID  07/11/2023     MICROALBUMIN  07/11/2023     ANNUAL REVIEW OF HM ORDERS  08/18/2023     INFLUENZA VACCINE (1) 09/01/2023     FALL RISK ASSESSMENT  07/13/2024     BMP  08/04/2024     TSH W/FREE T4 REFLEX  08/04/2024     COLORECTAL CANCER SCREENING  04/10/2028     ADVANCE CARE PLANNING  07/14/2028     DTAP/TDAP/TD IMMUNIZATION (2 - Td or Tdap) 08/13/2031     SPIROMETRY  Completed     PHQ-2 (once per calendar year)  Completed     Pneumococcal Vaccine: 65+ Years  Completed     URINALYSIS  Completed     ZOSTER IMMUNIZATION  Completed     COVID-19 Vaccine  Completed     IPV IMMUNIZATION  Aged Out     MENINGITIS IMMUNIZATION  Aged  Out           Review of Systems   Constitutional:  Positive for chills.   Cardiovascular:  Positive for peripheral edema.   Gastrointestinal:  Positive for nausea.   Breasts:  Negative for tenderness, breast mass and discharge.   Genitourinary:  Negative for pelvic pain, vaginal bleeding and vaginal discharge.   Neurological:  Positive for weakness and headaches.       Patient has been advised of split billing requirements and indicates understanding: Yes At the check in, at the        COUNSELING:  Reviewed preventive health counseling, as reflected in patient instructions       Regular exercise       Healthy diet/nutrition        She reports that she quit smoking about 22 years ago. Her smoking use included cigarettes. She has a 22.50 pack-year smoking history. She has been exposed to tobacco smoke. She has never used smokeless tobacco.      Appropriate preventive services were discussed with this patient, including applicable screening as appropriate for cardiovascular disease, diabetes, osteopenia/osteoporosis, and glaucoma.  As appropriate for age/gender, discussed screening for colorectal cancer, prostate cancer, breast cancer, and cervical cancer. Checklist reviewing preventive services available has been given to the patient.    Reviewed patients plan of care and provided an AVS. The Basic Care Plan (routine screening as documented in Health Maintenance) for Bushra meets the Care Plan requirement. This Care Plan has been established and reviewed with the Patient.          Natalie Bro MD  [facility]    Identified Health Risks:  I have reviewed Opioid Use Disorder and Substance Use Disorder risk factors and made any needed referrals.

## 2023-08-28 ENCOUNTER — HOSPITAL ENCOUNTER (OUTPATIENT)
Dept: CARDIOLOGY | Facility: CLINIC | Age: 88
Discharge: HOME | End: 2023-08-28
Attending: INTERNAL MEDICINE | Admitting: INTERNAL MEDICINE
Payer: MEDICARE

## 2023-08-28 DIAGNOSIS — R60.0 BILATERAL LEG EDEMA: ICD-10-CM

## 2023-08-28 PROCEDURE — 93306 TTE W/DOPPLER COMPLETE: CPT | Mod: 26 | Performed by: INTERNAL MEDICINE

## 2023-08-28 PROCEDURE — 93306 TTE W/DOPPLER COMPLETE: CPT

## 2023-08-29 ENCOUNTER — TELEPHONE (OUTPATIENT)
Dept: INTERNAL MEDICINE | Facility: CLINIC | Age: 88
End: 2023-08-29
Payer: MEDICARE

## 2023-08-31 NOTE — TELEPHONE ENCOUNTER
"Pt had an Echo Cardiogram She is concerned about the results that were posted on [employer] and would like someone to go over them with her. Test was done onn 08/28     Left detailed message advising result has not been reviewed by MD yet put per interpretation summary \"Normal study for age\". Advised patient will receive a call once reviewed by MD if she has any further recommendations.    Please review and advise if any additional recommendations. No need to call back if no additional recommendations.   "

## 2023-09-06 ENCOUNTER — ANCILLARY PROCEDURE (OUTPATIENT)
Dept: BONE DENSITY | Facility: CLINIC | Age: 88
End: 2023-09-06
Payer: MEDICARE

## 2023-09-06 ENCOUNTER — ANCILLARY PROCEDURE (OUTPATIENT)
Dept: BONE DENSITY | Facility: CLINIC | Age: 88
End: 2023-09-06
Attending: INTERNAL MEDICINE
Payer: MEDICARE

## 2023-09-06 DIAGNOSIS — N95.1 SYMPTOMATIC MENOPAUSAL OR FEMALE CLIMACTERIC STATES: ICD-10-CM

## 2023-09-06 DIAGNOSIS — M81.0 AGE RELATED OSTEOPOROSIS, UNSPECIFIED PATHOLOGICAL FRACTURE PRESENCE: ICD-10-CM

## 2023-09-06 PROCEDURE — 77085 DXA BONE DENSITY AXL VRT FX: CPT | Performed by: INTERNAL MEDICINE

## 2023-09-06 PROCEDURE — 77081 DXA BONE DENSITY APPENDICULR: CPT | Mod: GA | Performed by: INTERNAL MEDICINE

## 2023-09-11 DIAGNOSIS — E03.8 OTHER SPECIFIED HYPOTHYROIDISM: ICD-10-CM

## 2023-09-12 RX ORDER — LEVOTHYROXINE SODIUM 88 UG/1
88 TABLET ORAL DAILY
Qty: 90 TABLET | Refills: 0 | Status: SHIPPED | OUTPATIENT
Start: 2023-09-12 | End: 2023-10-05

## 2023-09-26 ENCOUNTER — LAB (OUTPATIENT)
Dept: LAB | Facility: CLINIC | Age: 88
End: 2023-09-26
Payer: MEDICARE

## 2023-09-26 ENCOUNTER — TRANSFERRED RECORDS (OUTPATIENT)
Dept: HEALTH INFORMATION MANAGEMENT | Facility: CLINIC | Age: 88
End: 2023-09-26

## 2023-09-26 DIAGNOSIS — E06.3 HYPOTHYROIDISM DUE TO HASHIMOTO'S THYROIDITIS: ICD-10-CM

## 2023-09-26 PROCEDURE — 84439 ASSAY OF FREE THYROXINE: CPT | Mod: 59

## 2023-09-26 PROCEDURE — 36415 COLL VENOUS BLD VENIPUNCTURE: CPT

## 2023-09-26 PROCEDURE — 84439 ASSAY OF FREE THYROXINE: CPT | Mod: 90

## 2023-09-26 PROCEDURE — 99000 SPECIMEN HANDLING OFFICE-LAB: CPT

## 2023-09-26 PROCEDURE — 84443 ASSAY THYROID STIM HORMONE: CPT

## 2023-09-27 LAB
T4 FREE SERPL-MCNC: 1.62 NG/DL (ref 0.9–1.7)
TSH SERPL DL<=0.005 MIU/L-ACNC: 5.4 UIU/ML (ref 0.3–4.2)

## 2023-09-28 ENCOUNTER — TRANSFERRED RECORDS (OUTPATIENT)
Dept: HEALTH INFORMATION MANAGEMENT | Facility: CLINIC | Age: 88
End: 2023-09-28
Payer: MEDICARE

## 2023-09-28 LAB
ALT SERPL-CCNC: 21 IU/L (ref 5–35)
ALT SERPL-CCNC: 21 IU/L (ref 5–35)
AST SERPL-CCNC: 28 U/L (ref 5–34)
AST SERPL-CCNC: 28 U/L (ref 5–34)
CREATININE (EXTERNAL): 0.87 MG/DL (ref 0.5–1.3)
CREATININE (EXTERNAL): 0.87 MG/DL (ref 0.5–1.3)
GFR ESTIMATED (EXTERNAL): 65.8 ML/MIN/1.73M2
GFR ESTIMATED (EXTERNAL): 65.8 ML/MIN/1.73M2
HEP C HIM: NORMAL

## 2023-09-30 LAB — T4 FREE SERPL DIALY-MCNC: 2.4 NG/DL

## 2023-10-04 ENCOUNTER — TRANSFERRED RECORDS (OUTPATIENT)
Dept: HEALTH INFORMATION MANAGEMENT | Facility: CLINIC | Age: 88
End: 2023-10-04
Payer: MEDICARE

## 2023-10-05 ENCOUNTER — OFFICE VISIT (OUTPATIENT)
Dept: ENDOCRINOLOGY | Facility: CLINIC | Age: 88
End: 2023-10-05
Payer: MEDICARE

## 2023-10-05 VITALS
WEIGHT: 175.8 LBS | HEIGHT: 64 IN | RESPIRATION RATE: 18 BRPM | SYSTOLIC BLOOD PRESSURE: 170 MMHG | TEMPERATURE: 98 F | BODY MASS INDEX: 30.01 KG/M2 | HEART RATE: 72 BPM | OXYGEN SATURATION: 94 % | DIASTOLIC BLOOD PRESSURE: 82 MMHG

## 2023-10-05 DIAGNOSIS — E06.3 HYPOTHYROIDISM DUE TO HASHIMOTO'S THYROIDITIS: ICD-10-CM

## 2023-10-05 DIAGNOSIS — M81.0 AGE RELATED OSTEOPOROSIS, UNSPECIFIED PATHOLOGICAL FRACTURE PRESENCE: Primary | ICD-10-CM

## 2023-10-05 DIAGNOSIS — E03.8 OTHER SPECIFIED HYPOTHYROIDISM: ICD-10-CM

## 2023-10-05 PROCEDURE — 99214 OFFICE O/P EST MOD 30 MIN: CPT | Performed by: INTERNAL MEDICINE

## 2023-10-05 RX ORDER — PREDNISONE 5 MG/1
10 TABLET ORAL DAILY
COMMUNITY
Start: 2023-10-04

## 2023-10-05 RX ORDER — LEVOTHYROXINE SODIUM 88 UG/1
88 TABLET ORAL DAILY
Qty: 90 TABLET | Refills: 3 | Status: SHIPPED | OUTPATIENT
Start: 2023-10-05

## 2023-10-05 RX ORDER — METAPROTERENOL SULFATE 10 MG
500 TABLET ORAL DAILY
COMMUNITY

## 2023-10-05 RX ORDER — ACETAMINOPHEN 325 MG/1
325-650 TABLET ORAL EVERY 6 HOURS PRN
COMMUNITY

## 2023-10-05 RX ORDER — HYDRALAZINE HYDROCHLORIDE 25 MG/1
1 TABLET, FILM COATED ORAL
COMMUNITY
Start: 2023-07-14 | End: 2024-03-12

## 2023-10-05 RX ORDER — POLYETHYLENE GLYCOL 3350 17 G/17G
1 POWDER, FOR SOLUTION ORAL EVERY 24 HOURS
COMMUNITY

## 2023-10-05 NOTE — LETTER
10/5/2023         RE: Bushra Harmon  [address] [place] [address]        Dear Colleague,    Thank you for referring your patient, Bushra Harmon, to the [facility]. Please see a copy of my visit note below.    ENDOCRINOLOGY CLINIC NOTE:    Name: Bushra Harmon  Seen for f/u of Hypothyroidism and Osteopenia today.  HPI:  Bushra Harmon is a 87 year old female who presents for the evaluation of :hypothyroidism.   has a past medical history of Age related osteoporosis, unspecified pathological fracture presence (4/5/2022), Chronic obstructive pulmonary disease, unspecified COPD type (H) (7/11/2022), Essential hypertension, benign, Hyperlipidemia LDL goal <100 (5/18/2011), Hypertension, unspecified type (3/14/2023), Hypothyroidism due to Hashimoto's thyroiditis (10/10/2022), PMR (polymyalgia rheumatica) (H), PMR (polymyalgia rheumatica) (H)  -- rheumatologist -- dr Russo (8/3/2020), and Unspecified hypothyroidism.    Was started on prednisone for RA by Rheumatology.  Gained some wt since starting Prednisone. On it X 2 year.  She is also on methotrexate.    #1. Hypothyroidism (+TPO):  Initially diagnosed at age 42.and since then she is on thyroid hormone replacement.  Currently taking levothyroxine 88 mcg/day (On this dose since 7/9/2020). Dose was decreased at that time based on that.    Taking generic.  Reports compliance.    Feeling OK. No major s/s.  No longer taking biotin X Aug 2019.    + frontal hair loss. X chronic.      Weight is stable.    #2.Osteoporosis:    DEXA 9/2021: Osteopenia.  No significant change in bone density of the lumbar spine or of the hip(s). There has been no significant change in bone density of the forearm. No vertebral fractures identified on the VFA.     DEXA 9/2023: Severe osteoporosis on the basis of vertebral fracture. Moderate (grade 2) biconcave fracture of L4. This is new compared to the previous study. (S he recalls falling from bed but there  was no pain)  There has been no significant change in bone density of the lumbar spine, hip(s) or forearm.     RISK FACTORS:  Post-menopausal, Height loss 1.5 inches,  Parent history of osteoporosis in her mother with  a hip fracture, Long term corticosteroid therapy     Dairy: 2 servings/day  Calcium 600 mg/day  Vit D 1000 international unit(s)/day  Dental health is OK- no upcoming dental procedure.    She was started on Fosamax by primary care provider but she has not started it as she was worried about side effect of medication.  In last visit 12/2021 she wanted to start fosamax and was started on FOSAMAX- She took 1 dose of Fosamax but had diarrhea, joint pain and back pain and stopped that.  She is not interested in retrial of Fosamax at this time.    Feeling tired- has to nap during day.  + HA    Steroids: On prednisone 15 mg BID for PMR and giant cell arteritis?.    Exercise: golf in summer.     Palpitations:  No  Changes to hair or skin: chronic problem. + frontal hair loss X 2 years  Diarrhea/Constipation:No  Changes in menses: s/p menopause. Had been on HRT for a while. Stopped at age 67.  Dysphagia or Shortness of breath:No  Tremors:No  Changes in weight: gained some weight. Mostly stable.    Heat or cold intolerance: no  History of Lithium or Amiodarone use:No  Head or neck surgery/radiation:No  Family History of Thyroid Problems: + hypothyroidism in mother and sister  PMH/PSH:  Past Medical History:   Diagnosis Date     Age related osteoporosis, unspecified pathological fracture presence 4/5/2022     Chronic obstructive pulmonary disease, unspecified COPD type (H) 7/11/2022     Essential hypertension, benign      Hyperlipidemia LDL goal <100 5/18/2011     Hypertension, unspecified type 3/14/2023     Hypothyroidism due to Hashimoto's thyroiditis 10/10/2022     PMR (polymyalgia rheumatica) (H)      PMR (polymyalgia rheumatica) (H)  -- rheumatologist -- dr Russo 8/3/2020     Unspecified hypothyroidism       Past Surgical History:   Procedure Laterality Date     AS TOTAL HIP ARTHROPLASTY Left      C DEXA, BONE DENSITY, AXIAL SKEL  2003    Normal BMD     CATARACT EXTRACTION Left      COLONOSCOPY N/A 2015    Procedure: COLONOSCOPY;  Surgeon: Joanna Acevedo MD;  Location:  GI     HC REMOVE TONSILS/ADENOIDS,<11 Y/O       LAPAROTOMY EXPLORATORY      Exploratory Laparotomy: constricting band around small bowel, simple lysis     SIGMOIDOSCOPY FLEXIBLE N/A 4/10/2023    Procedure: Sigmoidoscopy flexible without sedation;  Surgeon: Marina Gordon MD;  Location:  GI     SURGICAL HISTORY OF -   2004    macular hole repair- Left eye     ZZC NONSPECIFIC PROCEDURE       x3     ZZC REPLANTATION THUMB DISTAL,COMPLETE  2009    left thumb trauma; Dr. Jose surgery     Family Hx:  Family History   Problem Relation Age of Onset     Hypertension Mother           at 100.     Breast Cancer Mother 85     Thyroid Disease Mother      Diabetes Sister      Hypertension Sister      Neurologic Disorder Daughter         MS     Hypertension Son      Ovarian Cancer No family hx of        Social Hx:  Social History     Socioeconomic History     Marital status:      Spouse name: Not on file     Number of children: 3     Years of education: Not on file     Highest education level: Not on file   Occupational History     Occupation: office     Employer: RETIRED   Tobacco Use     Smoking status: Former     Packs/day: 0.50     Years: 45.00     Pack years: 22.50     Types: Cigarettes     Quit date: 2001     Years since quittin.4     Passive exposure: Past     Smokeless tobacco: Never   Vaping Use     Vaping Use: Never used   Substance and Sexual Activity     Alcohol use: Yes     Comment: 1 drink per day     Drug use: No     Sexual activity: Never   Other Topics Concern      Service Not Asked     Blood Transfusions Not Asked     Caffeine Concern Yes     Comment: 1 cup per  "day     Occupational Exposure Not Asked     Hobby Hazards Not Asked     Sleep Concern Not Asked     Stress Concern Not Asked     Weight Concern Not Asked     Special Diet Not Asked     Back Care Not Asked     Exercise Yes     Comment: Active; exercise 4 times per week     Bike Helmet Not Asked     Seat Belt Yes     Self-Exams No     Parent/sibling w/ CABG, MI or angioplasty before 65F 55M? Not Asked   Social History Narrative     Not on file     Social Determinants of Health     Financial Resource Strain: Not on file   Food Insecurity: Not on file   Transportation Needs: Not on file   Physical Activity: Not on file   Stress: Not on file   Social Connections: Not on file   Interpersonal Safety: Not on file   Housing Stability: Not on file          MEDICATIONS:  has a current medication list which includes the following prescription(s): acetaminophen, biotin, blood glucose, blood glucose monitoring, calcium citrate-vitamin d, carvedilol, vitamin d3, cranberry, cyanocobalamin, dextromethorphan-guaifenesin, doxylamine, fluticasone-umeclidin-vilanterol, glucosamine hcl, hydralazine, lactobacillus, levothyroxine, lisinopril, lutein, multivitamin, omega-3 fish oil, polyethylene glycol, prednisone, simvastatin, thin, and ondansetron.    ROS   ROS: 10 point ROS neg other than the symptoms noted above in the HPI.    Physical Exam   VS: BP (!) 170/82   Pulse 72   Temp 98  F (36.7  C) (Tympanic)   Resp 18   Ht 1.626 m (5' 4.02\")   Wt 79.7 kg (175 lb 12.8 oz)   LMP  (LMP Unknown)   SpO2 94%   Breastfeeding No   BMI 30.16 kg/m    GENERAL: healthy, alert and no distress  EYES: Eyes grossly normal to inspection, conjunctivae and sclerae normal  ENT: no nose swelling, nasal discharge.  Thyroid: no apparent thyroid nodules.  Thyroid appears normal in size and nontender.  CV: RRR, no rubs, gallops, no murmurs  RESP: CTAB, no wheezes, rales, or ronchi  ABDO: +BS  EXTREMITIES: no hand tremors.  NEURO: Cranial nerves grossly " intact, mentation intact and speech normal  SKIN: No apparent skin lesions, rash or edema seen   PSYCH: mentation appears normal, affect normal/bright, judgement and insight intact, normal speech and appearance well-groomed    LABS:  TFTs:   Latest Ref Rng 9/26/2023  11:17 AM   [facility] THYROID [facility]     TSH 0.30 - 4.20 uIU/mL 5.40 (H)    FREE T4 0.90 - 1.70 ng/dL 1.62       Calcium:   Latest Ref Rng 8/17/2023  11:36 AM   END[facility] CALCIUM LABS[facility]     Calcium 8.8 - 10.2 mg/dL 9.9      Creatinine:  Creatinine   Date Value Ref Range Status   08/17/2023 0.92 0.51 - 0.95 mg/dL Final   06/07/2021 0.98 0.52 - 1.04 mg/dL Final     PTH:   Latest Ref Rng 6/7/2021  9:56 AM   END[facility] CALCIUM LABS[facility]     Parathyroid Hormone Intact 18 - 80 pg/mL 42        VIT D:  Vitamin D Deficiency Screening Results:  Lab Results   Component Value Date    VITDT 63 08/04/2023    VITDT 60 06/07/2021    VITDT 58 04/28/2020    VITDT 53 06/04/2018    VITDT 53 05/27/2016     All pertinent notes, labs, and images personally reviewed by me.     A/P  Ms.Nancy YUNG Harmon is a 87 year old here for the evaluation of hypothyroidism:    #1. Hypothyroidism(+TPO):   Currently taking 88 mcg/day (on this dose since 7/9/2020)  Taking generic.  + fatigue.  Noted recent labs with high TSH andnormal FT4.  Plan:  Discussed diagnosis, pathophysiology, management and treatment options of condition with patient.  Discussed atypical labs with high TSH and high Ft4.  In the setting of old age and no major concerning s/s of overrepalcement--Continue current dose of thyroid hormone replacement-levothyroxine 88 mcg/day.  Repeat labs in 6 months- 1 year.  Please make a lab appointment for blood work and follow up clinic appointment in 1 week after that to discuss results.    Discussed s/s of hypothyroidism and hyperthyroidism to watch for.  The patient indicates understanding of these issues and agrees with the plan.    #2. Osteoporosis:  DEXA 9/2023: Severe osteoporosis on the basis  of vertebral fracture. Moderate (grade 2) biconcave fracture of L4. This is new compared to the previous study.  There has been no significant change in bone density of the lumbar spine, hip(s) or forearm.   RISK FACTORS:  Post-menopausal, Height loss 1.5 inches,  Parent history of osteoporosis in her mother with  a hip fracture, Long term corticosteroid therapy   Currently she is taking prednisone 15 gram BID for PMR.  Followed by rheumatology.  Dental health is OK- no upcoming dental procedure.  She took 1 dose of Fosamax but had diarrhea, joint pain and back pain and stopped that.  She is not interested in retrial of Fosamax at this time.  Plan:  Discussed diagnosis, pathophysiology, management and treatment options of condition with pt.  Discussed bone density results with patient.  In the setting of severe osteoporosis recommend medication.  Previously normal PTH, calcium, creatinine labs.  Discussed medication options like Prolia and Evenity.  At this time she would like to follow-up with rheumatologist and cardiologist as scheduled.  She would like to read more about medication options  She will inform us if she is ready for medication and will follow-up at that time.    Bushra to follow up with Primary Care provider regarding elevated blood pressure.    Possible major side effects of Denosumab (PROLIA) include risk for atypical femur fractures, hypersensitivity, low calcium levels, osteonecrosis of jaw (which can manifest as jaw pain, osteomyelitis, osteitis, bone erosion, tooth/periodontal infection, toothache, gingival ulceration/erosion). Other s/e include but not limited to Hypertension, Headache and peripheral edema.   Always let your dentist lnow about the medication if plan for major dental procedure.    Indication: Prolia  (denosumab) is a prescription medicine used to treat osteoporosis in patients who:   Are at high risk for fracture, meaning patients who have had a fracture related to osteoporosis,  or who have multiple risk factors for fracture   Cannot use another osteoporosis medicine or other osteoporosis medicines did not work well   The timeline for early/late injections would be 4 weeks early and any time after the 6 month rin. If a patient receives their injection late, then the subsequent injection would be 6 months from the date that they actually received the injection      Discussed indications, risks and benefits of all medications prescribed, and answered questions to patient's satisfaction.      The pt was advised to  Maintain an adequate calcium and vitamin D intake and to supplement vitamin D if needed to maintain serum levels of 25 hydroxy D (25 OH D) between 30-60 ng/ml.  Limit alcohol intake to no more than 2 servings per day.  Limit caffeine intake.  Maintain an active lifestyle including weight-bearing exercises for at least 30 mins daily.  Take measures to reduce the risk of falling.      Follow-up:  As discussed in AVS    Belinda Marcos MD  Endocrinology  [facility]/[place]  CC: Magalis Woodward    All questions were answered.  The patient indicates understanding of the above issues and agrees with the plan set forth.     Addendum to above note and clinic visit:    Labs reviewed.    See result note/telephone encounter.        Again, thank you for allowing me to participate in the care of your patient.        Sincerely,        Belinda Marcos MD

## 2023-10-05 NOTE — PROGRESS NOTES
ENDOCRINOLOGY CLINIC NOTE:    Name: Bushra Harmon  Seen for f/u of Hypothyroidism and Osteopenia today.  HPI:  Bushra Harmon is a 87 year old female who presents for the evaluation of :hypothyroidism.   has a past medical history of Age related osteoporosis, unspecified pathological fracture presence (4/5/2022), Chronic obstructive pulmonary disease, unspecified COPD type (H) (7/11/2022), Essential hypertension, benign, Hyperlipidemia LDL goal <100 (5/18/2011), Hypertension, unspecified type (3/14/2023), Hypothyroidism due to Hashimoto's thyroiditis (10/10/2022), PMR (polymyalgia rheumatica) (H), PMR (polymyalgia rheumatica) (H)  -- rheumatologist -- dr Russo (8/3/2020), and Unspecified hypothyroidism.    Was started on prednisone for RA by Rheumatology.  Gained some wt since starting Prednisone. On it X 2 year.  She is also on methotrexate.    #1. Hypothyroidism (+TPO):  Initially diagnosed at age 42.and since then she is on thyroid hormone replacement.  Currently taking levothyroxine 88 mcg/day (On this dose since 7/9/2020). Dose was decreased at that time based on that.    Taking generic.  Reports compliance.    Feeling OK. No major s/s.  No longer taking biotin X Aug 2019.    + frontal hair loss. X chronic.      Weight is stable.    #2.Osteoporosis:    DEXA 9/2021: Osteopenia.  No significant change in bone density of the lumbar spine or of the hip(s). There has been no significant change in bone density of the forearm. No vertebral fractures identified on the VFA.     DEXA 9/2023: Severe osteoporosis on the basis of vertebral fracture. Moderate (grade 2) biconcave fracture of L4. This is new compared to the previous study. (S he recalls falling from bed but there was no pain)  There has been no significant change in bone density of the lumbar spine, hip(s) or forearm.     RISK FACTORS:  Post-menopausal, Height loss 1.5 inches,  Parent history of osteoporosis in her mother with  a hip fracture, Long term  corticosteroid therapy     Dairy: 2 servings/day  Calcium 600 mg/day  Vit D 1000 international unit(s)/day  Dental health is OK- no upcoming dental procedure.    She was started on Fosamax by primary care provider but she has not started it as she was worried about side effect of medication.  In last visit 12/2021 she wanted to start fosamax and was started on FOSAMAX- She took 1 dose of Fosamax but had diarrhea, joint pain and back pain and stopped that.  She is not interested in retrial of Fosamax at this time.    Feeling tired- has to nap during day.  + HA    Steroids: On prednisone 15 mg BID for PMR and giant cell arteritis?.    Exercise: golf in summer.     Palpitations:  No  Changes to hair or skin: chronic problem. + frontal hair loss X 2 years  Diarrhea/Constipation:No  Changes in menses: s/p menopause. Had been on HRT for a while. Stopped at age 67.  Dysphagia or Shortness of breath:No  Tremors:No  Changes in weight: gained some weight. Mostly stable.    Heat or cold intolerance: no  History of Lithium or Amiodarone use:No  Head or neck surgery/radiation:No  Family History of Thyroid Problems: + hypothyroidism in mother and sister  PMH/PSH:  Past Medical History:   Diagnosis Date    Age related osteoporosis, unspecified pathological fracture presence 4/5/2022    Chronic obstructive pulmonary disease, unspecified COPD type (H) 7/11/2022    Essential hypertension, benign     Hyperlipidemia LDL goal <100 5/18/2011    Hypertension, unspecified type 3/14/2023    Hypothyroidism due to Hashimoto's thyroiditis 10/10/2022    PMR (polymyalgia rheumatica) (H)     PMR (polymyalgia rheumatica) (H)  -- rheumatologist -- dr Russo 8/3/2020    Unspecified hypothyroidism      Past Surgical History:   Procedure Laterality Date    AS TOTAL HIP ARTHROPLASTY Left     C DEXA, BONE DENSITY, AXIAL SKEL  06/16/2003    Normal BMD    CATARACT EXTRACTION Left     COLONOSCOPY N/A 11/03/2015    Procedure: COLONOSCOPY;  Surgeon: Kristina  Joanna Ziegler MD;  Location:  GI    HC REMOVE TONSILS/ADENOIDS,<11 Y/O      LAPAROTOMY EXPLORATORY      Exploratory Laparotomy: constricting band around small bowel, simple lysis    SIGMOIDOSCOPY FLEXIBLE N/A 4/10/2023    Procedure: Sigmoidoscopy flexible without sedation;  Surgeon: Marina Gordon MD;  Location:  GI    SURGICAL HISTORY OF -   2004    macular hole repair- Left eye    ZZC NONSPECIFIC PROCEDURE       x3    ZZC REPLANTATION THUMB DISTAL,COMPLETE  2009    left thumb trauma; Dr. Jose surgery     Family Hx:  Family History   Problem Relation Age of Onset    Hypertension Mother           at 100.    Breast Cancer Mother 85    Thyroid Disease Mother     Diabetes Sister     Hypertension Sister     Neurologic Disorder Daughter         MS    Hypertension Son     Ovarian Cancer No family hx of        Social Hx:  Social History     Socioeconomic History    Marital status:      Spouse name: Not on file    Number of children: 3    Years of education: Not on file    Highest education level: Not on file   Occupational History    Occupation: office     Employer: RETIRED   Tobacco Use    Smoking status: Former     Packs/day: 0.50     Years: 45.00     Pack years: 22.50     Types: Cigarettes     Quit date: 2001     Years since quittin.4     Passive exposure: Past    Smokeless tobacco: Never   Vaping Use    Vaping Use: Never used   Substance and Sexual Activity    Alcohol use: Yes     Comment: 1 drink per day    Drug use: No    Sexual activity: Never   Other Topics Concern     Service Not Asked    Blood Transfusions Not Asked    Caffeine Concern Yes     Comment: 1 cup per day    Occupational Exposure Not Asked    Hobby Hazards Not Asked    Sleep Concern Not Asked    Stress Concern Not Asked    Weight Concern Not Asked    Special Diet Not Asked    Back Care Not Asked    Exercise Yes     Comment: Active; exercise 4 times per week    Bike Helmet Not Asked     "Seat Belt Yes    Self-Exams No    Parent/sibling w/ CABG, MI or angioplasty before 65F 55M? Not Asked   Social History Narrative    Not on file     Social Determinants of Health     Financial Resource Strain: Not on file   Food Insecurity: Not on file   Transportation Needs: Not on file   Physical Activity: Not on file   Stress: Not on file   Social Connections: Not on file   Interpersonal Safety: Not on file   Housing Stability: Not on file          MEDICATIONS:  has a current medication list which includes the following prescription(s): acetaminophen, biotin, blood glucose, blood glucose monitoring, calcium citrate-vitamin d, carvedilol, vitamin d3, cranberry, cyanocobalamin, dextromethorphan-guaifenesin, doxylamine, fluticasone-umeclidin-vilanterol, glucosamine hcl, hydralazine, lactobacillus, levothyroxine, lisinopril, lutein, multivitamin, omega-3 fish oil, polyethylene glycol, prednisone, simvastatin, thin, and ondansetron.    ROS   ROS: 10 point ROS neg other than the symptoms noted above in the HPI.    Physical Exam   VS: BP (!) 170/82   Pulse 72   Temp 98  F (36.7  C) (Tympanic)   Resp 18   Ht 1.626 m (5' 4.02\")   Wt 79.7 kg (175 lb 12.8 oz)   LMP  (LMP Unknown)   SpO2 94%   Breastfeeding No   BMI 30.16 kg/m    GENERAL: healthy, alert and no distress  EYES: Eyes grossly normal to inspection, conjunctivae and sclerae normal  ENT: no nose swelling, nasal discharge.  Thyroid: no apparent thyroid nodules.  Thyroid appears normal in size and nontender.  CV: RRR, no rubs, gallops, no murmurs  RESP: CTAB, no wheezes, rales, or ronchi  ABDO: +BS  EXTREMITIES: no hand tremors.  NEURO: Cranial nerves grossly intact, mentation intact and speech normal  SKIN: No apparent skin lesions, rash or edema seen   PSYCH: mentation appears normal, affect normal/bright, judgement and insight intact, normal speech and appearance well-groomed    LABS:  TFTs:   Latest Ref Rng 9/26/2023  11:17 AM   [facility]   "   TSH 0.30 - 4.20 uIU/mL 5.40 (H)    FREE T4 0.90 - 1.70 ng/dL 1.62       Calcium:   Latest Ref Rng 8/17/2023  11:36 AM   [facility] CALCIUM [facility]     Calcium 8.8 - 10.2 mg/dL 9.9      Creatinine:  Creatinine   Date Value Ref Range Status   08/17/2023 0.92 0.51 - 0.95 mg/dL Final   06/07/2021 0.98 0.52 - 1.04 mg/dL Final     PTH:   Latest Ref Rng 6/7/2021  9:56 AM   [facility] CALCIUM [facility]     Parathyroid Hormone Intact 18 - 80 pg/mL 42        VIT D:  Vitamin D Deficiency Screening Results:  Lab Results   Component Value Date    VITDT 63 08/04/2023    VITDT 60 06/07/2021    VITDT 58 04/28/2020    VITDT 53 06/04/2018    VITDT 53 05/27/2016     All pertinent notes, labs, and images personally reviewed by me.     A/P  Ms.Nancy YUNG Harmon is a 87 year old here for the evaluation of hypothyroidism:    #1. Hypothyroidism(+TPO):   Currently taking 88 mcg/day (on this dose since 7/9/2020)  Taking generic.  + fatigue.  Noted recent labs with high TSH andnormal FT4.  Plan:  Discussed diagnosis, pathophysiology, management and treatment options of condition with patient.  Discussed atypical labs with high TSH and high Ft4.  In the setting of old age and no major concerning s/s of overrepalcement--Continue current dose of thyroid hormone replacement-levothyroxine 88 mcg/day.  Repeat labs in 6 months- 1 year.  Please make a lab appointment for blood work and follow up clinic appointment in 1 week after that to discuss results.    Discussed s/s of hypothyroidism and hyperthyroidism to watch for.  The patient indicates understanding of these issues and agrees with the plan.    #2. Osteoporosis:  DEXA 9/2023: Severe osteoporosis on the basis of vertebral fracture. Moderate (grade 2) biconcave fracture of L4. This is new compared to the previous study.  There has been no significant change in bone density of the lumbar spine, hip(s) or forearm.   RISK FACTORS:  Post-menopausal, Height loss 1.5 inches,  Parent history of osteoporosis in her  mother with  a hip fracture, Long term corticosteroid therapy   Currently she is taking prednisone 15 gram BID for PMR.  Followed by rheumatology.  Dental health is OK- no upcoming dental procedure.  She took 1 dose of Fosamax but had diarrhea, joint pain and back pain and stopped that.  She is not interested in retrial of Fosamax at this time.  Plan:  Discussed diagnosis, pathophysiology, management and treatment options of condition with pt.  Discussed bone density results with patient.  In the setting of severe osteoporosis recommend medication.  Previously normal PTH, calcium, creatinine labs.  Discussed medication options like Prolia and Evenity.  At this time she would like to follow-up with rheumatologist and cardiologist as scheduled.  She would like to read more about medication options  She will inform us if she is ready for medication and will follow-up at that time.    Bushra to follow up with Primary Care provider regarding elevated blood pressure.    Possible major side effects of Denosumab (PROLIA) include risk for atypical femur fractures, hypersensitivity, low calcium levels, osteonecrosis of jaw (which can manifest as jaw pain, osteomyelitis, osteitis, bone erosion, tooth/periodontal infection, toothache, gingival ulceration/erosion). Other s/e include but not limited to Hypertension, Headache and peripheral edema.   Always let your dentist lnow about the medication if plan for major dental procedure.    Indication: Prolia  (denosumab) is a prescription medicine used to treat osteoporosis in patients who:   Are at high risk for fracture, meaning patients who have had a fracture related to osteoporosis, or who have multiple risk factors for fracture   Cannot use another osteoporosis medicine or other osteoporosis medicines did not work well   The timeline for early/late injections would be 4 weeks early and any time after the 6 month rin. If a patient receives their injection late, then the  subsequent injection would be 6 months from the date that they actually received the injection      Discussed indications, risks and benefits of all medications prescribed, and answered questions to patient's satisfaction.      The pt was advised to  Maintain an adequate calcium and vitamin D intake and to supplement vitamin D if needed to maintain serum levels of 25 hydroxy D (25 OH D) between 30-60 ng/ml.  Limit alcohol intake to no more than 2 servings per day.  Limit caffeine intake.  Maintain an active lifestyle including weight-bearing exercises for at least 30 mins daily.  Take measures to reduce the risk of falling.      Follow-up:  As discussed in AVS    Belinda Marcos MD  Endocrinology  [facility]  CC: Magalis Woodward    All questions were answered.  The patient indicates understanding of the above issues and agrees with the plan set forth.     Addendum to above note and clinic visit:    Labs reviewed.    See result note/telephone encounter.

## 2023-10-05 NOTE — PATIENT INSTRUCTIONS
[facility]  Dr Marcos, Endocrinology Department    [facility]   [address] [facility]  [place] [address]  Appointment [phone]  Fax: [phone]  [place]: Monday - Thursday      Please check the cost coverage and copay with insurance before recommended tests, services and medications (especially if new medications are prescribed).     If ordered, please get blood work done 1 week prior to your next appointment so they will be available to Dr. Marcos at your visit.    Continue current dose of thyroid hormone replacement-levothyroxine 88 mcg/day.  Repeat labs in 6 months- 1 year.  Please make a lab appointment for blood work and follow up clinic appointment in 1 week after that to discuss results.    Recommend PROLIA.  Let us know when you are ready for it and will schedule video visit.    Possible major side effects of Denosumab (PROLIA) include risk for atypical femur fractures, hypersensitivity, low calcium levels, osteonecrosis of jaw (which can manifest as jaw pain, osteomyelitis, osteitis, bone erosion, tooth/periodontal infection, toothache, gingival ulceration/erosion). Other s/e include but not limited to Hypertension, Headache and peripheral edema.   Always let your dentist lnow about the medication if plan for major dental procedure.    Indication: Prolia  (denosumab) is a prescription medicine used to treat osteoporosis in patients who:   Are at high risk for fracture, meaning patients who have had a fracture related to osteoporosis, or who have multiple risk factors for fracture   Cannot use another osteoporosis medicine or other osteoporosis medicines did not work well   The timeline for early/late injections would be 4 weeks early and any time after the 6 month rin. If a patient receives their injection late, then the subsequent injection would be 6 months from the date that they actually received the injection      The pt was advised  to  Maintain an adequate calcium and vitamin D intake and to supplement vitamin D if needed to maintain serum levels of 25 hydroxy D (25 OH D) between 30-60 ng/ml.  Limit alcohol intake to no more than 2 servings per day.  Limit caffeine intake.  Maintain an active lifestyle including weight-bearing exercises for at least 30 mins daily.  Take measures to reduce the risk of falling.

## 2023-10-12 ENCOUNTER — OFFICE VISIT (OUTPATIENT)
Dept: CARDIOLOGY | Facility: CLINIC | Age: 88
End: 2023-10-12
Payer: MEDICARE

## 2023-10-12 VITALS
WEIGHT: 173 LBS | HEART RATE: 63 BPM | HEIGHT: 64 IN | DIASTOLIC BLOOD PRESSURE: 82 MMHG | SYSTOLIC BLOOD PRESSURE: 150 MMHG | BODY MASS INDEX: 29.53 KG/M2

## 2023-10-12 DIAGNOSIS — Z13.6 SCREENING FOR CARDIOVASCULAR CONDITION: Primary | ICD-10-CM

## 2023-10-12 PROCEDURE — 99203 OFFICE O/P NEW LOW 30 MIN: CPT | Performed by: INTERNAL MEDICINE

## 2023-10-12 PROCEDURE — 93000 ELECTROCARDIOGRAM COMPLETE: CPT | Performed by: INTERNAL MEDICINE

## 2023-10-12 NOTE — PROGRESS NOTES
HPI and Plan:   It is my pleasure to see this very pleasant 87-year-old lady for evaluation of hypertension.  She looks much younger than the stated age    She has stage III chronic renal failure and has been under the care off my friend Dr. Tor Castillo.    She has been hypertensive for the last 40 years but in the past year her blood pressures have been widely fluctuating.  She is compliant with her medications and adheres to a low-salt diet.  She does not abuse tobacco alcohol or drugs and she keeps her caffeine intake to a minimum    She records her blood pressures multiple times a day and has noticed that readings can be as high as 200 systolic and as low as 70/40.  She does not have symptoms when the blood pressure is high but she does feel profoundly fatigued when her blood pressure is low.  She has not fallen and denies dizzy spells and syncopal episodes    She denies heart failure and anginal symptoms.    She has recently been diagnosed with polymyalgia rheumatica and possible giant cell arteritis.  He has just been started on 30 mg of prednisone.  This is likely raise her blood pressure further    Has no history of diabetes mellitus.    Cardiac examination is unremarkable.  EKG shows sinus rhythm with left atrial enlargement but I am very happy to report that her echocardiogram shows normal atrial sizes.  I am sure the EKG finding is false positive and fact this lady's echocardiogram is compatible with younger than his stated age    Her episodes of hypotension occur midmorning and around early afternoon.  As such I would advise her not to take her hydralazine dose.    She has no history of established atherosclerotic disease I think we can have permissive hypertension and I think up to perhaps systolic of 170 should be fine.    With such widely varying blood pressure I wonder if she may have autonomic dysfunction and perhaps neurologic opinion may be beneficial.    I reassured her that she is under the  "capable hands of Dr. Tor Simon.  It is better to have 1 physician or at most 2 physicians managing her blood pressure as we will know \"too many Cooks can swallow the broth.\"    I will see her in future on an as required basis.    Orders Placed This Encounter   Procedures    EKG 12-lead complete w/read - Clinics (performed today)       No orders of the defined types were placed in this encounter.      Encounter Diagnosis   Name Primary?    Screening for cardiovascular condition Yes       CURRENT MEDICATIONS:  Current Outpatient Medications   Medication Sig Dispense Refill    acetaminophen (TYLENOL) 325 MG tablet Take 1-2 tablets (325-650 mg) by mouth every 6 hours as needed for mild pain      BIOTIN 5000 PO       blood glucose (NO BRAND SPECIFIED) test strip Use to test blood sugar 3 times daily or as directed. To accompany: Blood Glucose Monitor Brands: per insurance. 100 strip 0    blood glucose monitoring (NO BRAND SPECIFIED) meter device kit Use to test blood sugar 3 times daily or as directed. Preferred blood glucose meter OR supplies to accompany: Blood Glucose Monitor Brands: per insurance. 1 kit 0    CALCIUM CITRATE-VITAMIN D 315-200 MG-UNIT PO TABS 2 TABLETS DAILY 90 Tab 3    carvedilol (COREG) 6.25 MG tablet Take 3 tablets (18.75 mg) by mouth 2 times daily (with meals) -- prescribed by nephrology 180 tablet 0    Cholecalciferol (VITAMIN D3) 25 MCG (1000 UT) CAPS Take 1 capsule by mouth daily      Cranberry 450 MG CAPS       cyanocobalamin (VITAMIN B-12) 2500 MCG SUBL sublingual tablet Place 2,500 mcg under the tongue daily      dextromethorphan-guaiFENesin (MUCINEX DM)  MG 12 hr tablet Take 1 tablet by mouth every 12 hours      doxylamine (UNISOM) 25 MG TABS tablet Take 1 tablet (25 mg) by mouth At Bedtime      Fluticasone-Umeclidin-Vilanterol (TRELEGY ELLIPTA) 100-62.5-25 MCG/INH oral inhaler Inhale 1 puff into the lungs daily      GLUCOSAMINE HCL 1000 MG PO TABS 2 TABLETS DAILY 90 Tab 3    " hydrALAZINE (APRESOLINE) 25 MG tablet Take 1 tablet by mouth 3 times daily      LACTOBACILLUS PO Take 120 mg by mouth daily      levothyroxine (SYNTHROID/LEVOTHROID) 88 MCG tablet Take 1 tablet (88 mcg) by mouth daily 90 tablet 3    lisinopril (ZESTRIL) 40 MG tablet Take 1 tablet (40 mg) by mouth daily -- prescribed by nephrology      Lutein 6 MG TABS Take 20 mg by mouth daily       MULTIVITAMIN TABS   OR 1 daily      Omega-3 Fish Oil 500 MG capsule Take 1 capsule (500 mg) by mouth daily      polyethylene glycol (MIRALAX) 17 g packet Take 1 packet by mouth every 24 hours      predniSONE (DELTASONE) 5 MG tablet Take 15 mg by mouth 2 times daily      simvastatin (ZOCOR) 10 MG tablet Take 1 tablet (10 mg) by mouth At Bedtime 90 tablet 0    thin (NO BRAND SPECIFIED) lancets Use with lanceting device. To accompany: Blood Glucose Monitor Brands: per insurance. 100 each 0    ondansetron (ZOFRAN ODT) 4 MG ODT tab Take 1 tablet (4 mg) by mouth every 8 hours as needed for nausea (Patient not taking: Reported on 10/5/2023) 10 tablet 0       ALLERGIES     Allergies   Allergen Reactions    No Known Drug Allergy        PAST MEDICAL HISTORY:  Past Medical History:   Diagnosis Date    Age related osteoporosis, unspecified pathological fracture presence 4/5/2022    Chronic obstructive pulmonary disease, unspecified COPD type (H) 7/11/2022    Essential hypertension, benign     Hyperlipidemia LDL goal <100 5/18/2011    Hypertension, unspecified type 3/14/2023    Hypothyroidism due to Hashimoto's thyroiditis 10/10/2022    PMR (polymyalgia rheumatica) (H)  -- rheumatologist -- dr Russo 8/3/2020    PMR (polymyalgia rheumatica) (H24)     Unspecified hypothyroidism        PAST SURGICAL HISTORY:  Past Surgical History:   Procedure Laterality Date    AS TOTAL HIP ARTHROPLASTY Left     C DEXA, BONE DENSITY, AXIAL SKEL  06/16/2003    Normal BMD    CATARACT EXTRACTION Left     COLONOSCOPY N/A 11/03/2015    Procedure: COLONOSCOPY;  Surgeon:  Joanna Acevedo MD;  Location:  GI    HC REMOVE TONSILS/ADENOIDS,<13 Y/O      LAPAROTOMY EXPLORATORY      Exploratory Laparotomy: constricting band around small bowel, simple lysis    SIGMOIDOSCOPY FLEXIBLE N/A 4/10/2023    Procedure: Sigmoidoscopy flexible without sedation;  Surgeon: Marina Gordon MD;  Location:  GI    SURGICAL HISTORY OF -   2004    macular hole repair- Left eye    ZZC NONSPECIFIC PROCEDURE       x3    ZZC REPLANTATION THUMB DISTAL,COMPLETE  2009    left thumb trauma; Dr. Jose surgery       FAMILY HISTORY:  Family History   Problem Relation Age of Onset    Hypertension Mother           at 100.    Breast Cancer Mother 85    Thyroid Disease Mother     Diabetes Sister     Hypertension Sister     Neurologic Disorder Daughter         MS    Hypertension Son     Ovarian Cancer No family hx of        SOCIAL HISTORY:  Social History     Socioeconomic History    Marital status:      Spouse name: None    Number of children: 3    Years of education: None    Highest education level: None   Occupational History    Occupation: office     Employer: RETIRED   Tobacco Use    Smoking status: Former     Packs/day: 0.50     Years: 45.00     Additional pack years: 0.00     Total pack years: 22.50     Types: Cigarettes     Quit date: 2001     Years since quittin.4     Passive exposure: Past    Smokeless tobacco: Never   Vaping Use    Vaping Use: Never used   Substance and Sexual Activity    Alcohol use: Not Currently    Drug use: No    Sexual activity: Never   Other Topics Concern    Caffeine Concern Yes     Comment: 1 cup per day    Exercise Yes     Comment: Active; exercise 4 times per week    Seat Belt Yes    Self-Exams No       Review of Systems:  Skin:        Eyes:       ENT:       Respiratory:  Negative    Cardiovascular:    fatigue;Positive for  Gastroenterology:      Genitourinary:       Musculoskeletal:       Neurologic:       Psychiatric:      "  Heme/Lymph/Imm:       Endocrine:         Physical Exam:  Vitals: BP (!) 150/82   Pulse 63   Ht 1.626 m (5' 4\")   Wt 78.5 kg (173 lb)   LMP  (LMP Unknown)   BMI 29.70 kg/m      Constitutional:  cooperative, alert and oriented, well developed, well nourished, in no acute distress        Skin:  warm and dry to the touch, no apparent skin lesions or masses noted          Head:  normocephalic, no masses or lesions        Eyes:  pupils equal and round, conjunctivae and lids unremarkable, sclera white, no xanthalasma, EOMS intact, no nystagmus        Lymph:No Cervical lymphadenopathy present     ENT:  no pallor or cyanosis, dentition good        Neck:  carotid pulses are full and equal bilaterally, JVP normal, no carotid bruit        Respiratory:  normal breath sounds, clear to auscultation, normal A-P diameter, normal symmetry, normal respiratory excursion, no use of accessory muscles         Cardiac: regular rhythm, normal S1/S2, no S3 or S4, apical impulse not displaced, no murmurs, gallops or rubs                pulses full and equal, no bruits auscultated                                        GI:  abdomen soft, non-tender, BS normoactive, no mass, no HSM, no bruits        Extremities and Muscular Skeletal:  no deformities, clubbing, cyanosis, erythema observed              Neurological:  no gross motor deficits        Psych:  Alert and Oriented x 3        Recent Lab Results:  LIPID RESULTS:  Lab Results   Component Value Date    CHOL 191 07/11/2022    CHOL 185 06/07/2021    HDL 86 07/11/2022    HDL 91 06/07/2021    LDL 89 07/11/2022    LDL 76 06/07/2021    TRIG 80 07/11/2022    TRIG 91 06/07/2021    CHOLHDLRATIO 2.2 05/26/2015       LIVER ENZYME RESULTS:  Lab Results   Component Value Date    AST 24 07/11/2022    AST 29 06/07/2021    ALT 25 07/11/2022    ALT 29 06/07/2021       CBC RESULTS:  Lab Results   Component Value Date    WBC 6.8 05/07/2023    WBC 7.0 06/07/2021    RBC 4.61 05/07/2023    RBC 4.54 " 06/07/2021    HGB 14.0 05/07/2023    HGB 14.1 06/07/2021    HCT 42.6 05/07/2023    HCT 43.0 06/07/2021    MCV 92 05/07/2023    MCV 95 06/07/2021    MCH 30.4 05/07/2023    MCH 31.1 06/07/2021    MCHC 32.9 05/07/2023    MCHC 32.8 06/07/2021    RDW 14.2 05/07/2023    RDW 15.2 (H) 06/07/2021     05/07/2023     06/07/2021       BMP RESULTS:  Lab Results   Component Value Date     (L) 08/17/2023     06/07/2021    POTASSIUM 5.0 08/17/2023    POTASSIUM 4.5 07/11/2022    POTASSIUM 4.1 06/07/2021    CHLORIDE 94 (L) 08/17/2023    CHLORIDE 105 07/11/2022    CHLORIDE 102 06/07/2021    CO2 24 08/17/2023    CO2 29 07/11/2022    CO2 33 (H) 06/07/2021    ANIONGAP 13 08/17/2023    ANIONGAP 6 07/11/2022    ANIONGAP 1 (L) 06/07/2021    GLC 98 08/17/2023    GLC 98 07/11/2022    GLC 90 06/07/2021    BUN 20.8 08/17/2023    BUN 24 07/11/2022    BUN 26 06/07/2021    CR 0.92 08/17/2023    CR 0.98 06/07/2021    GFRESTIMATED 61 08/17/2023    GFRESTIMATED 53 (L) 06/07/2021    GFRESTBLACK 61 06/07/2021    ARNOLDO 9.9 08/17/2023    ARNOLDO 9.4 06/07/2021        A1C RESULTS:  Lab Results   Component Value Date    A1C 5.8 05/07/2009       INR RESULTS:  Lab Results   Component Value Date    INR 1.08 04/22/2011    INR 1.03 04/14/2011           CC  No referring provider defined for this encounter.

## 2023-10-12 NOTE — LETTER
10/12/2023    Natalie Bro MD  [address] [facility] [place] [address]    RE: Bushra YUNG Harmon       Dear Colleague,     I had the pleasure of seeing Bushra Harmon in the [facility].  HPI and Plan:   It is my pleasure to see this very pleasant 87-year-old lady for evaluation of hypertension.  She looks much younger than the stated age    She has stage III chronic renal failure and has been under the care off my friend Dr. Tor Castillo.    She has been hypertensive for the last 40 years but in the past year her blood pressures have been widely fluctuating.  She is compliant with her medications and adheres to a low-salt diet.  She does not abuse tobacco alcohol or drugs and she keeps her caffeine intake to a minimum    She records her blood pressures multiple times a day and has noticed that readings can be as high as 200 systolic and as low as 70/40.  She does not have symptoms when the blood pressure is high but she does feel profoundly fatigued when her blood pressure is low.  She has not fallen and denies dizzy spells and syncopal episodes    She denies heart failure and anginal symptoms.    She has recently been diagnosed with polymyalgia rheumatica and possible giant cell arteritis.  He has just been started on 30 mg of prednisone.  This is likely raise her blood pressure further    Has no history of diabetes mellitus.    Cardiac examination is unremarkable.  EKG shows sinus rhythm with left atrial enlargement but I am very happy to report that her echocardiogram shows normal atrial sizes.  I am sure the EKG finding is false positive and fact this lady's echocardiogram is compatible with younger than his stated age    Her episodes of hypotension occur midmorning and around early afternoon.  As such I would advise her not to take her hydralazine dose.    She has no history of established atherosclerotic disease I think we can have permissive hypertension and I think up to  "perhaps systolic of 170 should be fine.    With such widely varying blood pressure I wonder if she may have autonomic dysfunction and perhaps neurologic opinion may be beneficial.    I reassured her that she is under the capable hands of Dr. Tor Simon.  It is better to have 1 physician or at most 2 physicians managing her blood pressure as we will know \"too many Cooks can swallow the broth.\"    I will see her in future on an as required basis.    Orders Placed This Encounter   Procedures    EKG 12-lead complete w/read - Clinics (performed today)       No orders of the defined types were placed in this encounter.      Encounter Diagnosis   Name Primary?    Screening for cardiovascular condition Yes       CURRENT MEDICATIONS:  Current Outpatient Medications   Medication Sig Dispense Refill    acetaminophen (TYLENOL) 325 MG tablet Take 1-2 tablets (325-650 mg) by mouth every 6 hours as needed for mild pain      BIOTIN 5000 PO       blood glucose (NO BRAND SPECIFIED) test strip Use to test blood sugar 3 times daily or as directed. To accompany: Blood Glucose Monitor Brands: per insurance. 100 strip 0    blood glucose monitoring (NO BRAND SPECIFIED) meter device kit Use to test blood sugar 3 times daily or as directed. Preferred blood glucose meter OR supplies to accompany: Blood Glucose Monitor Brands: per insurance. 1 kit 0    CALCIUM CITRATE-VITAMIN D 315-200 MG-UNIT PO TABS 2 TABLETS DAILY 90 Tab 3    carvedilol (COREG) 6.25 MG tablet Take 3 tablets (18.75 mg) by mouth 2 times daily (with meals) -- prescribed by nephrology 180 tablet 0    Cholecalciferol (VITAMIN D3) 25 MCG (1000 UT) CAPS Take 1 capsule by mouth daily      Cranberry 450 MG CAPS       cyanocobalamin (VITAMIN B-12) 2500 MCG SUBL sublingual tablet Place 2,500 mcg under the tongue daily      dextromethorphan-guaiFENesin (MUCINEX DM)  MG 12 hr tablet Take 1 tablet by mouth every 12 hours      doxylamine (UNISOM) 25 MG TABS tablet Take 1 tablet " (25 mg) by mouth At Bedtime      Fluticasone-Umeclidin-Vilanterol (TRELEGY ELLIPTA) 100-62.5-25 MCG/INH oral inhaler Inhale 1 puff into the lungs daily      GLUCOSAMINE HCL 1000 MG PO TABS 2 TABLETS DAILY 90 Tab 3    hydrALAZINE (APRESOLINE) 25 MG tablet Take 1 tablet by mouth 3 times daily      LACTOBACILLUS PO Take 120 mg by mouth daily      levothyroxine (SYNTHROID/LEVOTHROID) 88 MCG tablet Take 1 tablet (88 mcg) by mouth daily 90 tablet 3    lisinopril (ZESTRIL) 40 MG tablet Take 1 tablet (40 mg) by mouth daily -- prescribed by nephrology      Lutein 6 MG TABS Take 20 mg by mouth daily       MULTIVITAMIN TABS   OR 1 daily      Omega-3 Fish Oil 500 MG capsule Take 1 capsule (500 mg) by mouth daily      polyethylene glycol (MIRALAX) 17 g packet Take 1 packet by mouth every 24 hours      predniSONE (DELTASONE) 5 MG tablet Take 15 mg by mouth 2 times daily      simvastatin (ZOCOR) 10 MG tablet Take 1 tablet (10 mg) by mouth At Bedtime 90 tablet 0    thin (NO BRAND SPECIFIED) lancets Use with lanceting device. To accompany: Blood Glucose Monitor Brands: per insurance. 100 each 0    ondansetron (ZOFRAN ODT) 4 MG ODT tab Take 1 tablet (4 mg) by mouth every 8 hours as needed for nausea (Patient not taking: Reported on 10/5/2023) 10 tablet 0       ALLERGIES     Allergies   Allergen Reactions    No Known Drug Allergy        PAST MEDICAL HISTORY:  Past Medical History:   Diagnosis Date    Age related osteoporosis, unspecified pathological fracture presence 4/5/2022    Chronic obstructive pulmonary disease, unspecified COPD type (H) 7/11/2022    Essential hypertension, benign     Hyperlipidemia LDL goal <100 5/18/2011    Hypertension, unspecified type 3/14/2023    Hypothyroidism due to Hashimoto's thyroiditis 10/10/2022    PMR (polymyalgia rheumatica) (H)  -- rheumatologist -- dr Russo 8/3/2020    PMR (polymyalgia rheumatica) (H24)     Unspecified hypothyroidism        PAST SURGICAL HISTORY:  Past Surgical History:    Procedure Laterality Date    AS TOTAL HIP ARTHROPLASTY Left     C DEXA, BONE DENSITY, AXIAL SKEL  2003    Normal BMD    CATARACT EXTRACTION Left     COLONOSCOPY N/A 2015    Procedure: COLONOSCOPY;  Surgeon: Joanna Acevedo MD;  Location:  GI    HC REMOVE TONSILS/ADENOIDS,<13 Y/O      LAPAROTOMY EXPLORATORY      Exploratory Laparotomy: constricting band around small bowel, simple lysis    SIGMOIDOSCOPY FLEXIBLE N/A 4/10/2023    Procedure: Sigmoidoscopy flexible without sedation;  Surgeon: Marina Gordon MD;  Location:  GI    SURGICAL HISTORY OF -   2004    macular hole repair- Left eye    ZZC NONSPECIFIC PROCEDURE       x3    ZZC REPLANTATION THUMB DISTAL,COMPLETE  2009    left thumb trauma; Dr. Jose surgery       FAMILY HISTORY:  Family History   Problem Relation Age of Onset    Hypertension Mother           at 100.    Breast Cancer Mother 85    Thyroid Disease Mother     Diabetes Sister     Hypertension Sister     Neurologic Disorder Daughter         MS    Hypertension Son     Ovarian Cancer No family hx of        SOCIAL HISTORY:  Social History     Socioeconomic History    Marital status:      Spouse name: None    Number of children: 3    Years of education: None    Highest education level: None   Occupational History    Occupation: office     Employer: RETIRED   Tobacco Use    Smoking status: Former     Packs/day: 0.50     Years: 45.00     Additional pack years: 0.00     Total pack years: 22.50     Types: Cigarettes     Quit date: 2001     Years since quittin.4     Passive exposure: Past    Smokeless tobacco: Never   Vaping Use    Vaping Use: Never used   Substance and Sexual Activity    Alcohol use: Not Currently    Drug use: No    Sexual activity: Never   Other Topics Concern    Caffeine Concern Yes     Comment: 1 cup per day    Exercise Yes     Comment: Active; exercise 4 times per week    Seat Belt Yes    Self-Exams No       Review  "of Systems:  Skin:        Eyes:       ENT:       Respiratory:  Negative    Cardiovascular:    fatigue;Positive for  Gastroenterology:      Genitourinary:       Musculoskeletal:       Neurologic:       Psychiatric:       Heme/Lymph/Imm:       Endocrine:         Physical Exam:  Vitals: BP (!) 150/82   Pulse 63   Ht 1.626 m (5' 4\")   Wt 78.5 kg (173 lb)   LMP  (LMP Unknown)   BMI 29.70 kg/m      Constitutional:  cooperative, alert and oriented, well developed, well nourished, in no acute distress        Skin:  warm and dry to the touch, no apparent skin lesions or masses noted          Head:  normocephalic, no masses or lesions        Eyes:  pupils equal and round, conjunctivae and lids unremarkable, sclera white, no xanthalasma, EOMS intact, no nystagmus        Lymph:No Cervical lymphadenopathy present     ENT:  no pallor or cyanosis, dentition good        Neck:  carotid pulses are full and equal bilaterally, JVP normal, no carotid bruit        Respiratory:  normal breath sounds, clear to auscultation, normal A-P diameter, normal symmetry, normal respiratory excursion, no use of accessory muscles         Cardiac: regular rhythm, normal S1/S2, no S3 or S4, apical impulse not displaced, no murmurs, gallops or rubs                pulses full and equal, no bruits auscultated                                        GI:  abdomen soft, non-tender, BS normoactive, no mass, no HSM, no bruits        Extremities and Muscular Skeletal:  no deformities, clubbing, cyanosis, erythema observed              Neurological:  no gross motor deficits        Psych:  Alert and Oriented x 3        Recent Lab Results:  LIPID RESULTS:  Lab Results   Component Value Date    CHOL 191 07/11/2022    CHOL 185 06/07/2021    HDL 86 07/11/2022    HDL 91 06/07/2021    LDL 89 07/11/2022    LDL 76 06/07/2021    TRIG 80 07/11/2022    TRIG 91 06/07/2021    CHOLHDLRATIO 2.2 05/26/2015       LIVER ENZYME RESULTS:  Lab Results   Component Value Date    AST " 24 07/11/2022    AST 29 06/07/2021    ALT 25 07/11/2022    ALT 29 06/07/2021       CBC RESULTS:  Lab Results   Component Value Date    WBC 6.8 05/07/2023    WBC 7.0 06/07/2021    RBC 4.61 05/07/2023    RBC 4.54 06/07/2021    HGB 14.0 05/07/2023    HGB 14.1 06/07/2021    HCT 42.6 05/07/2023    HCT 43.0 06/07/2021    MCV 92 05/07/2023    MCV 95 06/07/2021    MCH 30.4 05/07/2023    MCH 31.1 06/07/2021    MCHC 32.9 05/07/2023    MCHC 32.8 06/07/2021    RDW 14.2 05/07/2023    RDW 15.2 (H) 06/07/2021     05/07/2023     06/07/2021       BMP RESULTS:  Lab Results   Component Value Date     (L) 08/17/2023     06/07/2021    POTASSIUM 5.0 08/17/2023    POTASSIUM 4.5 07/11/2022    POTASSIUM 4.1 06/07/2021    CHLORIDE 94 (L) 08/17/2023    CHLORIDE 105 07/11/2022    CHLORIDE 102 06/07/2021    CO2 24 08/17/2023    CO2 29 07/11/2022    CO2 33 (H) 06/07/2021    ANIONGAP 13 08/17/2023    ANIONGAP 6 07/11/2022    ANIONGAP 1 (L) 06/07/2021    GLC 98 08/17/2023    GLC 98 07/11/2022    GLC 90 06/07/2021    BUN 20.8 08/17/2023    BUN 24 07/11/2022    BUN 26 06/07/2021    CR 0.92 08/17/2023    CR 0.98 06/07/2021    GFRESTIMATED 61 08/17/2023    GFRESTIMATED 53 (L) 06/07/2021    GFRESTBLACK 61 06/07/2021    ARNOLDO 9.9 08/17/2023    ARNOLDO 9.4 06/07/2021        A1C RESULTS:  Lab Results   Component Value Date    A1C 5.8 05/07/2009       INR RESULTS:  Lab Results   Component Value Date    INR 1.08 04/22/2011    INR 1.03 04/14/2011           CC  No referring provider defined for this encounter.      Thank you for allowing me to participate in the care of your patient.      Sincerely,     DR CATRACHITO DAVIS MD     [facility]

## 2023-10-15 DIAGNOSIS — E78.5 HYPERLIPIDEMIA LDL GOAL <100: ICD-10-CM

## 2023-10-16 ENCOUNTER — TRANSFERRED RECORDS (OUTPATIENT)
Dept: HEALTH INFORMATION MANAGEMENT | Facility: CLINIC | Age: 88
End: 2023-10-16
Payer: MEDICARE

## 2023-10-16 ENCOUNTER — TELEPHONE (OUTPATIENT)
Dept: BEHAVIORAL HEALTH | Facility: CLINIC | Age: 88
End: 2023-10-16
Payer: MEDICARE

## 2023-10-16 RX ORDER — SIMVASTATIN 10 MG
10 TABLET ORAL AT BEDTIME
Qty: 90 TABLET | Refills: 0 | Status: SHIPPED | OUTPATIENT
Start: 2023-10-16 | End: 2024-01-08

## 2023-10-16 NOTE — TELEPHONE ENCOUNTER
"Team received this staff message today:    \" This Dr Porter patient left a voice mail on Friday 10/13/23 after 4:30 pm wanting a call back from Dr Porter's nurse.  The patient (Bushra) had an appointment with Dr Porter on 10/12/23.  The voice mail stated she had a question and can be reached at [phone].  Can someone please reach out to this patient?   Thank you , Khadijah \"    She is asking about her EKG results, which say \"abnormal\".  Explained to her what Dr. Porter's note says, that it was likely \"false positive\".  She did not remember if they spoke about that at all during the office visit.    Writer reviewed the things that Dr. Porter wrote in his note, and she was grateful for the \"update\".   She also wanted to know what her BP was when here, since she did not write it down and says she did not receive an after visit summary.    Writer said I will mail her the AVS.  She had no further questions at this time.    Lelo Cronin RN on 10/16/2023 at 11:02 AM        "

## 2023-10-25 ENCOUNTER — TRANSFERRED RECORDS (OUTPATIENT)
Dept: HEALTH INFORMATION MANAGEMENT | Facility: CLINIC | Age: 88
End: 2023-10-25
Payer: MEDICARE

## 2023-11-01 ENCOUNTER — TELEPHONE (OUTPATIENT)
Dept: INTERNAL MEDICINE | Facility: CLINIC | Age: 88
End: 2023-11-01
Payer: MEDICARE

## 2023-11-01 DIAGNOSIS — R05.9 COUGH, UNSPECIFIED TYPE: Primary | ICD-10-CM

## 2023-11-01 NOTE — TELEPHONE ENCOUNTER
"Patient has had dry cough for 1 week with continuous coughing fits. Patient says she can't lay down/lay back without coughing fits. Patient denies fever, shortness of breath, chest pain/pressure. Patient's oxygen has been reading 95%.     Treatment: over the counter Robitussin.     Patient reports she is is taking Azithromycin (z-pack) and prednisone 30 mg through pulmonologist to \"have on hand\" for her COPD as well as Bactrim to take Monday, Wednesday and Friday for her giant cell arteritis. Patient says she was diagnosed with giant cell arteritis to do with polymyalgia rheumatica 3 years ago and she will be getting infusions monthly for this.    Patient is requesting Robitussin with codeine (this has helped her in the past) or \"a strong cough medication\".     Advised likely needs visit (with medication she is requesting). Please advise.    Routing to covering provider for \"G\" since primary care provider is out.     Thank you,  Jean Claude Franco, Triage RN [place]  3:10 PM 11/1/2023     "

## 2023-11-02 ENCOUNTER — TELEPHONE (OUTPATIENT)
Dept: INTERNAL MEDICINE | Facility: CLINIC | Age: 88
End: 2023-11-02
Payer: MEDICARE

## 2023-11-02 ENCOUNTER — TELEPHONE (OUTPATIENT)
Dept: OTHER | Facility: CLINIC | Age: 88
End: 2023-11-02
Payer: MEDICARE

## 2023-11-02 RX ORDER — CODEINE PHOSPHATE AND GUAIFENESIN 10; 100 MG/5ML; MG/5ML
1-2 SOLUTION ORAL EVERY 4 HOURS PRN
Qty: 237 ML | Refills: 0 | Status: SHIPPED | OUTPATIENT
Start: 2023-11-02 | End: 2024-03-12

## 2023-11-02 NOTE — TELEPHONE ENCOUNTER
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    I returned call to Bushra and she states for the past 5 days both of her ears are feeling \"plugged\".  She states \" I googled plugged ears and it says this could be symptoms of giant cell arteritis and I do not want to lose my hearing\"     I explained that Dr. Nunez does not treat GSA and that she needs to see her PCP to take a look into her ears to see what is going on and that she may need to see a ENT doctor to try and figure out what is going on with her hearing complaints. She verbalized understanding and will call her PCP. Bushra denied any vision changes, headaches, neck pain or temporal tenderness.     Jerica ZHENG, JOHANNY    [facility]  [facility]  Office: [phone]  Fax: [phone]            "

## 2023-11-02 NOTE — TELEPHONE ENCOUNTER
Reason for Call:  Appointment Request    Patient requesting this type of appt: Chronic Diease Management/Medication/Follow-Up    Requested provider: Natalie Bro    Reason patient unable to be scheduled: Not within requested timeframe    When does patient want to be seen/preferred time: 3-7 days    Comments: Nurse advised her to return in 1 week to recheck her symptoms    Could we send this information to you in [facility] or would you prefer to receive a phone call?:   Patient would prefer a phone call   Okay to leave a detailed message?: Yes at Home number on file [phone] (home)    Call taken on 11/2/2023 at 8:04 AM by Jaki Mckeon PTA

## 2023-11-02 NOTE — TELEPHONE ENCOUNTER
Called patient. An appointment was made for 11-8-2023 for f/up. If not needed patient knows to call and cance 11-7-2023

## 2023-11-02 NOTE — TELEPHONE ENCOUNTER
[facility]    Who is the name of the provider?:  Dr Nunez    What is the location you see this provider at/preferred location?: [place]  Person calling / Facility: Bushra Harmon  Phone number:  [phone]  Nurse call back needed:  YES     Reason for call:  Patient called stating she has giant cell arteritis that she believes is causing some hearing loss. Patient would like to speak to nurse to discuss her general concerns of hearing loss to know if Dr Nunez is who she should see regarding this concern.    Pharmacy location:  n/a  Outside Imaging: n/a   Can we leave a detailed message on this number?  Yes

## 2023-11-03 ENCOUNTER — TELEPHONE (OUTPATIENT)
Dept: INTERNAL MEDICINE | Facility: CLINIC | Age: 88
End: 2023-11-03
Payer: MEDICARE

## 2023-11-03 NOTE — TELEPHONE ENCOUNTER
Usually take z pack for 5 days   Wait 5 days and if not better may repeat z pack  If she feels she is worsening and not improving would need to be seen - in urgent care or ER as end of week   But sounds like it is just not entirely improved by notes so I would suggest she suazo as above for z pack

## 2023-11-03 NOTE — TELEPHONE ENCOUNTER
"S-(situation): Patient calls, has been sick for a few days. Is wondering if she should start her additional Z pack.     B-(background): Just finished taking Azithromycin pack. Has another Zpack available to take.   States she still has a \"hacking cough\", from her being sick and is wondering if she should start taking the 2nd Zpack. Patient states she always has a Zpack available to her, by her Pulmonologist.  Patient is continuing to take Robitussin with Codeine.   Pt also taking Bactrim, prescribed by Rheumatologist and Prednisone from Pulmonologist.  A-(assessment): Patient continues to be have illness, see previous notes.    R-(recommendations): Advised not to start taking 2nd Zpack as the first Zpack is still working in her system, without recommendations by either PCP or Pulmonologist. Advised azithromycin is active in people after taking for upto 15 days.    Will forward to PCP for further recommendations.  "

## 2023-11-08 ENCOUNTER — ANCILLARY PROCEDURE (OUTPATIENT)
Dept: GENERAL RADIOLOGY | Facility: CLINIC | Age: 88
End: 2023-11-08
Payer: MEDICARE

## 2023-11-08 ENCOUNTER — OFFICE VISIT (OUTPATIENT)
Dept: INTERNAL MEDICINE | Facility: CLINIC | Age: 88
End: 2023-11-08
Payer: MEDICARE

## 2023-11-08 VITALS
HEIGHT: 64 IN | HEART RATE: 88 BPM | WEIGHT: 175 LBS | BODY MASS INDEX: 29.88 KG/M2 | DIASTOLIC BLOOD PRESSURE: 94 MMHG | RESPIRATION RATE: 20 BRPM | TEMPERATURE: 98.1 F | OXYGEN SATURATION: 96 % | SYSTOLIC BLOOD PRESSURE: 186 MMHG

## 2023-11-08 DIAGNOSIS — R05.1 ACUTE COUGH: Primary | ICD-10-CM

## 2023-11-08 DIAGNOSIS — R05.1 ACUTE COUGH: ICD-10-CM

## 2023-11-08 LAB
BASOPHILS # BLD AUTO: 0 10E3/UL (ref 0–0.2)
BASOPHILS NFR BLD AUTO: 0 %
EOSINOPHIL # BLD AUTO: 0 10E3/UL (ref 0–0.7)
EOSINOPHIL NFR BLD AUTO: 0 %
ERYTHROCYTE [DISTWIDTH] IN BLOOD BY AUTOMATED COUNT: 15.6 % (ref 10–15)
HCT VFR BLD AUTO: 41.4 % (ref 35–47)
HGB BLD-MCNC: 13.9 G/DL (ref 11.7–15.7)
IMM GRANULOCYTES # BLD: 0.1 10E3/UL
IMM GRANULOCYTES NFR BLD: 1 %
LYMPHOCYTES # BLD AUTO: 1.1 10E3/UL (ref 0.8–5.3)
LYMPHOCYTES NFR BLD AUTO: 12 %
MCH RBC QN AUTO: 31.2 PG (ref 26.5–33)
MCHC RBC AUTO-ENTMCNC: 33.6 G/DL (ref 31.5–36.5)
MCV RBC AUTO: 93 FL (ref 78–100)
MONOCYTES # BLD AUTO: 0.4 10E3/UL (ref 0–1.3)
MONOCYTES NFR BLD AUTO: 4 %
NEUTROPHILS # BLD AUTO: 7.8 10E3/UL (ref 1.6–8.3)
NEUTROPHILS NFR BLD AUTO: 83 %
PLATELET # BLD AUTO: 338 10E3/UL (ref 150–450)
RBC # BLD AUTO: 4.46 10E6/UL (ref 3.8–5.2)
WBC # BLD AUTO: 9.4 10E3/UL (ref 4–11)

## 2023-11-08 PROCEDURE — 99214 OFFICE O/P EST MOD 30 MIN: CPT

## 2023-11-08 PROCEDURE — 85025 COMPLETE CBC W/AUTO DIFF WBC: CPT

## 2023-11-08 PROCEDURE — 71046 X-RAY EXAM CHEST 2 VIEWS: CPT | Mod: TC | Performed by: RADIOLOGY

## 2023-11-08 PROCEDURE — 36415 COLL VENOUS BLD VENIPUNCTURE: CPT

## 2023-11-08 RX ORDER — RESPIRATORY SYNCYTIAL VIRUS VACCINE 120MCG/0.5
0.5 KIT INTRAMUSCULAR ONCE
Qty: 1 EACH | Refills: 0 | Status: CANCELLED | OUTPATIENT
Start: 2023-11-08 | End: 2023-11-08

## 2023-11-08 RX ORDER — ALBUTEROL SULFATE 0.83 MG/ML
2.5 SOLUTION RESPIRATORY (INHALATION) EVERY 6 HOURS PRN
COMMUNITY
Start: 2023-11-08

## 2023-11-08 RX ORDER — SULFAMETHOXAZOLE/TRIMETHOPRIM 800-160 MG
TABLET ORAL
COMMUNITY
Start: 2023-10-16 | End: 2024-03-12

## 2023-11-08 ASSESSMENT — ENCOUNTER SYMPTOMS: COUGH: 1

## 2023-11-08 NOTE — PROGRESS NOTES
Assessment & Plan     (R05.1) Acute cough  (primary encounter diagnosis)  Comment: Patient presents to the clinic with an acute cough.  She recently has taken 5 days of azithromycin and is also on prednisone 30 mg every single day.  The patient has had a cough for 1 week with frequent coughing fits.  On clinical exam the patient is found to have bilateral wheezing throughout.  Today we will get a chest x-ray and a white blood cell count to ensure she does not have an infection that would interfere with her infusions to treat her giant cell.  Patient does have a history of pleural effusions.  Plan: XR Chest 2 Views, CBC with platelets and         differential        Imaging pending and labs pending.  We will send in antibiotic if necessary.      30 minutes spent by me on the date of the encounter doing chart review, review of test results, interpretation of tests, patient visit, documentation, and discussion with family            Jennifer Maeck, APRN CNP  [facility]    Obed Tomlinson is a 87 year old, presenting for the following health issues: Patient had COVID in April which then started with headaches since then. She was found to have giant cell and was on prednisone for quite awhile. She is on 30 mg everyday right now. Her taste comes and goes. Her hearing has been off and on. She has been needing to turn the TV volume up to 100. On Monday she is suppose to get infusion for the Giant cell. She is so anxious to get these infusion to get all of these symptoms under control. So today she needs to make sure she doesn't have infection. She would like a chest x-ray.   Cough      11/8/2023     1:45 PM   Additional Questions   Roomed by NORBERTO Appiah LPN   Accompanied by self         11/8/2023     1:45 PM   Patient Reported Additional Medications   Patient reports taking the following new medications none       Cough    History of Present Illness       Reason for visit:  Follow up    She eats  "2-3 servings of fruits and vegetables daily.She consumes 0 sweetened beverage(s) daily.She exercises with enough effort to increase her heart rate 10 to 19 minutes per day.  She exercises with enough effort to increase her heart rate 4 days per week.   She is taking medications regularly.                 Review of Systems   Respiratory:  Positive for cough.       Constitutional, HEENT, cardiovascular, pulmonary, gi and gu systems are negative, except as otherwise noted.      Objective    Pulse 88   Temp 98.1  F (36.7  C) (Oral)   Resp 20   Ht 1.626 m (5' 4\")   Wt 79.4 kg (175 lb)   LMP  (LMP Unknown)   SpO2 96%   Breastfeeding No   BMI 30.04 kg/m    Body mass index is 30.04 kg/m .  Physical Exam   GENERAL: healthy, alert and no distress  NECK: no adenopathy, no asymmetry, masses, or scars and thyroid normal to palpation  RESP: wheezes throughout lobes bilaterally.   CV: regular rate and rhythm, normal S1 S2, no S3 or S4, no murmur, click or rub, no peripheral edema and peripheral pulses strong  ABDOMEN: soft, nontender, no hepatosplenomegaly, no masses and bowel sounds normal  MS: no gross musculoskeletal defects noted, no edema    CXR - Reviewed and interpreted by Radiologist at this time                  "

## 2023-11-21 ENCOUNTER — TRANSFERRED RECORDS (OUTPATIENT)
Dept: HEALTH INFORMATION MANAGEMENT | Facility: CLINIC | Age: 88
End: 2023-11-21
Payer: MEDICARE

## 2023-11-21 LAB
ALT SERPL-CCNC: 43 IU/L (ref 5–35)
AST SERPL-CCNC: 31 U/L (ref 5–34)
CREATININE (EXTERNAL): 0.93 MG/DL (ref 0.5–1.3)
GFR ESTIMATED (EXTERNAL): 60.9 ML/MIN/1.73M2

## 2023-11-25 ENCOUNTER — MYC MEDICAL ADVICE (OUTPATIENT)
Dept: ENDOCRINOLOGY | Facility: CLINIC | Age: 88
End: 2023-11-25

## 2023-11-27 NOTE — TELEPHONE ENCOUNTER
Plan:  Discussed diagnosis, pathophysiology, management and treatment options of condition with pt.  Discussed bone density results with patient.  In the setting of severe osteoporosis recommend medication.  Previously normal PTH, calcium, creatinine labs.  Discussed medication options like Prolia and Evenity.  At this time she would like to follow-up with rheumatologist and cardiologist as scheduled.  She would like to read more about medication options  She will inform us if she is ready for medication and will follow-up at that time.

## 2023-11-27 NOTE — TELEPHONE ENCOUNTER
Recommend follow up appointment to discuss medications.  Please help schedule with me or [place].

## 2023-12-22 ENCOUNTER — APPOINTMENT (OUTPATIENT)
Dept: CT IMAGING | Facility: CLINIC | Age: 88
End: 2023-12-22
Attending: EMERGENCY MEDICINE
Payer: MEDICARE

## 2023-12-22 ENCOUNTER — HOSPITAL ENCOUNTER (EMERGENCY)
Facility: CLINIC | Age: 88
Discharge: HOME | End: 2023-12-22
Attending: EMERGENCY MEDICINE | Admitting: EMERGENCY MEDICINE
Payer: MEDICARE

## 2023-12-22 VITALS
TEMPERATURE: 98.1 F | OXYGEN SATURATION: 96 % | SYSTOLIC BLOOD PRESSURE: 205 MMHG | RESPIRATION RATE: 10 BRPM | DIASTOLIC BLOOD PRESSURE: 94 MMHG | HEART RATE: 102 BPM

## 2023-12-22 DIAGNOSIS — J20.9 ACUTE BRONCHITIS, UNSPECIFIED ORGANISM: ICD-10-CM

## 2023-12-22 LAB
ALBUMIN SERPL BCG-MCNC: 4.3 G/DL (ref 3.5–5.2)
ALBUMIN UR-MCNC: NEGATIVE MG/DL
ALP SERPL-CCNC: 32 U/L (ref 40–150)
ALT SERPL W P-5'-P-CCNC: 42 U/L (ref 0–50)
ANION GAP SERPL CALCULATED.3IONS-SCNC: 11 MMOL/L (ref 7–15)
APPEARANCE UR: CLEAR
AST SERPL W P-5'-P-CCNC: 28 U/L (ref 0–45)
BILIRUB DIRECT SERPL-MCNC: <0.2 MG/DL (ref 0–0.3)
BILIRUB SERPL-MCNC: 1 MG/DL
BILIRUB UR QL STRIP: NEGATIVE
BUN SERPL-MCNC: 32.1 MG/DL (ref 8–23)
CALCIUM SERPL-MCNC: 9.1 MG/DL (ref 8.8–10.2)
CHLORIDE SERPL-SCNC: 99 MMOL/L (ref 98–107)
COLOR UR AUTO: ABNORMAL
CREAT SERPL-MCNC: 0.96 MG/DL (ref 0.51–0.95)
CRP SERPL-MCNC: <3 MG/L
D DIMER PPP FEU-MCNC: 0.53 UG/ML FEU (ref 0–0.5)
DEPRECATED HCO3 PLAS-SCNC: 27 MMOL/L (ref 22–29)
EGFRCR SERPLBLD CKD-EPI 2021: 57 ML/MIN/1.73M2
ERYTHROCYTE [SEDIMENTATION RATE] IN BLOOD BY WESTERGREN METHOD: 6 MM/HR (ref 0–30)
FLUAV RNA SPEC QL NAA+PROBE: NEGATIVE
FLUBV RNA RESP QL NAA+PROBE: NEGATIVE
GLUCOSE BLDC GLUCOMTR-MCNC: 122 MG/DL (ref 70–99)
GLUCOSE SERPL-MCNC: 122 MG/DL (ref 70–99)
GLUCOSE UR STRIP-MCNC: NEGATIVE MG/DL
HGB UR QL STRIP: NEGATIVE
KETONES UR STRIP-MCNC: NEGATIVE MG/DL
LEUKOCYTE ESTERASE UR QL STRIP: NEGATIVE
NITRATE UR QL: NEGATIVE
NT-PROBNP SERPL-MCNC: 308 PG/ML (ref 0–1800)
PH UR STRIP: 7.5 [PH] (ref 5–7)
POTASSIUM SERPL-SCNC: 4.4 MMOL/L (ref 3.4–5.3)
PROCALCITONIN SERPL IA-MCNC: 0.04 NG/ML
PROT SERPL-MCNC: 6.4 G/DL (ref 6.4–8.3)
RBC URINE: <1 /HPF
RSV RNA SPEC NAA+PROBE: NEGATIVE
SARS-COV-2 RNA RESP QL NAA+PROBE: NEGATIVE
SODIUM SERPL-SCNC: 137 MMOL/L (ref 135–145)
SP GR UR STRIP: 1.02 (ref 1–1.03)
TROPONIN T SERPL HS-MCNC: 26 NG/L
TROPONIN T SERPL HS-MCNC: 30 NG/L
UROBILINOGEN UR STRIP-MCNC: NORMAL MG/DL
WBC URINE: 0 /HPF

## 2023-12-22 PROCEDURE — 250N000011 HC RX IP 250 OP 636: Performed by: EMERGENCY MEDICINE

## 2023-12-22 PROCEDURE — 85652 RBC SED RATE AUTOMATED: CPT | Performed by: EMERGENCY MEDICINE

## 2023-12-22 PROCEDURE — 81001 URINALYSIS AUTO W/SCOPE: CPT | Performed by: EMERGENCY MEDICINE

## 2023-12-22 PROCEDURE — 36415 COLL VENOUS BLD VENIPUNCTURE: CPT | Performed by: EMERGENCY MEDICINE

## 2023-12-22 PROCEDURE — 71275 CT ANGIOGRAPHY CHEST: CPT | Mod: MA

## 2023-12-22 PROCEDURE — 250N000009 HC RX 250: Performed by: EMERGENCY MEDICINE

## 2023-12-22 PROCEDURE — 83880 ASSAY OF NATRIURETIC PEPTIDE: CPT | Performed by: EMERGENCY MEDICINE

## 2023-12-22 PROCEDURE — 86140 C-REACTIVE PROTEIN: CPT | Performed by: EMERGENCY MEDICINE

## 2023-12-22 PROCEDURE — 87637 SARSCOV2&INF A&B&RSV AMP PRB: CPT | Performed by: EMERGENCY MEDICINE

## 2023-12-22 PROCEDURE — 99285 EMERGENCY DEPT VISIT HI MDM: CPT | Mod: 25

## 2023-12-22 PROCEDURE — 82962 GLUCOSE BLOOD TEST: CPT

## 2023-12-22 PROCEDURE — 70450 CT HEAD/BRAIN W/O DYE: CPT | Mod: MA

## 2023-12-22 PROCEDURE — 84145 PROCALCITONIN (PCT): CPT | Performed by: EMERGENCY MEDICINE

## 2023-12-22 PROCEDURE — 84484 ASSAY OF TROPONIN QUANT: CPT | Performed by: EMERGENCY MEDICINE

## 2023-12-22 PROCEDURE — 93005 ELECTROCARDIOGRAM TRACING: CPT

## 2023-12-22 PROCEDURE — 82248 BILIRUBIN DIRECT: CPT | Performed by: EMERGENCY MEDICINE

## 2023-12-22 PROCEDURE — 85379 FIBRIN DEGRADATION QUANT: CPT | Performed by: EMERGENCY MEDICINE

## 2023-12-22 RX ORDER — CEFUROXIME AXETIL 500 MG/1
500 TABLET ORAL 2 TIMES DAILY
Qty: 14 TABLET | Refills: 0 | Status: SHIPPED | OUTPATIENT
Start: 2023-12-22 | End: 2023-12-26

## 2023-12-22 RX ORDER — IOPAMIDOL 755 MG/ML
500 INJECTION, SOLUTION INTRAVASCULAR ONCE
Status: COMPLETED | OUTPATIENT
Start: 2023-12-22 | End: 2023-12-22

## 2023-12-22 RX ADMIN — IOPAMIDOL 62 ML: 755 INJECTION, SOLUTION INTRAVENOUS at 11:59

## 2023-12-22 RX ADMIN — SODIUM CHLORIDE 100 ML: 9 INJECTION, SOLUTION INTRAVENOUS at 12:00

## 2023-12-22 ASSESSMENT — ACTIVITIES OF DAILY LIVING (ADL)
ADLS_ACUITY_SCORE: 35
ADLS_ACUITY_SCORE: 35

## 2023-12-22 NOTE — DISCHARGE INSTRUCTIONS
Please return to the ED if you have worsening cough, fevers >101, chest pain, shortness of breath, intractable vomiting, or other acute changes.  Please follow up with your PCP in the next 2-3 days.      We will start antibiotics for better coverage         Discharge Instructions  Bronchitis, Pneumonia, Bronchospasm    You were seen today for a chest infection or inflammation. If your provider decided this was due to a bacterial infection, you may need an antibiotic. Sometimes these are caused by a virus, and then an antibiotic will not help.     Generally, every Emergency Department visit should have a follow-up clinic visit with either a primary or a specialty clinic/provider. Please follow-up as instructed by your emergency provider today.    Return to the Emergency Department if:  Your breathing gets much worse.  You are very weak, or feel much more ill.  You develop new symptoms, such as chest pain.  You cough up blood.  You are vomiting (throwing up) enough that you cannot keep fluids or your medicine down.    What can I do to help myself?  Fill any prescriptions the provider gave you and take them right away--especially antibiotics. Be sure to finish the whole antibiotic prescription.  You may be given a prescription for an inhaler, which can help loosen tight air passages.  Use this as needed, but not more often than directed. Inhalers work much better when used with a spacer.   You may be given a prescription for a steroid to reduce inflammation. Used long-term, these can have side effects, but for short-term use they are safe. You may notice restlessness or increased appetite.      You may use non-prescription cough or cold medicines. Cough medicines may help, but don t make the cough go away completely.   Avoid smoke, because this can make your symptoms worse. If you smoke, this may be a good time to quit! Consider using nicotine lozenges, gum, or patches to reduce cravings.   If you have a fever,  Tylenol  (acetaminophen), Motrin  (ibuprofen), or Advil  (ibuprofen) may help bring fever down and may help you feel more comfortable. Be sure to read and follow the package directions, and ask your provider if you have questions.  Be sure to get your flu shot each year.  For certain ages, the pneumonia shot can help prevent pneumonia.  If you were given a prescription for medicine here today, be sure to read all of the information (including the package insert) that comes with your prescription.  This will include important information about the medicine, its side effects, and any warnings that you need to know about.  The pharmacist who fills the prescription can provide more information and answer questions you may have about the medicine.  If you have questions or concerns that the pharmacist cannot address, please call or return to the Emergency Department.     Remember that you can always come back to the Emergency Department if you are not able to see your regular provider in the amount of time listed above, if you get any new symptoms, or if there is anything that worries you.

## 2023-12-22 NOTE — ED TRIAGE NOTES
"Pt reports that she has been having cough, SOB, and sinus pressure for the last few days. Pt states that she has \"hearing loss on both ears now\" Pt BEFAST otherwise negative. Pt daughter with pt and reports that pt has been having worsening SOB with exertion as well. No CP        "

## 2023-12-22 NOTE — ED NOTES
Patient ambulated form 1331- 1333 with a pulse oximeter on room air and maintained saturations above 95%. Patient denies dizziness and SOB while ambulating. Patient ambulated back to bed and is hooked back up to monitors.

## 2023-12-22 NOTE — ED PROVIDER NOTES
History     Chief Complaint:  Cough and Shortness of Breath     HPI   Bushra Harmon is a 88 year old female with a history of giant-cell arthritis,  who presents to the emergency department for cough and shortness of breath. The patient states that on Friday, she began experiencing bilateral ear congestion that she has intermittently experiencing for a month. She reports that last night, she began experiencing shortness of breath while walking up stairs, productive cough, and pausing while talking to her daughter which is not normal. She notes that the patient's O2 sats were 88%. She adds that the patient is currently taking azithromycin, Z-pack, and Mucinex. She adds that she has a hx of COPD and giant-cell cancer. Denies facial droop, vision changes, or slurred speech. Denies chest pain or dyspnea. Denies fever, Denies nausea or vomiting. Denies hemoptysis. She states that she is still on 20 mg prednisone. She states that she has a hx of hypertension in which she took her anti-hypertensive medications this morning, as directed.    Independent Historian:   The patient and her daughter provided the history as noted above.     Review of External Notes:   I reviewed the patient's office visit from 11/08/23 in which she was seen for a cough.    Reviewed arthritis note from October 2023.  Reviewed labs from that time as well.  ESR was 11.  CRP 0.06.    Medications:    albuterol (PROVENTIL) (2.5 MG/3ML) 0.083% neb solution  carvedilol (COREG) 6.25 MG tablet  dextromethorphan-guaiFENesin (MUCINEX DM)  MG 12 hr tablet  doxylamine (UNISOM) 25 MG TABS tablet  guaiFENesin-codeine (ROBITUSSIN AC) 100-10 MG/5ML solution  hydrALAZINE (APRESOLINE) 25 MG tablet  LACTOBACILLUS PO  levothyroxine (SYNTHROID/LEVOTHROID) 88 MCG tablet  lisinopril (ZESTRIL) 40 MG tablet  Lutein 6 MG TABS  ondansetron (ZOFRAN ODT) 4 MG ODT tab  predniSONE (DELTASONE) 5 MG tablet  simvastatin (ZOCOR) 10 MG tablet  sulfamethoxazole-trimethoprim  (BACTRIM DS) 800-160 MG tablet    Past Medical History:    Osteoporosis  COPD  Hypertension  Hyperlipidemia  Hypothyroidism  PMR  CKD  Giant cell arthritis     Past Surgical History:    L hip arthroplasty  Cataract extraction  Tonsillectomy and adenoidectomy  Laparotomy   x3  Replantation of thumb     Physical Exam   Patient Vitals for the past 24 hrs:   BP Temp Temp src Pulse Resp SpO2   23 1445 (!) 205/94 -- -- 102 -- 96 %   23 1333 -- -- -- -- -- 95 %   23 1332 -- -- -- -- -- 97 %   23 1331 -- -- -- -- -- 96 %   23 1330 -- -- -- -- -- 95 %   23 1315 (!) 196/100 -- -- 84 -- --   23 1313 -- -- -- -- -- 95 %   23 1230 (!) 204/79 -- -- 75 -- 93 %   23 1131 -- -- -- -- 10 97 %   23 1130 (!) 199/97 -- -- 87 -- --   23 1049 (!) 204/96 -- -- 86 -- 98 %   23 1001 (!) 202/87 98.1  F (36.7  C) Temporal 94 26 97 %      Physical Exam  General: Resting on the bed.  Head: No obvious trauma to head.  Ears, Nose, Throat:  External ears normal.  Nose normal.    Eyes:  Conjunctivae clear.  Pupils are equal, round, and reactive.   Neck: Normal range of motion.  Neck supple.   CV: Regular rate and rhythm.  No murmurs.  2+ radial pulses     Respiratory: Effort normal and breath sounds normal.  No wheezing or crackles.   Gastrointestinal: Soft.  No distension. There is no tenderness.  There is no rigidity, no rebound and no guarding.   Musculoskeletal: mild pretibial edema   Neuro: Alert. Moving all extremities appropriately.  Normal speech.    Skin: Skin is warm and dry.  No rash noted.     Emergency Department Course   ECG  ECG taken at 1018, ECG read at 1021  Normal sinus rhythm  Nonspecific ST abnormality   No changes as compared to prior, dated 10/12/23.  Rate 86 bpm. MT interval 136 ms. QRS duration 66 ms. QT/QTc 338/404 ms. P-R-T axes 71 9 83.     Imaging:  CT Chest Pulmonary Embolism w Contrast   Final Result   IMPRESSION:   1.  No evidence for  pulmonary embolism.   2.  Interval increased bilateral peribronchial wall thickening that   may be a bronchitis. Increased bilateral patchy groundglass pulmonary   opacities and areas of sparing that could be related to bronchitis   versus an atypical pneumonia.   3.  Stable tiny nodule at the right lower lobe.      Recommendations for one or multiple incidental lung nodules < 6mm :     Low risk patients: No routine follow-up.     High risk patients: Optional follow-up CT at 12 months; if   unchanged, no further follow-up.      *Low Risk: Minimal or absent history of smoking or other known risk   factors.   *Nonsolid (ground glass) or partly solid nodules may require longer   follow-up to exclude indolent adenocarcinoma.   *Recommendations based on Guidelines for the Management of Incidental   Pulmonary Nodules Detected at CT: From the Fleischner Society 2017,   Radiology 2017.      MILENA WEINSTEIN MD            SYSTEM ID:  [id number]      CT Head w/o Contrast   Final Result   IMPRESSION:    1. No CT findings of intracranial process.   2. Generalized brain atrophy and presumed chronic small vessel   ischemic changes, as described.      VIKTOR SANCHEZ MD            SYSTEM ID:  [id number]         Read by radiologist.    Laboratory:  Labs Ordered and Resulted from Time of ED Arrival to Time of ED Departure   BASIC METABOLIC PANEL - Abnormal       Result Value    Sodium 137      Potassium 4.4      Chloride 99      Carbon Dioxide (CO2) 27      Anion Gap 11      Urea Nitrogen 32.1 (*)     Creatinine 0.96 (*)     GFR Estimate 57 (*)     Calcium 9.1      Glucose 122 (*)    TROPONIN T, HIGH SENSITIVITY - Abnormal    Troponin T, High Sensitivity 30 (*)    GLUCOSE BY METER - Abnormal    GLUCOSE BY METER POCT 122 (*)    ROUTINE UA WITH MICROSCOPIC REFLEX TO CULTURE - Abnormal    Color Urine Straw      Appearance Urine Clear      Glucose Urine Negative      Bilirubin Urine Negative      Ketones Urine Negative      Specific Gravity  Urine 1.017      Blood Urine Negative      pH Urine 7.5 (*)     Protein Albumin Urine Negative      Urobilinogen Urine Normal      Nitrite Urine Negative      Leukocyte Esterase Urine Negative      RBC Urine <1      WBC Urine 0     D DIMER QUANTITATIVE - Abnormal    D-Dimer Quantitative 0.53 (*)    HEPATIC FUNCTION PANEL - Abnormal    Protein Total 6.4      Albumin 4.3      Bilirubin Total 1.0      Alkaline Phosphatase 32 (*)     AST 28      ALT 42      Bilirubin Direct <0.20     TROPONIN T, HIGH SENSITIVITY - Abnormal    Troponin T, High Sensitivity 26 (*)    NT PROBNP INPATIENT - Normal    N terminal Pro BNP Inpatient 308     CRP INFLAMMATION - Normal    CRP Inflammation <3.00     ERYTHROCYTE SEDIMENTATION RATE AUTO - Normal    Erythrocyte Sedimentation Rate 6     PROCALCITONIN - Normal    Procalcitonin 0.04     CBC WITH PLATELETS AND DIFFERENTIAL      Procedures       Emergency Department Course & Assessments:     Interventions:  Medications   iopamidol (ISOVUE-370) solution 500 mL (62 mLs Intravenous $Given 23 1159)        Assessments:  1043 I obtained history and examined the patient as noted above.     Independent Interpretation (X-rays, CTs, rhythm strip):  No ICH on head CT     Consultations/Discussion of Management or Tests:  None        Social Determinants of Health affecting care:   None    Disposition:  The patient was discharged to home.     Impression & Plan      Medical Decision Makin-year-old female with history of COPD presents to the ER with ongoing cough.  Vitals are reassuring, patient is mildly hypertensive.  Patient reports that she has autonomic dysfunction and her blood pressure will improve when she is at home.  She is not concerned about her hypertension.  Remainder of vital signs are unrevealing.  Broad differential was pursued include not limited to pneumonia, pneumothorax, effusion, PE, ACS, arrhythmia, electrolyte, metabolic, renal dysfunction, COVID, influenza, RSV, etc.   CBC shows no leukocytosis or anemia.  BMP shows no acute electrolyte, metabolic or renal dysfunction other than a mild elevation in BUN and creatinine.  Patient has not ate or drink well today.  Considered CHF but BNP appears to be within normal range making CHF unlikely.  Considered PE and dimer is only marginally elevated but given concern for possible other pulmonary etiology plan for CT scan.  Fortunately CT shows no evidence of PE, there is evidence of bronchitis.  Patient recently treated with azithromycin and spoke with pharmacy plan to broaden with cefuroxime twice daily.  Head CT was negative for acute intracranial process.  Patient is alert, oriented, nonfocal neurologic exam.  I see no evidence of acute stroke on examination.  Urine sample is clear without evidence of infection.  LFTs are reassuring.  CRP and ESR normal, not concerned for giant cell flare.  I have recommended that she follow-up with her rheumatologist for ongoing management of her muffled hearing.  Considered severe sepsis or septic shock but Pro-Yosef is normal as well.  EKG was sinus rhythm, no acute ischemic changes.  Troponin downtrending from 30-26.  She has no chest pain or shortness of breath.  I suspect this is likely demand related to patient's hypertension as well as recent illness.  I have offered a third troponin, observation and additional workup but patient wishes to discharge at this time.  With shared decision making we will not do any further workup regarding elevated troponin given that this is only very marginally elevated and appears to be downtrending.  I suspicion for ACS arrhythmia is quite low, I think outpatient follow-up is appropriate.  Given that she was able to ambulate without desaturation.  She has normal work of breathing.  There is no evidence of COPD exacerbation at this time.  I think she is reasonable for outpatient management.  Will start oral antibiotics.  Return precautions were provided.  Patient was  discharged.    Diagnosis:    ICD-10-CM    1. Acute bronchitis, unspecified organism  J20.9            Discharge Medications:  Discharge Medication List as of 12/22/2023  2:43 PM        START taking these medications    Details   cefuroxime (CEFTIN) 500 MG tablet Take 1 tablet (500 mg) by mouth 2 times daily for 7 days, Disp-14 tablet, R-0, E-Prescribe            Scribe Disclosure:  I, Héctor Barber, am serving as a scribe at 11:44 AM on 12/22/2023 to document services personally performed by Tara Kimbrough MD based on my observations and the provider's statements to me.     12/22/2023   Tara Kimbrough MD Bennett, Jennifer L, MD  12/22/23 [id number]

## 2023-12-24 LAB
ATRIAL RATE - MUSE: 86 BPM
DIASTOLIC BLOOD PRESSURE - MUSE: NORMAL MMHG
INTERPRETATION ECG - MUSE: NORMAL
P AXIS - MUSE: 71 DEGREES
PR INTERVAL - MUSE: 136 MS
QRS DURATION - MUSE: 66 MS
QT - MUSE: 338 MS
QTC - MUSE: 404 MS
R AXIS - MUSE: 9 DEGREES
SYSTOLIC BLOOD PRESSURE - MUSE: NORMAL MMHG
T AXIS - MUSE: 83 DEGREES
VENTRICULAR RATE- MUSE: 86 BPM

## 2023-12-26 ENCOUNTER — OFFICE VISIT (OUTPATIENT)
Dept: INTERNAL MEDICINE | Facility: CLINIC | Age: 88
End: 2023-12-26
Payer: MEDICARE

## 2023-12-26 VITALS
TEMPERATURE: 97.9 F | RESPIRATION RATE: 18 BRPM | OXYGEN SATURATION: 94 % | WEIGHT: 175.2 LBS | BODY MASS INDEX: 29.91 KG/M2 | HEIGHT: 64 IN | SYSTOLIC BLOOD PRESSURE: 130 MMHG | HEART RATE: 108 BPM | DIASTOLIC BLOOD PRESSURE: 80 MMHG

## 2023-12-26 DIAGNOSIS — M31.6 GIANT CELL ARTERITIS (H): ICD-10-CM

## 2023-12-26 DIAGNOSIS — D84.821 IMMUNOCOMPROMISED STATE DUE TO DRUG THERAPY (H): ICD-10-CM

## 2023-12-26 DIAGNOSIS — J20.9 ACUTE BRONCHITIS, UNSPECIFIED ORGANISM: Primary | ICD-10-CM

## 2023-12-26 DIAGNOSIS — J44.9 CHRONIC OBSTRUCTIVE PULMONARY DISEASE, UNSPECIFIED COPD TYPE (H): ICD-10-CM

## 2023-12-26 DIAGNOSIS — Z79.899 IMMUNOCOMPROMISED STATE DUE TO DRUG THERAPY (H): ICD-10-CM

## 2023-12-26 DIAGNOSIS — R91.8 PULMONARY NODULES: ICD-10-CM

## 2023-12-26 PROCEDURE — 99214 OFFICE O/P EST MOD 30 MIN: CPT | Performed by: INTERNAL MEDICINE

## 2023-12-26 RX ORDER — CEFUROXIME AXETIL 500 MG/1
500 TABLET ORAL 2 TIMES DAILY
Qty: 14 TABLET | Refills: 0 | Status: SHIPPED | OUTPATIENT
Start: 2023-12-26 | End: 2024-03-12

## 2023-12-26 ASSESSMENT — PAIN SCALES - GENERAL: PAINLEVEL: NO PAIN (0)

## 2023-12-26 NOTE — PROGRESS NOTES
Dr Houston's note      Patient's instructions / PLAN:                                                        Plan:  Take the Cefuroxiome antibiotic for a silvana of 10 - 14 days  2. Use nebulizer 2 times a day at least  3. Continue the other meds, same doses for now.  4. May keep appointment with Jaki Souza on Jan 19 or may cancel few days before if you are improving   5. Schedule ANNUAL EXAM  after Aug 18, 2024          ASSESSMENT & PLAN:                                                      Mrs Harmon  is 87 y/o  woman, with PMHx significant for  HTN, CKD3, PMR - dr Russo, HLipidemia, hypothyroidism.  This fall the prednisone was stopped because her PMR was stable and a month later she developed giant cell arteritis.  Now she is on treatment for this with prednisone 30 mg daily, Actemra and Bactrim.  Since she was diagnosed with giant cell arteritis she feels very tired and achy.  She developed upper respiratory infection/bronchitis on December 15.  She does not feel much better, but she does not feel worse.  She is immunocompromised from giant cell arteritis treatment.  I explained her that she will feel tired for long time because of the upper respiratory infection and because of the giant cell arteritis.    She is immunocompromise,  she has an acute bronchitis/atypical pneumonia, she does not feel better, she does not feel worse.  I think the cefuroxime treatment should be longer than 7 days, as above    She developed postprandial hypotension in the last year.  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 therapy   Comment:   Plan: cefuroxime (CEFTIN) 500 MG tablet            (J44.9) Chronic obstructive pulmonary disease, unspecified COPD type (H)  Comment: Stable  Plan: Continue same meds, same doses for now     (R91.8) Pulmonary nodules  Comment: Stable compared with prior CT  Plan: Follow-up with pulmonology  as per prior arrangements    (M31.6) Giant cell arteritis (H)  Comment: Ongoing with symptoms  Plan: Follow-up with rheumatology    Chief complaint:                                                      F/up ER visit      SUBJECTIVE:                                                    History of present illness:      URI  -- symptoms started on Dec 15  -- still fatigue  -- still coughing, no wheezes  -- nose secrations are clear now   -- We went over the chest CT results, she was not aware about the findings    In ED on Dec 22    Giant cell arteritis  -- Actemra prednisone ( 20 mg now)  Bactrim   -- hx of PMR. Stopped Prednisone for a month then she developed giant cell arteritis in Sept    Postprandial hypotension       Chest CT: IMPRESSION:  1.  No evidence for pulmonary embolism.  2.  Interval increased bilateral peribronchial wall thickening that  may be a bronchitis. Increased bilateral patchy groundglass pulmonary  opacities and areas of sparing that could be related to bronchitis  versus an atypical pneumonia.  3.  Stable tiny nodule at the right lower lobe.     She was prescribed Cefuroxime     Nurse note:    Obed Tomlinson is a 88 year old, presenting for the following health issues:  Patient is being seen for an ER follow up.      12/26/2023    12:33 PM   Additional Questions   Roomed by Alysha Mendieta       [facility]  ED/UC Followup:    Facility:  [facility] Emergency Dept  Date of visit: 12-  Reason for visit: Acute bronchitis, unspecified organism   Current Status: Patient states that she is still fatigued and congested.      Review of Systems:                                                      ROS: negative for fever, chills, wheezes, chest pain, shortness of breath, vomiting, abdominal pain, leg swelling   Positive for cough         OBJECTIVE:             Physical exam:  Blood pressure 130/80, pulse 108, temperature 97.9  F (36.6  C), temperature source Tympanic, resp. rate 18, height  "1.626 m (5' 4\"), weight 79.5 kg (175 lb 3.2 oz), SpO2 94%, not currently breastfeeding.     NAD, appears comfortable  Skin: no rashes   Neck: supple, no JVD,  No thyroidmegaly. Lymph nodes nonpalpable cervical and supraclavicular.  Chest: clear to auscultation bilaterally, good respiratory effort  Heart: S1 S2, RRR, no mgr appreciated  Abdomen: soft, not tender,   Extremities: no edema,   Neurologic: A, Ox3, no focal signs appreciated    PMHx: reviewed  Past Medical History:   Diagnosis Date    Age related osteoporosis, unspecified pathological fracture presence 2022    Chronic obstructive pulmonary disease, unspecified COPD type (H) 2022    Essential hypertension, benign     Hyperlipidemia LDL goal <100 2011    Hypertension, unspecified type 3/14/2023    Hypothyroidism due to Hashimoto's thyroiditis 10/10/2022    PMR (polymyalgia rheumatica) (H)  -- rheumatologist -- dr Russo 8/3/2020    PMR (polymyalgia rheumatica) (H24)     Unspecified hypothyroidism       PSHx: reviewed  Past Surgical History:   Procedure Laterality Date    AS TOTAL HIP ARTHROPLASTY Left     C DEXA, BONE DENSITY, AXIAL SKEL  2003    Normal BMD    CATARACT EXTRACTION Left     COLONOSCOPY N/A 2015    Procedure: COLONOSCOPY;  Surgeon: Joanna Acevedo MD;  Location:  GI    HC REMOVE TONSILS/ADENOIDS,<11 Y/O      LAPAROTOMY EXPLORATORY  1985    Exploratory Laparotomy: constricting band around small bowel, simple lysis    SIGMOIDOSCOPY FLEXIBLE N/A 4/10/2023    Procedure: Sigmoidoscopy flexible without sedation;  Surgeon: Marina Gordon MD;  Location:  GI    SURGICAL HISTORY OF -   2004    macular hole repair- Left eye    [facility] NONSPECIFIC PROCEDURE       x3    [facility] REPLANTATION THUMB DISTAL,COMPLETE  2009    left thumb trauma; Dr. Jose surgery        Meds: reviewed  Current Outpatient Medications   Medication Sig Dispense Refill    acetaminophen (TYLENOL) 325 MG tablet Take 1-2 tablets (325-650 " mg) by mouth every 6 hours as needed for mild pain      albuterol (PROVENTIL) (2.5 MG/3ML) 0.083% neb solution Take 1 vial (2.5 mg) by nebulization every 6 hours as needed for shortness of breath, wheezing or cough      BIOTIN 5000 PO       blood glucose (NO BRAND SPECIFIED) test strip Use to test blood sugar 3 times daily or as directed. To accompany: Blood Glucose Monitor Brands: per insurance. 100 strip 0    blood glucose monitoring (NO BRAND SPECIFIED) meter device kit Use to test blood sugar 3 times daily or as directed. Preferred blood glucose meter OR supplies to accompany: Blood Glucose Monitor Brands: per insurance. 1 kit 0    CALCIUM CITRATE-VITAMIN D 315-200 MG-UNIT PO TABS 2 TABLETS DAILY 90 Tab 3    carvedilol (COREG) 6.25 MG tablet Take 6.25 mg by mouth 2 times daily (with meals) -- prescribed by nephrology 180 tablet 0    cefuroxime (CEFTIN) 500 MG tablet Take 1 tablet (500 mg) by mouth 2 times daily for 7 days 14 tablet 0    Cholecalciferol (VITAMIN D3) 25 MCG (1000 UT) CAPS Take 1 capsule by mouth daily      Cranberry 450 MG CAPS       cyanocobalamin (VITAMIN B-12) 2500 MCG SUBL sublingual tablet Place 2,500 mcg under the tongue daily      dextromethorphan-guaiFENesin (MUCINEX DM)  MG 12 hr tablet Take 1 tablet by mouth every 12 hours      doxylamine (UNISOM) 25 MG TABS tablet Take 1 tablet (25 mg) by mouth At Bedtime      Fluticasone-Umeclidin-Vilanterol (TRELEGY ELLIPTA) 100-62.5-25 MCG/INH oral inhaler Inhale 1 puff into the lungs daily      GLUCOSAMINE HCL 1000 MG PO TABS 2 TABLETS DAILY 90 Tab 3    guaiFENesin-codeine (ROBITUSSIN AC) 100-10 MG/5ML solution Take 5-10 mLs by mouth every 4 hours as needed for cough 237 mL 0    hydrALAZINE (APRESOLINE) 25 MG tablet Take 1 tablet by mouth 3 times daily      LACTOBACILLUS PO Take 120 mg by mouth daily      levothyroxine (SYNTHROID/LEVOTHROID) 88 MCG tablet Take 1 tablet (88 mcg) by mouth daily 90 tablet 3    lisinopril (ZESTRIL) 40 MG tablet  Take 1 tablet (40 mg) by mouth daily -- prescribed by nephrology      Lutein 6 MG TABS Take 20 mg by mouth daily       MULTIVITAMIN TABS   OR 1 daily      Omega-3 Fish Oil 500 MG capsule Take 1 capsule (500 mg) by mouth daily      polyethylene glycol (MIRALAX) 17 g packet Take 1 packet by mouth every 24 hours      predniSONE (DELTASONE) 5 MG tablet Take 15 mg by mouth 2 times daily      simvastatin (ZOCOR) 10 MG tablet TAKE 1 TABLET(10 MG) BY MOUTH AT BEDTIME 90 tablet 0    sulfamethoxazole-trimethoprim (BACTRIM DS) 800-160 MG tablet       thin (NO BRAND SPECIFIED) lancets Use with lanceting device. To accompany: Blood Glucose Monitor Brands: per insurance. 100 each 0       Soc Hx: reviewed  Fam Hx: reviewed      Chart documentation was completed, in part, with [employer] voice-recognition software. Even though reviewed, some grammatical, spelling, and word errors may remain.      Natalie Houston MD  Internal Medicine

## 2023-12-26 NOTE — PATIENT INSTRUCTIONS
Plan:  Take the Cefuroxiome antibiotic for a silvana of 10 - 14 days  2. Use nebulizer 2 times a day at least  3. Continue the other meds, same doses for now.  4. May keep appointment with Jaki Souza on Jan 19 or may cancel few days before if you are improving   5. Schedule ANNUAL EXAM  after Aug 18, 2024

## 2024-01-08 DIAGNOSIS — E78.5 HYPERLIPIDEMIA LDL GOAL <100: ICD-10-CM

## 2024-01-10 RX ORDER — SIMVASTATIN 10 MG
10 TABLET ORAL AT BEDTIME
Qty: 90 TABLET | Refills: 0 | Status: SHIPPED | OUTPATIENT
Start: 2024-01-10 | End: 2024-04-15

## 2024-01-22 ENCOUNTER — TRANSFERRED RECORDS (OUTPATIENT)
Dept: HEALTH INFORMATION MANAGEMENT | Facility: CLINIC | Age: 89
End: 2024-01-22
Payer: MEDICARE

## 2024-01-22 LAB
ALT SERPL-CCNC: 84 IU/L (ref 5–35)
AST SERPL-CCNC: 91 U/L (ref 5–34)
CREATININE (EXTERNAL): 0.94 MG/DL (ref 0.5–1.3)
CREATININE (EXTERNAL): 0.94 MG/DL (ref 0.5–1.3)

## 2024-02-19 ENCOUNTER — TRANSFERRED RECORDS (OUTPATIENT)
Dept: HEALTH INFORMATION MANAGEMENT | Facility: CLINIC | Age: 89
End: 2024-02-19
Payer: MEDICARE

## 2024-02-19 LAB
ALT SERPL-CCNC: 36 IU/L (ref 5–35)
AST SERPL-CCNC: 31 U/L (ref 5–34)

## 2024-03-04 NOTE — TELEPHONE ENCOUNTER
The patient was calling because she was a bit confused about the letter asking her to do labs prior to her follow up with Marina Reed on 3/12, the patient didn't understand why she needed to do labs if that appointment was about discussing the Prolia Injection, the patient decided that she wanted to scheduled a 6-12 month follow up with Dr. Marcos and do the labs 1 week prior to that follow up instead , please review an follow up with patient to make sure that's ok thank you.

## 2024-03-04 NOTE — TELEPHONE ENCOUNTER
Spoke to pt-- informed pt does not need labs. Clarified with pt if she is okay with seeing ruma or wanted to wait for . Pt states she wants to see Ruma to get started on a medication as  does not have anything till fall.

## 2024-03-12 ENCOUNTER — OFFICE VISIT (OUTPATIENT)
Dept: ENDOCRINOLOGY | Facility: CLINIC | Age: 89
End: 2024-03-12
Payer: MEDICARE

## 2024-03-12 VITALS
HEIGHT: 64 IN | OXYGEN SATURATION: 99 % | DIASTOLIC BLOOD PRESSURE: 90 MMHG | BODY MASS INDEX: 30.42 KG/M2 | HEART RATE: 80 BPM | SYSTOLIC BLOOD PRESSURE: 144 MMHG | RESPIRATION RATE: 18 BRPM | TEMPERATURE: 97.5 F | WEIGHT: 178.2 LBS

## 2024-03-12 DIAGNOSIS — M81.0 AGE RELATED OSTEOPOROSIS, UNSPECIFIED PATHOLOGICAL FRACTURE PRESENCE: Primary | ICD-10-CM

## 2024-03-12 PROCEDURE — 99213 OFFICE O/P EST LOW 20 MIN: CPT | Performed by: PHYSICIAN ASSISTANT

## 2024-03-12 NOTE — PROGRESS NOTES
Assessment/Plan :   Osteoporosis. Bushra is having second thoughts regarding osteoporosis therapy. She has looked over her recent DXA scan and compared to to previous, and she doesn't think that it looks that bad. Things seem to be stable from a few years ago. She is always worried about the possibility of adverse effects and she would prefer not to take unnecessary medications. We discussed her recent DXA scan and her high risk of fracture. We also discussed that the lumbar spinal reading is falsely elevated due to previous fractures. She states that she understands but she would like to get a repeat DXA scan in September before proceeding with therapy. I will place an order today and she will follow-up with Dr. Marcos in October for further discussion.      I have independently reviewed and interpreted labs, imaging as indicated.      Chief complaint:  Bushra is a 88 year old female who returns to our office to discuss osteoporosis and treatment options.    I have reviewed Care Everywhere including [facility], [facility], [facility], [facility], [place], [facility], [facility] , , [facility] lab reports, imaging reports and provider notes as indicated.      HISTORY OF PRESENT ILLNESS  Bushra states that last year, both her rheumatologist and Dr. Marcos, had recommended that she go forward with Prolia injections. She thought that the matter had been decided but the Prolia was not ordered. She was then told that she had a follow-up scheduled with me, to further discuss options. She has since, done more research and she would like to hold off on the injections.     Bushra states that she was told to start taking calcium after the birth of her daughter over 60 yrs ago. Bushra states that she has taken calcium every day, since that time. She attributes this with her good bone health. She recently printed off her most recent DXA scan and it looks like she has osteopenia, not osteoporosis. She also states that  her spine shows no evidence of bone loss.    Bushra does have a history of fracture but she feels like these fractures were due to trauma, not osteoporosis. She also tried alendronate in the past, but she could not tolerate the medication. She states that it immediately caused an upset stomach and she is not willing to try the medication, again.     Endocrine relevant labs are as follows:   Latest Reference Range & Units 23 11:17   TSH 0.30 - 4.20 uIU/mL 5.40 (H)   (H): Data is abnormally high   Latest Reference Range & Units 23 11:17   TSH 0.30 - 4.20 uIU/mL 5.40 (H)   (H): Data is abnormally high   Latest Reference Range & Units 23 11:17   T4 Free 0.90 - 1.70 ng/dL 1.62     Relevant imaging is as follows: (as read by me as it pertains to endocrine relevant organs)  Narrative & Impression   BONE DENSITOMETRY  [facility] [address] [place] [address]  2023      PATIENT: Bushra Harmon  CHART: [id number]   :  1935  AGE:  87 year old  SEX:  female   REFERRING PROVIDER:  Belinda Marcos MD      PROCEDURE:  Bone density scanning was performed using DXA technology of the lumbar spine and hip.  Scanning was performed on a Lunar Prodigy scanner.  Reporting is completed in the form of a T-score.  The T-score represents the standard deviation from peak bone mass based on a young healthy adult.     REFERENCE T-SCORES:       Normal                -1.0 and greater                                 Osteopenia         Between -1.0 and -2.5                                           Osteoporosis     -2.5 and less                                       RISK FACTORS:  Post-menopausal, Height loss 1.75 inches, Parent history of osteoporosis with a hip fracture, Long term corticosteroid treatment, Fracture of ankle, follow up Low Bone Density (osteopenia)     CURRENT TREATMENT:  Calcium, Vitamin D     FINDINGS:               Lumbar Spine (L1-L2)      T-score:   "1.4, marked degenerative changes present, most marked at L3,4, so only L1,2 are evaluated.                        Right Femoral Neck          T-score:  -1.2               Forearm (radius 33%)      T-score:  -0.9  The left femur is not acceptable for evaluation due to previous arthroplasty.                   Lumbar (L1-L2) BMD: 1.346  Previous: 1.305                        Right Total Hip BMD: 0.906  Previous: 0.895               Forearm (radius 33%) BMD: 0.800   Previous: 0.803     Comparison is made to another DXA performed on the same Lunar Prodigy  machine on 9/2/2021.       LATERAL VERTEBRAL ASSESSMENT  Procedure:  Vertebral fracture assessment was performed in the lateral decubitus position using a Lunar Prodigy  densitometer.  Indications for VFA: T-score of -1.0 or worse, age (female>69), and height loss > 1.5\"  Confounding factors for VFA: None.  The LVA scan is interpretable from T7 to L5.     VFA Findings: Using the semi-quantitative analysis of [place] there was evidence of spinal deformity as follows:  moderate (grade 2) biconcave fracture of L4. This is new compared to the previous study.   VFA Impression: Bushra Harmon has one vertebral fracture identified on the LVA.  If alternative etiologies for the presence of vertebral fractures are excluded, the diagnosis is consistent with severe osteoporosis.      IMPRESSION  Severe osteoporosis on the basis of vertebral fracture.  Degenerative changes at the lumbar spine may falsely elevate results.      There has been no significant change in bone density of the lumbar spine. There has been no significant change in bone density of the hip(s). There has been no significant change in bone density of the forearm.      Recommendations include ensuring adequate Calcium and Vitamin D.     The current NOF Guidelines recommend treatment for patients with prior hip or vertebral fracture, T-score -2.5 or below, or 10 year risk of any major osteoporotic fracture 20% or " "greater, or 10 year risk of hip fracture 3% or greater as calculated using the FRAX calculator (www.shef.ac.uk/FRAX or you can google \"FRAX\").    Based on these guidelines, treatment (in addition to calcium and vitamin D) is recommended for this patient, after ruling out other causes of osteoporosis.  This is meant as an aid to clinical decision making; one must still use clinical judgement.     Follow up can be considered in 2 years.   ___________________  Alvina Reyes M.D.  Electronically signed     REVIEW OF SYSTEMS    Endocrine: positive for thyroid disorder and osteoporosis  Skin: negative  Eyes: negative for, visual blurring, tearing, itching  Ears/Nose/Throat: negative  Respiratory: No shortness of breath, dyspnea on exertion, cough, or hemoptysis  Cardiovascular: negative for and chest pain  Gastrointestinal: negative for, heartburn, and dyspepsia  Genitourinary: negative for, dysuria, frequency, urgency, and hesitancy  Musculoskeletal: negative for, back pain, and muscular weakness  Neurologic: negative  Psychiatric: negative  Hematologic/Lymphatic/Immunologic: positive for long term use of corticosteroids    Past Medical History  Past Medical History:   Diagnosis Date    Age related osteoporosis, unspecified pathological fracture presence 4/5/2022    Chronic obstructive pulmonary disease, unspecified COPD type (H) 7/11/2022    Essential hypertension, benign     Hyperlipidemia LDL goal <100 5/18/2011    Hypertension, unspecified type 3/14/2023    Hypothyroidism due to Hashimoto's thyroiditis 10/10/2022    PMR (polymyalgia rheumatica) (H)  -- rheumatologist -- dr Russo 8/3/2020    PMR (polymyalgia rheumatica) (H24)     Unspecified hypothyroidism        Medications  Current Outpatient Medications   Medication Sig Dispense Refill    acetaminophen (TYLENOL) 325 MG tablet Take 1-2 tablets (325-650 mg) by mouth every 6 hours as needed for mild pain      albuterol (PROVENTIL) (2.5 MG/3ML) 0.083% neb solution Take " 1 vial (2.5 mg) by nebulization every 6 hours as needed for shortness of breath, wheezing or cough      BIOTIN 5000 PO       blood glucose (NO BRAND SPECIFIED) test strip Use to test blood sugar 3 times daily or as directed. To accompany: Blood Glucose Monitor Brands: per insurance. 100 strip 0    blood glucose monitoring (NO BRAND SPECIFIED) meter device kit Use to test blood sugar 3 times daily or as directed. Preferred blood glucose meter OR supplies to accompany: Blood Glucose Monitor Brands: per insurance. 1 kit 0    CALCIUM CITRATE-VITAMIN D 315-200 MG-UNIT PO TABS 2 TABLETS DAILY 90 Tab 3    carvedilol (COREG) 6.25 MG tablet Take 6.25 mg by mouth 2 times daily (with meals) -- prescribed by nephrology 180 tablet 0    Cholecalciferol (VITAMIN D3) 25 MCG (1000 UT) CAPS Take 1 capsule by mouth daily      Cranberry 450 MG CAPS       cyanocobalamin (VITAMIN B-12) 2500 MCG SUBL sublingual tablet Place 2,500 mcg under the tongue daily      dextromethorphan-guaiFENesin (MUCINEX DM)  MG 12 hr tablet Take 1 tablet by mouth every 12 hours      doxylamine (UNISOM) 25 MG TABS tablet Take 1 tablet (25 mg) by mouth At Bedtime      Fluticasone-Umeclidin-Vilanterol (TRELEGY ELLIPTA) 100-62.5-25 MCG/INH oral inhaler Inhale 1 puff into the lungs daily      GLUCOSAMINE HCL 1000 MG PO TABS 2 TABLETS DAILY 90 Tab 3    LACTOBACILLUS PO Take 120 mg by mouth daily      levothyroxine (SYNTHROID/LEVOTHROID) 88 MCG tablet Take 1 tablet (88 mcg) by mouth daily 90 tablet 3    lisinopril (ZESTRIL) 40 MG tablet Take 1 tablet (40 mg) by mouth daily -- prescribed by nephrology      Lutein 6 MG TABS Take 20 mg by mouth daily       MULTIVITAMIN TABS   OR 1 daily      Omega-3 Fish Oil 500 MG capsule Take 1 capsule (500 mg) by mouth daily      polyethylene glycol (MIRALAX) 17 g packet Take 1 packet by mouth every 24 hours      predniSONE (DELTASONE) 5 MG tablet Take 10 mg by mouth daily      simvastatin (ZOCOR) 10 MG tablet Take 1 tablet (10  "mg) by mouth at bedtime 90 tablet 0    thin (NO BRAND SPECIFIED) lancets Use with lanceting device. To accompany: Blood Glucose Monitor Brands: per insurance. 100 each 0       Allergies  Allergies   Allergen Reactions    No Known Drug Allergy          Family History  family history includes Breast Cancer (age of onset: 85) in her mother; Diabetes in her sister; Hypertension in her mother, sister, and son; Neurologic Disorder in her daughter; Thyroid Disease in her mother.    Social History  Social History     Tobacco Use    Smoking status: Former     Packs/day: 0.50     Years: 45.00     Additional pack years: 0.00     Total pack years: 22.50     Types: Cigarettes     Quit date: 2001     Years since quittin.8     Passive exposure: Past    Smokeless tobacco: Never   Vaping Use    Vaping Use: Never used   Substance Use Topics    Alcohol use: Not Currently    Drug use: No       Physical Exam  BP (!) 144/90 (BP Location: Left arm, Patient Position: Chair, Cuff Size: Adult Regular)   Pulse 80   Temp 97.5  F (36.4  C) (Tympanic)   Resp 18   Ht 1.626 m (5' 4.02\")   Wt 80.8 kg (178 lb 3.2 oz)   LMP  (LMP Unknown)   SpO2 99%   Breastfeeding No   BMI 30.57 kg/m    Body mass index is 30.57 kg/m .  GENERAL :  In no apparent distress  SKIN: Normal color, normal temperature, texture.  No hirsutism, alopecia or purple striae.     EYES: PERRL, EOMI, No scleral icterus,  No proptosis, conjunctival redness, stare, retraction  NEURO: awake, alert, responds appropriately to questions.  Cranial nerves intact.   Moves all extremities; Gait normal.  No tremor of the outstretched hand.    EXTREMITIES: No clubbing, cyanosis or edema.          "

## 2024-03-12 NOTE — LETTER
3/12/2024         RE: Bushra Harmon  [address]  [place] [address]        Dear Colleague,    Thank you for referring your patient, Bushra Harmon, to the [facility]. Please see a copy of my visit note below.    Assessment/Plan :   Osteoporosis. Bushra is having second thoughts regarding osteoporosis therapy. She has looked over her recent DXA scan and compared to to previous, and she doesn't think that it looks that bad. Things seem to be stable from a few years ago. She is always worried about the possibility of adverse effects and she would prefer not to take unnecessary medications. We discussed her recent DXA scan and her high risk of fracture. We also discussed that the lumbar spinal reading is falsely elevated due to previous fractures. She states that she understands but she would like to get a repeat DXA scan in September before proceeding with therapy. I will place an order today and she will follow-up with Dr. Marcos in October for further discussion.      I have independently reviewed and interpreted labs, imaging as indicated.      Chief complaint:  Bushra is a 88 year old female who returns to our office to discuss osteoporosis and treatment options.    I have reviewed Care Everywhere including [facility], [facility], [facility], [facility], [facility], [place], [facility], [facility] , [facility], [place] lab reports, imaging reports and provider notes as indicated.      HISTORY OF PRESENT ILLNESS  Bushra states that last year, both her rheumatologist and Dr. Marcos, had recommended that she go forward with Prolia injections. She thought that the matter had been decided but the Prolia was not ordered. She was then told that she had a follow-up scheduled with me, to further discuss options. She has since, done more research and she would like to hold off on the injections.     Bushra states that she was told to start taking calcium after the birth of her daughter over 60 yrs  ago. Bushra states that she has taken calcium every day, since that time. She attributes this with her good bone health. She recently printed off her most recent DXA scan and it looks like she has osteopenia, not osteoporosis. She also states that her spine shows no evidence of bone loss.    Bushra does have a history of fracture but she feels like these fractures were due to trauma, not osteoporosis. She also tried alendronate in the past, but she could not tolerate the medication. She states that it immediately caused an upset stomach and she is not willing to try the medication, again.     Endocrine relevant labs are as follows:   Latest Reference Range & Units 23 11:17   TSH 0.30 - 4.20 uIU/mL 5.40 (H)   (H): Data is abnormally high   Latest Reference Range & Units 23 11:17   TSH 0.30 - 4.20 uIU/mL 5.40 (H)   (H): Data is abnormally high   Latest Reference Range & Units 23 11:17   T4 Free 0.90 - 1.70 ng/dL 1.62     Relevant imaging is as follows: (as read by me as it pertains to endocrine relevant organs)  Narrative & Impression   BONE DENSITOMETRY  [facility] [address] [place] [address]  2023      PATIENT: Bushra Harmon  CHART: [id number]   :  1935  AGE:  87 year old  SEX:  female   REFERRING PROVIDER:  Belinda Marcos MD      PROCEDURE:  Bone density scanning was performed using DXA technology of the lumbar spine and hip.  Scanning was performed on a Lunar Prodigy scanner.  Reporting is completed in the form of a T-score.  The T-score represents the standard deviation from peak bone mass based on a young healthy adult.     REFERENCE T-SCORES:       Normal                -1.0 and greater                                 Osteopenia         Between -1.0 and -2.5                                           Osteoporosis     -2.5 and less                                       RISK FACTORS:  Post-menopausal, Height loss 1.75 inches, Parent  "history of osteoporosis with a hip fracture, Long term corticosteroid treatment, Fracture of ankle, follow up Low Bone Density (osteopenia)     CURRENT TREATMENT:  Calcium, Vitamin D     FINDINGS:               Lumbar Spine (L1-L2)      T-score:  1.4, marked degenerative changes present, most marked at L3,4, so only L1,2 are evaluated.                        Right Femoral Neck          T-score:  -1.2               Forearm (radius 33%)      T-score:  -0.9  The left femur is not acceptable for evaluation due to previous arthroplasty.                   Lumbar (L1-L2) BMD: 1.346  Previous: 1.305                        Right Total Hip BMD: 0.906  Previous: 0.895               Forearm (radius 33%) BMD: 0.800   Previous: 0.803     Comparison is made to another DXA performed on the same Lunar Prodigy  machine on 9/2/2021.       LATERAL VERTEBRAL ASSESSMENT  Procedure:  Vertebral fracture assessment was performed in the lateral decubitus position using a Lunar Prodigy  densitometer.  Indications for VFA: T-score of -1.0 or worse, age (female>69), and height loss > 1.5\"  Confounding factors for VFA: None.  The LVA scan is interpretable from T7 to L5.     VFA Findings: Using the semi-quantitative analysis of [employer] there was evidence of spinal deformity as follows:  moderate (grade 2) biconcave fracture of L4. This is new compared to the previous study.   VFA Impression: Bushra Harmon has one vertebral fracture identified on the LVA.  If alternative etiologies for the presence of vertebral fractures are excluded, the diagnosis is consistent with severe osteoporosis.      IMPRESSION  Severe osteoporosis on the basis of vertebral fracture.  Degenerative changes at the lumbar spine may falsely elevate results.      There has been no significant change in bone density of the lumbar spine. There has been no significant change in bone density of the hip(s). There has been no significant change in bone density of the forearm.    " "  Recommendations include ensuring adequate Calcium and Vitamin D.     The current NOF Guidelines recommend treatment for patients with prior hip or vertebral fracture, T-score -2.5 or below, or 10 year risk of any major osteoporotic fracture 20% or greater, or 10 year risk of hip fracture 3% or greater as calculated using the FRAX calculator (www.shef.ac.uk/FRAX or you can google \"FRAX\").    Based on these guidelines, treatment (in addition to calcium and vitamin D) is recommended for this patient, after ruling out other causes of osteoporosis.  This is meant as an aid to clinical decision making; one must still use clinical judgement.     Follow up can be considered in 2 years.   ___________________  Alvina Reyes M.D.  Electronically signed     REVIEW OF SYSTEMS    Endocrine: positive for thyroid disorder and osteoporosis  Skin: negative  Eyes: negative for, visual blurring, tearing, itching  Ears/Nose/Throat: negative  Respiratory: No shortness of breath, dyspnea on exertion, cough, or hemoptysis  Cardiovascular: negative for and chest pain  Gastrointestinal: negative for, heartburn, and dyspepsia  Genitourinary: negative for, dysuria, frequency, urgency, and hesitancy  Musculoskeletal: negative for, back pain, and muscular weakness  Neurologic: negative  Psychiatric: negative  Hematologic/Lymphatic/Immunologic: positive for long term use of corticosteroids    Past Medical History  Past Medical History:   Diagnosis Date     Age related osteoporosis, unspecified pathological fracture presence 4/5/2022     Chronic obstructive pulmonary disease, unspecified COPD type (H) 7/11/2022     Essential hypertension, benign      Hyperlipidemia LDL goal <100 5/18/2011     Hypertension, unspecified type 3/14/2023     Hypothyroidism due to Hashimoto's thyroiditis 10/10/2022     PMR (polymyalgia rheumatica) (H)  -- rheumatologist -- dr Russo 8/3/2020     PMR (polymyalgia rheumatica) (H24)      Unspecified hypothyroidism  "       Medications  Current Outpatient Medications   Medication Sig Dispense Refill     acetaminophen (TYLENOL) 325 MG tablet Take 1-2 tablets (325-650 mg) by mouth every 6 hours as needed for mild pain       albuterol (PROVENTIL) (2.5 MG/3ML) 0.083% neb solution Take 1 vial (2.5 mg) by nebulization every 6 hours as needed for shortness of breath, wheezing or cough       BIOTIN 5000 PO        blood glucose (NO BRAND SPECIFIED) test strip Use to test blood sugar 3 times daily or as directed. To accompany: Blood Glucose Monitor Brands: per insurance. 100 strip 0     blood glucose monitoring (NO BRAND SPECIFIED) meter device kit Use to test blood sugar 3 times daily or as directed. Preferred blood glucose meter OR supplies to accompany: Blood Glucose Monitor Brands: per insurance. 1 kit 0     CALCIUM CITRATE-VITAMIN D 315-200 MG-UNIT PO TABS 2 TABLETS DAILY 90 Tab 3     carvedilol (COREG) 6.25 MG tablet Take 6.25 mg by mouth 2 times daily (with meals) -- prescribed by nephrology 180 tablet 0     Cholecalciferol (VITAMIN D3) 25 MCG (1000 UT) CAPS Take 1 capsule by mouth daily       Cranberry 450 MG CAPS        cyanocobalamin (VITAMIN B-12) 2500 MCG SUBL sublingual tablet Place 2,500 mcg under the tongue daily       dextromethorphan-guaiFENesin (MUCINEX DM)  MG 12 hr tablet Take 1 tablet by mouth every 12 hours       doxylamine (UNISOM) 25 MG TABS tablet Take 1 tablet (25 mg) by mouth At Bedtime       Fluticasone-Umeclidin-Vilanterol (TRELEGY ELLIPTA) 100-62.5-25 MCG/INH oral inhaler Inhale 1 puff into the lungs daily       GLUCOSAMINE HCL 1000 MG PO TABS 2 TABLETS DAILY 90 Tab 3     LACTOBACILLUS PO Take 120 mg by mouth daily       levothyroxine (SYNTHROID/LEVOTHROID) 88 MCG tablet Take 1 tablet (88 mcg) by mouth daily 90 tablet 3     lisinopril (ZESTRIL) 40 MG tablet Take 1 tablet (40 mg) by mouth daily -- prescribed by nephrology       Lutein 6 MG TABS Take 20 mg by mouth daily        MULTIVITAMIN TABS   OR 1  "daily       Omega-3 Fish Oil 500 MG capsule Take 1 capsule (500 mg) by mouth daily       polyethylene glycol (MIRALAX) 17 g packet Take 1 packet by mouth every 24 hours       predniSONE (DELTASONE) 5 MG tablet Take 10 mg by mouth daily       simvastatin (ZOCOR) 10 MG tablet Take 1 tablet (10 mg) by mouth at bedtime 90 tablet 0     thin (NO BRAND SPECIFIED) lancets Use with lanceting device. To accompany: Blood Glucose Monitor Brands: per insurance. 100 each 0       Allergies  Allergies   Allergen Reactions     No Known Drug Allergy          Family History  family history includes Breast Cancer (age of onset: 85) in her mother; Diabetes in her sister; Hypertension in her mother, sister, and son; Neurologic Disorder in her daughter; Thyroid Disease in her mother.    Social History  Social History     Tobacco Use     Smoking status: Former     Packs/day: 0.50     Years: 45.00     Additional pack years: 0.00     Total pack years: 22.50     Types: Cigarettes     Quit date: 2001     Years since quittin.8     Passive exposure: Past     Smokeless tobacco: Never   Vaping Use     Vaping Use: Never used   Substance Use Topics     Alcohol use: Not Currently     Drug use: No       Physical Exam  BP (!) 144/90 (BP Location: Left arm, Patient Position: Chair, Cuff Size: Adult Regular)   Pulse 80   Temp 97.5  F (36.4  C) (Tympanic)   Resp 18   Ht 1.626 m (5' 4.02\")   Wt 80.8 kg (178 lb 3.2 oz)   LMP  (LMP Unknown)   SpO2 99%   Breastfeeding No   BMI 30.57 kg/m    Body mass index is 30.57 kg/m .  GENERAL :  In no apparent distress  SKIN: Normal color, normal temperature, texture.  No hirsutism, alopecia or purple striae.     EYES: PERRL, EOMI, No scleral icterus,  No proptosis, conjunctival redness, stare, retraction  NEURO: awake, alert, responds appropriately to questions.  Cranial nerves intact.   Moves all extremities; Gait normal.  No tremor of the outstretched hand.    EXTREMITIES: No clubbing, cyanosis or " edema.            Again, thank you for allowing me to participate in the care of your patient.        Sincerely,        Marina Reed PA-C

## 2024-03-12 NOTE — PATIENT INSTRUCTIONS
[facility]  Dr Marcos, Endocrinology Department    [address] [id number]  [place] [address]  Appointment [phone]  Fax: [phone]  [place]: Monday - Thursday

## 2024-04-14 DIAGNOSIS — E78.5 HYPERLIPIDEMIA LDL GOAL <100: ICD-10-CM

## 2024-04-15 RX ORDER — SIMVASTATIN 10 MG
10 TABLET ORAL AT BEDTIME
Qty: 90 TABLET | Refills: 0 | Status: SHIPPED | OUTPATIENT
Start: 2024-04-15 | End: 2024-07-02

## 2024-04-19 ENCOUNTER — TRANSFERRED RECORDS (OUTPATIENT)
Dept: HEALTH INFORMATION MANAGEMENT | Facility: CLINIC | Age: 89
End: 2024-04-19
Payer: MEDICARE

## 2024-04-19 LAB
ALT SERPL-CCNC: 35 IU/L (ref 5–35)
AST SERPL-CCNC: 34 U/L (ref 5–34)
CREATININE (EXTERNAL): 0.87 MG/DL (ref 0.5–1.3)

## 2024-05-16 ENCOUNTER — NURSE TRIAGE (OUTPATIENT)
Dept: NURSING | Facility: CLINIC | Age: 89
End: 2024-05-16
Payer: MEDICARE

## 2024-05-16 ENCOUNTER — TELEPHONE (OUTPATIENT)
Dept: INTERNAL MEDICINE | Facility: CLINIC | Age: 89
End: 2024-05-16
Payer: MEDICARE

## 2024-05-16 NOTE — TELEPHONE ENCOUNTER
Triage Call:     Pt calling to report that she took a double dose of her Levothyroxine this morning accidentally   She is not having any adverse sx to report  She is wondering if she should take her dose tomorrow or skip a day    Disposition: Call transferred to PCP now. Clinic is open. Caller was transferred to the TC line at the clinic to discuss this with her medical team now per protocol.     Reason for Disposition   DOUBLE DOSE (an extra dose or lesser amount) of prescription drug and NO symptoms  (Exception: A double dose of antibiotics.)    Additional Information   Negative: Intentional drug overdose and suicidal thoughts or ideas   Negative: MORE THAN A DOUBLE DOSE of a prescription or over-the-counter (OTC) drug   Negative: DOUBLE DOSE (an extra dose or lesser amount) of prescription drug and any symptoms (e.g., dizziness, nausea, pain, sleepiness)   Negative: DOUBLE DOSE (an extra dose or lesser amount) of over-the-counter (OTC) drug and any symptoms (e.g., dizziness, nausea, pain, sleepiness)   Negative: Took another person's prescription drug    Protocols used: Medication Question Call-A-OH  Milagro Gonzalez RN  [facility] Nurse Advisor 8:09 AM 5/16/2024

## 2024-05-16 NOTE — TELEPHONE ENCOUNTER
Patient is calling to report that she accidentally took 2 doses of Levothyroxine this morning and is wondering if she should skip her dose tomorrow. Patient is on Levothyroxine 88 mcg tab once daily.     Routing to provider for advise.

## 2024-05-16 NOTE — TELEPHONE ENCOUNTER
Taking an extra dose of levothyroxine by accident has low risk of side effects.  Encourage patient to drink enough water.  If the patient does develop symptoms of racing heartbeat or chest pain, needs to follow-up at the nearest ED.  Can continue with the medication regularly.

## 2024-05-16 NOTE — TELEPHONE ENCOUNTER
"Patient states that she was advised to have DEXA scan done before her appointment with Dr. Marcos on October. Patient is wondering if it has been ordered and scheduled yet.    Per Marina Reed PA-C Office Visit notes on 03/12/2024, \"patient would like to get a repeat DEXA scan in September before proceeding with therapy. I will place an order today and she will follow-up with Dr. Marcos in October for further discussion.\" Record shows no order and appointment for DEXA scan.     Routing to providers and care team for review.   Can call patient at [phone].    "

## 2024-05-16 NOTE — TELEPHONE ENCOUNTER
MC sent to pt    Order was placed on 3/12/24- Pt will need to discuss with Insurance as Medicare will normally only pay for 1 every 2 years, unless after 1 year change of therapy. . Pt's last DXA was 9/6/2023.

## 2024-06-04 ENCOUNTER — TELEPHONE (OUTPATIENT)
Dept: CARDIOLOGY | Facility: CLINIC | Age: 89
End: 2024-06-04
Payer: MEDICARE

## 2024-06-04 NOTE — TELEPHONE ENCOUNTER
Writer heard back from Bushra, states that she forgot to tell us about the swelling in her LE.      Writer has discontinued the Carvedilol from her med list, she has not taken for at least 2 months.    Her BP continues to be low even with the stopping of the med.   She states she just gets so tired after eating, many times she has to lay down.  Her BP is often 40 or 50 diastolic.      Bushra states that her legs are normal in the morning and gradually get swollen throughout the day.  She has not worn any compression stockings and does not think she can get them on.   She is in agreement with putting her legs up higher than her heart for 30 minutes per day to start with.  She will send us an update.    Lelo Cronin RN on 6/4/2024 at 2:55 PM

## 2024-06-05 ENCOUNTER — TELEPHONE (OUTPATIENT)
Dept: INTERNAL MEDICINE | Facility: CLINIC | Age: 89
End: 2024-06-05
Payer: MEDICARE

## 2024-06-05 NOTE — TELEPHONE ENCOUNTER
Reason for Call:  Appointment Request    Patient requesting this type of appt:  has an irregular heart beat. Spoke with Cardiologist and was told to follow up with PCP first    Requested provider: Natalie Bro    Reason patient unable to be scheduled:  no appt available     When does patient want to be seen/preferred time: 1-2 weeks    Comments: mid morning is preferred    Could we send this information to you in [facility] or would you prefer to receive a phone call?:   Patient would prefer a phone call   Okay to leave a detailed message?: Yes at Cell number on file:    Telephone Information:   Mobile [phone]       Call taken on 6/5/2024 at 10:19 AM by Rosy Hernandez

## 2024-06-17 ENCOUNTER — OFFICE VISIT (OUTPATIENT)
Dept: INTERNAL MEDICINE | Facility: CLINIC | Age: 89
End: 2024-06-17
Payer: MEDICARE

## 2024-06-17 VITALS
TEMPERATURE: 97.6 F | WEIGHT: 181.1 LBS | HEART RATE: 104 BPM | SYSTOLIC BLOOD PRESSURE: 180 MMHG | OXYGEN SATURATION: 94 % | BODY MASS INDEX: 30.92 KG/M2 | HEIGHT: 64 IN | DIASTOLIC BLOOD PRESSURE: 62 MMHG | RESPIRATION RATE: 18 BRPM

## 2024-06-17 DIAGNOSIS — I10 HTN, GOAL BELOW 150/90: ICD-10-CM

## 2024-06-17 DIAGNOSIS — I49.9 IRREGULAR HEART RATE: Primary | ICD-10-CM

## 2024-06-17 DIAGNOSIS — H91.93 DECREASED HEARING OF BOTH EARS: ICD-10-CM

## 2024-06-17 DIAGNOSIS — M31.5 GIANT CELL ARTERITIS WITH POLYMYALGIA RHEUMATICA (H): ICD-10-CM

## 2024-06-17 PROCEDURE — 99214 OFFICE O/P EST MOD 30 MIN: CPT | Performed by: INTERNAL MEDICINE

## 2024-06-17 ASSESSMENT — PAIN SCALES - GENERAL: PAINLEVEL: NO PAIN (0)

## 2024-06-17 NOTE — PROGRESS NOTES
Dr Houston's note      Patient's instructions / PLAN:                                                        Plan:  Continue same meds, same doses for now   2. Heart monitor -- will be mailed out       ASSESSMENT & PLAN:                                                      (I49.9) Irregular heart rate  (primary encounter diagnosis)  Comment: asympt   Plan: ZIO PATCH MAIL OUT            (M31.5) Giant cell arteritis + PMR -- f/up w rheum - dr Russo (H)  Comment: stable   Plan: prednisone    (I10) HTN, goal below 150/90  Comment: labile,   stable w the present tx  Plan:  Continue same meds, same doses for now     (H91.93) Decreased hearing of both ears  Comment: no wax  Plan:          Chief complaint:                                                      Irreg heart    SUBJECTIVE:                                                    History of present illness:    HTN  -- she sees nephrology, dr Mcneal  -- fluctuating  -- checks it daily: 168/74, 148/69, 147/59, 144/69, 116/55, P 79 - 86  -- in the last week the BP monitor shows frequently irrg heart. She has no symptoms    Decreased hearing  -- no wax     Cardio OV 10/12/2023: -- reviewed and Discussed  ( she had questions)    Nephrology OV: Jan 2024 -- -- reviewed and Discussed  ( she had questions)    GCA / PMR -- stable     Subjective   Bushra is a 88 year old, presenting for the following health issues:  Follow Up (Patient is being seen for an follow for irregular heart beat per cardiologist.)      6/17/2024     9:36 AM   Additional Questions   Roomed by Alysha Mendieta     HPI       Diabetes Follow-up    How often are you checking your blood sugar? Not at all  What concerns do you have today about your diabetes? None   Do you have any of these symptoms? (Select all that apply)  No numbness or tingling in feet.  No redness, sores or blisters on feet.  No complaints of excessive thirst.  No reports of blurry vision.  No significant changes to  "weight.          Hyperlipidemia Follow-Up    Are you regularly taking any medication or supplement to lower your cholesterol?   Yes- Simvastatin  Are you having muscle aches or other side effects that you think could be caused by your cholesterol lowering medication?  No    Hypertension Follow-up    Do you check your blood pressure regularly outside of the clinic? Yes   Are you following a low salt diet? Yes  Are your blood pressures ever more than 140 on the top number (systolic) OR more   than 90 on the bottom number (diastolic), for example 140/90? Yes    BP Readings from Last 2 Encounters:   06/17/24 (!) 180/62   03/12/24 (!) 144/90     Hemoglobin A1C (%)   Date Value   05/07/2009 5.8     LDL Cholesterol Calculated (mg/dL)   Date Value   07/11/2022 89   06/07/2021 76   04/20/2020 92     How many servings of fruits and vegetables do you eat daily?  2-3  On average, how many sweetened beverages do you drink each day (Examples: soda, juice, sweet tea, etc.  Do NOT count diet or artificially sweetened beverages)?   1  How many days per week do you exercise enough to make your heart beat faster? 3 or less  How many minutes a day do you exercise enough to make your heart beat faster? 9 or less  How many days per week do you miss taking your medication? 0          Review of Systems:                                                      ROS: negative for fever, chills, cough, wheezes, chest pain, shortness of breath, vomiting, abdominal pain, leg swelling       OBJECTIVE:             Physical exam:  Blood pressure (!) 180/62, pulse 104, temperature 97.6  F (36.4  C), temperature source Tympanic, resp. rate 18, height 1.626 m (5' 4.02\"), weight 82.1 kg (181 lb 1.6 oz), SpO2 94%, not currently breastfeeding.     NAD, appears comfortable  Skin: no rashes   HEENT: bilateral tympanic membranes are clinically normal,   Neck: supple, no JVD,  No thyroidmegaly. Lymph nodes nonpalpable cervical and supraclavicular.  Chest: clear " to auscultation bilaterally, good respiratory effort  Heart: S1 S2, RRR, no mgr appreciated  Abdomen: soft, not tender,   Extremities: no edema,   Neurologic: A, Ox3, no focal signs appreciated    PMHx: reviewed  Past Medical History:   Diagnosis Date    Age related osteoporosis, unspecified pathological fracture presence 2022    Chronic obstructive pulmonary disease, unspecified COPD type (H) 2022    Essential hypertension, benign     Hyperlipidemia LDL goal <100 2011    Hypertension, unspecified type 3/14/2023    Hypothyroidism due to Hashimoto's thyroiditis 10/10/2022    PMR (polymyalgia rheumatica) (H)  -- rheumatologist -- dr Russo 8/3/2020    PMR (polymyalgia rheumatica) (H24)     Unspecified hypothyroidism       PSHx: reviewed  Past Surgical History:   Procedure Laterality Date    AS TOTAL HIP ARTHROPLASTY Left     C DEXA, BONE DENSITY, AXIAL SKEL  2003    Normal BMD    CATARACT EXTRACTION Left     COLONOSCOPY N/A 2015    Procedure: COLONOSCOPY;  Surgeon: Joanna Acevedo MD;  Location:  GI    HC REMOVE TONSILS/ADENOIDS,<13 Y/O      LAPAROTOMY EXPLORATORY  1985    Exploratory Laparotomy: constricting band around small bowel, simple lysis    SIGMOIDOSCOPY FLEXIBLE N/A 4/10/2023    Procedure: Sigmoidoscopy flexible without sedation;  Surgeon: Marina Gordon MD;  Location:  GI    SURGICAL HISTORY OF -   2004    macular hole repair- Left eye    [facility] NONSPECIFIC [facility]ED[facility]       x3    [facility] REPLANTATION THUMB DISTAL,COMPLETE  2009    left thumb trauma; Dr. Jose surgery        Meds: reviewed  Current Outpatient Medications   Medication Sig Dispense Refill    acetaminophen (TYLENOL) 325 MG tablet Take 1-2 tablets (325-650 mg) by mouth every 6 hours as needed for mild pain      albuterol (PROVENTIL) (2.5 MG/3ML) 0.083% neb solution Take 1 vial (2.5 mg) by nebulization every 6 hours as needed for shortness of breath, wheezing or cough      BIOTIN 5000 PO       blood  glucose (NO BRAND SPECIFIED) test strip Use to test blood sugar 3 times daily or as directed. To accompany: Blood Glucose Monitor Brands: per insurance. 100 strip 0    blood glucose monitoring (NO BRAND SPECIFIED) meter device kit Use to test blood sugar 3 times daily or as directed. Preferred blood glucose meter OR supplies to accompany: Blood Glucose Monitor Brands: per insurance. 1 kit 0    CALCIUM CITRATE-VITAMIN D 315-200 MG-UNIT PO TABS 2 TABLETS DAILY 90 Tab 3    Cholecalciferol (VITAMIN D3) 25 MCG (1000 UT) CAPS Take 1 capsule by mouth daily      Cranberry 450 MG CAPS       cyanocobalamin (VITAMIN B-12) 2500 MCG SUBL sublingual tablet Place 2,500 mcg under the tongue daily      dextromethorphan-guaiFENesin (MUCINEX DM)  MG 12 hr tablet Take 1 tablet by mouth every 12 hours      doxylamine (UNISOM) 25 MG TABS tablet Take 1 tablet (25 mg) by mouth At Bedtime      Fluticasone-Umeclidin-Vilanterol (TRELEGY ELLIPTA) 100-62.5-25 MCG/INH oral inhaler Inhale 1 puff into the lungs daily      GLUCOSAMINE HCL 1000 MG PO TABS 2 TABLETS DAILY 90 Tab 3    LACTOBACILLUS PO Take 120 mg by mouth daily      levothyroxine (SYNTHROID/LEVOTHROID) 88 MCG tablet Take 1 tablet (88 mcg) by mouth daily 90 tablet 3    lisinopril (ZESTRIL) 40 MG tablet Take 1 tablet (40 mg) by mouth daily -- prescribed by nephrology      Lutein 6 MG TABS Take 20 mg by mouth daily       MULTIVITAMIN TABS   OR 1 daily      Omega-3 Fish Oil 500 MG capsule Take 1 capsule (500 mg) by mouth daily      polyethylene glycol (MIRALAX) 17 g packet Take 1 packet by mouth every 24 hours      predniSONE (DELTASONE) 5 MG tablet Take 10 mg by mouth daily      simvastatin (ZOCOR) 10 MG tablet TAKE 1 TABLET(10 MG) BY MOUTH AT BEDTIME 90 tablet 0    thin (NO BRAND SPECIFIED) lancets Use with lanceting device. To accompany: Blood Glucose Monitor Brands: per insurance. 100 each 0       Soc Hx: reviewed  Fam Hx: reviewed      Chart documentation was completed, in  part, with Dragon voice-recognition software. Even though reviewed, some grammatical, spelling, and word errors may remain.      Natalie Houston MD  Internal Medicine       Signed Electronically by: Natalie Bro MD

## 2024-06-18 ENCOUNTER — ORDERS ONLY (AUTO-RELEASED) (OUTPATIENT)
Dept: INTERNAL MEDICINE | Facility: CLINIC | Age: 89
End: 2024-06-18
Payer: MEDICARE

## 2024-06-18 DIAGNOSIS — I49.9 IRREGULAR HEART RATE: ICD-10-CM

## 2024-06-30 DIAGNOSIS — E78.5 HYPERLIPIDEMIA LDL GOAL <100: ICD-10-CM

## 2024-07-02 RX ORDER — SIMVASTATIN 10 MG
10 TABLET ORAL AT BEDTIME
Qty: 90 TABLET | Refills: 0 | Status: SHIPPED | OUTPATIENT
Start: 2024-07-02 | End: 2024-08-20

## 2024-07-06 PROCEDURE — 93244 EXT ECG>48HR<7D REV&INTERPJ: CPT | Performed by: INTERNAL MEDICINE

## 2024-07-16 ENCOUNTER — TRANSFERRED RECORDS (OUTPATIENT)
Dept: HEALTH INFORMATION MANAGEMENT | Facility: CLINIC | Age: 89
End: 2024-07-16
Payer: MEDICARE

## 2024-07-16 LAB
ALT SERPL-CCNC: 25 IU/L (ref 6–32)
AST SERPL-CCNC: 30 U/L (ref 10–35)
CREATININE (EXTERNAL): 0.8 MG/DL (ref 0.5–1.05)

## 2024-08-06 ENCOUNTER — HOSPITAL ENCOUNTER (OUTPATIENT)
Dept: MAMMOGRAPHY | Facility: CLINIC | Age: 89
Discharge: HOME | End: 2024-08-06
Attending: INTERNAL MEDICINE | Admitting: INTERNAL MEDICINE
Payer: MEDICARE

## 2024-08-06 DIAGNOSIS — Z12.31 VISIT FOR SCREENING MAMMOGRAM: ICD-10-CM

## 2024-08-06 PROCEDURE — 77063 BREAST TOMOSYNTHESIS BI: CPT

## 2024-08-20 ENCOUNTER — OFFICE VISIT (OUTPATIENT)
Dept: INTERNAL MEDICINE | Facility: CLINIC | Age: 89
End: 2024-08-20
Payer: MEDICARE

## 2024-08-20 VITALS
TEMPERATURE: 97.7 F | RESPIRATION RATE: 16 BRPM | DIASTOLIC BLOOD PRESSURE: 76 MMHG | HEART RATE: 87 BPM | OXYGEN SATURATION: 95 % | WEIGHT: 181 LBS | SYSTOLIC BLOOD PRESSURE: 148 MMHG | HEIGHT: 64 IN | BODY MASS INDEX: 30.9 KG/M2

## 2024-08-20 DIAGNOSIS — J44.9 CHRONIC OBSTRUCTIVE PULMONARY DISEASE, UNSPECIFIED COPD TYPE (H): ICD-10-CM

## 2024-08-20 DIAGNOSIS — Z00.00 ROUTINE GENERAL MEDICAL EXAMINATION AT A HEALTH CARE FACILITY: Primary | ICD-10-CM

## 2024-08-20 DIAGNOSIS — I10 HTN, GOAL BELOW 150/90: ICD-10-CM

## 2024-08-20 DIAGNOSIS — I95.89 OTHER SPECIFIED HYPOTENSION: ICD-10-CM

## 2024-08-20 DIAGNOSIS — E78.5 HYPERLIPIDEMIA LDL GOAL <100: ICD-10-CM

## 2024-08-20 DIAGNOSIS — M31.5 GIANT CELL ARTERITIS WITH POLYMYALGIA RHEUMATICA (H): ICD-10-CM

## 2024-08-20 DIAGNOSIS — E03.8 OTHER SPECIFIED HYPOTHYROIDISM: ICD-10-CM

## 2024-08-20 LAB
ALBUMIN SERPL BCG-MCNC: 4.4 G/DL (ref 3.5–5.2)
ALP SERPL-CCNC: 48 U/L (ref 40–150)
ALT SERPL W P-5'-P-CCNC: 29 U/L (ref 0–50)
ANION GAP SERPL CALCULATED.3IONS-SCNC: 12 MMOL/L (ref 7–15)
AST SERPL W P-5'-P-CCNC: 38 U/L (ref 0–45)
BILIRUB SERPL-MCNC: 1.4 MG/DL
BUN SERPL-MCNC: 28.4 MG/DL (ref 8–23)
CALCIUM SERPL-MCNC: 9.4 MG/DL (ref 8.8–10.4)
CHLORIDE SERPL-SCNC: 99 MMOL/L (ref 98–107)
CHOLEST SERPL-MCNC: 227 MG/DL
CREAT SERPL-MCNC: 0.87 MG/DL (ref 0.51–0.95)
CRP SERPL-MCNC: <3 MG/L
EGFRCR SERPLBLD CKD-EPI 2021: 65 ML/MIN/1.73M2
ERYTHROCYTE [DISTWIDTH] IN BLOOD BY AUTOMATED COUNT: 12.3 % (ref 10–15)
ERYTHROCYTE [SEDIMENTATION RATE] IN BLOOD BY WESTERGREN METHOD: 5 MM/HR (ref 0–30)
FASTING STATUS PATIENT QL REPORTED: YES
FASTING STATUS PATIENT QL REPORTED: YES
GLUCOSE SERPL-MCNC: 91 MG/DL (ref 70–99)
HCO3 SERPL-SCNC: 26 MMOL/L (ref 22–29)
HCT VFR BLD AUTO: 43 % (ref 35–47)
HDLC SERPL-MCNC: 100 MG/DL
HGB BLD-MCNC: 14.5 G/DL (ref 11.7–15.7)
LDLC SERPL CALC-MCNC: 110 MG/DL
MCH RBC QN AUTO: 31.9 PG (ref 26.5–33)
MCHC RBC AUTO-ENTMCNC: 33.7 G/DL (ref 31.5–36.5)
MCV RBC AUTO: 95 FL (ref 78–100)
NONHDLC SERPL-MCNC: 127 MG/DL
PLATELET # BLD AUTO: 195 10E3/UL (ref 150–450)
POTASSIUM SERPL-SCNC: 4 MMOL/L (ref 3.4–5.3)
PROT SERPL-MCNC: 6.7 G/DL (ref 6.4–8.3)
RBC # BLD AUTO: 4.55 10E6/UL (ref 3.8–5.2)
SODIUM SERPL-SCNC: 137 MMOL/L (ref 135–145)
TRIGL SERPL-MCNC: 85 MG/DL
TSH SERPL DL<=0.005 MIU/L-ACNC: 3.31 UIU/ML (ref 0.3–4.2)
WBC # BLD AUTO: 6.3 10E3/UL (ref 4–11)

## 2024-08-20 PROCEDURE — 36415 COLL VENOUS BLD VENIPUNCTURE: CPT | Performed by: INTERNAL MEDICINE

## 2024-08-20 PROCEDURE — 80061 LIPID PANEL: CPT | Performed by: INTERNAL MEDICINE

## 2024-08-20 PROCEDURE — 84443 ASSAY THYROID STIM HORMONE: CPT | Performed by: INTERNAL MEDICINE

## 2024-08-20 PROCEDURE — G0439 PPPS, SUBSEQ VISIT: HCPCS | Performed by: INTERNAL MEDICINE

## 2024-08-20 PROCEDURE — 85027 COMPLETE CBC AUTOMATED: CPT | Performed by: INTERNAL MEDICINE

## 2024-08-20 PROCEDURE — 80053 COMPREHEN METABOLIC PANEL: CPT | Performed by: INTERNAL MEDICINE

## 2024-08-20 PROCEDURE — 86140 C-REACTIVE PROTEIN: CPT | Performed by: INTERNAL MEDICINE

## 2024-08-20 PROCEDURE — 99214 OFFICE O/P EST MOD 30 MIN: CPT | Mod: 25 | Performed by: INTERNAL MEDICINE

## 2024-08-20 PROCEDURE — 85652 RBC SED RATE AUTOMATED: CPT | Performed by: INTERNAL MEDICINE

## 2024-08-20 RX ORDER — SIMVASTATIN 10 MG
10 TABLET ORAL AT BEDTIME
Qty: 90 TABLET | Refills: 4 | Status: SHIPPED | OUTPATIENT
Start: 2024-08-20

## 2024-08-20 RX ORDER — ASPIRIN 81 MG/1
81 TABLET ORAL DAILY
COMMUNITY

## 2024-08-20 RX ORDER — FOLIC ACID 0.8 MG
800 TABLET ORAL 2 TIMES DAILY
COMMUNITY

## 2024-08-20 SDOH — HEALTH STABILITY: PHYSICAL HEALTH: ON AVERAGE, HOW MANY DAYS PER WEEK DO YOU ENGAGE IN MODERATE TO STRENUOUS EXERCISE (LIKE A BRISK WALK)?: 2 DAYS

## 2024-08-20 ASSESSMENT — SOCIAL DETERMINANTS OF HEALTH (SDOH): HOW OFTEN DO YOU GET TOGETHER WITH FRIENDS OR RELATIVES?: MORE THAN THREE TIMES A WEEK

## 2024-08-20 NOTE — PROGRESS NOTES
Dr Houston's note    Patient's instructions / PLAN:                                                        Plan:   Labs today - [address]   2. Continue same meds, same doses for now   3. Pulmonary referral   4.  The following vaccines are recommended for you. Please check with your insurance about coverage.  Some insurances cover better if you have these vaccines at the pharmacy:  -- Pneumonia 20   -- Flu / Covid      ASSESSMENT & PLAN:                                                      (Z00.00) Routine general medical examination at a health care facility  (primary encounter diagnosis)  Comment:   Plan: Lipid panel reflex to direct LDL Fasting,         Comprehensive metabolic panel, CBC with         platelets, TSH with free T4 reflex, CRP         inflammation, Erythrocyte sedimentation rate         auto            (E03.8) Hypothyroidism - f/u w Endo - dr Meyers   Comment:   Plan: Lipid panel reflex to direct LDL Fasting,         Comprehensive metabolic panel, CBC with         platelets, TSH with free T4 reflex, CRP         inflammation, Erythrocyte sedimentation rate         auto            (E78.5) Hyperlipidemia LDL goal <100  Comment: Controlled, based on prior numbers  Plan: Lipid panel reflex to direct LDL Fasting,         Comprehensive metabolic panel, CBC with         platelets, TSH with free T4 reflex, CRP         inflammation, Erythrocyte sedimentation rate         auto, simvastatin (ZOCOR) 10 MG tablet            (I10) HTN, goal below 150/90  Comment: No changes in meds, since postprandial hypotension  Plan: Lipid panel reflex to direct LDL Fasting,         Comprehensive metabolic panel, CBC with         platelets, TSH with free T4 reflex, CRP         inflammation, Erythrocyte sedimentation rate         auto            (M31.5) Giant cell arteritis + PMR -- f/up w rheum - dr Russo (H)  Comment: Apparently controlled  Plan: Lipid panel reflex to direct LDL Fasting,         Comprehensive metabolic  panel, CBC with         platelets, TSH with free T4 reflex, CRP         inflammation, Erythrocyte sedimentation rate         auto            (I95.89) Other specified hypotension  -- postprandial  Comment:   Plan: Lipid panel reflex to direct LDL Fasting,         Comprehensive metabolic panel, CBC with         platelets, TSH with free T4 reflex, CRP         inflammation, Erythrocyte sedimentation rate         auto            (J44.9) Chronic obstructive pulmonary disease, unspecified COPD type (H)  Comment: Stable  Plan: Adult Pulmonary Medicine  Referral        No med changes       Chief Complaint:                                                        Annual exam  Follow up chronic medical problems      SUBJECTIVE:                                                    History of present illness     We reviewed the chronic medical problems as above.   I reviewed the recent tests results in Epic     Palpitations  -- improved   -- Zio results -- Discussed    -- 7-day cardiac monitor.  Occasional PVCs (~2%).  12 SVT runs, longest 19 beats.  No symptoms reported.     Leg edema   -- better in am   -- progress through the day     PMR  -- on prednisone from Rheum     Urine flow slower     COPD  -- stable  -- she would like to see pulmonary in [place] -- ref done    HTN  --  because she didn't take the meds yet. At home <150     She doesn't want 6 m f/up She prefers 1 y      ROS:                                                      ROS: negative for fever, chills, cough, wheezes, chest pain, shortness of breath, vomiting, abdominal pain, Pos for leg swelling         PMHx: - reviewed  Past Medical History:   Diagnosis Date    Age related osteoporosis, unspecified pathological fracture presence 4/5/2022    Chronic obstructive pulmonary disease, unspecified COPD type (H) 7/11/2022    Essential hypertension, benign     Hyperlipidemia LDL goal <100 5/18/2011    Hypertension, unspecified type 3/14/2023     Hypothyroidism due to Hashimoto's thyroiditis 10/10/2022    PMR (polymyalgia rheumatica) (H)  -- rheumatologist -- dr Russo 8/3/2020    PMR (polymyalgia rheumatica) (H24)     Unspecified hypothyroidism        PSHx: reviewed  Past Surgical History:   Procedure Laterality Date    AS TOTAL HIP ARTHROPLASTY Left     C DEXA, BONE DENSITY, AXIAL SKEL  2003    Normal BMD    CATARACT EXTRACTION Left     COLONOSCOPY N/A 2015    Procedure: COLONOSCOPY;  Surgeon: Joanna Acevedo MD;  Location:  GI    HC REMOVE TONSILS/ADENOIDS,<13 Y/O      LAPAROTOMY EXPLORATORY      Exploratory Laparotomy: constricting band around small bowel, simple lysis    SIGMOIDOSCOPY FLEXIBLE N/A 4/10/2023    Procedure: Sigmoidoscopy flexible without sedation;  Surgeon: Marina Gordon MD;  Location:  GI    SURGICAL HISTORY OF -   2004    macular hole repair- Left eye    ZZC NONSPECIFIC PROCEDURE       x3    ZZC REPLANTATION THUMB DISTAL,COMPLETE  2009    left thumb trauma; Dr. Jose surgery        Soc Hx: No daily alcohol, no smoking  Social History     Socioeconomic History    Marital status:      Spouse name: Not on file    Number of children: 3    Years of education: Not on file    Highest education level: Not on file   Occupational History    Occupation: office     Employer: RETIRED   Tobacco Use    Smoking status: Former     Current packs/day: 0.00     Average packs/day: 0.5 packs/day for 45.0 years (22.5 ttl pk-yrs)     Types: Cigarettes     Start date: 1956     Quit date: 2001     Years since quittin.3     Passive exposure: Past    Smokeless tobacco: Never   Vaping Use    Vaping status: Never Used   Substance and Sexual Activity    Alcohol use: Not Currently    Drug use: No    Sexual activity: Never   Other Topics Concern     Service Not Asked    Blood Transfusions Not Asked    Caffeine Concern Yes     Comment: 1 cup per day    Occupational Exposure Not Asked    Hobby  Hazards Not Asked    Sleep Concern Not Asked    Stress Concern Not Asked    Weight Concern Not Asked    Special Diet Not Asked    Back Care Not Asked    Exercise Yes     Comment: Active; exercise 4 times per week    Bike Helmet Not Asked    Seat Belt Yes    Self-Exams No    Parent/sibling w/ CABG, MI or angioplasty before 65F 55M? Not Asked   Social History Narrative    Not on file     Social Determinants of Health     Financial Resource Strain: Low Risk  (8/20/2024)    Financial Resource Strain     Within the past 12 months, have you or your family members you live with been unable to get utilities (heat, electricity) when it was really needed?: No   Food Insecurity: Low Risk  (8/20/2024)    Food Insecurity     Within the past 12 months, did you worry that your food would run out before you got money to buy more?: No     Within the past 12 months, did the food you bought just not last and you didn t have money to get more?: No   Transportation Needs: Low Risk  (8/20/2024)    Transportation Needs     Within the past 12 months, has lack of transportation kept you from medical appointments, getting your medicines, non-medical meetings or appointments, work, or from getting things that you need?: No   Physical Activity: Unknown (8/20/2024)    Exercise Vital Sign     Days of Exercise per Week: 2 days     Minutes of Exercise per Session: Not on file   Stress: No Stress Concern Present (8/20/2024)    [employer] - Occupational Stress Questionnaire     Feeling of Stress : Not at all   Social Connections: Unknown (8/20/2024)    Social Connection and Isolation Panel [NHANES]     Frequency of Communication with Friends and Family: Not on file     Frequency of Social Gatherings with Friends and Family: More than three times a week     Attends Congregational Services: Not on file     Active Member of Clubs or Organizations: Not on file     Attends Club or Organization Meetings: Not on file     Marital  Status: Not on file   Interpersonal Safety: Low Risk  (2024)    Interpersonal Safety     Do you feel physically and emotionally safe where you currently live?: Yes     Within the past 12 months, have you been hit, slapped, kicked or otherwise physically hurt by someone?: No     Within the past 12 months, have you been humiliated or emotionally abused in other ways by your partner or ex-partner?: No   Housing Stability: Low Risk  (2024)    Housing Stability     Do you have housing? : Yes     Are you worried about losing your housing?: No        Fam Hx: reviewed  Family History   Problem Relation Age of Onset    Hypertension Mother           at 100.    Breast Cancer Mother 85    Thyroid Disease Mother     Diabetes Sister     Hypertension Sister     Neurologic Disorder Daughter         MS    Hypertension Son     Ovarian Cancer No family hx of          Screening: reviewed      All: reviewed    Meds: reviewed  Current Outpatient Medications   Medication Sig Dispense Refill    acetaminophen (TYLENOL) 325 MG tablet Take 1-2 tablets (325-650 mg) by mouth every 6 hours as needed for mild pain      albuterol (PROVENTIL) (2.5 MG/3ML) 0.083% neb solution Take 1 vial (2.5 mg) by nebulization every 6 hours as needed for shortness of breath, wheezing or cough      BIOTIN 5000 PO       blood glucose (NO BRAND SPECIFIED) test strip Use to test blood sugar 3 times daily or as directed. To accompany: Blood Glucose Monitor Brands: per insurance. 100 strip 0    blood glucose monitoring (NO BRAND SPECIFIED) meter device kit Use to test blood sugar 3 times daily or as directed. Preferred blood glucose meter OR supplies to accompany: Blood Glucose Monitor Brands: per insurance. 1 kit 0    CALCIUM CITRATE-VITAMIN D 315-200 MG-UNIT PO TABS 2 TABLETS DAILY 90 Tab 3    Cholecalciferol (VITAMIN D3) 25 MCG (1000 UT) CAPS Take 1 capsule by mouth daily      Cranberry 450 MG CAPS       cyanocobalamin (VITAMIN B-12) 2500 MCG  "SUBL sublingual tablet Place 2,500 mcg under the tongue daily      dextromethorphan-guaiFENesin (MUCINEX DM)  MG 12 hr tablet Take 1 tablet by mouth every 12 hours      doxylamine (UNISOM) 25 MG TABS tablet Take 1 tablet (25 mg) by mouth At Bedtime      Fluticasone-Umeclidin-Vilanterol (TRELEGY ELLIPTA) 100-62.5-25 MCG/INH oral inhaler Inhale 1 puff into the lungs daily      GLUCOSAMINE HCL 1000 MG PO TABS 2 TABLETS DAILY 90 Tab 3    LACTOBACILLUS PO Take 120 mg by mouth daily      levothyroxine (SYNTHROID/LEVOTHROID) 88 MCG tablet Take 1 tablet (88 mcg) by mouth daily 90 tablet 3    lisinopril (ZESTRIL) 40 MG tablet Take 1 tablet (40 mg) by mouth daily -- prescribed by nephrology      Lutein 6 MG TABS Take 20 mg by mouth daily       MULTIVITAMIN TABS   OR 1 daily      Omega-3 Fish Oil 500 MG capsule Take 1 capsule (500 mg) by mouth daily      polyethylene glycol (MIRALAX) 17 g packet Take 1 packet by mouth every 24 hours      predniSONE (DELTASONE) 5 MG tablet Take 10 mg by mouth daily      simvastatin (ZOCOR) 10 MG tablet TAKE 1 TABLET(10 MG) BY MOUTH AT BEDTIME 90 tablet 0    thin (NO BRAND SPECIFIED) lancets Use with lanceting device. To accompany: Blood Glucose Monitor Brands: per insurance. 100 each 0       OBJECTIVE:                                                    Physical Exam :  Blood pressure (!) 148/76, pulse 87, temperature 97.7  F (36.5  C), temperature source Oral, resp. rate 16, height 1.626 m (5' 4\"), weight 82.1 kg (181 lb), SpO2 95%, not currently breastfeeding.     NAD, appears comfortable  Skin clear, no rashes  Neck: supple, no JVD,  no thyroidmegaly  Lymph nodes non palpable in the cervical, supraclavicular axillaries,   Chest: clear to auscultation with good respiratory effort  Cardiac: S1S2, RRR, no mgr appreciated  Abdomen: soft, not tender, not distended, audible bowel sound, no hepatosplenomegaly, no palpable masses, no abdominal bruits  Extremities: no cyanosis, clubbing or " edema.   Neuro: A, Ox3, no focal signs.  Breast exam in supine and erect position: they are symmetrical, no skin changes, no tenderness or nodes on palpation. Nipples are erect, no skin lesions, no discharge on pressure.    Pelvic exam: deferred, s/p menopause, no symptoms, no hx of abnormal pap        Patient has been advised of split billing requirements and indicates understanding: Yes.  At the check in, at the      Natalie Houston MD  Internal Medicine       #####################################    Preventive Care Visit  [facility]  Natalie Bro MD, Internal Medicine  Aug 20, 2024          Obed Tomlinson is a 88 year old, presenting for the following:  Wellness Visit        8/20/2024     8:22 AM   Additional Questions   Roomed by azamit   Accompanied by self         8/20/2024   Forms   Any forms needing to be completed Yes          8/20/2024     8:22 AM   Patient Reported Additional Medications   Patient reports taking the following new medications self         Health Care Directive  Patient has a Health Care Directive on file  Advance care planning document is on file and is current.    HPI              8/20/2024   General Health   How would you rate your overall physical health? Good   Feel stress (tense, anxious, or unable to sleep) Not at all            8/20/2024   Nutrition   Diet: Breakfast skipped            8/20/2024   Exercise   Days per week of moderate/strenous exercise 2 days      (!) EXERCISE CONCERN      8/20/2024   Social Factors   Frequency of gathering with friends or relatives More than three times a week   Worry food won't last until get money to buy more No   Food not last or not have enough money for food? No   Do you have housing? (Housing is defined as stable permanent housing and does not include staying ouside in a car, in a tent, in an abandoned building, in an overnight shelter, or couch-surfing.) Yes   Are you worried about  losing your housing? No   Lack of transportation? No   Unable to get utilities (heat,electricity)? No            2024   Fall Risk   Fallen 2 or more times in the past year? No    No   Trouble with walking or balance? No    No       Multiple values from one day are sorted in reverse-chronological order          2024   Activities of Daily Living- Home Safety   Needs help with the following daily activites None of the above   Safety concerns in the home None of the above            2024   Dental   Dentist two times every year? Yes            2024   Hearing Screening   Hearing concerns? (!) IT'S HARD TO FOLLOW A CONVERSATION IN A NOISY RESTAURANT OR CROWDED ROOM.            2024   Driving Risk Screening   Patient/family members have concerns about driving No            2024   General Alertness/Fatigue Screening   Have you been more tired than usual lately? No            2024   Urinary Incontinence Screening   Bothered by leaking urine in past 6 months Yes            2024   TB Screening   Were you born outside of the [place]? No            Today's PHQ-2 Score:       2024     8:14 AM   PHQ-2 (  [employer])   Q1: Little interest or pleasure in doing things 0   Q2: Feeling down, depressed or hopeless 0   PHQ-2 Score 0   Q1: Little interest or pleasure in doing things Not at all   Q2: Feeling down, depressed or hopeless Not at all   PHQ-2 Score 0           2024   Substance Use   Alcohol more than 3/day or more than 7/wk Not Applicable   Do you have a current opioid prescription? No   How severe/bad is pain from 1 to 10? 0/10 (No Pain)   Do you use any other substances recreationally? No        Social History     Tobacco Use    Smoking status: Former     Current packs/day: 0.00     Average packs/day: 0.5 packs/day for 45.0 years (22.5 ttl pk-yrs)     Types: Cigarettes     Start date: 1956     Quit date: 2001     Years since quittin.3     Passive exposure: Past     Smokeless tobacco: Never   Vaping Use    Vaping status: Never Used   Substance Use Topics    Alcohol use: Not Currently    Drug use: No           8/6/2024   LAST FHS-7 RESULTS   1st degree relative breast or ovarian cancer Yes   Any relative bilateral breast cancer No   Any male have breast cancer No   Any ONE woman have BOTH breast AND ovarian cancer No   Any woman with breast cancer before 50yrs No   2 or more relatives with breast AND/OR ovarian cancer No   2 or more relatives with breast AND/OR bowel cancer No                     Reviewed and updated as needed this visit by Provider                      Current providers sharing in care for this patient include:  Patient Care Team:  Natalie Bro MD as PCP - General (Internal Medicine)  Belinda Marcos MD as Assigned Endocrinology Provider  Natalie Bro MD as Assigned PCP  Ip, Sohan Stout MD as Assigned Heart and Vascular Provider    The following health maintenance items are reviewed in Epic and correct as of today:  Health Maintenance   Topic Date Due    COPD ACTION PLAN  Never done    LIPID  07/11/2023    MICROALBUMIN  07/11/2023    COVID-19 Vaccine (9 - 2023-24 season) 05/28/2024    MEDICARE ANNUAL WELLNESS VISIT  08/17/2024    INFLUENZA VACCINE (1) 09/01/2024    TSH W/FREE T4 REFLEX  09/26/2024    ANNUAL REVIEW OF HM ORDERS  11/08/2024    BMP  12/22/2024    FALL RISK ASSESSMENT  08/20/2025    COLORECTAL CANCER SCREENING  04/10/2028    ADVANCE CARE PLANNING  08/17/2028    DTAP/TDAP/TD IMMUNIZATION (2 - Td or Tdap) 08/13/2031    DEXA  09/06/2038    SPIROMETRY  Completed    PHQ-2 (once per calendar year)  Completed    Pneumococcal Vaccine: 65+ Years  Completed    URINALYSIS  Completed    ZOSTER IMMUNIZATION  Completed    RSV VACCINE (Pregnancy & 60+)  Completed    IPV IMMUNIZATION  Aged Out    HPV IMMUNIZATION  Aged Out    MENINGITIS IMMUNIZATION  Aged Out    RSV MONOCLONAL ANTIBODY  Aged Out            Objective   "  Exam  BP (!) 148/76 (BP Location: Right arm, Patient Position: Sitting, Cuff Size: Adult Regular)   Pulse 87   Temp 97.7  F (36.5  C) (Oral)   Resp 16   Ht 1.626 m (5' 4\")   Wt 82.1 kg (181 lb)   LMP  (LMP Unknown)   SpO2 95%   BMI 31.07 kg/m     Estimated body mass index is 31.07 kg/m  as calculated from the following:    Height as of this encounter: 1.626 m (5' 4\").    Weight as of this encounter: 82.1 kg (181 lb).    Physical Exam           8/20/2024   Mini Cog   Clock Draw Score 2 Normal   3 Item Recall 3 objects recalled   Mini Cog Total Score 5                 Signed Electronically by: Natalie Bro MD    "

## 2024-08-20 NOTE — PATIENT INSTRUCTIONS
Plan:   Labs today - [address]   2. Continue same meds, same doses for now   3. Pulmonary referral   4.  The following vaccines are recommended for you. Please check with your insurance about coverage.  Some insurances cover better if you have these vaccines at the pharmacy:  -- Pneumonia 20   -- Flu / Covid

## 2024-08-21 DIAGNOSIS — J44.9 CHRONIC OBSTRUCTIVE PULMONARY DISEASE, UNSPECIFIED COPD TYPE (H): Primary | ICD-10-CM

## 2024-08-21 NOTE — Clinical Note
Future Appointments 9/12/2024  11:30 AM   RIDX1                      RIDEX               [facility] 10/1/2024  11:15 AM   [facility]                     [facility]              [facility] 10/10/2024 11:00 AM   Belinda Marcos MD   RIENCR              [facility] 12/9/2024  12:00 PM   [facility] PULMONARY FUNCTION      CSPULM               12/9/2024  1:00 PM    Corie Alejandra MD     CSPUL              [facility]

## 2024-09-12 ENCOUNTER — ANCILLARY PROCEDURE (OUTPATIENT)
Dept: BONE DENSITY | Facility: CLINIC | Age: 89
End: 2024-09-12
Attending: PHYSICIAN ASSISTANT
Payer: MEDICARE

## 2024-09-12 DIAGNOSIS — M81.0 AGE RELATED OSTEOPOROSIS, UNSPECIFIED PATHOLOGICAL FRACTURE PRESENCE: ICD-10-CM

## 2024-09-12 PROCEDURE — 77080 DXA BONE DENSITY AXIAL: CPT | Mod: TC

## 2024-09-12 PROCEDURE — 77081 DXA BONE DENSITY APPENDICULR: CPT | Mod: TC

## 2024-10-01 ENCOUNTER — LAB (OUTPATIENT)
Dept: LAB | Facility: CLINIC | Age: 89
End: 2024-10-01
Payer: MEDICARE

## 2024-10-01 DIAGNOSIS — E06.3 HYPOTHYROIDISM DUE TO HASHIMOTO'S THYROIDITIS: ICD-10-CM

## 2024-10-01 DIAGNOSIS — M81.0 AGE RELATED OSTEOPOROSIS, UNSPECIFIED PATHOLOGICAL FRACTURE PRESENCE: ICD-10-CM

## 2024-10-01 DIAGNOSIS — N18.9 CHRONIC KIDNEY DISEASE: Primary | ICD-10-CM

## 2024-10-01 PROCEDURE — 82570 ASSAY OF URINE CREATININE: CPT

## 2024-10-01 PROCEDURE — 82043 UR ALBUMIN QUANTITATIVE: CPT

## 2024-10-01 PROCEDURE — 36415 COLL VENOUS BLD VENIPUNCTURE: CPT

## 2024-10-01 PROCEDURE — 84439 ASSAY OF FREE THYROXINE: CPT | Mod: 59

## 2024-10-01 PROCEDURE — 84443 ASSAY THYROID STIM HORMONE: CPT

## 2024-10-01 PROCEDURE — 99000 SPECIMEN HANDLING OFFICE-LAB: CPT

## 2024-10-01 PROCEDURE — 84439 ASSAY OF FREE THYROXINE: CPT | Mod: 90

## 2024-10-02 LAB
CREAT UR-MCNC: 65 MG/DL
MICROALBUMIN UR-MCNC: 128 MG/L
MICROALBUMIN/CREAT UR: 196.92 MG/G CR (ref 0–25)
T4 FREE SERPL-MCNC: 1.72 NG/DL (ref 0.9–1.7)
TSH SERPL DL<=0.005 MIU/L-ACNC: 3.72 UIU/ML (ref 0.3–4.2)

## 2024-10-05 LAB — T4 FREE SERPL DIALY-MCNC: 2.8 NG/DL

## 2024-10-10 ENCOUNTER — OFFICE VISIT (OUTPATIENT)
Dept: ENDOCRINOLOGY | Facility: CLINIC | Age: 89
End: 2024-10-10
Payer: MEDICARE

## 2024-10-10 VITALS
BODY MASS INDEX: 30.97 KG/M2 | HEART RATE: 77 BPM | HEIGHT: 64 IN | DIASTOLIC BLOOD PRESSURE: 80 MMHG | WEIGHT: 181.4 LBS | SYSTOLIC BLOOD PRESSURE: 150 MMHG | RESPIRATION RATE: 16 BRPM | OXYGEN SATURATION: 97 % | TEMPERATURE: 97.1 F

## 2024-10-10 DIAGNOSIS — E03.8 OTHER SPECIFIED HYPOTHYROIDISM: ICD-10-CM

## 2024-10-10 DIAGNOSIS — E06.3 HYPOTHYROIDISM DUE TO HASHIMOTO'S THYROIDITIS: ICD-10-CM

## 2024-10-10 DIAGNOSIS — M81.0 AGE RELATED OSTEOPOROSIS, UNSPECIFIED PATHOLOGICAL FRACTURE PRESENCE: Primary | ICD-10-CM

## 2024-10-10 PROCEDURE — G2211 COMPLEX E/M VISIT ADD ON: HCPCS | Performed by: INTERNAL MEDICINE

## 2024-10-10 PROCEDURE — 99214 OFFICE O/P EST MOD 30 MIN: CPT | Performed by: INTERNAL MEDICINE

## 2024-10-10 RX ORDER — LEVOTHYROXINE SODIUM 88 UG/1
88 TABLET ORAL DAILY
Qty: 90 TABLET | Refills: 3 | Status: SHIPPED | OUTPATIENT
Start: 2024-10-10

## 2024-10-10 NOTE — LETTER
10/10/2024      Bushra Harmon  [address] [place] [address]      Dear Colleague,    Thank you for referring your patient, Bushra Harmon, to the [facility]. Please see a copy of my visit note below.    ENDOCRINOLOGY CLINIC NOTE:    Name: Bushra Harmon  Seen for f/u of Hypothyroidism and Osteopenia today.  HPI:  Bushra Harmon is a 88 year old female who presents for the evaluation of :hypothyroidism.   has a past medical history of Age related osteoporosis, unspecified pathological fracture presence (4/5/2022), Chronic obstructive pulmonary disease, unspecified COPD type (H) (7/11/2022), Essential hypertension, benign, Hyperlipidemia LDL goal <100 (5/18/2011), Hypertension, unspecified type (3/14/2023), Hypothyroidism due to Hashimoto's thyroiditis (10/10/2022), PMR (polymyalgia rheumatica) (H), PMR (polymyalgia rheumatica) (H)  -- rheumatologist -- dr Russo (8/3/2020), and Unspecified hypothyroidism.    Was started on prednisone for RA by Rheumatology.  Gained some wt since starting Prednisone. On it X 2 year.  She was also on methotrexate.    #1. Hypothyroidism (+TPO):  Initially diagnosed at age 42.and since then she is on thyroid hormone replacement.  Currently taking levothyroxine 88 mcg/day (On this dose since 7/9/2020). Dose was decreased at that time based on that.  Labs 10/1/2024- noted normal TSH with  slightly high free T4 and free T4 DL levels.    Taking generic.  Reports compliance.    Feeling OK. No major s/s.  She is taking biotin supplement but typically holds supplement for few days prior to thyroid labs.  + frontal hair loss. X chronic.      Weight is stable.    #2.Osteoporosis:    DEXA 9/2021: Osteopenia.  No significant change in bone density of the lumbar spine or of the hip(s). There has been no significant change in bone density of the forearm. No vertebral fractures identified on the VFA.     DEXA 9/2023: Severe osteoporosis on the basis of vertebral  fracture. Moderate (grade 2) biconcave fracture of L4. This is new compared to the previous study. (S he recalls falling from bed but there was no pain)  There has been no significant change in bone density of the lumbar spine, hip(s) or forearm.     DEXA 9/2024: Low bone density (OSTEOPENIA).   INTERVAL CHANGE  -There has been a 2.5% decrease in lumbar spine BMD.  -There has been a 0.1% increase in the right hip BMD.  -There has been a 3.6% increase in the left radius 33% BMD.  -FRAX Results: The 10 year probability of major osteoporotic fracture is 11.0%, and of hip fracture is 2.8%, based on right femoral neck BMD.     RISK FACTORS:  Post-menopausal, Height loss 1.5 inches,  Parent history of osteoporosis in her mother with  a hip fracture, Long term corticosteroid therapy.    Dairy: 2 servings/day  Calcium 600 mg/day  Vit D 1000 international unit(s)/day  Also takes multivitamin    Dental health is OK- no upcoming dental procedure.    She was started on Fosamax by primary care provider but she has not started it as she was worried about side effect of medication.  In last visit 12/2021 she wanted to start fosamax and was started on FOSAMAX- She took 1 dose of Fosamax but had diarrhea, joint pain and back pain and stopped that.  She is not interested in retrial of Fosamax at this time.    Steroids: On prednisone 2.5 mg every other day for PMR and giant cell arteritis?.    Exercise: golf in summer.     Palpitations:  No  Changes to hair or skin: chronic problem. + frontal hair loss X 2 years  Diarrhea/Constipation:No  Changes in menses: s/p menopause. Had been on HRT for a while. Stopped at age 67.  Dysphagia or Shortness of breath:No  Tremors:No  Changes in weight: gained some weight. Mostly stable.    Heat or cold intolerance: no  History of Lithium or Amiodarone use:No  Head or neck surgery/radiation:No  Family History of Thyroid Problems: + hypothyroidism in mother and sister  Stable weight.  Wt Readings from  Last 2 Encounters:   10/10/24 82.3 kg (181 lb 6.4 oz)   24 82.1 kg (181 lb)     PMH/PSH:  Past Medical History:   Diagnosis Date     Age related osteoporosis, unspecified pathological fracture presence 2022     Chronic obstructive pulmonary disease, unspecified COPD type (H) 2022     Essential hypertension, benign      Hyperlipidemia LDL goal <100 2011     Hypertension, unspecified type 3/14/2023     Hypothyroidism due to Hashimoto's thyroiditis 10/10/2022     PMR (polymyalgia rheumatica) (H)      PMR (polymyalgia rheumatica) (H)  -- rheumatologist -- dr Russo 8/3/2020     Unspecified hypothyroidism      Past Surgical History:   Procedure Laterality Date     AS TOTAL HIP ARTHROPLASTY Left      C DEXA, BONE DENSITY, AXIAL SKEL  2003    Normal BMD     CATARACT EXTRACTION Left      COLONOSCOPY N/A 2015    Procedure: COLONOSCOPY;  Surgeon: Joanna Acevedo MD;  Location:  GI     HC REMOVE TONSILS/ADENOIDS,<13 Y/O       LAPAROTOMY EXPLORATORY  1985    Exploratory Laparotomy: constricting band around small bowel, simple lysis     SIGMOIDOSCOPY FLEXIBLE N/A 4/10/2023    Procedure: Sigmoidoscopy flexible without sedation;  Surgeon: Marina Gordon MD;  Location:  GI     SURGICAL HISTORY OF -   2004    macular hole repair- Left eye     Z NONSPECIFIC PROCEDURE       x3     Z REPLANTATION THUMB DISTAL,COMPLETE  2009    left thumb trauma; Dr. Jose surgery     Family Hx:  Family History   Problem Relation Age of Onset     Hypertension Mother           at 100.     Breast Cancer Mother 85     Thyroid Disease Mother      Diabetes Sister      Hypertension Sister      Neurologic Disorder Daughter         MS     Hypertension Son      Ovarian Cancer No family hx of        Social Hx:  Social History     Socioeconomic History     Marital status:      Spouse name: Not on file     Number of children: 3     Years of education: Not on file     Highest  education level: Not on file   Occupational History     Occupation: office     Employer: RETIRED   Tobacco Use     Smoking status: Former     Current packs/day: 0.00     Average packs/day: 0.5 packs/day for 45.0 years (22.5 ttl pk-yrs)     Types: Cigarettes     Start date: 1956     Quit date: 2001     Years since quittin.4     Passive exposure: Past     Smokeless tobacco: Never   Vaping Use     Vaping status: Never Used   Substance and Sexual Activity     Alcohol use: Not Currently     Drug use: No     Sexual activity: Never   Other Topics Concern      Service Not Asked     Blood Transfusions Not Asked     Caffeine Concern Yes     Comment: 1 cup per day     Occupational Exposure Not Asked     Hobby Hazards Not Asked     Sleep Concern Not Asked     Stress Concern Not Asked     Weight Concern Not Asked     Special Diet Not Asked     Back Care Not Asked     Exercise Yes     Comment: Active; exercise 4 times per week     Bike Helmet Not Asked     Seat Belt Yes     Self-Exams No     Parent/sibling w/ CABG, MI or angioplasty before 65F 55M? Not Asked   Social History Narrative     Not on file     Social Determinants of Health     Financial Resource Strain: Low Risk  (2024)    Financial Resource Strain      Within the past 12 months, have you or your family members you live with been unable to get utilities (heat, electricity) when it was really needed?: No   Food Insecurity: Low Risk  (2024)    Food Insecurity      Within the past 12 months, did you worry that your food would run out before you got money to buy more?: No      Within the past 12 months, did the food you bought just not last and you didn t have money to get more?: No   Transportation Needs: Low Risk  (2024)    Transportation Needs      Within the past 12 months, has lack of transportation kept you from medical appointments, getting your medicines, non-medical meetings or appointments, work, or from getting things that you  need?: No   Physical Activity: Unknown (8/20/2024)    Exercise Vital Sign      Days of Exercise per Week: 2 days      Minutes of Exercise per Session: Not on file   Stress: No Stress Concern Present (8/20/2024)    [employer] - Occupational Stress Questionnaire      Feeling of Stress : Not at all   Social Connections: Unknown (8/20/2024)    Social Connection and Isolation Panel [NHANES]      Frequency of Communication with Friends and Family: Not on file      Frequency of Social Gatherings with Friends and Family: More than three times a week      Attends Christian Services: Not on file      Active Member of Clubs or Organizations: Not on file      Attends Club or Organization Meetings: Not on file      Marital Status: Not on file   Interpersonal Safety: Low Risk  (8/20/2024)    Interpersonal Safety      Do you feel physically and emotionally safe where you currently live?: Yes      Within the past 12 months, have you been hit, slapped, kicked or otherwise physically hurt by someone?: No      Within the past 12 months, have you been humiliated or emotionally abused in other ways by your partner or ex-partner?: No   Housing Stability: Low Risk  (8/20/2024)    Housing Stability      Do you have housing? : Yes      Are you worried about losing your housing?: No          MEDICATIONS:  has a current medication list which includes the following prescription(s): acetaminophen, albuterol, aspirin, biotin, blood glucose, blood glucose monitoring, calcium citrate-vitamin d, vitamin d3, cranberry, cyanocobalamin, dextromethorphan-guaifenesin, doxylamine, fluticasone-umeclidin-vilanterol, folic acid, glucosamine hcl, lactobacillus, levothyroxine, lisinopril, lutein, multivitamin, omega-3 fish oil, polyethylene glycol, prebiotic product, prednisone, probiotic product, simvastatin, thin, and tocilizumab.    ROS   ROS: 10 point ROS neg other than the symptoms noted above in the HPI.    Physical Exam    VS: LMP  (LMP Unknown)   Breastfeeding No   GENERAL: healthy, alert and no distress  EYES: Eyes grossly normal to inspection, conjunctivae and sclerae normal  ENT: no nose swelling, nasal discharge.  Thyroid: no apparent thyroid nodules.  Thyroid appears normal in size and nontender.  CV: RRR, no rubs, gallops, no murmurs  RESP: CTAB, no wheezes, rales, or ronchi  ABDO: +BS  EXTREMITIES: no hand tremors.  NEURO: Cranial nerves grossly intact, mentation intact and speech normal  SKIN: No apparent skin lesions, rash or edema seen   PSYCH: mentation appears normal, affect normal/bright, judgement and insight intact, normal speech and appearance well-groomed    LABS:  TFTs:  Component      Latest Ref Rn 10/1/2024  11:21 AM   T4 Free      0.90 - 1.70 ng/dL 1.72 (H)    TSH      0.30 - 4.20 uIU/mL 3.72    Free T4 Eq Dialysis      1.1 - 2.4 ng/dL 2.8 (H)        Calcium:   Latest Ref Rn 8/20/2024  9:14 AM   [facility]     Calcium 8.8 - 10.4 mg/dL 9.4      Creatinine:  Creatinine   Date Value Ref Range Status   08/20/2024 0.87 0.51 - 0.95 mg/dL Final   06/07/2021 0.98 0.52 - 1.04 mg/dL Final     PTH:   Latest Ref Rng 6/7/2021  9:56 AM   [facility]     Parathyroid Hormone Intact 18 - 80 pg/mL 42        VIT D:  Vitamin D Deficiency Screening Results:  Lab Results   Component Value Date    VITDT 63 08/04/2023    VITDT 60 06/07/2021    VITDT 58 04/28/2020    VITDT 53 06/04/2018    VITDT 53 05/27/2016     DEXA 9/2024: Low bone density (OSTEOPENIA).   INTERVAL CHANGE  -There has been a 2.5% decrease in lumbar spine BMD.  -There has been a 0.1% increase in the right hip BMD.  -There has been a 3.6% increase in the left radius 33% BMD.  -FRAX Results: The 10 year probability of major osteoporotic fracture is 11.0%, and of hip fracture is 2.8%, based on right femoral neck BMD.     All pertinent notes, labs, and images personally reviewed by me.     A/P  Ms.Bushra Harmon is a 88 year old here for the  evaluation of hypothyroidism:    #1. Hypothyroidism(+TPO):   Currently taking 88 mcg/day (on this dose since 7/9/2020)  Taking generic.  Noted recent labs with normal TSH and slightly high free T4 levels.  Plan:  Discussed diagnosis, pathophysiology, management and treatment options of condition with patient.  Continue current dose of thyroid hormone replacement.  Thyroid labs in 11/6/24-- Need to make lab only appointment.(  Plan to recheck labs in the setting of history of infusion prior to labs and she is wondering if that can be affecting thyroid labs)  If stable, will continue same dose of levothyroxine.  Then labs and follow up in 9/2025.    Discussed s/s of hypothyroidism and hyperthyroidism to watch for.  The patient indicates understanding of these issues and agrees with the plan.    #2. Osteoporosis:  DEXA 9/2023: Severe osteoporosis on the basis of vertebral fracture. Moderate (grade 2) biconcave fracture of L4. This is new compared to the previous study.  There has been no significant change in bone density of the lumbar spine, hip(s) or forearm.    DEXA 9/2024--osteopenia as noted above.  Prednisone dose is decreased and currently she is taking 2.5 mg/day  Risk factors include post-menopausal, Height loss 1.5 inches,  Parent history of osteoporosis in her mother with  a hip fracture, Long term corticosteroid use.   Dental health is OK- no upcoming dental procedure.  She took 1 dose of Fosamax but had diarrhea, joint pain and back pain and stopped that.  She is not interested in retrial of Fosamax at this time.  Previously normal PTH, calcium, creatinine labs.  Plan:  Discussed diagnosis, pathophysiology, management and treatment options of condition with pt.  Discussed bone density results with patient.  FRAX score The 10 year probability of major osteoporotic fracture is 11.0%, and of hip fracture is 2.8%, based on right femoral neck BMD.   Prednisone dose has decreased and currently taking 2.5 mg/day  and bone density has slightly improved at increased at wrist  as noted above  She is not interested in medication in general.  In the setting of improved bone density scan and low FRAX score along with decrease in dose of prednisone, plan to continue monitor and recheck bone density 9/2025.  Follow-up after that  I recommend adequate calcium and vitamin D intake.    Bushra to follow up with Primary Care provider regarding elevated blood pressure.    Plan: DX Bone Density       Plan: T4 free, TSH, Thyroxine Free by [facility]         Dialysis, T4 free, TSH, Thyroxine Free by         [facility]        Plan: levothyroxine (SYNTHROID/LEVOTHROID) 88 MCG         tablet          The pt was advised to  Maintain an adequate calcium and vitamin D intake and to supplement vitamin D if needed to maintain serum levels of 25 hydroxy D (25 OH D) between 30-60 ng/ml.  Limit alcohol intake to no more than 2 servings per day.  Limit caffeine intake.  Maintain an active lifestyle including weight-bearing exercises for at least 30 mins daily.  Take measures to reduce the risk of falling.    Discussed indications, risks and benefits of all medications prescribed, and answered questions to patient's satisfaction.  The longitudinal plan of care for the diagnosis(es)/condition(s) as documented were addressed during this visit. Due to the added complexity in care, I will continue to support Bushra in the subsequent management and with ongoing continuity of care.  All questions were answered.  The patient indicates understanding of the above issues and agrees with the plan set forth.    Follow-up:  As discussed in AVS    Belinda Marcos MD  Endocrinology  [facility]  CC: Magalis Woodward     Addendum to above note and clinic visit:    Labs reviewed.    See result note/telephone encounter.        Again, thank you for allowing me to participate in the care of your patient.        Sincerely,        Belinda  MD Priti

## 2024-10-10 NOTE — PROGRESS NOTES
ENDOCRINOLOGY CLINIC NOTE:    Name: Bushra Harmon  Seen for f/u of Hypothyroidism and Osteopenia today.  HPI:  Bushra Harmon is a 88 year old female who presents for the evaluation of :hypothyroidism.   has a past medical history of Age related osteoporosis, unspecified pathological fracture presence (4/5/2022), Chronic obstructive pulmonary disease, unspecified COPD type (H) (7/11/2022), Essential hypertension, benign, Hyperlipidemia LDL goal <100 (5/18/2011), Hypertension, unspecified type (3/14/2023), Hypothyroidism due to Hashimoto's thyroiditis (10/10/2022), PMR (polymyalgia rheumatica) (H), PMR (polymyalgia rheumatica) (H)  -- rheumatologist -- dr Russo (8/3/2020), and Unspecified hypothyroidism.    Was started on prednisone for RA by Rheumatology.  Gained some wt since starting Prednisone. On it X 2 year.  She was also on methotrexate.    #1. Hypothyroidism (+TPO):  Initially diagnosed at age 42.and since then she is on thyroid hormone replacement.  Currently taking levothyroxine 88 mcg/day (On this dose since 7/9/2020). Dose was decreased at that time based on that.  Labs 10/1/2024- noted normal TSH with  slightly high free T4 and free T4 DL levels.    Taking generic.  Reports compliance.    Feeling OK. No major s/s.  She is taking biotin supplement but typically holds supplement for few days prior to thyroid labs.  + frontal hair loss. X chronic.      Weight is stable.    #2.Osteoporosis:    DEXA 9/2021: Osteopenia.  No significant change in bone density of the lumbar spine or of the hip(s). There has been no significant change in bone density of the forearm. No vertebral fractures identified on the VFA.     DEXA 9/2023: Severe osteoporosis on the basis of vertebral fracture. Moderate (grade 2) biconcave fracture of L4. This is new compared to the previous study. (S he recalls falling from bed but there was no pain)  There has been no significant change in bone density of the lumbar spine, hip(s) or  forearm.     DEXA 9/2024: Low bone density (OSTEOPENIA).   INTERVAL CHANGE  -There has been a 2.5% decrease in lumbar spine BMD.  -There has been a 0.1% increase in the right hip BMD.  -There has been a 3.6% increase in the left radius 33% BMD.  -FRAX Results: The 10 year probability of major osteoporotic fracture is 11.0%, and of hip fracture is 2.8%, based on right femoral neck BMD.     RISK FACTORS:  Post-menopausal, Height loss 1.5 inches,  Parent history of osteoporosis in her mother with  a hip fracture, Long term corticosteroid therapy.    Dairy: 2 servings/day  Calcium 600 mg/day  Vit D 1000 international unit(s)/day  Also takes multivitamin    Dental health is OK- no upcoming dental procedure.    She was started on Fosamax by primary care provider but she has not started it as she was worried about side effect of medication.  In last visit 12/2021 she wanted to start fosamax and was started on FOSAMAX- She took 1 dose of Fosamax but had diarrhea, joint pain and back pain and stopped that.  She is not interested in retrial of Fosamax at this time.    Steroids: On prednisone 2.5 mg every other day for PMR and giant cell arteritis?.    Exercise: golf in summer.     Palpitations:  No  Changes to hair or skin: chronic problem. + frontal hair loss X 2 years  Diarrhea/Constipation:No  Changes in menses: s/p menopause. Had been on HRT for a while. Stopped at age 67.  Dysphagia or Shortness of breath:No  Tremors:No  Changes in weight: gained some weight. Mostly stable.    Heat or cold intolerance: no  History of Lithium or Amiodarone use:No  Head or neck surgery/radiation:No  Family History of Thyroid Problems: + hypothyroidism in mother and sister  Stable weight.  Wt Readings from Last 2 Encounters:   10/10/24 82.3 kg (181 lb 6.4 oz)   08/20/24 82.1 kg (181 lb)     PMH/PSH:  Past Medical History:   Diagnosis Date    Age related osteoporosis, unspecified pathological fracture presence 4/5/2022    Chronic obstructive  pulmonary disease, unspecified COPD type (H) 2022    Essential hypertension, benign     Hyperlipidemia LDL goal <100 2011    Hypertension, unspecified type 3/14/2023    Hypothyroidism due to Hashimoto's thyroiditis 10/10/2022    PMR (polymyalgia rheumatica) (H)     PMR (polymyalgia rheumatica) (H)  -- rheumatologist -- dr Russo 8/3/2020    Unspecified hypothyroidism      Past Surgical History:   Procedure Laterality Date    AS TOTAL HIP ARTHROPLASTY Left     C DEXA, BONE DENSITY, AXIAL SKEL  2003    Normal BMD    CATARACT EXTRACTION Left     COLONOSCOPY N/A 2015    Procedure: COLONOSCOPY;  Surgeon: Joanna Acevedo MD;  Location:  GI    HC REMOVE TONSILS/ADENOIDS,<13 Y/O      LAPAROTOMY EXPLORATORY  1985    Exploratory Laparotomy: constricting band around small bowel, simple lysis    SIGMOIDOSCOPY FLEXIBLE N/A 4/10/2023    Procedure: Sigmoidoscopy flexible without sedation;  Surgeon: Marina Gordon MD;  Location:  GI    SURGICAL HISTORY OF -   2004    macular hole repair- Left eye    ZZC NONSPECIFIC PROCEDURE       x3    ZZC REPLANTATION THUMB DISTAL,COMPLETE  2009    left thumb trauma; Dr. Jose surgery     Family Hx:  Family History   Problem Relation Age of Onset    Hypertension Mother           at 100.    Breast Cancer Mother 85    Thyroid Disease Mother     Diabetes Sister     Hypertension Sister     Neurologic Disorder Daughter         MS    Hypertension Son     Ovarian Cancer No family hx of        Social Hx:  Social History     Socioeconomic History    Marital status:      Spouse name: Not on file    Number of children: 3    Years of education: Not on file    Highest education level: Not on file   Occupational History    Occupation: office     Employer: RETIRED   Tobacco Use    Smoking status: Former     Current packs/day: 0.00     Average packs/day: 0.5 packs/day for 45.0 years (22.5 ttl pk-yrs)     Types: Cigarettes     Start date:  1956     Quit date: 2001     Years since quittin.4     Passive exposure: Past    Smokeless tobacco: Never   Vaping Use    Vaping status: Never Used   Substance and Sexual Activity    Alcohol use: Not Currently    Drug use: No    Sexual activity: Never   Other Topics Concern     Service Not Asked    Blood Transfusions Not Asked    Caffeine Concern Yes     Comment: 1 cup per day    Occupational Exposure Not Asked    Hobby Hazards Not Asked    Sleep Concern Not Asked    Stress Concern Not Asked    Weight Concern Not Asked    Special Diet Not Asked    Back Care Not Asked    Exercise Yes     Comment: Active; exercise 4 times per week    Bike Helmet Not Asked    Seat Belt Yes    Self-Exams No    Parent/sibling w/ CABG, MI or angioplasty before 65F 55M? Not Asked   Social History Narrative    Not on file     Social Determinants of Health     Financial Resource Strain: Low Risk  (2024)    Financial Resource Strain     Within the past 12 months, have you or your family members you live with been unable to get utilities (heat, electricity) when it was really needed?: No   Food Insecurity: Low Risk  (2024)    Food Insecurity     Within the past 12 months, did you worry that your food would run out before you got money to buy more?: No     Within the past 12 months, did the food you bought just not last and you didn t have money to get more?: No   Transportation Needs: Low Risk  (2024)    Transportation Needs     Within the past 12 months, has lack of transportation kept you from medical appointments, getting your medicines, non-medical meetings or appointments, work, or from getting things that you need?: No   Physical Activity: Unknown (2024)    Exercise Vital Sign     Days of Exercise per Week: 2 days     Minutes of Exercise per Session: Not on file   Stress: No Stress Concern Present (2024)    [place] [employer] - Occupational Stress Questionnaire      Feeling of Stress : Not at all   Social Connections: Unknown (8/20/2024)    Social Connection and Isolation Panel [NHANES]     Frequency of Communication with Friends and Family: Not on file     Frequency of Social Gatherings with Friends and Family: More than three times a week     Attends Taoism Services: Not on file     Active Member of Clubs or Organizations: Not on file     Attends Club or Organization Meetings: Not on file     Marital Status: Not on file   Interpersonal Safety: Low Risk  (8/20/2024)    Interpersonal Safety     Do you feel physically and emotionally safe where you currently live?: Yes     Within the past 12 months, have you been hit, slapped, kicked or otherwise physically hurt by someone?: No     Within the past 12 months, have you been humiliated or emotionally abused in other ways by your partner or ex-partner?: No   Housing Stability: Low Risk  (8/20/2024)    Housing Stability     Do you have housing? : Yes     Are you worried about losing your housing?: No          MEDICATIONS:  has a current medication list which includes the following prescription(s): acetaminophen, albuterol, aspirin, biotin, blood glucose, blood glucose monitoring, calcium citrate-vitamin d, vitamin d3, cranberry, cyanocobalamin, dextromethorphan-guaifenesin, doxylamine, fluticasone-umeclidin-vilanterol, folic acid, glucosamine hcl, lactobacillus, levothyroxine, lisinopril, lutein, multivitamin, omega-3 fish oil, polyethylene glycol, prebiotic product, prednisone, probiotic product, simvastatin, thin, and tocilizumab.    ROS   ROS: 10 point ROS neg other than the symptoms noted above in the HPI.    Physical Exam   VS: LMP  (LMP Unknown)   Breastfeeding No   GENERAL: healthy, alert and no distress  EYES: Eyes grossly normal to inspection, conjunctivae and sclerae normal  ENT: no nose swelling, nasal discharge.  Thyroid: no apparent thyroid nodules.  Thyroid appears normal in size and nontender.  CV: RRR, no rubs,  gallops, no murmurs  RESP: CTAB, no wheezes, rales, or ronchi  ABDO: +BS  EXTREMITIES: no hand tremors.  NEURO: Cranial nerves grossly intact, mentation intact and speech normal  SKIN: No apparent skin lesions, rash or edema seen   PSYCH: mentation appears normal, affect normal/bright, judgement and insight intact, normal speech and appearance well-groomed    LABS:  TFTs:  Component      Latest Ref Rng 10/1/2024  11:21 AM   T4 Free      0.90 - 1.70 ng/dL 1.72 (H)    TSH      0.30 - 4.20 uIU/mL 3.72    Free T4 Eq Dialysis      1.1 - 2.4 ng/dL 2.8 (H)        Calcium:   Latest Ref Rng 8/20/2024  9:14 AM   [facility]     Calcium 8.8 - 10.4 mg/dL 9.4      Creatinine:  Creatinine   Date Value Ref Range Status   08/20/2024 0.87 0.51 - 0.95 mg/dL Final   06/07/2021 0.98 0.52 - 1.04 mg/dL Final     PTH:   Latest Ref Rng 6/7/2021  9:56 AM   [facility]     Parathyroid Hormone Intact 18 - 80 pg/mL 42        VIT D:  Vitamin D Deficiency Screening Results:  Lab Results   Component Value Date    VITDT 63 08/04/2023    VITDT 60 06/07/2021    VITDT 58 04/28/2020    VITDT 53 06/04/2018    VITDT 53 05/27/2016     DEXA 9/2024: Low bone density (OSTEOPENIA).   INTERVAL CHANGE  -There has been a 2.5% decrease in lumbar spine BMD.  -There has been a 0.1% increase in the right hip BMD.  -There has been a 3.6% increase in the left radius 33% BMD.  -FRAX Results: The 10 year probability of major osteoporotic fracture is 11.0%, and of hip fracture is 2.8%, based on right femoral neck BMD.     All pertinent notes, labs, and images personally reviewed by me.     A/P  Ms.Nancy YUNG Harmon is a 88 year old here for the evaluation of hypothyroidism:    #1. Hypothyroidism(+TPO):   Currently taking 88 mcg/day (on this dose since 7/9/2020)  Taking generic.  Noted recent labs with normal TSH and slightly high free T4 levels.  Plan:  Discussed diagnosis, pathophysiology, management and treatment options of condition with  patient.  Continue current dose of thyroid hormone replacement.  Thyroid labs in 11/6/24-- Need to make lab only appointment.(  Plan to recheck labs in the setting of history of infusion prior to labs and she is wondering if that can be affecting thyroid labs)  If stable, will continue same dose of levothyroxine.  Then labs and follow up in 9/2025.    Discussed s/s of hypothyroidism and hyperthyroidism to watch for.  The patient indicates understanding of these issues and agrees with the plan.    #2. Osteoporosis:  DEXA 9/2023: Severe osteoporosis on the basis of vertebral fracture. Moderate (grade 2) biconcave fracture of L4. This is new compared to the previous study.  There has been no significant change in bone density of the lumbar spine, hip(s) or forearm.    DEXA 9/2024--osteopenia as noted above.  Prednisone dose is decreased and currently she is taking 2.5 mg/day  Risk factors include post-menopausal, Height loss 1.5 inches,  Parent history of osteoporosis in her mother with  a hip fracture, Long term corticosteroid use.   Dental health is OK- no upcoming dental procedure.  She took 1 dose of Fosamax but had diarrhea, joint pain and back pain and stopped that.  She is not interested in retrial of Fosamax at this time.  Previously normal PTH, calcium, creatinine labs.  Plan:  Discussed diagnosis, pathophysiology, management and treatment options of condition with pt.  Discussed bone density results with patient.  FRAX score The 10 year probability of major osteoporotic fracture is 11.0%, and of hip fracture is 2.8%, based on right femoral neck BMD.   Prednisone dose has decreased and currently taking 2.5 mg/day and bone density has slightly improved at increased at wrist  as noted above  She is not interested in medication in general.  In the setting of improved bone density scan and low FRAX score along with decrease in dose of prednisone, plan to continue monitor and recheck bone density 9/2025.  Follow-up  after that  I recommend adequate calcium and vitamin D intake.    Bushra to follow up with Primary Care provider regarding elevated blood pressure.    Plan: DX Bone Density       Plan: T4 free, TSH, Thyroxine Free by [facility]         Dialysis, T4 free, TSH, Thyroxine Free by         [facility]        Plan: levothyroxine (SYNTHROID/LEVOTHROID) 88 MCG         tablet          The pt was advised to  Maintain an adequate calcium and vitamin D intake and to supplement vitamin D if needed to maintain serum levels of 25 hydroxy D (25 OH D) between 30-60 ng/ml.  Limit alcohol intake to no more than 2 servings per day.  Limit caffeine intake.  Maintain an active lifestyle including weight-bearing exercises for at least 30 mins daily.  Take measures to reduce the risk of falling.    Discussed indications, risks and benefits of all medications prescribed, and answered questions to patient's satisfaction.  The longitudinal plan of care for the diagnosis(es)/condition(s) as documented were addressed during this visit. Due to the added complexity in care, I will continue to support Bushra in the subsequent management and with ongoing continuity of care.  All questions were answered.  The patient indicates understanding of the above issues and agrees with the plan set forth.    Follow-up:  As discussed in AVS    Belinda Mracos MD  Endocrinology  [facility]  CC: Magalis Woodward     Addendum to above note and clinic visit:    Labs reviewed.    See result note/telephone encounter.

## 2024-10-10 NOTE — PATIENT INSTRUCTIONS
[facility]  Dr Marcos, Endocrinology Department    [facility]   [address] [facility]  [place] [address]  Appointment [phone]  Fax: [phone]  [place]: Monday - Thursday      Please check the cost coverage and copay with insurance before recommended tests, services and medications (especially if new medications are prescribed).     If ordered, please get blood work done 1 week prior to your next appointment so they will be available to Dr. Marcos at your visit.    Continue current dose of thyroid hormone replacement.  Thyroid labs in 24-- Need to make lab only appointment.  If stable, will continue same dose of levothyroxine.  Then labs and follow up in 2025.    DEXA in 2025 at [place].  Follow up after that.    Take Levothyroxine on an empty stomach. Take it with a full glass of water at least 30 minutes to 1 hour before eating breakfast.   This medicine should be taken at least 4 hours before or 4 hours after these medicines: antacids (Maalox , Mylanta , Tums ), calcium supplements, cholestyramine (Prevalite , Questran ), colestipol (Colestid ), iron supplements, orlistat (Heraclio , Xenical ), simethicone (Gas-X , Mylicon ), and sucralfate (Carafate ).   Swallow the capsule whole. Do not cut or crush it.     The pt was advised to  Maintain an adequate calcium and vitamin D intake and to supplement vitamin D if needed to maintain serum levels of 25 hydroxy D (25 OH D) between 30-60 ng/ml.  Limit alcohol intake to no more than 2 servings per day.  Limit caffeine intake.  Maintain an active lifestyle including weight-bearing exercises for at least 30 mins daily.  Take measures to reduce the risk of falling.     You should get 1000- 1200 mg/day calcium in divided doses of no more than 500 mg/dose.  This INCLUDES what is in your food as well as what is in any supplements you may be taking.    Vit D about 800-1000 IU/day ( unless you have  vit D deficiency- in that case higher dose)  Dietary sources of calcium:: These also contain vitamin D  Milk                            8 oz            300 mg calcium  Yogurt                          1 cup           400 mg calcium   Hard cheese                     1.5 oz          300 mg  Cottage cheese                  2 cup           300 mg  Orange juice with Calcium       8 oz            300 mg  Low fat dairy sources are recommended        You should get 30 minutes of moderate weight bearing exercise on most days of the week .  Weight bearing exercise includes such things as walking, jogging, hiking, dancing.  You should also get Strength training 2 or more times/week in addition to other weight -being exercise. Strength training uses weight or resistance beyond that seen in everyday activities -(pilates, weight training with free weights, weight machines or resistance bands)     Living with Osteoporosis: Preventing Fractures  If you have osteoporosis, you can do a lot to reduce its effect on your life. Knowing how to prevent fractures and spinal curvature can help you live more comfortably and safely with this disease.  Reducing your risk for fractures  The most common fracture sites in people with osteoporosis are the wrist, spine, and hip. These fractures are often caused by accidents and falls. All fractures are painful and may limit what you can do. But hip fractures are very serious. They often need surgery, and it can take months to recover. To reduce your risk for fractures:  Get regular exercise. Try walking, swimming, or weight training.  Eat foods that are rich in calcium, or take calcium supplements.  Make your home safe to help avoid accidents.  Take extra precautions not to fall in risky areas, such as icy sidewalks.  Understanding spinal fractures  Your spine is made up of many bones called vertebrae. Osteoporosis can cause the vertebrae in your spine to collapse. As a result, your upper back may  arch forward, creating a curvature. Spine fractures may also result from back strain and bad posture. You will also lose height. Your lower spine must then adjust to keep your body balanced. This can cause back pain. To prevent or lessen these spinal changes:  Practice good posture.  Use proper techniques if you need to lift heavy objects.  Do back exercises to help your posture.  Lie on your back when you have pain.  Ask your healthcare provider about these and other ways to help your spine.  [employer] last reviewed this educational content on 5/1/2018 2000-2021 [employer]. All rights reserved. This information is not intended as a substitute for professional medical care. Always follow your healthcare professional's instructions.          Living with Osteoporosis: Regular Exercise  If you have osteoporosis, exercise is vital for your health. It can prevent bone fractures and spine changes. It will slow bone loss. Exercise will strengthen your body. It can also be fun. A variety of exercises is best. See below for exercises that can help you. But before you start, talk with your healthcare provider to be sure these exercises are right for you.   Resistance exercises. These build muscle strength and maintain bone mass. They also make you less prone to injury. Exercises include lifting small weights, doing push-ups and sit-ups, using elastic exercise bands, and using weight machines.   Weight-bearing activities. These help your whole body. They also help you maintain bone mass. Activities include walking, dancing, and housework.   Non-weight-bearing exercises. These help prevent back strain and pain. They do this by building the trunk and leg muscles. Exercises that help with flexibility can prevent falls. Examples include swimming, water exercise, and stretching.   Staying safe  Here are tips to stay safe:   Always check with your healthcare provider before starting any new exercise program.  Use  weights only as instructed.  Stop any exercise that causes pain.  [employer] last reviewed this educational content on 5/1/2018 2000-2021 [employer]. All rights reserved. This information is not intended as a substitute for professional medical care. Always follow your healthcare professional's instructions.          Preventing Osteoporosis: Avoiding Bone Loss  Certain factors can speed up bone loss or decrease bone growth. For example, alcohol, cigarettes, and certain medicines reduce bone mass. Some foods make it hard for your body to absorb calcium.  Things to avoid  Here are things to avoid to help prevent osteoporosis:  Alcohol. This is toxic to bones. It is a major cause of bone loss. Heavy drinking can cause osteoporosis even if you have no other risk factors.  Smoking. This reduces bone mass. Smoking may also interfere with estrogen levels and cause early menopause.  Inactivity. Not being active makes your bones lose strength and become thinner. Over time, thin bones may break. Women who aren't active are at a high risk for osteoporosis.  Certain medicines. Some medicines, such as cortisone, increase bone loss. They also decrease bone growth. Ask your healthcare provider about any side effects of your medicines, and how to prevent them.  Protein-rich or salty foods. Eaten in large amounts, these foods may deplete calcium.  Caffeine. This increases calcium loss. People who drink a lot of coffee, tea, or soda lose more calcium than those who don't.  [employer] last reviewed this educational content on 5/1/2018 2000-2021 [employer]. All rights reserved. This information is not intended as a substitute for professional medical care. Always follow your healthcare professional's instructions.          Preventing Osteoporosis: Meeting Your Calcium Needs  Your body needs calcium to build and repair bones. But it can't make calcium on its own. That's why it's important to eat  calcium-rich foods. Some foods are naturally rich in calcium. Others have calcium added (fortified). It's best to get calcium from the foods you eat. But if you can't get enough, you may want to take calcium supplements. To meet your daily calcium needs, try the foods listed below.                          Dairy Fish & beans Other sources   Source   Calcium (mg) per serving   Source   Calcium (mg) per serving   Source   Calcium (mg) per serving   Low-fat yogurt, plain   415 mg/8 oz.   Sardines, [place], canned, with bones   351 mg/3 oz.   Oatmeal, instant, fortified   215 mg/1 cup   Nonfat milk   302 mg/1 cup   [place], sockeye, canned, with bones   239 mg/3 oz.   Tofu made with calcium sulfate   204 mg/3 oz.   Low-fat milk   297 mg/1 cup   Soybeans, fresh, boiled   131 mg/1/2 cup   Collards   179 mg/1/2 cup   Swiss cheese   272 mg/1 oz.   White beans, cooked   81 mg/1/2 cup   English muffin, whole wheat   175 mg/1 muffin   Cheddar cheese   205 mg/1 oz.   Navy beans, cooked   79 mg/1/2 cup   Kale   90 mg/1/2 cup   Ice cream strawberry   79 mg/1/2 cup           Orange, navel   56 mg/1 medium   Note: Calcium levels may vary depending on brand and size.  Daily calcium needs  14 to 18 years old: 1,300 mg  19 to 30 years old: 1,000 mg  31 to 50 years old: 1,000 mg  51 to 70 years old, women: 1,200 mg  51 to 70 years old, men: 1,000 mg  Pregnant or nursin to 18 years old: 1,300 mg, 19 to 50 years old: 1,000 mg  Older than 70 (women and men): 1,200 mg   [employer] last reviewed this educational content on 2018-2021 [employer]. All rights reserved. This information is not intended as a substitute for professional medical care. Always follow your healthcare professional's instructions.

## 2024-10-24 ENCOUNTER — TRANSFERRED RECORDS (OUTPATIENT)
Dept: HEALTH INFORMATION MANAGEMENT | Facility: CLINIC | Age: 89
End: 2024-10-24
Payer: MEDICARE

## 2024-10-24 ENCOUNTER — DOCUMENTATION ONLY (OUTPATIENT)
Dept: LAB | Facility: CLINIC | Age: 89
End: 2024-10-24
Payer: MEDICARE

## 2024-10-24 NOTE — PROGRESS NOTES
Bushra Harmon has an upcoming lab appointment:    Future Appointments   Date Time Provider Department Center   11/7/2024  3:30 PM RI LAB RILABR RI   12/9/2024 12:00 PM CS PULMONARY FUNCTION CSPULM    12/9/2024  1:00 PM Corie Alejandra MD CSPULM CS   9/15/2025 10:00 AM RIDX1 RIDEX RI   10/9/2025 11:15 AM RI LAB RILABR RI   10/16/2025 11:00 AM Belinda Marcos MD [facility]     Patient is scheduled for the following lab(s): Please change expected date on existing orders. Thank you    There is no order available. Please review and place either future orders or HMPO (Review of Health Maintenance Protocol Orders), as appropriate.    Health Maintenance Due   Topic    ANNUAL REVIEW OF HM ORDERS      Arpita Jha

## 2024-11-04 ENCOUNTER — TELEPHONE (OUTPATIENT)
Dept: INTERNAL MEDICINE | Facility: CLINIC | Age: 89
End: 2024-11-04

## 2024-11-04 NOTE — TELEPHONE ENCOUNTER
S-(situation): upcoming lab appt    B-(background): has lab appt on 11/7, infusion scheduled for 11/8.     A-(assessment): previously had to reschedule lab because she had a recent pneumococcal vaccine the week prior and provider told her it would skew the lab results. Currently has lab appt on 11/7 and states she recently had the flu/covid shot on 10/29. Patient wondering if these vaccines will skew the lab results or if ok to go ahead with lab      R-(recommendations): Routing to provider to review and advise.     MARQUIS BLISS RN on 11/4/2024 at 9:53 AM   [facility]

## 2024-11-07 ENCOUNTER — LAB (OUTPATIENT)
Dept: LAB | Facility: CLINIC | Age: 89
End: 2024-11-07
Payer: MEDICARE

## 2024-11-07 DIAGNOSIS — E06.3 HYPOTHYROIDISM DUE TO HASHIMOTO'S THYROIDITIS: ICD-10-CM

## 2024-11-07 LAB
T4 FREE SERPL-MCNC: 1.66 NG/DL (ref 0.9–1.7)
TSH SERPL DL<=0.005 MIU/L-ACNC: 2.03 UIU/ML (ref 0.3–4.2)

## 2024-11-07 PROCEDURE — 84439 ASSAY OF FREE THYROXINE: CPT | Mod: 59

## 2024-11-07 PROCEDURE — 99000 SPECIMEN HANDLING OFFICE-LAB: CPT

## 2024-11-07 PROCEDURE — 36415 COLL VENOUS BLD VENIPUNCTURE: CPT

## 2024-11-07 PROCEDURE — 84439 ASSAY OF FREE THYROXINE: CPT | Mod: 90

## 2024-11-07 PROCEDURE — 84443 ASSAY THYROID STIM HORMONE: CPT

## 2024-11-10 NOTE — TELEPHONE ENCOUNTER
What is the medication name  ?    The meds interaction question should be addressed by the pharmacist at the infusion ctr

## 2024-11-11 NOTE — TELEPHONE ENCOUNTER
Unsure what infusion this is referring to. 11/7/24 lab was ordered by Dr. Marcos and was completed. Routing to endocrinology.

## 2024-11-11 NOTE — TELEPHONE ENCOUNTER
Labs were done 11/2024.  1 more lab is pending.  No need to recheck the labs.  It is that the question?

## 2024-11-11 NOTE — TELEPHONE ENCOUNTER
11/7/24 labs:  TSH - 2.03  Free T4 - 1.66    Per below notes:  A-(assessment): previously had to reschedule lab because she had a recent pneumococcal vaccine the week prior and provider told her it would skew the lab results. Currently has lab appt on 11/7 and states she recently had the flu/covid shot on 10/29. Patient wondering if these vaccines will skew the lab results or if ok to go ahead with lab eloy.     Per 10/10/24 OV:  Continue current dose of thyroid hormone replacement.  Thyroid labs in 11/6/24-- Need to make lab only appointment.(  Plan to recheck labs in the setting of history of infusion prior to labs and she is wondering if that can be affecting thyroid labs)

## 2024-11-12 LAB — T4 FREE SERPL DIALY-MCNC: 2.5 NG/DL

## 2024-11-12 NOTE — TELEPHONE ENCOUNTER
Pt wanting to verify that flu and covid shot will not affect lab results of thyroid labs     Per notes 10/10:  Thyroid labs in 11/6/24-- Need to make lab only appointment.(  Plan to recheck labs in the setting of history of infusion prior to labs and she is wondering if that can be affecting thyroid labs)

## 2024-12-02 ENCOUNTER — ANCILLARY PROCEDURE (OUTPATIENT)
Dept: GENERAL RADIOLOGY | Facility: CLINIC | Age: 89
End: 2024-12-02
Attending: INTERNAL MEDICINE
Payer: MEDICARE

## 2024-12-02 DIAGNOSIS — J44.9 CHRONIC OBSTRUCTIVE PULMONARY DISEASE, UNSPECIFIED COPD TYPE (H): ICD-10-CM

## 2024-12-02 PROCEDURE — 71046 X-RAY EXAM CHEST 2 VIEWS: CPT | Mod: TC | Performed by: RADIOLOGY

## 2024-12-09 ENCOUNTER — OFFICE VISIT (OUTPATIENT)
Dept: PULMONOLOGY | Facility: CLINIC | Age: 89
End: 2024-12-09
Payer: MEDICARE

## 2024-12-09 VITALS
BODY MASS INDEX: 30.88 KG/M2 | HEART RATE: 75 BPM | RESPIRATION RATE: 16 BRPM | SYSTOLIC BLOOD PRESSURE: 130 MMHG | OXYGEN SATURATION: 95 % | WEIGHT: 180 LBS | DIASTOLIC BLOOD PRESSURE: 70 MMHG

## 2024-12-09 DIAGNOSIS — J44.9 CHRONIC OBSTRUCTIVE PULMONARY DISEASE, UNSPECIFIED COPD TYPE (H): ICD-10-CM

## 2024-12-09 PROCEDURE — 94150 VITAL CAPACITY TEST: CPT | Performed by: INTERNAL MEDICINE

## 2024-12-09 PROCEDURE — 94729 DIFFUSING CAPACITY: CPT | Performed by: INTERNAL MEDICINE

## 2024-12-09 PROCEDURE — 94375 RESPIRATORY FLOW VOLUME LOOP: CPT | Performed by: INTERNAL MEDICINE

## 2024-12-09 PROCEDURE — 94726 PLETHYSMOGRAPHY LUNG VOLUMES: CPT | Performed by: INTERNAL MEDICINE

## 2024-12-09 PROCEDURE — 99205 OFFICE O/P NEW HI 60 MIN: CPT | Mod: 25 | Performed by: INTERNAL MEDICINE

## 2024-12-09 ASSESSMENT — PAIN SCALES - GENERAL: PAINLEVEL_OUTOF10: NO PAIN (0)

## 2024-12-09 NOTE — PROGRESS NOTES
[facility] [address] [address]  Phone: [phone]  Fax: [phone]    Patient:  Bushra Harmon, Date of birth 1935  Date of Visit:  12/09/2024  Reason for Consult: COPD    Pulmonary Clinic New Patient Consult  Assessment and Plan:   Bushra Harmon is a 89 year old female with a history of temporal arteritis and PMR on prednisone and tocilizumab and COPD who presents to pulmonary clinic today for further evaluation of COPD.  Presumed COPD based on history, mosaic attenuation and air trapping seen on previous CT chest imaging in the setting of tobacco use history.  Normal PFTs. Does not sound to have had issues with recurrent exacerbation historically.  Has been symptomatically stable on Trelegy but is experiencing voice hoarseness, likely related to ICS component.  Was previously suggested to step down to Spiriva by [facility] but did not do this as she was finishing up her supply of Trelegy on hand.  At this point, I would favor stepping down to LABA/LAMA with Anoro (instead of Spiriva).  She is agreeable to this.  She may continue to use albuterol as needed for breakthrough symptoms.  Will proceed with A1AT testing today after discussion of risks and benefits.  Pulmonary nodule.  3 mm pulmonary nodule seen in [facility] on CT PE from 12/22023.  Was read as stable compared with CT from May 2023 and per my review also appears to have been apparent and unchanged on CT from 4/2022.  No further dedicated surveillance is necessary given 12 months stability.  Temporal arteritis and PMR. Followed by rheumatology.  Questions and concerns were answered to the patient's satisfaction.  she was provided with my contact information should new questions or concerns arise in the interim.  she should return to clinic in 6 months with myself or Yaritza Dietz PA-C.  Up to date on Pneumonia vaccines (2015, 2024, 2011), RSV (2023), seasonal influenza and COVID  "vaccines.  Elicia Alejandra MD  Pulmonary and Critical Care Medicine    I spent 82 minutes on the date of encounter doing chart review, review of outside records, review of test results, conducting the patient visit, completing documentation and further activities as noted above.        History of Present Illness    Bushra Harmon is a 89 year old female with a history of temporal arteritis and PMR on prednisone and tocilizumab and COPD who presents to pulmonary clinic today for further evaluation of COPD.  Patient was previously followed by [facility] pulm and was last seen in October.  At this time spirometry was reportedly normal.  She had stable symptoms on Trelegy but noted voice hoarseness and was switched Spiriva.      She presents to clinic today and reports the following:  -Has had COPD for at least a few years.  Reports general symptoms as fairly minimal unless she tries to walk at a brisk pace.  -Had bronchitis last [place] but has otherwise been doing well.  -Will use albuterol in a nebulizer when sick.  MDI albuterol irritates her throat so she does not really use it.  Uses nebulizer if she is sick, but does not require otherwise.  -Uses Trelegy daily.  Does experience some voice hoarseness with this that comes and goes.  -Prior to being diagnosed with COPD was prone to recurrent bronchitis.  Since starting on treatment this has not been much of an issue for her.  -No history of asthma earlier in life.  -No cough or sputum production.  -No seasonal allergies or hayfever.  -No heartburn or acid reflux.    -Previous smoker. Smoked 1/2 ppd x 40 years.  Quit 20+ years ago.  -No pets at home.  -No bird exposure, down bedding, jacuzzi use, recent travel.    Family history notable for mom with \"chronic bronchitis\" (non smoker).      Review of Systems:  10 of 14 systems reviewed and are negative unless otherwise stated in HPI.    Past Medical History:   Diagnosis Date    Age related osteoporosis, " unspecified pathological fracture presence 4/5/2022    Chronic obstructive pulmonary disease, unspecified COPD type (H) 7/11/2022    Essential hypertension, benign     Hyperlipidemia LDL goal <100 5/18/2011    Hypertension, unspecified type 3/14/2023    Hypothyroidism due to Hashimoto's thyroiditis 10/10/2022    PMR (polymyalgia rheumatica) (H)     PMR (polymyalgia rheumatica) (H)  -- rheumatologist -- dr Russo 8/3/2020    Unspecified hypothyroidism          Current Outpatient Medications:     acetaminophen (TYLENOL) 325 MG tablet, Take 1-2 tablets (325-650 mg) by mouth every 6 hours as needed for mild pain, Disp: , Rfl:     albuterol (PROVENTIL) (2.5 MG/3ML) 0.083% neb solution, Take 1 vial (2.5 mg) by nebulization every 6 hours as needed for shortness of breath, wheezing or cough, Disp: , Rfl:     aspirin 81 MG EC tablet, Take 81 mg by mouth daily, Disp: , Rfl:     BIOTIN 5000 PO, , Disp: , Rfl:     blood glucose (NO BRAND SPECIFIED) test strip, Use to test blood sugar 3 times daily or as directed. To accompany: Blood Glucose Monitor Brands: per insurance., Disp: 100 strip, Rfl: 0    blood glucose monitoring (NO BRAND SPECIFIED) meter device kit, Use to test blood sugar 3 times daily or as directed. Preferred blood glucose meter OR supplies to accompany: Blood Glucose Monitor Brands: per insurance., Disp: 1 kit, Rfl: 0    CALCIUM CITRATE-VITAMIN D 315-200 MG-UNIT PO TABS, 2 TABLETS DAILY, Disp: 90 Tab, Rfl: 3    Cholecalciferol (VITAMIN D3) 25 MCG (1000 UT) CAPS, Take 1 capsule by mouth daily, Disp: , Rfl:     Cranberry 450 MG CAPS, , Disp: , Rfl:     cyanocobalamin (VITAMIN B-12) 2500 MCG SUBL sublingual tablet, Place 2,500 mcg under the tongue daily, Disp: , Rfl:     dextromethorphan-guaiFENesin (MUCINEX DM)  MG 12 hr tablet, Take 1 tablet by mouth every 12 hours, Disp: , Rfl:     doxylamine (UNISOM) 25 MG TABS tablet, Take 1 tablet (25 mg) by mouth At Bedtime, Disp: , Rfl:      Fluticasone-Umeclidin-Vilanterol (TRELEGY ELLIPTA) 100-62.5-25 MCG/INH oral inhaler, Inhale 1 puff into the lungs daily, Disp: , Rfl:     folic acid 800 MCG tablet, Take 800 mcg by mouth 2 times daily, Disp: , Rfl:     GLUCOSAMINE HCL 1000 MG PO TABS, 2 TABLETS DAILY, Disp: 90 Tab, Rfl: 3    LACTOBACILLUS PO, Take 120 mg by mouth daily, Disp: , Rfl:     levothyroxine (SYNTHROID/LEVOTHROID) 88 MCG tablet, Take 1 tablet (88 mcg) by mouth daily., Disp: 90 tablet, Rfl: 3    lisinopril (ZESTRIL) 40 MG tablet, Take 1 tablet (40 mg) by mouth daily -- prescribed by nephrology, Disp: , Rfl:     Lutein 6 MG TABS, Take 20 mg by mouth daily , Disp: , Rfl:     MULTIVITAMIN TABS   OR, 1 daily, Disp: , Rfl:     Omega-3 Fish Oil 500 MG capsule, Take 1 capsule (500 mg) by mouth daily, Disp: , Rfl:     polyethylene glycol (MIRALAX) 17 g packet, Take 1 packet by mouth every 24 hours, Disp: , Rfl:     PREBIOTIC PRODUCT PO, , Disp: , Rfl:     predniSONE (DELTASONE) 5 MG tablet, Take 10 mg by mouth daily, Disp: , Rfl:     Probiotic Product (PROBIOTIC DAILY PO), , Disp: , Rfl:     simvastatin (ZOCOR) 10 MG tablet, Take 1 tablet (10 mg) by mouth at bedtime, Disp: 90 tablet, Rfl: 4    thin (NO BRAND SPECIFIED) lancets, Use with lanceting device. To accompany: Blood Glucose Monitor Brands: per insurance., Disp: 100 each, Rfl: 0    tocilizumab (ACTEMRA) 162 MG/0.9ML subcutaneous injection, Inject 162 mg subcutaneously every 28 days, Disp: , Rfl:       Physical Exam:  /70 (BP Location: Left arm, Patient Position: Sitting, Cuff Size: Adult Large)   Pulse 75   Resp 16   Wt 81.6 kg (180 lb)   LMP  (LMP Unknown)   SpO2 95%   BMI 30.88 kg/m    GENERAL: Well developed, well nourished, alert, and in no apparent distress.  HEENT: Normocephalic, atraumatic. PERRL, EOMI. Oral mucosa is moist. No perioral cyanosis.  NECK: supple, no obvious masses.  RESP:  Normal respiratory effort.  CTAB.  No rales, wheezes, rhonchi.  No cyanosis or  clubbing.  CV: Normal S1, S2, regular rhythm, normal rate. + systolic murmur.  Trace b/l LE edema.   ABDOMEN: non-distended.   SKIN: warm and dry. No rash.  NEURO: Alert and oriented.  Normal gait.  Fluent speech.  PSYCH: mentation appears normal.     Results (personally reviewed in clinic today):  PFTs: R 86%, FVC 83%, FEV1 94%, %, DLCO 101%.  Study demonstrates normal pulmonary mechanics.  Imaging:  CT-PE 12/22/2023:   1.  No evidence for pulmonary embolism.  2.  Interval increased bilateral peribronchial wall thickening that may be a bronchitis. Increased bilateral patchy groundglass pulmonary opacities and areas of sparing that could be related to bronchitis versus an atypical pneumonia.  3.  Stable tiny nodule at the right lower lobe.  CXR 12/2/2024: There are no acute infiltrates. The cardiac silhouette is not enlarged. Pulmonary vasculature is unremarkable.      The above note was dictated using voice recognition software and may include typographical errors. Please contact the author for any clarifications.

## 2024-12-09 NOTE — NURSING NOTE
A1AD Test    A1AD testing completed during visit. Genetic Consent Form completed by Dr Alejandra. Patient tolerated fingerstick and left clinic without any distress.    Lyle De La Paz RN on 12/9/2024 at 1:57 PM

## 2024-12-09 NOTE — LETTER
12/9/2024      Bushra Harmon  [address] [address]      Dear Colleague,    Thank you for referring your patient, Bushra Harmon, to the [facility]. Please see a copy of my visit note below.      [facility]  [address]  [place] [address]  Phone: [phone]  Fax: [phone]    Patient:  Bushra Harmon, Date of birth 1935  Date of Visit:  12/09/2024  Reason for Consult: COPD    Pulmonary Clinic New Patient Consult  Assessment and Plan:   Bushra Harmon is a 89 year old female with a history of temporal arteritis and PMR on prednisone and tocilizumab and COPD who presents to pulmonary clinic today for further evaluation of COPD.  Presumed COPD based on history, mosaic attenuation and air trapping seen on previous CT chest imaging in the setting of tobacco use history.  Normal PFTs. Does not sound to have had issues with recurrent exacerbation historically.  Has been symptomatically stable on Trelegy but is experiencing voice hoarseness, likely related to ICS component.  Was previously suggested to step down to Spiriva by KALYAN Gonzalez but did not do this as she was finishing up her supply of Trelegy on hand.  At this point, I would favor stepping down to LABA/LAMA with Anoro (instead of Spiriva).  She is agreeable to this.  She may continue to use albuterol as needed for breakthrough symptoms.  Will proceed with A1AT testing today after discussion of risks and benefits.  Pulmonary nodule.  3 mm pulmonary nodule seen in RLL on CT PE from 12/22023.  Was read as stable compared with CT from May 2023 and per my review also appears to have been apparent and unchanged on CT from 4/2022.  No further dedicated surveillance is necessary given 12 months stability.  Temporal arteritis and PMR. Followed by rheumatology.  Questions and concerns were answered to the patient's satisfaction.  she was provided with my contact  information should new questions or concerns arise in the interim.  she should return to clinic in 6 months with myself or Yaritza Dietz PA-C.  Up to date on Pneumonia vaccines (2015, 2024, 2011), RSV (2023), seasonal influenza and COVID vaccines.  Elicia Alejandra MD  Pulmonary and Critical Care Medicine    I spent 82 minutes on the date of encounter doing chart review, review of outside records, review of test results, conducting the patient visit, completing documentation and further activities as noted above.        History of Present Illness    Bushra Harmon is a 89 year old female with a history of temporal arteritis and PMR on prednisone and tocilizumab and COPD who presents to pulmonary clinic today for further evaluation of COPD.  Patient was previously followed by [facility] pulm and was last seen in October.  At this time spirometry was reportedly normal.  She had stable symptoms on Trelegy but noted voice hoarseness and was switched Spiriva.      She presents to clinic today and reports the following:  -Has had COPD for at least a few years.  Reports general symptoms as fairly minimal unless she tries to walk at a brisk pace.  -Had bronchitis last Renetta but has otherwise been doing well.  -Will use albuterol in a nebulizer when sick.  MDI albuterol irritates her throat so she does not really use it.  Uses nebulizer if she is sick, but does not require otherwise.  -Uses Trelegy daily.  Does experience some voice hoarseness with this that comes and goes.  -Prior to being diagnosed with COPD was prone to recurrent bronchitis.  Since starting on treatment this has not been much of an issue for her.  -No history of asthma earlier in life.  -No cough or sputum production.  -No seasonal allergies or hayfever.  -No heartburn or acid reflux.    -Previous smoker. Smoked 1/2 ppd x 40 years.  Quit 20+ years ago.  -No pets at home.  -No bird exposure, down bedding, jacuzzi use, recent travel.    Family  "history notable for mom with \"chronic bronchitis\" (non smoker).      Review of Systems:  10 of 14 systems reviewed and are negative unless otherwise stated in HPI.    Past Medical History:   Diagnosis Date     Age related osteoporosis, unspecified pathological fracture presence 4/5/2022     Chronic obstructive pulmonary disease, unspecified COPD type (H) 7/11/2022     Essential hypertension, benign      Hyperlipidemia LDL goal <100 5/18/2011     Hypertension, unspecified type 3/14/2023     Hypothyroidism due to Hashimoto's thyroiditis 10/10/2022     PMR (polymyalgia rheumatica) (H)      PMR (polymyalgia rheumatica) (H)  -- rheumatologist -- dr Russo 8/3/2020     Unspecified hypothyroidism          Current Outpatient Medications:      acetaminophen (TYLENOL) 325 MG tablet, Take 1-2 tablets (325-650 mg) by mouth every 6 hours as needed for mild pain, Disp: , Rfl:      albuterol (PROVENTIL) (2.5 MG/3ML) 0.083% neb solution, Take 1 vial (2.5 mg) by nebulization every 6 hours as needed for shortness of breath, wheezing or cough, Disp: , Rfl:      aspirin 81 MG EC tablet, Take 81 mg by mouth daily, Disp: , Rfl:      BIOTIN 5000 PO, , Disp: , Rfl:      blood glucose (NO BRAND SPECIFIED) test strip, Use to test blood sugar 3 times daily or as directed. To accompany: Blood Glucose Monitor Brands: per insurance., Disp: 100 strip, Rfl: 0     blood glucose monitoring (NO BRAND SPECIFIED) meter device kit, Use to test blood sugar 3 times daily or as directed. Preferred blood glucose meter OR supplies to accompany: Blood Glucose Monitor Brands: per insurance., Disp: 1 kit, Rfl: 0     CALCIUM CITRATE-VITAMIN D 315-200 MG-UNIT PO TABS, 2 TABLETS DAILY, Disp: 90 Tab, Rfl: 3     Cholecalciferol (VITAMIN D3) 25 MCG (1000 UT) CAPS, Take 1 capsule by mouth daily, Disp: , Rfl:      Cranberry 450 MG CAPS, , Disp: , Rfl:      cyanocobalamin (VITAMIN B-12) 2500 MCG SUBL sublingual tablet, Place 2,500 mcg under the tongue daily, Disp: , Rfl: "      dextromethorphan-guaiFENesin (MUCINEX DM)  MG 12 hr tablet, Take 1 tablet by mouth every 12 hours, Disp: , Rfl:      doxylamine (UNISOM) 25 MG TABS tablet, Take 1 tablet (25 mg) by mouth At Bedtime, Disp: , Rfl:      Fluticasone-Umeclidin-Vilanterol (TRELEGY ELLIPTA) 100-62.5-25 MCG/INH oral inhaler, Inhale 1 puff into the lungs daily, Disp: , Rfl:      folic acid 800 MCG tablet, Take 800 mcg by mouth 2 times daily, Disp: , Rfl:      GLUCOSAMINE HCL 1000 MG PO TABS, 2 TABLETS DAILY, Disp: 90 Tab, Rfl: 3     LACTOBACILLUS PO, Take 120 mg by mouth daily, Disp: , Rfl:      levothyroxine (SYNTHROID/LEVOTHROID) 88 MCG tablet, Take 1 tablet (88 mcg) by mouth daily., Disp: 90 tablet, Rfl: 3     lisinopril (ZESTRIL) 40 MG tablet, Take 1 tablet (40 mg) by mouth daily -- prescribed by nephrology, Disp: , Rfl:      Lutein 6 MG TABS, Take 20 mg by mouth daily , Disp: , Rfl:      MULTIVITAMIN TABS   OR, 1 daily, Disp: , Rfl:      Omega-3 Fish Oil 500 MG capsule, Take 1 capsule (500 mg) by mouth daily, Disp: , Rfl:      polyethylene glycol (MIRALAX) 17 g packet, Take 1 packet by mouth every 24 hours, Disp: , Rfl:      PREBIOTIC PRODUCT PO, , Disp: , Rfl:      predniSONE (DELTASONE) 5 MG tablet, Take 10 mg by mouth daily, Disp: , Rfl:      Probiotic Product (PROBIOTIC DAILY PO), , Disp: , Rfl:      simvastatin (ZOCOR) 10 MG tablet, Take 1 tablet (10 mg) by mouth at bedtime, Disp: 90 tablet, Rfl: 4     thin (NO BRAND SPECIFIED) lancets, Use with lanceting device. To accompany: Blood Glucose Monitor Brands: per insurance., Disp: 100 each, Rfl: 0     tocilizumab (ACTEMRA) 162 MG/0.9ML subcutaneous injection, Inject 162 mg subcutaneously every 28 days, Disp: , Rfl:       Physical Exam:  /70 (BP Location: Left arm, Patient Position: Sitting, Cuff Size: Adult Large)   Pulse 75   Resp 16   Wt 81.6 kg (180 lb)   LMP  (LMP Unknown)   SpO2 95%   BMI 30.88 kg/m    GENERAL: Well developed, well nourished, alert, and in  no apparent distress.  HEENT: Normocephalic, atraumatic. PERRL, EOMI. Oral mucosa is moist. No perioral cyanosis.  NECK: supple, no obvious masses.  RESP:  Normal respiratory effort.  CTAB.  No rales, wheezes, rhonchi.  No cyanosis or clubbing.  CV: Normal S1, S2, regular rhythm, normal rate. + systolic murmur.  Trace b/l LE edema.   ABDOMEN: non-distended.   SKIN: warm and dry. No rash.  NEURO: Alert and oriented.  Normal gait.  Fluent speech.  PSYCH: mentation appears normal.     Results (personally reviewed in clinic today):  PFTs: R 86%, FVC 83%, FEV1 94%, %, DLCO 101%.  Study demonstrates normal pulmonary mechanics.  Imaging:  CT-PE 12/22/2023:   1.  No evidence for pulmonary embolism.  2.  Interval increased bilateral peribronchial wall thickening that may be a bronchitis. Increased bilateral patchy groundglass pulmonary opacities and areas of sparing that could be related to bronchitis versus an atypical pneumonia.  3.  Stable tiny nodule at the right lower lobe.  CXR 12/2/2024: There are no acute infiltrates. The cardiac silhouette is not enlarged. Pulmonary vasculature is unremarkable.      The above note was dictated using voice recognition software and may include typographical errors. Please contact the author for any clarifications.                                      Again, thank you for allowing me to participate in the care of your patient.        Sincerely,        Corie Alejandra MD

## 2024-12-09 NOTE — PATIENT INSTRUCTIONS
-Finish up Trelegy on hand.  Then START Anoro 1 puff once daily.   -May use albuterol inhaler or nebs as frequently as 2 puffs or 1 vial every 4-6 hours as needed, for increased symptoms of shortness of breath, chest tightness, cough or wheezing.  You may also benefit from pre-treatment with albuterol prior to exercise, 10-15 minutes prior to exertion.    Return to clinic in 6 months for follow up with myself or Yaritza Dietz PA-C.

## 2024-12-09 NOTE — NURSING NOTE
Chief Complaint   Patient presents with    New Patient     Chronic obstructive pulmonary disease, unspecified COPD type       Vitals:    12/09/24 1250   BP: 130/70   BP Location: Left arm   Patient Position: Sitting   Cuff Size: Adult Large   Pulse: 75   Resp: 16   SpO2: 95%   Weight: 81.6 kg (180 lb)       Body mass index is 30.88 kg/m .      Abelardo Pretty MA

## 2024-12-09 NOTE — PROGRESS NOTES
Bushra Harmon comes into clinic today at the request of Dr. Alejandra, Ordering Provider for PFT      This service provided today was under the supervising provider of the day Dr. Alejandra, who was available if needed.    Candido Dunlap, RT

## 2024-12-10 LAB
DLCOUNC-%PRED-PRE: 101 %
DLCOUNC-PRE: 18.1 ML/MIN/MMHG
DLCOUNC-PRED: 17.9 ML/MIN/MMHG
ERV-%PRED-PRE: 31 %
ERV-PRE: 0.24 L
ERV-PRED: 0.76 L
EXPTIME-PRE: 4.91 SEC
FEF2575-%PRED-PRE: 170 %
FEF2575-PRE: 2.23 L/SEC
FEF2575-PRED: 1.31 L/SEC
FEFMAX-%PRED-PRE: 156 %
FEFMAX-PRE: 6.35 L/SEC
FEFMAX-PRED: 4.07 L/SEC
FEV1-%PRED-PRE: 94 %
FEV1-PRE: 1.55 L
FEV1FEV6-PRE: 86 %
FEV1FEV6-PRED: 77 %
FEV1FVC-PRE: 86 %
FEV1FVC-PRED: 77 %
FEV1SVC-PRE: 79 %
FEV1SVC-PRED: 58 %
FIFMAX-PRE: 4.96 L/SEC
FRCPLETH-%PRED-PRE: 127 %
FRCPLETH-PRE: 3.49 L
FRCPLETH-PRED: 2.73 L
FVC-%PRED-PRE: 83 %
FVC-PRE: 1.81 L
FVC-PRED: 2.17 L
IC-%PRED-PRE: 114 %
IC-PRE: 1.74 L
IC-PRED: 1.51 L
RVPLETH-PRE: 3.25 L
TLCPLETH-%PRED-PRE: 105 %
TLCPLETH-PRE: 5.22 L
TLCPLETH-PRED: 4.94 L
VA-%PRED-PRE: 79 %
VA-PRE: 3.47 L
VC-%PRED-PRE: 68 %
VC-PRE: 1.97 L
VC-PRED: 2.86 L

## 2024-12-22 ENCOUNTER — HEALTH MAINTENANCE LETTER (OUTPATIENT)
Age: 89
End: 2024-12-22

## 2024-12-26 LAB
A1AD NUMERIC: 138 MG/DL (ref 90–200)
A1AD STRING: NORMAL

## 2025-01-10 ENCOUNTER — TRANSFERRED RECORDS (OUTPATIENT)
Dept: HEALTH INFORMATION MANAGEMENT | Facility: CLINIC | Age: 89
End: 2025-01-10
Payer: MEDICARE

## 2025-02-24 ENCOUNTER — TRANSFERRED RECORDS (OUTPATIENT)
Dept: HEALTH INFORMATION MANAGEMENT | Facility: CLINIC | Age: 89
End: 2025-02-24
Payer: MEDICARE

## 2025-02-24 LAB
ALT SERPL-CCNC: 23 IU/L (ref 6–32)
AST SERPL-CCNC: 34 U/L (ref 10–35)
CREATININE (EXTERNAL): 0.9 MG/DL (ref 0.5–1.05)

## 2025-04-30 ENCOUNTER — OFFICE VISIT (OUTPATIENT)
Dept: INTERNAL MEDICINE | Facility: CLINIC | Age: 89
End: 2025-04-30
Payer: MEDICARE

## 2025-04-30 ENCOUNTER — ANCILLARY PROCEDURE (OUTPATIENT)
Dept: GENERAL RADIOLOGY | Facility: CLINIC | Age: 89
End: 2025-04-30
Payer: MEDICARE

## 2025-04-30 VITALS
RESPIRATION RATE: 16 BRPM | DIASTOLIC BLOOD PRESSURE: 62 MMHG | TEMPERATURE: 97.5 F | WEIGHT: 178.2 LBS | BODY MASS INDEX: 30.57 KG/M2 | HEART RATE: 80 BPM | SYSTOLIC BLOOD PRESSURE: 115 MMHG | OXYGEN SATURATION: 95 %

## 2025-04-30 DIAGNOSIS — M54.2 NECK PAIN: Primary | ICD-10-CM

## 2025-04-30 DIAGNOSIS — M54.2 NECK PAIN: ICD-10-CM

## 2025-04-30 PROBLEM — J43.2 CENTRILOBULAR EMPHYSEMA (H): Status: ACTIVE | Noted: 2024-10-25

## 2025-04-30 PROBLEM — R05.3 CHRONIC COUGH: Status: ACTIVE | Noted: 2024-10-25

## 2025-04-30 PROBLEM — F17.211 CIGARETTE NICOTINE DEPENDENCE IN REMISSION: Status: ACTIVE | Noted: 2024-10-25

## 2025-04-30 PROBLEM — E06.3 HASHIMOTO THYROIDITIS: Status: ACTIVE | Noted: 2025-04-30

## 2025-04-30 PROCEDURE — G2211 COMPLEX E/M VISIT ADD ON: HCPCS

## 2025-04-30 PROCEDURE — 1126F AMNT PAIN NOTED NONE PRSNT: CPT

## 2025-04-30 PROCEDURE — 3074F SYST BP LT 130 MM HG: CPT

## 2025-04-30 PROCEDURE — 72040 X-RAY EXAM NECK SPINE 2-3 VW: CPT | Mod: TC | Performed by: RADIOLOGY

## 2025-04-30 PROCEDURE — 99214 OFFICE O/P EST MOD 30 MIN: CPT

## 2025-04-30 PROCEDURE — 3078F DIAST BP <80 MM HG: CPT

## 2025-04-30 RX ORDER — TIOTROPIUM BROMIDE INHALATION SPRAY 3.12 UG/1
2 SPRAY, METERED RESPIRATORY (INHALATION)
COMMUNITY
Start: 2024-10-25

## 2025-04-30 RX ORDER — MUPIROCIN 20 MG/G
OINTMENT TOPICAL 2 TIMES DAILY
COMMUNITY
Start: 2024-11-26

## 2025-04-30 ASSESSMENT — PAIN SCALES - GENERAL: PAINLEVEL_OUTOF10: NO PAIN (0)

## 2025-04-30 NOTE — PROGRESS NOTES
"  Assessment & Plan     Neck pain  Patient presents to the clinic for chief complaint of neck pain and crackling sounds in her head over the last 2 weeks.  Patient reports having crackling sounds all over throughout her head with any movement.  Unclear etiology possibly misalignment.  Could consider sending her to spine or physical therapy.  Denies any headaches or vision changes.  Neuro  exam within normal limits.  - XR Cervical Spine 2/3 Views; Future    The longitudinal plan of care for the diagnosis(es)/condition(s) as documented were addressed during this visit. Due to the added complexity in care, I will continue to support Bushra in the subsequent management and with ongoing continuity of care.      30 minutes spent by me on the date of the encounter doing chart review, review of test results, interpretation of tests, patient visit, and documentation       BMI  Estimated body mass index is 30.57 kg/m  as calculated from the following:    Height as of 10/10/24: 1.626 m (5' 4.02\").    Weight as of this encounter: 80.8 kg (178 lb 3.2 oz).   Weight management plan: Patient was referred to their PCP to discuss a diet and exercise plan.          Subjective   Bushra is a 89 year old, presenting for the following health issues:  Patient presents to the clinic for a complaint of neck and head cracking. She states she has been hearing this and feeling this over the last 2 weeks.   She states she does have giant cell arteritis.   She also wonders if she has postprandial hypotension or neck crepitus.   Checks her BP and its always lower after eating.   She hears the cracking every single time she turns her head.   Denies any new headaches or vision changes.   Tried to talk to rheumatologist but he said to go to PCP.   Chiropractor- doesn't see one.   She has been doing a vibration plate, but she has not done it much over the last couple months.   No injury no car accidents.   Sleeping okay.       office Visit (Crackling in " the back of the neck pain at the side of neck x 2 weeks.)        4/30/2025    12:35 PM   Additional Questions   Roomed by Rosendo Najera CMA   Accompanied by Self         4/30/2025    12:35 PM   Patient Reported Additional Medications   Patient reports taking the following new medications no     History of Present Illness       Reason for visit:  Crackling sound in head  Symptom onset:  1-2 weeks ago  Symptoms include:  Neck pain and crackling sound  Symptom intensity:  Mild  Symptom progression:  Staying the same  Had these symptoms before:  No  What makes it worse:  No  What makes it better:  No   She is taking medications regularly.                  Review of Systems  Constitutional, HEENT, cardiovascular, pulmonary, gi and gu systems are negative, except as otherwise noted.      Objective    BP (!) 153/78 (BP Location: Right arm, Patient Position: Sitting, Cuff Size: Adult Regular)   Pulse 80   Temp 97.5  F (36.4  C) (Oral)   Resp 16   Wt 80.8 kg (178 lb 3.2 oz)   LMP  (LMP Unknown)   SpO2 95%   BMI 30.57 kg/m    Body mass index is 30.57 kg/m .  Physical Exam   GENERAL: alert and no distress  NECK: no adenopathy, no asymmetry, masses, or scars  RESP: lungs clear to auscultation - no rales, rhonchi or wheezes  CV: regular rate and rhythm, normal S1 S2, no S3 or S4, no murmur, click or rub, no peripheral edema  ABDOMEN: soft, nontender, no hepatosplenomegaly, no masses and bowel sounds normal  MS: no gross musculoskeletal defects noted, no edema  NEURO: Normal strength and tone, mentation intact and speech normal            Signed Electronically by: GRANT Clemons CNP

## 2025-05-07 ENCOUNTER — TELEPHONE (OUTPATIENT)
Dept: OTHER | Facility: CLINIC | Age: 89
End: 2025-05-07
Payer: MEDICARE

## 2025-05-07 NOTE — TELEPHONE ENCOUNTER
[facility]    Who is the name of the provider?:  REHAN WEATHERS   What is the location you see this provider at/preferred location?: [place]  Person calling / Facility: Bushra Harmon  Phone number:  [phone] (Mobile)    Nurse call back needed:  Yes     Reason for call:  Patient requesting to speak with RN about concerns she has regarding repeated CT contrast dye and if it is affecting her kidneys. Please call patient.       Can we leave a detailed message on this number?  YES     5/7/2025, 11:43 AM  '

## 2025-05-07 NOTE — TELEPHONE ENCOUNTER
"Writer called Bushra and she states she has been having some neck pain and is hearing a \"clicking\" sound when turning her head. She was wondering if this is something she could have assessed with the CTA she has scheduled on 6/18/25. Writer explained the CTA abdomen pelvis will be looking at her splenic artery aneurysm and would not be looking at her head or neck and to discuss this further with her PCP- pt verbalized understanding.    Jerica ZHENG, RN    [facility]  Office: [phone]  Fax: [phone]      "

## 2025-05-08 ENCOUNTER — RESULTS FOLLOW-UP (OUTPATIENT)
Dept: INTERNAL MEDICINE | Facility: CLINIC | Age: 89
End: 2025-05-08

## 2025-05-08 ENCOUNTER — TELEPHONE (OUTPATIENT)
Dept: INTERNAL MEDICINE | Facility: CLINIC | Age: 89
End: 2025-05-08
Payer: MEDICARE

## 2025-05-08 DIAGNOSIS — M47.812 ARTHROPATHY OF CERVICAL SPINE: Primary | ICD-10-CM

## 2025-05-08 NOTE — TELEPHONE ENCOUNTER
Test Results    Contacts       Contact Date/Time Type Contact Phone/Fax    05/08/2025 10:02 AM CDT Phone (Incoming) Bushra Harmon (Self) [phone] (M)            Who ordered the test:  Jennifer Mcclelland APRN CNP    Type of test: Lab and X-ray    Date of test:  4/30/25    Where was the test performed:  [facility]    What are your questions/concerns?:  Pt states in MyC it says this result has not been reviewed by the ordering provider & pt has not heard any updates about the results/findings. Requesting a call to go over results.     Pt reports that soreness in neck is continuing since xray.     Could we send this information to you in [employer] or would you prefer to receive a phone call?:   Patient would prefer a phone call   Okay to leave a detailed message?: Yes at Cell number on file:    Telephone Information:   Mobile [phone]     Pt states that she would like to speak with someone if she has questions but would also like a written letter/message (either MyC or a physical letter - can clarify with pt on call which she would prefer) so that she &/or family can reference results.

## 2025-05-12 NOTE — TELEPHONE ENCOUNTER
Provider sent patient a [employer] message about xray results. Closing encounter.    Thank you,  Jean Claude, Triage JOHANNY Love    12:10 PM 5/12/2025

## 2025-05-13 NOTE — PROGRESS NOTES
PHYSICAL THERAPY EVALUATION  Type of Visit: Evaluation       Fall Risk Screen:  Have you fallen 2 or more times in the past year?: No  Have you fallen and had an injury in the past year?: No    Subjective         Presenting condition or subjective complaint: neck crepitus  Date of onset: 05/08/25 (Referral date)    Relevant medical history: COPD; High blood pressure; Kidney disease; Thyroid problems   Dates & types of surgery: hip replacement 2011      bowel obstruction 1985    Prior diagnostic imaging/testing results: X-ray     Prior therapy history for the same diagnosis, illness or injury: No      Pt is an 89 year old female presenting with complaints of neck pain, which started about 1 month ago (April 13, 2025). Pt reports that she hears cracking in her head. Pt reports having some soreness when turning head, especially to the R - pt reports soreness on the R-side of the neck. Pain changes throughout the day Pt denies MALCOM. Does not seem to affect day to day but just more irritating.    Pt does report that sometimes when she eats, she needs to lie down because it causes her BP to drop.     Pt describes the pain as soreness and a light pain.     Aggravating Factors: turning head, getting up in the morning    Alleviating Factors: lying down, resting    Prior treatments: none    Sleep Quality: not affected    Red Flags: none    Pt goals: get back to normal     X-ray 4/30/25: IMPRESSION: Assessment degraded due to shoulder positioning which obscures the lower cervical spine on the lateral view.  Alignment: Grade 1 anterolisthesis at C4-C5. Trace retrolisthesis at C5-C6. Vertebral body heights: Maintained. Fracture: No discernible acute fracture. Degenerative changes: Moderate-severe degenerative findings throughout the cervical spine with facet arthropathy, vertebral body osteophyte formation, and disc height loss. Other: Prevertebral soft tissue within normal limits.    PMH:  osteopenia, COPD, essential HTN,  hyperlipidemia, hypothyroidism, PMR, giant cell arthritis    Living Environment  Social support: Alone   Type of home: [facility]   Stairs to enter the home: Yes 2 Is there a railing: Yes     Ramp: Yes   Stairs inside the home: Yes 2 Is there a railing: Yes     Help at home: None  Equipment owned: Straight Cane; Four-point cane; Walker with wheels; Grab bars     Employment: No    Hobbies/Interests: golf and many groups of friends    Patient goals for therapy: does not prevent me but probably should not golf or use vibration therapy machine     Objective   CERVICAL:    Posture: forward/rounded shoulders, forward head; increased thoracic kyphosis     AROM / PROM: (*Indicates performed with pain):  -Flexion: min limitation*  -Extension: min-mod limitation  - L SB: 45* (pain on R)  -R SB: 40* (pain on R)  -L ROT: 80* (pain on R)  -R ROT: 40* (pain on R)    Neuro Screen: Not formally assessed - no radicular symptoms    Joint Mobility: mod limitation B C2-C7    Palpation: no significant TTP noted along cervical paraspinals, levator, or scalenes - more tender with movement of joints    Repeated Motion Exam:   -Supine/seated retraction x10: decreased discomfort with extension ROM, but no measurable changes with ROM or change in pain  -Supine / seated retraction with self OP x10: min improvement in ROM in all directions - no change in pain; decreased discomfort noted with all cervical ROM  -Supine / seated retraction with extension x10: continued improvement in ROM, no change in pain level though continued decreased discomfort with all cervical movements    SPECIAL TESTS   R L   Spurlings/Quadrant - -   ULTT 1 (median)     ULTT 2 (ulnar)     ULTT 3 (radial)     Distraction     Alar Ligament Test     Transverse Ligament Test       Shoulder Screen Relevant Findings:   -ROM: flexion, scaption, ER and IR all WNL      Assessment & Plan   CLINICAL IMPRESSIONS  Medical Diagnosis: Arthropathy of cervical spine    Treatment Diagnosis:  Neck pain with mobility deficits   Impression/Assessment: Patient is a 89 year old female with neck pain complaints.  The following significant findings have been identified: Pain, Decreased ROM/flexibility, Decreased joint mobility, Decreased strength, Impaired muscle performance, and Decreased activity tolerance. These impairments interfere with their ability to perform self care tasks, recreational activities, household chores, driving , household mobility, and community mobility as compared to previous level of function.     Clinical Decision Making (Complexity):  Clinical Presentation: Stable/Uncomplicated  Clinical Presentation Rationale: based on medical and personal factors listed in PT evaluation  Clinical Decision Making (Complexity): Low complexity    PLAN OF CARE  Treatment Interventions:  Modalities: Cryotherapy, E-stim, Hot Pack, Ultrasound  Interventions: Manual Therapy, Neuromuscular Re-education, Therapeutic Activity, Therapeutic Exercise, Self-Care/Home Management    Long Term Goals     PT Goal 1  Goal Identifier: Morning  Goal Description: Pt will report decreased stiffness when waking up in the morning in order to improve QOL  Rationale: to maximize safety and independence with self cares  Target Date: 06/25/25  PT Goal 2  Goal Identifier: ROM  Goal Description: Pt will be able to turn head to right to at least 70 degrees without being limited by pain in order to improve function and IND  Rationale: to maximize safety and independence with performance of ADLs and functional tasks;to maximize safety and independence within the home;to maximize safety and independence with transportation;to maximize safety and independence with self cares  Target Date: 08/06/25      Frequency of Treatment: 1x/week, decreasing to every other as appropriate  Duration of Treatment: 12 weeks    Recommended Referrals to Other Professionals: none at this time  Education Assessment:   Learner/Method: Patient    Risks and  benefits of evaluation/treatment have been explained.   Patient/Family/caregiver agrees with Plan of Care.     Evaluation Time:     PT Tom, Low Complexity Minutes (69832): 25     Signing Clinician: BRENDA [facility]                                                                                   OUTPATIENT PHYSICAL THERAPY      PLAN OF TREATMENT FOR OUTPATIENT REHABILITATION   Patient's Last Name, First Name, Bushra Carpenter YOB: 1935   Provider's Name   [facility]   Medical Record No.  [id number]     Onset Date: 05/08/25 (Referral date)  Start of Care Date: 05/14/25     Medical Diagnosis:  Arthropathy of cervical spine      PT Treatment Diagnosis:  Neck pain with mobility deficits Plan of Treatment  Frequency/Duration: 1x/week, decreasing to every other as appropriate/ 12 weeks    Certification date from 05/14/25 to 08/06/25         See note for plan of treatment details and functional goals     Grisel Wang PT                         I CERTIFY THE NEED FOR THESE SERVICES FURNISHED UNDER        THIS PLAN OF TREATMENT AND WHILE UNDER MY CARE     (Physician attestation of this document indicates review and certification of the therapy plan).              Referring Provider:  Jennifer Mcclelland    Initial Assessment  See Epic Evaluation- Start of Care Date: 05/14/25

## 2025-05-14 ENCOUNTER — THERAPY VISIT (OUTPATIENT)
Dept: PHYSICAL THERAPY | Facility: CLINIC | Age: 89
End: 2025-05-14
Payer: MEDICARE

## 2025-05-14 DIAGNOSIS — M47.812 ARTHROPATHY OF CERVICAL SPINE: ICD-10-CM

## 2025-05-14 PROCEDURE — 97161 PT EVAL LOW COMPLEX 20 MIN: CPT | Mod: GP

## 2025-05-14 PROCEDURE — 97110 THERAPEUTIC EXERCISES: CPT | Mod: GP

## 2025-05-27 ENCOUNTER — THERAPY VISIT (OUTPATIENT)
Dept: PHYSICAL THERAPY | Facility: CLINIC | Age: 89
End: 2025-05-27
Payer: MEDICARE

## 2025-05-27 DIAGNOSIS — J44.9 CHRONIC OBSTRUCTIVE PULMONARY DISEASE, UNSPECIFIED COPD TYPE (H): ICD-10-CM

## 2025-05-27 DIAGNOSIS — M47.812 ARTHROPATHY OF CERVICAL SPINE: Primary | ICD-10-CM

## 2025-05-27 PROCEDURE — 97110 THERAPEUTIC EXERCISES: CPT | Mod: GP | Performed by: PHYSICAL THERAPIST

## 2025-05-27 NOTE — TELEPHONE ENCOUNTER
umeclidinium-vilanterol (ANORO ELLIPTA) 62.5-25 MCG/ACT oral inhaler    Last Written Prescription Date:  12/09/2025  Last Fill Quantity: 60 each,  # refills: 4   Last office visit: 12/9/2024 ; last virtual visit: Visit date not found with prescribing provider:  Dr. Alejandra   Future Office Visit: 06/09/2025

## 2025-05-28 RX ORDER — UMECLIDINIUM BROMIDE AND VILANTEROL TRIFENATATE 62.5; 25 UG/1; UG/1
1 POWDER RESPIRATORY (INHALATION) DAILY
Qty: 60 EACH | Refills: 0 | Status: SHIPPED | OUTPATIENT
Start: 2025-05-28 | End: 2025-05-28

## 2025-05-28 RX ORDER — UMECLIDINIUM BROMIDE AND VILANTEROL TRIFENATATE 62.5; 25 UG/1; UG/1
1 POWDER RESPIRATORY (INHALATION) DAILY
Qty: 60 EACH | Refills: 0 | Status: SHIPPED | OUTPATIENT
Start: 2025-05-28

## 2025-05-28 NOTE — TELEPHONE ENCOUNTER
Requested Prescriptions   Pending Prescriptions Disp Refills    umeclidinium-vilanterol (ANORO ELLIPTA) 62.5-25 MCG/ACT oral inhaler 60 each 4     Sig: Inhale 1 puff into the lungs daily.       Long-Acting Muscarinic Agonists (LAMA) (including combination products) Passed - 5/28/2025 11:30 AM        Passed - Patient is 5 years of age or older        Passed - Medication is active on med list and the sig matches. RN to manually verify dose and sig if red X/fail.     If the protocol passes (green check), you do not need to verify med dose and sig.    A prescription matches if they are the same clinical intention.    For Example: once daily and every morning are the same.    The protocol can not identify upper and lower case letters as matching and will fail.     For Example: Take 1 tablet (50 mg) by mouth daily     TAKE 1 TABLET (50 MG) BY MOUTH DAILY    For all fails (red x), verify dose and sig.    If the refill does match what is on file, the RN can still proceed to approve the refill request.       If they do not match, route to the appropriate provider.             Passed - Recent (6 month) or future (90 days) visit with the authorizing provider's specialty (provided they have been seen in the past 9 months)     The patient must have completed an in-person or virtual visit within the past 6 months or has a future visit scheduled within the next 90 days with the authorizing provider s specialty.  Urgent care and e-visits do not quality as an office visit for this protocol.          Passed - Medication indicated for associated diagnosis     Medication is associated with one or more of the following diagnoses:     Asthma   COPD         Inhaled Steroids Protocol Passed - 5/28/2025 11:30 AM        Passed - Patient is age 12 or older        Passed - Medication is active on med list and the sig matches. RN to manually verify dose and sig if red X/fail.     If the protocol passes (green check), you do not need to verify  med dose and sig.    A prescription matches if they are the same clinical intention.    For Example: once daily and every morning are the same.    The protocol can not identify upper and lower case letters as matching and will fail.     For Example: Take 1 tablet (50 mg) by mouth daily     TAKE 1 TABLET (50 MG) BY MOUTH DAILY    For all fails (red x), verify dose and sig.    If the refill does match what is on file, the RN can still proceed to approve the refill request.       If they do not match, route to the appropriate provider.             Passed - Recent (12 month) or future (90 days) visit with authorizing provider's specialty (provided they have been seen in the past 15 months)     The patient must have completed an in-person or virtual visit within the past 12 months or has a future visit scheduled within the next 90 days with the authorizing provider s specialty.  Urgent care and e-visits do not qualify as an office visit for this protocol.          Passed - Medication indicated for associated diagnosis     Medication is associated with one or more of the following diagnoses:    Allergies   Asthma   COPD   Nasal Congestion   Nasal Polyps   Rhinitis   Cystic Fibrosis   Bronchiectasis             Refill sent to [facility] to next visit.    Lyle De La Paz RN

## 2025-06-09 ENCOUNTER — OFFICE VISIT (OUTPATIENT)
Dept: PULMONOLOGY | Facility: CLINIC | Age: 89
End: 2025-06-09
Attending: INTERNAL MEDICINE
Payer: MEDICARE

## 2025-06-09 VITALS
RESPIRATION RATE: 20 BRPM | SYSTOLIC BLOOD PRESSURE: 141 MMHG | HEART RATE: 77 BPM | OXYGEN SATURATION: 96 % | WEIGHT: 170 LBS | BODY MASS INDEX: 29.17 KG/M2 | DIASTOLIC BLOOD PRESSURE: 79 MMHG

## 2025-06-09 DIAGNOSIS — J44.9 CHRONIC OBSTRUCTIVE PULMONARY DISEASE, UNSPECIFIED COPD TYPE (H): ICD-10-CM

## 2025-06-09 PROCEDURE — 3077F SYST BP >= 140 MM HG: CPT | Performed by: INTERNAL MEDICINE

## 2025-06-09 PROCEDURE — 3078F DIAST BP <80 MM HG: CPT | Performed by: INTERNAL MEDICINE

## 2025-06-09 PROCEDURE — 99213 OFFICE O/P EST LOW 20 MIN: CPT | Performed by: INTERNAL MEDICINE

## 2025-06-09 RX ORDER — UMECLIDINIUM BROMIDE AND VILANTEROL TRIFENATATE 62.5; 25 UG/1; UG/1
1 POWDER RESPIRATORY (INHALATION) DAILY
Qty: 180 EACH | Refills: 3 | Status: SHIPPED | OUTPATIENT
Start: 2025-06-09

## 2025-06-09 NOTE — PROGRESS NOTES
[facility] SPECIALTY [address] [address]  Phone: [phone]  Fax: [phone]    Patient:  Bushra Harmon, Date of birth 1935  Date of Visit:  06/09/2025    Pulmonary Clinic Return Visit  Assessment and Plan:   Bushra Harmon is a 89 year old female with a history of temporal arteritis and PMR on prednisone and tocilizumab and COPD who presents to pulmonary clinic today for follow up of COPD.  Presumed COPD based on history, mosaic attenuation and air trapping seen on previous CT chest imaging in the setting of tobacco use history.  Normal PFTs; A1AT.  Overall doing quite well on Anoro + albuterol as needed.  No recent AECOPD or episodes of pneumonia.  We'll continue her current regimen at this time without changes.  Pulmonary nodule.  3 mm pulmonary nodule seen in RLL on CT PE from 12/22023.  Was read as stable compared with CT from May 2023 and per my review also appears to have been apparent and unchanged on CT from 4/2022.  No further dedicated surveillance is necessary given 12 months stability.  Temporal arteritis and PMR. Followed by rheumatology.  Questions and concerns were answered to the patient's satisfaction.  she was provided with my contact information should new questions or concerns arise in the interim.  she should return to clinic in 1 year with myself or Yaritza Dietz PA-C.  Up to date on Pneumonia vaccines (2015, 2024, 2011), RSV (2023), seasonal influenza and COVID vaccines.  Elicia Alejandra MD  Pulmonary and Critical Care Medicine    I spent 25 minutes on the date of encounter doing chart review, review of outside records, review of test results, conducting the patient visit, completing documentation and further activities as noted above.        History of Present Illness    Bushra Harmon is a 89 year old female with a history of temporal arteritis and PMR on prednisone and tocilizumab and COPD who presents to pulmonary  clinic today for further evaluation of COPD.  Patient was previously followed by [facility] pulm and transferred under my care in December 2024. PFTs have been historically normal.  She had stable symptoms on Trelegy but noted voice hoarseness and was ultimately switched to Anoro.  We last visited in December at which time she was doing well.     -Rheum note from 2/24 reviewed.  Doing well; continued on Acetemra and prednisone every other day.    She presents to clinic today and reports the following:  -Doing well on Anoro.  She is tolerating this well and without any voice hoarseness.  -Has albuterol available for rescue but has not needed to use this.  -No episodes of pneumonia since we list visited; no AECOPD.  -No new or worsening cough or shortness of breath.      -Previous smoker. Smoked 1/2 ppd x 40 years.  Quit 20+ years ago.        Review of Systems:  10 of 14 systems reviewed and are negative unless otherwise stated in HPI.    Past Medical History:   Diagnosis Date    Age related osteoporosis, unspecified pathological fracture presence 4/5/2022    Chronic obstructive pulmonary disease, unspecified COPD type (H) 7/11/2022    Essential hypertension, benign     Hyperlipidemia LDL goal <100 5/18/2011    Hypertension, unspecified type 3/14/2023    Hypothyroidism due to Hashimoto's thyroiditis 10/10/2022    PMR (polymyalgia rheumatica)     PMR (polymyalgia rheumatica) (H)  -- rheumatologist -- dr Russo 8/3/2020    Unspecified hypothyroidism          Current Outpatient Medications:     acetaminophen (TYLENOL) 325 MG tablet, Take 1-2 tablets (325-650 mg) by mouth every 6 hours as needed for mild pain, Disp: , Rfl:     albuterol (PROVENTIL) (2.5 MG/3ML) 0.083% neb solution, Take 1 vial (2.5 mg) by nebulization every 6 hours as needed for shortness of breath, wheezing or cough (Patient not taking: Reported on 4/30/2025), Disp: , Rfl:     aspirin 81 MG EC tablet, Take 81 mg by mouth daily, Disp: , Rfl:     BIOTIN  5000 PO, , Disp: , Rfl:     blood glucose (NO BRAND SPECIFIED) test strip, Use to test blood sugar 3 times daily or as directed. To accompany: Blood Glucose Monitor Brands: per insurance. (Patient not taking: Reported on 4/30/2025), Disp: 100 strip, Rfl: 0    blood glucose monitoring (NO BRAND SPECIFIED) meter device kit, Use to test blood sugar 3 times daily or as directed. Preferred blood glucose meter OR supplies to accompany: Blood Glucose Monitor Brands: per insurance. (Patient not taking: Reported on 4/30/2025), Disp: 1 kit, Rfl: 0    CALCIUM CITRATE-VITAMIN D 315-200 MG-UNIT PO TABS, 2 TABLETS DAILY, Disp: 90 Tab, Rfl: 3    Cholecalciferol (VITAMIN D3) 25 MCG (1000 UT) CAPS, Take 1 capsule by mouth daily, Disp: , Rfl:     Cranberry 450 MG CAPS, , Disp: , Rfl:     cyanocobalamin (VITAMIN B-12) 2500 MCG SUBL sublingual tablet, Place 2,500 mcg under the tongue daily, Disp: , Rfl:     dextromethorphan-guaiFENesin (MUCINEX DM)  MG 12 hr tablet, Take 1 tablet by mouth every 12 hours, Disp: , Rfl:     doxylamine (UNISOM) 25 MG TABS tablet, Take 25 mg by mouth at bedtime. Taking once a mth, Disp: , Rfl:     Fluticasone-Umeclidin-Vilanterol (TRELEGY ELLIPTA) 100-62.5-25 MCG/INH oral inhaler, Inhale 1 puff into the lungs daily (Patient not taking: Reported on 4/30/2025), Disp: , Rfl:     folic acid 800 MCG tablet, Take 800 mcg by mouth daily., Disp: , Rfl:     GLUCOSAMINE HCL 1000 MG PO TABS, 2 TABLETS DAILY, Disp: 90 Tab, Rfl: 3    LACTOBACILLUS PO, Take 120 mg by mouth daily, Disp: , Rfl:     levothyroxine (SYNTHROID/LEVOTHROID) 88 MCG tablet, Take 1 tablet (88 mcg) by mouth daily., Disp: 90 tablet, Rfl: 3    lisinopril (ZESTRIL) 40 MG tablet, Take 1 tablet (40 mg) by mouth daily -- prescribed by nephrology, Disp: , Rfl:     Lutein 6 MG TABS, Take 20 mg by mouth daily , Disp: , Rfl:     MULTIVITAMIN TABS   OR, 1 daily, Disp: , Rfl:     mupirocin (BACTROBAN) 2 % external ointment, Apply topically 2 times daily.  (Patient not taking: Reported on 4/30/2025), Disp: , Rfl:     Omega-3 Fish Oil 500 MG capsule, Take 1 capsule (500 mg) by mouth daily, Disp: , Rfl:     polyethylene glycol (MIRALAX) 17 g packet, Take 1 packet by mouth every 24 hours, Disp: , Rfl:     PREBIOTIC PRODUCT PO, , Disp: , Rfl:     predniSONE (DELTASONE) 5 MG tablet, Take 2.5 mg by mouth every other day., Disp: , Rfl:     Probiotic Product (PROBIOTIC DAILY PO), , Disp: , Rfl:     simvastatin (ZOCOR) 10 MG tablet, Take 1 tablet (10 mg) by mouth at bedtime, Disp: 90 tablet, Rfl: 4    thin (NO BRAND SPECIFIED) lancets, Use with lanceting device. To accompany: Blood Glucose Monitor Brands: per insurance. (Patient not taking: Reported on 4/30/2025), Disp: 100 each, Rfl: 0    tiotropium (SPIRIVA RESPIMAT) 2.5 MCG/ACT inhaler, Inhale 2 puffs into the lungs. (Patient not taking: Reported on 4/30/2025), Disp: , Rfl:     tocilizumab (ACTEMRA) 162 MG/0.9ML subcutaneous injection, Inject 162 mg subcutaneously every 28 days, Disp: , Rfl:     umeclidinium-vilanterol (ANORO ELLIPTA) 62.5-25 MCG/ACT oral inhaler, Inhale 1 puff into the lungs daily., Disp: 60 each, Rfl: 0      Physical Exam:  BP (!) 141/79 (BP Location: Right arm, Patient Position: Sitting, Cuff Size: Adult Large)   Pulse 77   Resp 20   Wt 77.1 kg (170 lb)   LMP  (LMP Unknown)   SpO2 96%   BMI 29.17 kg/m    GENERAL: Well developed, well nourished, alert, and in no apparent distress.  HEENT: Normocephalic, atraumatic. PERRL, EOMI. Oral mucosa is moist. No perioral cyanosis.  NECK: supple, no obvious masses.  RESP:  Normal respiratory effort.  CTAB.  No rales, wheezes, rhonchi.  No cyanosis or clubbing.  CV: Normal S1, S2, regular rhythm, normal rate. + systolic murmur.  Trace b/l LE edema.   ABDOMEN: non-distended.   SKIN: warm and dry. No rash.  NEURO: Alert and oriented.  Normal gait.  Fluent speech.  PSYCH: mentation appears normal.     Results (personally reviewed in clinic today):  None.    The  above note was dictated using voice recognition software and may include typographical errors. Please contact the author for any clarifications.

## 2025-06-09 NOTE — LETTER
6/9/2025      Bushra Harmon  [address] [place] [address]      Dear Colleague,    Thank you for referring your patient, Bushra Harmon, to the [facility] SPECIALTY [facility]. Please see a copy of my visit note below.      [facility] SPECIAL[facility]  [address]  [facility] [address]  Phone: [phone]  Fax: [phone]    Patient:  Bushra Harmon, Date of birth 1935  Date of Visit:  06/09/2025    Pulmonary Clinic Return Visit  Assessment and Plan:   Bushra Harmon is a 89 year old female with a history of temporal arteritis and PMR on prednisone and tocilizumab and COPD who presents to pulmonary clinic today for follow up of COPD.  Presumed COPD based on history, mosaic attenuation and air trapping seen on previous CT chest imaging in the setting of tobacco use history.  Normal PFTs; A1AT.  Overall doing quite well on Anoro + albuterol as needed.  No recent AECOPD or episodes of pneumonia.  We'll continue her current regimen at this time without changes.  Pulmonary nodule.  3 mm pulmonary nodule seen in RLL on CT PE from 12/22023.  Was read as stable compared with CT from May 2023 and per my review also appears to have been apparent and unchanged on CT from 4/2022.  No further dedicated surveillance is necessary given 12 months stability.  Temporal arteritis and PMR. Followed by rheumatology.  Questions and concerns were answered to the patient's satisfaction.  she was provided with my contact information should new questions or concerns arise in the interim.  she should return to clinic in 1 year with myself or Yaritza Dietz PA-C.  Up to date on Pneumonia vaccines (2015, 2024, 2011), RSV (2023), seasonal influenza and COVID vaccines.  Elicia Alejandra MD  Pulmonary and Critical Care Medicine    I spent 25 minutes on the date of encounter doing chart review, review of outside records, review of test results, conducting the patient visit, completing  documentation and further activities as noted above.        History of Present Illness    Bushra Harmon is a 89 year old female with a history of temporal arteritis and PMR on prednisone and tocilizumab and COPD who presents to pulmonary clinic today for further evaluation of COPD.  Patient was previously followed by [facility] pulm and transferred under my care in December 2024. PFTs have been historically normal.  She had stable symptoms on Trelegy but noted voice hoarseness and was ultimately switched to Anoro.  We last visited in December at which time she was doing well.     -Rheum note from 2/24 reviewed.  Doing well; continued on Acetemra and prednisone every other day.    She presents to clinic today and reports the following:  -Doing well on Anoro.  She is tolerating this well and without any voice hoarseness.  -Has albuterol available for rescue but has not needed to use this.  -No episodes of pneumonia since we list visited; no AECOPD.  -No new or worsening cough or shortness of breath.      -Previous smoker. Smoked 1/2 ppd x 40 years.  Quit 20+ years ago.        Review of Systems:  10 of 14 systems reviewed and are negative unless otherwise stated in HPI.    Past Medical History:   Diagnosis Date     Age related osteoporosis, unspecified pathological fracture presence 4/5/2022     Chronic obstructive pulmonary disease, unspecified COPD type (H) 7/11/2022     Essential hypertension, benign      Hyperlipidemia LDL goal <100 5/18/2011     Hypertension, unspecified type 3/14/2023     Hypothyroidism due to Hashimoto's thyroiditis 10/10/2022     PMR (polymyalgia rheumatica)      PMR (polymyalgia rheumatica) (H)  -- rheumatologist -- dr Russo 8/3/2020     Unspecified hypothyroidism          Current Outpatient Medications:      acetaminophen (TYLENOL) 325 MG tablet, Take 1-2 tablets (325-650 mg) by mouth every 6 hours as needed for mild pain, Disp: , Rfl:      albuterol (PROVENTIL) (2.5 MG/3ML) 0.083% neb  solution, Take 1 vial (2.5 mg) by nebulization every 6 hours as needed for shortness of breath, wheezing or cough (Patient not taking: Reported on 4/30/2025), Disp: , Rfl:      aspirin 81 MG EC tablet, Take 81 mg by mouth daily, Disp: , Rfl:      BIOTIN 5000 PO, , Disp: , Rfl:      blood glucose (NO BRAND SPECIFIED) test strip, Use to test blood sugar 3 times daily or as directed. To accompany: Blood Glucose Monitor Brands: per insurance. (Patient not taking: Reported on 4/30/2025), Disp: 100 strip, Rfl: 0     blood glucose monitoring (NO BRAND SPECIFIED) meter device kit, Use to test blood sugar 3 times daily or as directed. Preferred blood glucose meter OR supplies to accompany: Blood Glucose Monitor Brands: per insurance. (Patient not taking: Reported on 4/30/2025), Disp: 1 kit, Rfl: 0     CALCIUM CITRATE-VITAMIN D 315-200 MG-UNIT PO TABS, 2 TABLETS DAILY, Disp: 90 Tab, Rfl: 3     Cholecalciferol (VITAMIN D3) 25 MCG (1000 UT) CAPS, Take 1 capsule by mouth daily, Disp: , Rfl:      Cranberry 450 MG CAPS, , Disp: , Rfl:      cyanocobalamin (VITAMIN B-12) 2500 MCG SUBL sublingual tablet, Place 2,500 mcg under the tongue daily, Disp: , Rfl:      dextromethorphan-guaiFENesin (MUCINEX DM)  MG 12 hr tablet, Take 1 tablet by mouth every 12 hours, Disp: , Rfl:      doxylamine (UNISOM) 25 MG TABS tablet, Take 25 mg by mouth at bedtime. Taking once a mth, Disp: , Rfl:      Fluticasone-Umeclidin-Vilanterol (TRELEGY ELLIPTA) 100-62.5-25 MCG/INH oral inhaler, Inhale 1 puff into the lungs daily (Patient not taking: Reported on 4/30/2025), Disp: , Rfl:      folic acid 800 MCG tablet, Take 800 mcg by mouth daily., Disp: , Rfl:      GLUCOSAMINE HCL 1000 MG PO TABS, 2 TABLETS DAILY, Disp: 90 Tab, Rfl: 3     LACTOBACILLUS PO, Take 120 mg by mouth daily, Disp: , Rfl:      levothyroxine (SYNTHROID/LEVOTHROID) 88 MCG tablet, Take 1 tablet (88 mcg) by mouth daily., Disp: 90 tablet, Rfl: 3     lisinopril (ZESTRIL) 40 MG tablet, Take  1 tablet (40 mg) by mouth daily -- prescribed by nephrology, Disp: , Rfl:      Lutein 6 MG TABS, Take 20 mg by mouth daily , Disp: , Rfl:      MULTIVITAMIN TABS   OR, 1 daily, Disp: , Rfl:      mupirocin (BACTROBAN) 2 % external ointment, Apply topically 2 times daily. (Patient not taking: Reported on 4/30/2025), Disp: , Rfl:      Omega-3 Fish Oil 500 MG capsule, Take 1 capsule (500 mg) by mouth daily, Disp: , Rfl:      polyethylene glycol (MIRALAX) 17 g packet, Take 1 packet by mouth every 24 hours, Disp: , Rfl:      PREBIOTIC PRODUCT PO, , Disp: , Rfl:      predniSONE (DELTASONE) 5 MG tablet, Take 2.5 mg by mouth every other day., Disp: , Rfl:      Probiotic Product (PROBIOTIC DAILY PO), , Disp: , Rfl:      simvastatin (ZOCOR) 10 MG tablet, Take 1 tablet (10 mg) by mouth at bedtime, Disp: 90 tablet, Rfl: 4     thin (NO BRAND SPECIFIED) lancets, Use with lanceting device. To accompany: Blood Glucose Monitor Brands: per insurance. (Patient not taking: Reported on 4/30/2025), Disp: 100 each, Rfl: 0     tiotropium (SPIRIVA RESPIMAT) 2.5 MCG/ACT inhaler, Inhale 2 puffs into the lungs. (Patient not taking: Reported on 4/30/2025), Disp: , Rfl:      tocilizumab (ACTEMRA) 162 MG/0.9ML subcutaneous injection, Inject 162 mg subcutaneously every 28 days, Disp: , Rfl:      umeclidinium-vilanterol (ANORO ELLIPTA) 62.5-25 MCG/ACT oral inhaler, Inhale 1 puff into the lungs daily., Disp: 60 each, Rfl: 0      Physical Exam:  BP (!) 141/79 (BP Location: Right arm, Patient Position: Sitting, Cuff Size: Adult Large)   Pulse 77   Resp 20   Wt 77.1 kg (170 lb)   LMP  (LMP Unknown)   SpO2 96%   BMI 29.17 kg/m    GENERAL: Well developed, well nourished, alert, and in no apparent distress.  HEENT: Normocephalic, atraumatic. PERRL, EOMI. Oral mucosa is moist. No perioral cyanosis.  NECK: supple, no obvious masses.  RESP:  Normal respiratory effort.  CTAB.  No rales, wheezes, rhonchi.  No cyanosis or clubbing.  CV: Normal S1, S2,  regular rhythm, normal rate. + systolic murmur.  Trace b/l LE edema.   ABDOMEN: non-distended.   SKIN: warm and dry. No rash.  NEURO: Alert and oriented.  Normal gait.  Fluent speech.  PSYCH: mentation appears normal.     Results (personally reviewed in clinic today):  None.    The above note was dictated using voice recognition software and may include typographical errors. Please contact the author for any clarifications.                                      Again, thank you for allowing me to participate in the care of your patient.        Sincerely,        Corie Alejandra MD    Electronically signed

## 2025-06-18 ENCOUNTER — HOSPITAL ENCOUNTER (OUTPATIENT)
Dept: CT IMAGING | Facility: CLINIC | Age: 89
Discharge: HOME | End: 2025-06-18
Payer: MEDICARE

## 2025-06-18 DIAGNOSIS — I72.8 SPLENIC ARTERY ANEURYSM: ICD-10-CM

## 2025-06-18 LAB
CREAT BLD-MCNC: 0.9 MG/DL (ref 0.5–1)
EGFRCR SERPLBLD CKD-EPI 2021: >60 ML/MIN/1.73M2

## 2025-06-18 PROCEDURE — 250N000011 HC RX IP 250 OP 636

## 2025-06-18 PROCEDURE — 250N000009 HC RX 250

## 2025-06-18 PROCEDURE — 74174 CTA ABD&PLVS W/CONTRAST: CPT

## 2025-06-18 PROCEDURE — 82565 ASSAY OF CREATININE: CPT

## 2025-06-18 RX ORDER — IOPAMIDOL 755 MG/ML
500 INJECTION, SOLUTION INTRAVASCULAR ONCE
Status: COMPLETED | OUTPATIENT
Start: 2025-06-18 | End: 2025-06-18

## 2025-06-18 RX ADMIN — SODIUM CHLORIDE 80 ML: 9 INJECTION, SOLUTION INTRAVENOUS at 11:34

## 2025-06-18 RX ADMIN — IOPAMIDOL 72 ML: 755 INJECTION, SOLUTION INTRAVENOUS at 11:34

## 2025-06-25 NOTE — PROGRESS NOTES
VASCULAR SURGERY PROGRESS NOTE    LOCATION:  Vascular  [facility], [place]     Bushra Harmon  Medical Record #: [id number]  YOB: 1935  Age: 89 year old     Date of Service: 6/26/2025    PRIMARY CARE PROVIDER: Natalie Bro    Reason for visit:  Splenic artery aneurysm surveillance      Ms. Harmon is an 86 year old female who had an incidental finding of a 9 mm splenic artery aneurysm. The patient was seen in clinic for this finding, screened for abdominal aortic aneurysm done on 6/1.     EXAM: CTA ABDOMEN PELVIS WITH CONTRAST  LOCATION: [facility]  DATE: 6/18/2025     INDICATION: Splenic artery aneurysm  COMPARISON: None.  TECHNIQUE: CT angiogram abdomen pelvis during arterial phase of injection of IV contrast. 2D and 3D MIP reconstructions were performed by the CT technologist. Dose reduction techniques were used.  CONTRAST: 72mL Isovue 370     FINDINGS:  ANGIOGRAM ABDOMEN/PELVIS: The celiac artery, superior mesenteric artery and inferior mesenteric artery are patent. Fusiform mid splenic artery aneurysm with dense peripheral calcification measuring 9 mm. Single right renal artery. 2 renal arteries supply   the left kidney. Dense calcified plaque origin of the right renal artery. Calcified plaque infrarenal abdominal aorta. No evidence of abdominal aortic aneurysm. Calcified plaque common iliac arteries bilaterally. The internal and external iliac arteries   are patent bilaterally. Common femoral, proximal profunda femoral and proximal superficial femoral arteries are patent bilaterally.     LOWER CHEST: Bibasilar parenchymal scarring.     HEPATOBILIARY: Normal contour with no significant mass. No bile duct dilatation. Calcified gallstones.     PANCREAS: Normal.     SPLEEN: Normal.     ADRENAL GLANDS: Normal.     KIDNEYS/BLADDER: Normal.     BOWEL: Sigmoid diverticulosis.     LYMPH NODES: Normal.     PELVIC ORGANS: Artifact arising from left total hip  prosthesis.     MUSCULOSKELETAL: Severe degenerative changes of the lumbar spine.                                                                      IMPRESSION:  1.  Fusiform mid splenic artery aneurysm with dense peripheral calcification measuring 9 mm.    Recently diagnosed with post-prandial hypotension, otherwise no changes to health.       RECOMMENDATION/RISKS:  Low likelihood of significant size progression or need for repair of the splenic artery aneurysm, can have surveillance every 3-5 years, no further imaging needed at this time. Unchanged imaging and no growth in size.      REVIEW OF SYSTEMS:    A 12 point ROS was reviewed and is negative except for what is listed above in HPI.    PHH:    Past Medical History:   Diagnosis Date    Age related osteoporosis, unspecified pathological fracture presence 2022    Chronic obstructive pulmonary disease, unspecified COPD type (H) 2022    Essential hypertension, benign     Hyperlipidemia LDL goal <100 2011    Hypertension, unspecified type 3/14/2023    Hypothyroidism due to Hashimoto's thyroiditis 10/10/2022    PMR (polymyalgia rheumatica)     PMR (polymyalgia rheumatica) (H)  -- rheumatologist -- dr Russo 8/3/2020    Unspecified hypothyroidism           Past Surgical History:   Procedure Laterality Date    AS TOTAL HIP ARTHROPLASTY Left     C DEXA, BONE DENSITY, AXIAL SKEL  2003    Normal BMD    CATARACT EXTRACTION Left     COLONOSCOPY N/A 2015    Procedure: COLONOSCOPY;  Surgeon: Joanna Acevedo MD;  Location:  GI    HC REMOVE TONSILS/ADENOIDS,<11 Y/O      LAPAROTOMY EXPLORATORY  1985    Exploratory Laparotomy: constricting band around small bowel, simple lysis    SIGMOIDOSCOPY FLEXIBLE N/A 4/10/2023    Procedure: Sigmoidoscopy flexible without sedation;  Surgeon: Marina Gordon MD;  Location:  GI    SURGICAL HISTORY OF -   2004    macular hole repair- Left eye    [facility] NONSPECIFIC PROCEDURE       x3    [facility]  REPLANTATION THUMB DISTAL,COMPLETE  01/13/2009    left thumb trauma; Dr. Jose surgery       ALLERGIES:  No known drug allergy and Albuterol    MEDS:    Current Outpatient Medications:     acetaminophen (TYLENOL) 325 MG tablet, Take 1-2 tablets (325-650 mg) by mouth every 6 hours as needed for mild pain, Disp: , Rfl:     albuterol (PROVENTIL) (2.5 MG/3ML) 0.083% neb solution, Take 1 vial (2.5 mg) by nebulization every 6 hours as needed for shortness of breath, wheezing or cough (Patient not taking: Reported on 4/30/2025), Disp: , Rfl:     aspirin 81 MG EC tablet, Take 81 mg by mouth daily, Disp: , Rfl:     BIOTIN 5000 PO, , Disp: , Rfl:     blood glucose (NO BRAND SPECIFIED) test strip, Use to test blood sugar 3 times daily or as directed. To accompany: Blood Glucose Monitor Brands: per insurance. (Patient not taking: Reported on 4/30/2025), Disp: 100 strip, Rfl: 0    blood glucose monitoring (NO BRAND SPECIFIED) meter device kit, Use to test blood sugar 3 times daily or as directed. Preferred blood glucose meter OR supplies to accompany: Blood Glucose Monitor Brands: per insurance. (Patient not taking: Reported on 4/30/2025), Disp: 1 kit, Rfl: 0    CALCIUM CITRATE-VITAMIN D 315-200 MG-UNIT PO TABS, 2 TABLETS DAILY, Disp: 90 Tab, Rfl: 3    Cholecalciferol (VITAMIN D3) 25 MCG (1000 UT) CAPS, Take 1 capsule by mouth daily, Disp: , Rfl:     Cranberry 450 MG CAPS, , Disp: , Rfl:     cyanocobalamin (VITAMIN B-12) 2500 MCG SUBL sublingual tablet, Place 2,500 mcg under the tongue daily, Disp: , Rfl:     dextromethorphan-guaiFENesin (MUCINEX DM)  MG 12 hr tablet, Take 1 tablet by mouth every 12 hours, Disp: , Rfl:     doxylamine (UNISOM) 25 MG TABS tablet, Take 25 mg by mouth at bedtime. Taking once a mth, Disp: , Rfl:     Fluticasone-Umeclidin-Vilanterol (TRELEGY ELLIPTA) 100-62.5-25 MCG/INH oral inhaler, Inhale 1 puff into the lungs daily (Patient not taking: Reported on 4/30/2025), Disp: , Rfl:     folic acid 800  MCG tablet, Take 800 mcg by mouth daily., Disp: , Rfl:     GLUCOSAMINE HCL 1000 MG PO TABS, 2 TABLETS DAILY, Disp: 90 Tab, Rfl: 3    LACTOBACILLUS PO, Take 120 mg by mouth daily, Disp: , Rfl:     levothyroxine (SYNTHROID/LEVOTHROID) 88 MCG tablet, Take 1 tablet (88 mcg) by mouth daily., Disp: 90 tablet, Rfl: 3    lisinopril (ZESTRIL) 40 MG tablet, Take 1 tablet (40 mg) by mouth daily -- prescribed by nephrology, Disp: , Rfl:     Lutein 6 MG TABS, Take 20 mg by mouth daily , Disp: , Rfl:     MULTIVITAMIN TABS   OR, 1 daily, Disp: , Rfl:     mupirocin (BACTROBAN) 2 % external ointment, Apply topically 2 times daily. (Patient not taking: Reported on 2025), Disp: , Rfl:     Omega-3 Fish Oil 500 MG capsule, Take 1 capsule (500 mg) by mouth daily, Disp: , Rfl:     polyethylene glycol (MIRALAX) 17 g packet, Take 1 packet by mouth every 24 hours, Disp: , Rfl:     PREBIOTIC PRODUCT PO, , Disp: , Rfl:     predniSONE (DELTASONE) 5 MG tablet, Take 2.5 mg by mouth every other day., Disp: , Rfl:     Probiotic Product (PROBIOTIC DAILY PO), , Disp: , Rfl:     simvastatin (ZOCOR) 10 MG tablet, Take 1 tablet (10 mg) by mouth at bedtime, Disp: 90 tablet, Rfl: 4    thin (NO BRAND SPECIFIED) lancets, Use with lanceting device. To accompany: Blood Glucose Monitor Brands: per insurance. (Patient not taking: Reported on 2025), Disp: 100 each, Rfl: 0    tocilizumab (ACTEMRA) 162 MG/0.9ML subcutaneous injection, Inject 162 mg subcutaneously every 28 days, Disp: , Rfl:     umeclidinium-vilanterol (ANORO ELLIPTA) 62.5-25 MCG/ACT oral inhaler, Inhale 1 puff into the lungs daily., Disp: 180 each, Rfl: 3    SOCIAL HABITS:    History   Smoking Status    Former    Types: Cigarettes   Smokeless Tobacco    Never     Social History    Substance and Sexual Activity      Alcohol use: Not Currently      History   Drug Use No       FAMILY HISTORY:    Family History   Problem Relation Age of Onset    Hypertension Mother           at  100.    Breast Cancer Mother 85    Thyroid Disease Mother     Diabetes Sister     Hypertension Sister     Neurologic Disorder Daughter         MS    Hypertension Son     Ovarian Cancer No family hx of        PE:  LMP  (LMP Unknown)   Wt Readings from Last 1 Encounters:   06/09/25 170 lb (77.1 kg)     There is no height or weight on file to calculate BMI.    DIAGNOSTIC STUDIES:     Images:  CTA Abdomen Pelvis with Contrast  Result Date: 6/18/2025  EXAM: CTA ABDOMEN PELVIS WITH CONTRAST LOCATION: [facility] DATE: 6/18/2025 INDICATION: Splenic artery aneurysm COMPARISON: None. TECHNIQUE: CT angiogram abdomen pelvis during arterial phase of injection of IV contrast. 2D and 3D MIP reconstructions were performed by the CT technologist. Dose reduction techniques were used. CONTRAST: 72mL Isovue 370 FINDINGS: ANGIOGRAM ABDOMEN/PELVIS: The celiac artery, superior mesenteric artery and inferior mesenteric artery are patent. Fusiform mid splenic artery aneurysm with dense peripheral calcification measuring 9 mm. Single right renal artery. 2 renal arteries supply  the left kidney. Dense calcified plaque origin of the right renal artery. Calcified plaque infrarenal abdominal aorta. No evidence of abdominal aortic aneurysm. Calcified plaque common iliac arteries bilaterally. The internal and external iliac arteries  are patent bilaterally. Common femoral, proximal profunda femoral and proximal superficial femoral arteries are patent bilaterally. LOWER CHEST: Bibasilar parenchymal scarring. HEPATOBILIARY: Normal contour with no significant mass. No bile duct dilatation. Calcified gallstones. PANCREAS: Normal. SPLEEN: Normal. ADRENAL GLANDS: Normal. KIDNEYS/BLADDER: Normal. BOWEL: Sigmoid diverticulosis. LYMPH NODES: Normal. PELVIC ORGANS: Artifact arising from left total hip prosthesis. MUSCULOSKELETAL: Severe degenerative changes of the lumbar spine.     IMPRESSION: 1.  Fusiform mid splenic artery aneurysm  with dense peripheral calcification measuring 9 mm.      LABS:      Sodium   Date Value Ref Range Status   08/20/2024 137 135 - 145 mmol/L Final   12/22/2023 137 135 - 145 mmol/L Final     Comment:     Reference intervals for this test were updated on 09/26/2023 to more accurately reflect our healthy population. There may be differences in the flagging of prior results with similar values performed with this method. Interpretation of those prior results can be made in the context of the updated reference intervals.    08/17/2023 131 (L) 136 - 145 mmol/L Final   06/07/2021 136 133 - 144 mmol/L Final   04/20/2020 135 133 - 144 mmol/L Final   06/05/2019 137 133 - 144 mmol/L Final     Urea Nitrogen   Date Value Ref Range Status   08/20/2024 28.4 (H) 8.0 - 23.0 mg/dL Final   12/22/2023 32.1 (H) 8.0 - 23.0 mg/dL Final   08/17/2023 20.8 8.0 - 23.0 mg/dL Final   07/11/2022 24 7 - 30 mg/dL Final   06/07/2021 26 7 - 30 mg/dL Final   04/20/2020 25 7 - 30 mg/dL Final   06/05/2019 24 7 - 30 mg/dL Final     Hemoglobin   Date Value Ref Range Status   08/20/2024 14.5 11.7 - 15.7 g/dL Final   11/08/2023 13.9 11.7 - 15.7 g/dL Final   05/07/2023 14.0 11.7 - 15.7 g/dL Final   06/07/2021 14.1 11.7 - 15.7 g/dL Final   06/05/2019 15.2 11.7 - 15.7 g/dL Final   06/04/2018 13.9 11.7 - 15.7 g/dL Final     Platelet Count   Date Value Ref Range Status   08/20/2024 195 150 - 450 10e3/uL Final   11/08/2023 338 150 - 450 10e3/uL Final   05/07/2023 270 150 - 450 10e3/uL Final   06/07/2021 304 150 - 450 10e9/L Final   06/05/2019 296 150 - 450 10e9/L Final   06/04/2018 272 150 - 450 10e9/L Final     INR   Date Value Ref Range Status   04/22/2011 1.08 0.86 - 1.14 Final   04/14/2011 1.03 0.86 - 1.14 Final   11/29/2008 1.0@         20 minutes spent on the day of encounter doing chart review, history and exam, documentation, and further activities as noted.       Erinn Monzon NP  VASCULAR SURGERY

## 2025-06-26 ENCOUNTER — OFFICE VISIT (OUTPATIENT)
Dept: OTHER | Facility: CLINIC | Age: 89
End: 2025-06-26
Payer: MEDICARE

## 2025-06-26 VITALS — HEART RATE: 78 BPM | DIASTOLIC BLOOD PRESSURE: 70 MMHG | SYSTOLIC BLOOD PRESSURE: 170 MMHG

## 2025-06-26 DIAGNOSIS — I72.8 SPLENIC ARTERY ANEURYSM: Primary | ICD-10-CM

## 2025-06-26 PROCEDURE — G0463 HOSPITAL OUTPT CLINIC VISIT: HCPCS

## 2025-06-26 NOTE — PROGRESS NOTES
[facility]        Patient is here for a  follow up.    Pt is currently taking Aspirin and Statin.    BP (!) 170/70 (BP Location: Left arm, Patient Position: Chair, Cuff Size: Adult Regular)   Pulse 78   LMP  (LMP Unknown)     The provider has been notified that the patient has no concerns.     Questions patient would like addressed today are: N/A.    Refills are needed: N/A    Has homecare services and agency name:  Elsa Garcias MA

## 2025-07-08 ENCOUNTER — OFFICE VISIT (OUTPATIENT)
Dept: INTERNAL MEDICINE | Facility: CLINIC | Age: 89
End: 2025-07-08
Payer: MEDICARE

## 2025-07-08 VITALS
RESPIRATION RATE: 20 BRPM | HEART RATE: 79 BPM | TEMPERATURE: 98 F | WEIGHT: 177.1 LBS | HEIGHT: 64 IN | DIASTOLIC BLOOD PRESSURE: 86 MMHG | OXYGEN SATURATION: 97 % | BODY MASS INDEX: 30.23 KG/M2 | SYSTOLIC BLOOD PRESSURE: 186 MMHG

## 2025-07-08 DIAGNOSIS — I95.89 POSTPRANDIAL HYPOTENSION: ICD-10-CM

## 2025-07-08 DIAGNOSIS — M31.5 GIANT CELL ARTERITIS WITH POLYMYALGIA RHEUMATICA (H): ICD-10-CM

## 2025-07-08 DIAGNOSIS — E03.8 OTHER SPECIFIED HYPOTHYROIDISM: ICD-10-CM

## 2025-07-08 DIAGNOSIS — R01.1 HEART MURMUR: ICD-10-CM

## 2025-07-08 DIAGNOSIS — K11.20 PAROTITIS: Primary | ICD-10-CM

## 2025-07-08 PROCEDURE — 99215 OFFICE O/P EST HI 40 MIN: CPT | Performed by: INTERNAL MEDICINE

## 2025-07-08 PROCEDURE — G2211 COMPLEX E/M VISIT ADD ON: HCPCS | Performed by: INTERNAL MEDICINE

## 2025-07-08 PROCEDURE — 3079F DIAST BP 80-89 MM HG: CPT | Performed by: INTERNAL MEDICINE

## 2025-07-08 PROCEDURE — 3077F SYST BP >= 140 MM HG: CPT | Performed by: INTERNAL MEDICINE

## 2025-07-08 PROCEDURE — 1126F AMNT PAIN NOTED NONE PRSNT: CPT | Performed by: INTERNAL MEDICINE

## 2025-07-08 RX ORDER — LANCETS
EACH MISCELLANEOUS
Qty: 100 EACH | Refills: 0 | Status: CANCELLED | OUTPATIENT
Start: 2025-07-08

## 2025-07-08 ASSESSMENT — PAIN SCALES - GENERAL: PAINLEVEL_OUTOF10: NO PAIN (0)

## 2025-07-08 NOTE — PATIENT INSTRUCTIONS
Plan:  Echocardiogram for heart murmur  -- To schedule this test please call [facility] at: [phone]  -- It is the patient responsibility to check with insurance for coverage before you go for the test.   2. Make sure you drink plenty of water   3. Suck on lemon slices through the day  4. If not better in a week, start Augmentin 1 tablet twice a day -- antibiotic  5. Keep the appointment in Aug.

## 2025-07-08 NOTE — PROGRESS NOTES
Dr Houston's note      Patient's instructions / PLAN:                                                        Plan:  Echocardiogram for heart murmur  -- To schedule this test please call [facility] at: [phone]  -- It is the patient responsibility to check with insurance for coverage before you go for the test.   2. Make sure you drink plenty of water   3. Suck on lemon slices through the day  4. If not better in a week, start Augmentin 1 tablet twice a day -- antibiotic  5. Keep the appointment in Aug.       ASSESSMENT & PLAN:                                                      89-year-old woman with giant cell arteritis ( on Actemra tx)and PMR followed by rheumatology, hypothyroidism, HTN,HLip, COPD presents with swelling and pain on the left jaw.  The pain and swelling of the R jaw are not characteristic for giant cell arteritis and overall she feels well.  The signs and symptoms suggest more of a subacute parotitis.   It turned out to be a complex case because of the antibiotics problems: She contacted that Augmentin causes diarrhea.  Then I prescribed cephalexin but she was reluctant to take it because she has remote history of side effects which she does not remember details about.  The next option would have been levofloxacin (which she thinks she might have had a reaction to it, but is not documented in the chart) or clindamycin plus metronidazole(which are not friendly to the GI tract).  After many communications she decided to continue with the cephalexin.  Incidental finding is the heart murmur for which she will do an echocardiogram.    (K11.20) Parotitis  (primary encounter diagnosis)  Comment:   Plan: amoxicillin-clavulanate (AUGMENTIN) 875-125 MG       (R01.1) Heart murmur  Comment:   Plan: Echocardiogram Complete            (I95.89) Postprandial hypotension  Comment: It is a continual struggle and she tries to stay well-hydrated  Plan:     (E03.8) Hypothyroidism - f/u w Endo - dr Meyers  "  Comment: Controlled  Plan:     (M31.5) Giant cell arteritis + PMR -- f/up w rheum - dr Russo (H)  Comment: Stable  Plan:        Chief complaint:                                                      R jaw pain    SUBJECTIVE:                                                    History of present illness:    R jaw pain  -- when she wakes up she feels the jaw angle swollen and painful. It resolves later in the day   -- x 3 m    PMR and giant cell arteritis     Postprandoial hypotension: the BP dropps from 150 to 98. The BP machine records also irreg heart. Zio 2023 -- SVT, no AFib    Subjective   Bushra is a 89 year old, presenting for the following health issues:  Consult (Persistent headaches and facial swelling.)      7/8/2025     7:26 AM   Additional Questions   Roomed by Irasema Eid   Accompanied by self     History of Present Illness       Reason for visit:  Headaches and facial swelling    She eats 4 or more servings of fruits and vegetables daily.She consumes 0 sweetened beverage(s) daily.She exercises with enough effort to increase her heart rate 30 to 60 minutes per day.  She exercises with enough effort to increase her heart rate 5 days per week.   She is taking medications regularly.          Review of Systems:                                                      ROS: negative for fever, chills, cough, wheezes, chest pain, shortness of breath, vomiting, abdominal pain, leg swelling       OBJECTIVE:             Physical exam:  Blood pressure (!) 186/86, pulse 79, temperature 98  F (36.7  C), temperature source Oral, resp. rate 20, height 1.626 m (5' 4\"), weight 80.3 kg (177 lb 1.6 oz), SpO2 97%, not currently breastfeeding.     NAD, appears comfortable  Skin: no rashes   HEENT: PERRLA, EOMI, pink conjunctiva, anicteric sclerae, bilateral tympanic membranes are clinically normal,  Mild tender over the R parotid  Neck: supple, no JVD,  No thyroidmegaly. Lymph nodes nonpalpable cervical and " supraclavicular.  Chest: clear to auscultation bilaterally, good respiratory effort  Heart: S1 S2, RRR, no mgr appreciated  Abdomen: soft, not tender,   Extremities: no edema,  Neurologic: A, Ox3, no focal signs appreciated  Musculoskeletal: the paraspinal muscles in the cervical and upper trapezius  are tender and tense on palpation     PMHx: reviewed  Past Medical History:   Diagnosis Date    Age related osteoporosis, unspecified pathological fracture presence 2022    Chronic obstructive pulmonary disease, unspecified COPD type (H) 2022    Essential hypertension, benign     Hyperlipidemia LDL goal <100 2011    Hypertension, unspecified type 3/14/2023    Hypothyroidism due to Hashimoto's thyroiditis 10/10/2022    PMR (polymyalgia rheumatica)     PMR (polymyalgia rheumatica) (H)  -- rheumatologist -- dr Russo 8/3/2020    Unspecified hypothyroidism       PSHx: reviewed  Past Surgical History:   Procedure Laterality Date    AS TOTAL HIP ARTHROPLASTY Left     C DEXA, BONE DENSITY, AXIAL SKEL  2003    Normal BMD    CATARACT EXTRACTION Left     COLONOSCOPY N/A 2015    Procedure: COLONOSCOPY;  Surgeon: Joanna Acevedo MD;  Location:  GI    HC REMOVE TONSILS/ADENOIDS,<11 Y/O      LAPAROTOMY EXPLORATORY  1985    Exploratory Laparotomy: constricting band around small bowel, simple lysis    SIGMOIDOSCOPY FLEXIBLE N/A 4/10/2023    Procedure: Sigmoidoscopy flexible without sedation;  Surgeon: Marina Gordon MD;  Location:  GI    SURGICAL HISTORY OF -   2004    macular hole repair- Left eye    [facility] NONSPECIFIC PROCEDURE       x3    [facility] REPLANTATION THUMB DISTAL,COMPLETE  2009    left thumb trauma; Dr. Jose surgery        Meds: reviewed  Current Outpatient Medications   Medication Sig Dispense Refill    acetaminophen (TYLENOL) 325 MG tablet Take 1-2 tablets (325-650 mg) by mouth every 6 hours as needed for mild pain      aspirin 81 MG EC tablet Take 81 mg by mouth daily       BIOTIN 5000 PO       CALCIUM CITRATE-VITAMIN D 315-200 MG-UNIT PO TABS 2 TABLETS DAILY 90 Tab 3    Cholecalciferol (VITAMIN D3) 25 MCG (1000 UT) CAPS Take 1 capsule by mouth daily      Cranberry 450 MG CAPS       cyanocobalamin (VITAMIN B-12) 2500 MCG SUBL sublingual tablet Place 2,500 mcg under the tongue daily      dextromethorphan-guaiFENesin (MUCINEX DM)  MG 12 hr tablet Take 1 tablet by mouth every 12 hours      doxylamine (UNISOM) 25 MG TABS tablet Take 25 mg by mouth at bedtime. Taking once a mth      folic acid 800 MCG tablet Take 800 mcg by mouth daily.      GLUCOSAMINE HCL 1000 MG PO TABS 2 TABLETS DAILY 90 Tab 3    LACTOBACILLUS PO Take 120 mg by mouth daily      levothyroxine (SYNTHROID/LEVOTHROID) 88 MCG tablet Take 1 tablet (88 mcg) by mouth daily. 90 tablet 3    lisinopril (ZESTRIL) 40 MG tablet Take 1 tablet (40 mg) by mouth daily -- prescribed by nephrology      Lutein 6 MG TABS Take 20 mg by mouth daily       MULTIVITAMIN TABS   OR 1 daily      Omega-3 Fish Oil 500 MG capsule Take 1 capsule (500 mg) by mouth daily      polyethylene glycol (MIRALAX) 17 g packet Take 1 packet by mouth every 24 hours      PREBIOTIC PRODUCT PO       predniSONE (DELTASONE) 5 MG tablet Take 2.5 mg by mouth every other day.      Probiotic Product (PROBIOTIC DAILY PO)       simvastatin (ZOCOR) 10 MG tablet Take 1 tablet (10 mg) by mouth at bedtime 90 tablet 4    tocilizumab (ACTEMRA) 162 MG/0.9ML subcutaneous injection Inject 162 mg subcutaneously every 28 days      umeclidinium-vilanterol (ANORO ELLIPTA) 62.5-25 MCG/ACT oral inhaler Inhale 1 puff into the lungs daily. 180 each 3    albuterol (PROVENTIL) (2.5 MG/3ML) 0.083% neb solution Take 1 vial (2.5 mg) by nebulization every 6 hours as needed for shortness of breath, wheezing or cough (Patient not taking: Reported on 7/8/2025)      blood glucose (NO BRAND SPECIFIED) test strip Use to test blood sugar 3 times daily or as directed. To accompany: Blood Glucose  Monitor Brands: per insurance. (Patient not taking: Reported on 7/8/2025) 100 strip 0    blood glucose monitoring (NO BRAND SPECIFIED) meter device kit Use to test blood sugar 3 times daily or as directed. Preferred blood glucose meter OR supplies to accompany: Blood Glucose Monitor Brands: per insurance. (Patient not taking: Reported on 4/30/2025) 1 kit 0    Fluticasone-Umeclidin-Vilanterol (TRELEGY ELLIPTA) 100-62.5-25 MCG/INH oral inhaler Inhale 1 puff into the lungs daily (Patient not taking: Reported on 7/8/2025)      mupirocin (BACTROBAN) 2 % external ointment Apply topically 2 times daily. (Patient not taking: Reported on 7/8/2025)      thin (NO BRAND SPECIFIED) lancets Use with lanceting device. To accompany: Blood Glucose Monitor Brands: per insurance. (Patient not taking: Reported on 4/30/2025) 100 each 0       Soc Hx: reviewed  Fam Hx: reviewed      Chart documentation was completed, in part, with [employer] voice-recognition software. Even though reviewed, some grammatical, spelling, and word errors may remain.    The longitudinal plan of care for the diagnosis(es)/condition(s) as documented were addressed during this visit. Due to the added complexity in care, I will continue to support Bushra in the subsequent management and with ongoing continuity of care.     Natalie Houston MD  Internal Medicine           Signed Electronically by: Natalie Bro MD

## 2025-07-14 ENCOUNTER — PATIENT OUTREACH (OUTPATIENT)
Dept: CARE COORDINATION | Facility: CLINIC | Age: 89
End: 2025-07-14
Payer: MEDICARE

## 2025-07-17 PROBLEM — M47.812 ARTHROPATHY OF CERVICAL SPINE: Status: RESOLVED | Noted: 2025-05-14 | Resolved: 2025-07-17

## 2025-07-18 ENCOUNTER — TRANSFERRED RECORDS (OUTPATIENT)
Dept: HEALTH INFORMATION MANAGEMENT | Facility: CLINIC | Age: 89
End: 2025-07-18
Payer: MEDICARE

## 2025-07-29 ENCOUNTER — NURSE TRIAGE (OUTPATIENT)
Dept: INTERNAL MEDICINE | Facility: CLINIC | Age: 89
End: 2025-07-29
Payer: MEDICARE

## 2025-07-29 NOTE — TELEPHONE ENCOUNTER
"S-(situation): Pt calling about return of neck/jaw soreness and swelling.     B-(background): Seen 7/8/25, course of Cephalexin was not effective.     A-(assessment): Swelling and soreness of right side of neck/jaw below ear since April.  Pt reports Cephalexin was ineffective and after finishing it she was having some eye pain. Due to giant cell arteritis she called rheumatology and was put on a prednisone burst. The prednisone burst also decreased the swelling of soreness of the neck. She saw ENT on 7/18 and she reports he was \"dismissive and rude\". He said it wasn't cancer but couldn't say what it is and did not have any recommendations. Since ending prednisone burst the swelling and soreness has returned. She reports it is sore mostly in the AM and improves throughout the day. She is able to go about her day without interference.     R-(recommendations): Pt asking what next steps are and wondering if imaging would be recommended.     Dimple Littlejohn RN [facility]         Reason for Disposition   Nursing judgment    Protocols used: No Protocol [id number]    "

## 2025-07-29 NOTE — TELEPHONE ENCOUNTER
Since she had some improvement with the prednisone I suggest to contact the rheumatology if she needs to stay on prednisone  I also recommend reassessment in the clinic, Jennifer MATSON okay

## 2025-07-29 NOTE — TELEPHONE ENCOUNTER
Advised patient of provider recommendation via telephone. Patient was placed on bursts for giant cell arthritis, unrelated to swelling and soreness of neck and jaw.     Appointments in Next Year      Jul 30, 2025 3:00 PM  (Arrive by 2:40 PM)  Provider Visit with GRANT Clemons CNP  [facility] ([facility] ) [phone]       Summer RN 9:52 AM July 29, 2025   [facility]

## 2025-07-30 ENCOUNTER — TELEPHONE (OUTPATIENT)
Dept: INTERNAL MEDICINE | Facility: CLINIC | Age: 89
End: 2025-07-30

## 2025-07-30 ENCOUNTER — OFFICE VISIT (OUTPATIENT)
Dept: INTERNAL MEDICINE | Facility: CLINIC | Age: 89
End: 2025-07-30
Payer: MEDICARE

## 2025-07-30 VITALS
BODY MASS INDEX: 29.88 KG/M2 | SYSTOLIC BLOOD PRESSURE: 128 MMHG | TEMPERATURE: 98.1 F | HEART RATE: 84 BPM | RESPIRATION RATE: 20 BRPM | DIASTOLIC BLOOD PRESSURE: 68 MMHG | HEIGHT: 64 IN | OXYGEN SATURATION: 97 % | WEIGHT: 175 LBS

## 2025-07-30 DIAGNOSIS — K11.20 PAROTITIS: Primary | ICD-10-CM

## 2025-07-30 DIAGNOSIS — R22.1 NECK SWELLING: Primary | ICD-10-CM

## 2025-07-30 DIAGNOSIS — R22.0 RIGHT FACIAL SWELLING: ICD-10-CM

## 2025-07-30 PROCEDURE — 1125F AMNT PAIN NOTED PAIN PRSNT: CPT

## 2025-07-30 PROCEDURE — G2211 COMPLEX E/M VISIT ADD ON: HCPCS

## 2025-07-30 PROCEDURE — 3074F SYST BP LT 130 MM HG: CPT

## 2025-07-30 PROCEDURE — 3078F DIAST BP <80 MM HG: CPT

## 2025-07-30 PROCEDURE — 99214 OFFICE O/P EST MOD 30 MIN: CPT

## 2025-07-30 ASSESSMENT — PAIN SCALES - GENERAL: PAINLEVEL_OUTOF10: MODERATE PAIN (5)

## 2025-07-30 NOTE — TELEPHONE ENCOUNTER
Patient Returning Call    Reason for call:  confirmation on CT order    Information relayed to patient:  Agent will leave a message care team    Patient has additional questions:  Yes    What are your questions/concerns:  Pt sees the active order, but it states   Does this also include the neck? If not an order will need to be submitted per pt, prior to scheduling the appt. Pls call and advise.     Could we [facility] this information to you [facility] or would you prefer to receive a phone call?:   Patient would prefer a phone call   Okay to leave a detailed message?: Yes at Cell number on file:    Telephone Information:   Mobile [phone]

## 2025-07-30 NOTE — PROGRESS NOTES
"  Assessment & Plan     Parotitis  Patient just recently treated with Augmentin for parotitis.  Patient states it did not help her at all however upon exam patient does not have any right facial swelling.  Patient states she wakes up in the morning with facial swelling and it decreases throughout the day.  She would like to get a CT scan.  At this time I feel the most appropriate CT scan would be the CT facial bones to target her jaw area.  Patient agrees with the plan.  Order placed.  - CT Facial Bones without Contrast; Future    Right facial swelling  - CT Facial Bones without Contrast; Future      30 minutes spent by me on the date of the encounter doing chart review, review of test results, interpretation of tests, patient visit, and documentation         Subjective   Bushra is a 89 year old, presenting for the following health issues:  Patient is here today for a complaint of right facial and neck swelling. Was recently treated for parotitis and states it did not improve it.   The swelling is the worst in the AM and then the swelling improves.   PCP advised her to see rheumatology. They do not feel it is their wheel house.   She does have giant cell arteritis   She was put on steroids and that got rid of it for the time being and then once she tapered off of it, it came back.   She saw ENT- they kind of blew her off.   Was told she does not have cancer, bumps or lumps.   She does notice some clicking in her jaw with chewing and opening her mouth wide.       Neck Pain    HPI                Review of Systems  Constitutional, HEENT, cardiovascular, pulmonary, gi and gu systems are negative, except as otherwise noted.      Objective    /68   Pulse 84   Temp 98.1  F (36.7  C)   Resp 20   Ht 1.626 m (5' 4\")   Wt 79.4 kg (175 lb)   LMP  (LMP Unknown)   SpO2 97%   Breastfeeding No   BMI 30.04 kg/m    Body mass index is 30.04 kg/m .  Physical Exam   GENERAL: alert and no distress  EYES: Eyes grossly normal " to inspection, PERRL and conjunctivae and sclerae normal  HENT: ear canals and TM's normal, nose and mouth without ulcers or lesions  NECK: no adenopathy, no asymmetry, masses, or scars  NEURO: Normal strength and tone, mentation intact and speech normal            Signed Electronically by: GRANT Clemons CNP

## 2025-08-08 ENCOUNTER — HOSPITAL ENCOUNTER (OUTPATIENT)
Dept: MAMMOGRAPHY | Facility: CLINIC | Age: 89
Discharge: HOME | End: 2025-08-08
Attending: INTERNAL MEDICINE | Admitting: INTERNAL MEDICINE
Payer: MEDICARE

## 2025-08-08 DIAGNOSIS — Z12.31 VISIT FOR SCREENING MAMMOGRAM: ICD-10-CM

## 2025-08-08 PROCEDURE — 77067 SCR MAMMO BI INCL CAD: CPT

## 2025-08-17 ENCOUNTER — HOSPITAL ENCOUNTER (OUTPATIENT)
Dept: CT IMAGING | Facility: CLINIC | Age: 89
Discharge: HOME | End: 2025-08-17
Payer: MEDICARE

## 2025-08-17 DIAGNOSIS — R22.1 NECK SWELLING: ICD-10-CM

## 2025-08-17 PROCEDURE — 250N000011 HC RX IP 250 OP 636

## 2025-08-17 PROCEDURE — 70491 CT SOFT TISSUE NECK W/DYE: CPT

## 2025-08-17 RX ORDER — IOPAMIDOL 755 MG/ML
90 INJECTION, SOLUTION INTRAVASCULAR ONCE
Status: COMPLETED | OUTPATIENT
Start: 2025-08-17 | End: 2025-08-17

## 2025-08-17 RX ADMIN — IOPAMIDOL 90 ML: 755 INJECTION, SOLUTION INTRAVENOUS at 15:15

## 2025-08-20 ENCOUNTER — TELEPHONE (OUTPATIENT)
Dept: INTERNAL MEDICINE | Facility: CLINIC | Age: 89
End: 2025-08-20
Payer: MEDICARE

## 2025-08-21 ENCOUNTER — OFFICE VISIT (OUTPATIENT)
Dept: INTERNAL MEDICINE | Facility: CLINIC | Age: 89
End: 2025-08-21
Payer: MEDICARE

## 2025-08-21 VITALS
WEIGHT: 178 LBS | TEMPERATURE: 97.8 F | RESPIRATION RATE: 14 BRPM | HEIGHT: 64 IN | HEART RATE: 95 BPM | OXYGEN SATURATION: 96 % | SYSTOLIC BLOOD PRESSURE: 152 MMHG | BODY MASS INDEX: 30.39 KG/M2 | DIASTOLIC BLOOD PRESSURE: 84 MMHG

## 2025-08-21 DIAGNOSIS — E78.5 HYPERLIPIDEMIA LDL GOAL <100: ICD-10-CM

## 2025-08-21 DIAGNOSIS — M35.3 PMR (POLYMYALGIA RHEUMATICA): ICD-10-CM

## 2025-08-21 DIAGNOSIS — R22.0 JAW SWELLING: ICD-10-CM

## 2025-08-21 DIAGNOSIS — M31.5 GIANT CELL ARTERITIS WITH POLYMYALGIA RHEUMATICA (H): ICD-10-CM

## 2025-08-21 DIAGNOSIS — I95.89 POSTPRANDIAL HYPOTENSION: ICD-10-CM

## 2025-08-21 DIAGNOSIS — I10 HTN, GOAL BELOW 150/90: ICD-10-CM

## 2025-08-21 DIAGNOSIS — J44.9 CHRONIC OBSTRUCTIVE PULMONARY DISEASE, UNSPECIFIED COPD TYPE (H): ICD-10-CM

## 2025-08-21 DIAGNOSIS — E03.8 OTHER SPECIFIED HYPOTHYROIDISM: ICD-10-CM

## 2025-08-21 DIAGNOSIS — Z00.00 ROUTINE GENERAL MEDICAL EXAMINATION AT A HEALTH CARE FACILITY: Primary | ICD-10-CM

## 2025-08-21 RX ORDER — SIMVASTATIN 10 MG
10 TABLET ORAL AT BEDTIME
Qty: 90 TABLET | Refills: 4 | Status: SHIPPED | OUTPATIENT
Start: 2025-08-21

## 2025-08-21 SDOH — HEALTH STABILITY: PHYSICAL HEALTH: ON AVERAGE, HOW MANY DAYS PER WEEK DO YOU ENGAGE IN MODERATE TO STRENUOUS EXERCISE (LIKE A BRISK WALK)?: 4 DAYS

## 2025-08-21 ASSESSMENT — SOCIAL DETERMINANTS OF HEALTH (SDOH): HOW OFTEN DO YOU GET TOGETHER WITH FRIENDS OR RELATIVES?: THREE TIMES A WEEK

## 2025-08-29 ENCOUNTER — TRANSFERRED RECORDS (OUTPATIENT)
Dept: HEALTH INFORMATION MANAGEMENT | Facility: CLINIC | Age: 89
End: 2025-08-29

## 2025-08-29 ENCOUNTER — HOSPITAL ENCOUNTER (OUTPATIENT)
Dept: CARDIOLOGY | Facility: CLINIC | Age: 89
Discharge: HOME | End: 2025-08-29
Attending: INTERNAL MEDICINE | Admitting: INTERNAL MEDICINE
Payer: MEDICARE

## 2025-08-29 DIAGNOSIS — R01.1 HEART MURMUR: ICD-10-CM

## 2025-08-29 LAB — LVEF ECHO: NORMAL

## 2025-08-29 PROCEDURE — 93306 TTE W/DOPPLER COMPLETE: CPT | Mod: 26 | Performed by: INTERNAL MEDICINE

## 2025-08-29 PROCEDURE — 93306 TTE W/DOPPLER COMPLETE: CPT

## (undated) DEVICE — INFLATION DEVICE BIG 60 ENDO-AN6012

## (undated) DEVICE — KIT ENDO TURNOVER/PROCEDURE W/CLEAN A SCOPE LINERS 103888

## (undated) RX ORDER — SIMETHICONE 40MG/0.6ML
SUSPENSION, DROPS(FINAL DOSAGE FORM)(ML) ORAL
Status: DISPENSED
Start: 2023-04-10